# Patient Record
Sex: MALE | Race: WHITE | NOT HISPANIC OR LATINO | Employment: OTHER | URBAN - METROPOLITAN AREA
[De-identification: names, ages, dates, MRNs, and addresses within clinical notes are randomized per-mention and may not be internally consistent; named-entity substitution may affect disease eponyms.]

---

## 2017-02-02 ENCOUNTER — DOCUMENTATION ONLY (OUTPATIENT)
Dept: FAMILY MEDICINE | Facility: CLINIC | Age: 77
End: 2017-02-02

## 2017-02-02 NOTE — PROGRESS NOTES
Pre-Visit Chart Review  For Appointment Scheduled on 02/03/2017    Health Maintenance Due   Topic Date Due    Pneumococcal (65+) (2 of 2 - PCV13) 10/03/2009    Influenza Vaccine  08/01/2016

## 2017-02-03 ENCOUNTER — LAB VISIT (OUTPATIENT)
Dept: LAB | Facility: HOSPITAL | Age: 77
End: 2017-02-03
Attending: FAMILY MEDICINE
Payer: MEDICARE

## 2017-02-03 ENCOUNTER — OFFICE VISIT (OUTPATIENT)
Dept: FAMILY MEDICINE | Facility: CLINIC | Age: 77
End: 2017-02-03
Payer: MEDICARE

## 2017-02-03 VITALS
TEMPERATURE: 98 F | WEIGHT: 179 LBS | HEART RATE: 89 BPM | DIASTOLIC BLOOD PRESSURE: 74 MMHG | BODY MASS INDEX: 25.62 KG/M2 | SYSTOLIC BLOOD PRESSURE: 138 MMHG | HEIGHT: 70 IN

## 2017-02-03 DIAGNOSIS — Z12.5 SCREENING FOR PROSTATE CANCER: ICD-10-CM

## 2017-02-03 DIAGNOSIS — I10 ESSENTIAL HYPERTENSION: ICD-10-CM

## 2017-02-03 DIAGNOSIS — E78.5 HYPERLIPIDEMIA, UNSPECIFIED HYPERLIPIDEMIA TYPE: ICD-10-CM

## 2017-02-03 DIAGNOSIS — Z79.899 OTHER LONG TERM (CURRENT) DRUG THERAPY: ICD-10-CM

## 2017-02-03 DIAGNOSIS — B37.2 CANDIDAL INTERTRIGO: Primary | ICD-10-CM

## 2017-02-03 DIAGNOSIS — B37.2 CANDIDAL INTERTRIGO: ICD-10-CM

## 2017-02-03 LAB
ALBUMIN SERPL BCP-MCNC: 4.3 G/DL
ALP SERPL-CCNC: 68 U/L
ALT SERPL W/O P-5'-P-CCNC: 17 U/L
ANION GAP SERPL CALC-SCNC: 11 MMOL/L
AST SERPL-CCNC: 25 U/L
BILIRUB SERPL-MCNC: 1 MG/DL
BUN SERPL-MCNC: 16 MG/DL
CALCIUM SERPL-MCNC: 9.3 MG/DL
CHLORIDE SERPL-SCNC: 105 MMOL/L
CHOLEST/HDLC SERPL: 2.5 {RATIO}
CO2 SERPL-SCNC: 24 MMOL/L
COMPLEXED PSA SERPL-MCNC: 2.4 NG/ML
CREAT SERPL-MCNC: 0.8 MG/DL
EST. GFR  (AFRICAN AMERICAN): >60 ML/MIN/1.73 M^2
EST. GFR  (NON AFRICAN AMERICAN): >60 ML/MIN/1.73 M^2
GLUCOSE SERPL-MCNC: 150 MG/DL
HDL/CHOLESTEROL RATIO: 39.9 %
HDLC SERPL-MCNC: 153 MG/DL
HDLC SERPL-MCNC: 61 MG/DL
LDLC SERPL CALC-MCNC: 66 MG/DL
NONHDLC SERPL-MCNC: 92 MG/DL
POTASSIUM SERPL-SCNC: 3.7 MMOL/L
PROT SERPL-MCNC: 7 G/DL
SODIUM SERPL-SCNC: 140 MMOL/L
TRIGL SERPL-MCNC: 130 MG/DL

## 2017-02-03 PROCEDURE — 84153 ASSAY OF PSA TOTAL: CPT

## 2017-02-03 PROCEDURE — 99999 PR PBB SHADOW E&M-EST. PATIENT-LVL III: CPT | Mod: PBBFAC,,, | Performed by: PHYSICIAN ASSISTANT

## 2017-02-03 PROCEDURE — 83036 HEMOGLOBIN GLYCOSYLATED A1C: CPT

## 2017-02-03 PROCEDURE — 80053 COMPREHEN METABOLIC PANEL: CPT

## 2017-02-03 PROCEDURE — 99214 OFFICE O/P EST MOD 30 MIN: CPT | Mod: S$PBB,,, | Performed by: PHYSICIAN ASSISTANT

## 2017-02-03 PROCEDURE — 36415 COLL VENOUS BLD VENIPUNCTURE: CPT | Mod: PO

## 2017-02-03 PROCEDURE — 80061 LIPID PANEL: CPT

## 2017-02-03 RX ORDER — FLUCONAZOLE 150 MG/1
150 TABLET ORAL DAILY
Qty: 1 TABLET | Refills: 0 | Status: SHIPPED | OUTPATIENT
Start: 2017-02-03 | End: 2017-02-04

## 2017-02-03 RX ORDER — NYSTATIN AND TRIAMCINOLONE ACETONIDE 100000; 1 [USP'U]/G; MG/G
CREAM TOPICAL 2 TIMES DAILY
Qty: 60 G | Refills: 0 | Status: SHIPPED | OUTPATIENT
Start: 2017-02-03 | End: 2017-05-25 | Stop reason: SDUPTHER

## 2017-02-03 NOTE — PATIENT INSTRUCTIONS
Candida Skin Infection (Adult)  Candida is type of yeast. It grows naturally on the skin and in the mouth. If it grows out of control, it can cause an infection. Candida can cause infections in the genital area, skin folds, in the mouth, and under the breasts. Anyone can get this infection. It is more common in a person with a weak immune system, such as from diabetes, HIV, or cancer. Its also more common in someone who has been on antibiotic therapy. And its more common people who are overweight or who have incontinence. Wearing tight-fitting clothing and taking part in activities with lots of skin-to-skin contact can also put you at risk.  Candida causes the skin to become bright red and inflamed. The border of the infected part of the skin is often raised. The infection causes pain and itching. Sometimes the skin peels and bleeds. In the mouth, candida is called thrush, and may cause white thickened areas.  A Candida rash is most often treated with an antifungal cream or ointment. The rash will clear a few days after starting the medicine. Infections that dont go away may need a prescription medicine. In rare cases, a bacterial infection can also occur.  Home care  Your healthcare provider will recommend an antifungal cream or ointment for the rash. He or she may also prescribe a medicine for the itch. Follow all instructions for using these medicines. Dont use cornstarch powder. Cornstarch can cause the Candida infection to get worse.  General care:  · Keep your skin clean by washing the area twice a day.  · Use the cream as directed until your rash is gone. Once the skin has healed, keep it dry to prevent another infection.   · If you are overweight, talk with your healthcare provider about a plan to lose excess weight.  · Avoid clothes that fit tightly.  Follow-up care  Follow up with your healthcare provider, or as advised. Your rash will clear in 7 to 14 days. Call your healthcare provider if the rash  is not gone after 14 days.  When to seek medical advice  Call your healthcare provider right away if any of these occur:  · Pain or redness that gets worse or spreads  · Fluid coming from the skin  · Yellow crusts on the skin  · Fever of 100.4°F (38°C) or higher, or as directed by your healthcare provider  Date Last Reviewed: 9/1/2016  © 9469-5755 [employer]. [address] [place] [address], [place] [address]. All rights reserved. This information is not intended as a substitute for professional medical care. Always follow your healthcare professional's instructions.

## 2017-02-03 NOTE — PROGRESS NOTES
Subjective:       Patient ID: Iftikhar Lynn is a 76 y.o. male.    Chief Complaint: Abdominal Pain    HPI Comments: Patient with history of internal and external hemorrhoids presented for evaluation of hemorrhoids and tender rash in the groin.  He is using proctofoam without relief.  He has tried zinc oxide in the inguinal folds with some relief.      Groin Pain   The patient's primary symptoms include genital itching and testicular pain. The patient's pertinent negatives include no scrotal swelling. This is a new problem. The current episode started more than 1 month ago. The problem occurs constantly. The problem has been unchanged. Associated symptoms include anorexia and a rash. Pertinent negatives include no abdominal pain, chills, constipation, diarrhea, dysuria, fever, flank pain, frequency, hematuria, hesitancy, nausea, urgency or vomiting. The testicular pain affects both testicles. The color of the testicles is red.     Review of Systems   Constitutional: Negative for appetite change, chills, diaphoresis, fatigue and fever.   Gastrointestinal: Positive for anal bleeding and anorexia. Negative for abdominal pain, blood in stool, constipation, diarrhea, nausea and vomiting.   Genitourinary: Positive for testicular pain. Negative for decreased urine volume, difficulty urinating, dysuria, flank pain, frequency, genital sores, hematuria, hesitancy, scrotal swelling and urgency.   Skin: Positive for rash.       Objective:      Physical Exam   Constitutional: He appears well-developed and well-nourished. No distress.   Cardiovascular: Normal rate, regular rhythm and normal heart sounds.    Pulmonary/Chest: Effort normal and breath sounds normal.   Abdominal: Soft. Bowel sounds are normal. He exhibits no distension. There is no tenderness. A hernia is present. Hernia confirmed positive in the right inguinal area.   Genitourinary: Testes normal and penis normal. Rectal exam shows no external hemorrhoid.  Right testis shows no swelling and no tenderness. Left testis shows no swelling and no tenderness. No penile erythema.   Lymphadenopathy: No inguinal adenopathy noted on the right or left side.   Skin: Rash noted.   Beefy red rash with satellite lesions gluteal crease, beatriz rectal and bilateral inguinal folds     Vitals reviewed.      Assessment:       1. Candidal intertrigo    2. Essential hypertension    3. Hyperlipidemia, unspecified hyperlipidemia type    4. Screening for prostate cancer    5. Other long term (current) drug therapy         Plan:       Iftikhar was seen today for abdominal pain.    Diagnoses and all orders for this visit:    Candidal intertrigo    fluconazole (DIFLUCAN) 150 MG Tab; Take 1 tablet (150 mg total) by mouth once daily.  -     nystatin-triamcinolone (MYCOLOG II) cream; Apply topically 2 (two) times daily.        -     Essential hypertension  -     Comprehensive metabolic panel; Future  -     Lipid panel; Future  -     Urinalysis; Future  -     Hemoglobin A1c; Future    Hyperlipidemia, unspecified hyperlipidemia type  -     Comprehensive metabolic panel; Future  -     Lipid panel; Future  -     Hemoglobin A1c; Future    Screening for prostate cancer  -     PSA, Screening; Future    Other long term (current) drug therapy   -     Hemoglobin A1c; Future

## 2017-02-03 NOTE — MR AVS SNAPSHOT
[facility]  [address] [place] [address]  Phone: [phone]  Fax: [phone]                  Iftikhar Lynn   2/3/2017 8:40 AM   Office Visit    Description:  Male : 1940   Provider:  TREVON Nieves   Department:  [place] [facility]           Reason for Visit     Abdominal Pain           Diagnoses this Visit        Comments    Candidal intertrigo    -  Primary     Essential hypertension         Hyperlipidemia, unspecified hyperlipidemia type         Screening for prostate cancer         Other long term (current) drug therapy                To Do List           Future Appointments        Provider Department Dept Phone    2017 8:30 AM Chris Portillo Jr., MD [facility] [phone]      Goals (5 Years of Data)     None      Follow-Up and Disposition     Return for lab today ok, PCP follow up routine .       These Medications        Disp Refills Start End    fluconazole (DIFLUCAN) 150 MG Tab 1 tablet 0 2/3/2017 2017    Take 1 tablet (150 mg total) by mouth once daily. - Oral    Pharmacy: [facility]/pharmacy #[id number] - [place] - [address] Ph #: [phone]       nystatin-triamcinolone (MYCOLOG II) cream 60 g 0 2/3/2017     Apply topically 2 (two) times daily. - Topical (Top)    Pharmacy: [facility]/pharmacy #[id number] - [place] - [address] [employer] Ph #: [phone]         [facility] On Call     [facility] On Call Nurse Care Line -  Assistance  Registered nurses in the [facility] provide clinical advisement, health education, appointment booking, and other advisory services.  Call for this free service at [phone].             Medications           Message regarding Medications     Verify the changes and/or additions to your medication regime listed below are the same as discussed with your clinician today.  If any of these changes or additions are incorrect, please notify your healthcare provider.        START  "taking these NEW medications        Refills    fluconazole (DIFLUCAN) 150 MG Tab 0    Sig: Take 1 tablet (150 mg total) by mouth once daily.    Class: Normal    Route: Oral    nystatin-triamcinolone (MYCOLOG II) cream 0    Sig: Apply topically 2 (two) times daily.    Class: Normal    Route: Topical (Top)           Verify that the below list of medications is an accurate representation of the medications you are currently taking.  If none reported, the list may be blank. If incorrect, please contact your healthcare provider. Carry this list with you in case of emergency.           Current Medications     aspirin 81 MG chewable tablet Take 81 mg by mouth once daily.      hydrocortisone-pramoxine (PROCTOFOAM HC) rectal foam APPLY ONE APPLICATION RECTALLY TWICE DAILY AS DIRECTED    irbesartan (AVAPRO) 150 MG tablet TAKE 1 TABLET DAILY    meloxicam (MOBIC) 15 MG tablet Take 1 tablet (15 mg total) by mouth daily as needed for Pain.    omeprazole (PRILOSEC OTC) 20 MG tablet Take 20 mg by mouth once daily.     simvastatin (ZOCOR) 40 MG tablet TAKE 1 TABLET NIGHTLY    fluconazole (DIFLUCAN) 150 MG Tab Take 1 tablet (150 mg total) by mouth once daily.    FLUZONE HIGH-DOSE 2016-17, PF, 180 mcg/0.5 mL Syrg ADM 0.5ML IM UTD    nystatin-triamcinolone (MYCOLOG II) cream Apply topically 2 (two) times daily.    valacyclovir (VALTREX) 500 MG tablet Take 1 tablet (500 mg total) by mouth 2 (two) times daily.           Clinical Reference Information           Your Vitals Were     BP Pulse Temp Height Weight BMI    138/74 (BP Method: Manual) 89 98.3 °F (36.8 °C) (Oral) 5' 10" (1.778 m) 81.2 kg (179 lb 0.2 oz) 25.69 kg/m2      Blood Pressure          Most Recent Value    BP  138/74      Allergies as of 2/3/2017     Erythromycin    Iodine    Other    Shellfish Derived      Immunizations Administered on Date of Encounter - 2/3/2017     None      Orders Placed During Today's Visit     Future Labs/Procedures Expected by Expires    " Comprehensive metabolic panel  2/3/2017 4/4/2018    Hemoglobin A1c  2/3/2017 4/4/2018    Lipid panel  2/3/2017 4/4/2018    PSA, Screening  2/3/2017 4/4/2018    Urinalysis  2/3/2017 4/4/2018      Instructions      Candida Skin Infection (Adult)  Candida is type of yeast. It grows naturally on the skin and in the mouth. If it grows out of control, it can cause an infection. Candida can cause infections in the genital area, skin folds, in the mouth, and under the breasts. Anyone can get this infection. It is more common in a person with a weak immune system, such as from diabetes, HIV, or cancer. Its also more common in someone who has been on antibiotic therapy. And its more common people who are overweight or who have incontinence. Wearing tight-fitting clothing and taking part in activities with lots of skin-to-skin contact can also put you at risk.  Candida causes the skin to become bright red and inflamed. The border of the infected part of the skin is often raised. The infection causes pain and itching. Sometimes the skin peels and bleeds. In the mouth, candida is called thrush, and may cause white thickened areas.  A Candida rash is most often treated with an antifungal cream or ointment. The rash will clear a few days after starting the medicine. Infections that dont go away may need a prescription medicine. In rare cases, a bacterial infection can also occur.  Home care  Your healthcare provider will recommend an antifungal cream or ointment for the rash. He or she may also prescribe a medicine for the itch. Follow all instructions for using these medicines. Dont use cornstarch powder. Cornstarch can cause the Candida infection to get worse.  General care:  · Keep your skin clean by washing the area twice a day.  · Use the cream as directed until your rash is gone. Once the skin has healed, keep it dry to prevent another infection.   · If you are overweight, talk with your healthcare provider about a plan  to lose excess weight.  · Avoid clothes that fit tightly.  Follow-up care  Follow up with your healthcare provider, or as advised. Your rash will clear in 7 to 14 days. Call your healthcare provider if the rash is not gone after 14 days.  When to seek medical advice  Call your healthcare provider right away if any of these occur:  · Pain or redness that gets worse or spreads  · Fluid coming from the skin  · Yellow crusts on the skin  · Fever of 100.4°F (38°C) or higher, or as directed by your healthcare provider  Date Last Reviewed: 9/1/2016 © 2000-2016 [employer]. [address] [place] [address]. All rights reserved. This information is not intended as a substitute for professional medical care. Always follow your healthcare professional's instructions.             Language Assistance Services     ATTENTION: Language assistance services are available, free of charge. Please call [phone].      ATENCIÓN: Si habla enrique, tiene a shea disposición servicios gratuitos de asistencia lingüística. Llame al [phone].     BETSY Ý: N?u b?n nói Ti?ng Vi?t, có các d?ch v? h? tr? ngôn ng? mi?n phí dành cho b?n. G?i s? [phone].         [facility] complies with applicable Federal civil rights laws and does not discriminate on the basis of race, color, national origin, age, disability, or sex.

## 2017-02-05 LAB
ESTIMATED AVG GLUCOSE: 103 MG/DL
HBA1C MFR BLD HPLC: 5.2 %

## 2017-02-07 ENCOUNTER — TELEPHONE (OUTPATIENT)
Dept: FAMILY MEDICINE | Facility: CLINIC | Age: 77
End: 2017-02-07

## 2017-02-07 DIAGNOSIS — R80.9 PROTEINURIA, UNSPECIFIED TYPE: Primary | ICD-10-CM

## 2017-02-07 NOTE — TELEPHONE ENCOUNTER
Patient states he is feeling a little better. Pain on DOS 02/02/17- 3/10 he is currently at 1/10 on the pain scale. However, he would like to know how long should he expect to feel this pain/discomfort and when should he follow up if needed.

## 2017-02-07 NOTE — TELEPHONE ENCOUNTER
Patient would like parameters for pain/annoying constantly. Feels that improvement in 4 days, since 2/3/17 appointment,  has been 10%. Should he be doing anything more than using cream? Did schedule 2week followup in case needed.

## 2017-02-20 ENCOUNTER — DOCUMENTATION ONLY (OUTPATIENT)
Dept: FAMILY MEDICINE | Facility: CLINIC | Age: 77
End: 2017-02-20

## 2017-02-20 NOTE — PROGRESS NOTES
Pre-Visit Chart Review  For Appointment Scheduled on 02/21/2017    Health Maintenance Due   Topic Date Due    Pneumococcal (65+) (2 of 2 - PCV13) 10/03/2009    Influenza Vaccine  08/01/2016

## 2017-02-21 ENCOUNTER — TELEPHONE (OUTPATIENT)
Dept: FAMILY MEDICINE | Facility: CLINIC | Age: 77
End: 2017-02-21

## 2017-02-21 ENCOUNTER — HOSPITAL ENCOUNTER (OUTPATIENT)
Dept: RADIOLOGY | Facility: HOSPITAL | Age: 77
Discharge: HOME | End: 2017-02-21
Attending: FAMILY MEDICINE
Payer: MEDICARE

## 2017-02-21 ENCOUNTER — OFFICE VISIT (OUTPATIENT)
Dept: FAMILY MEDICINE | Facility: CLINIC | Age: 77
End: 2017-02-21
Payer: MEDICARE

## 2017-02-21 ENCOUNTER — LAB VISIT (OUTPATIENT)
Dept: LAB | Facility: HOSPITAL | Age: 77
End: 2017-02-21
Attending: FAMILY MEDICINE
Payer: MEDICARE

## 2017-02-21 VITALS
HEART RATE: 80 BPM | WEIGHT: 176.13 LBS | BODY MASS INDEX: 25.21 KG/M2 | TEMPERATURE: 98 F | DIASTOLIC BLOOD PRESSURE: 80 MMHG | SYSTOLIC BLOOD PRESSURE: 148 MMHG | HEIGHT: 70 IN

## 2017-02-21 DIAGNOSIS — R10.2 PELVIC PAIN: ICD-10-CM

## 2017-02-21 DIAGNOSIS — R80.9 PROTEINURIA, UNSPECIFIED TYPE: ICD-10-CM

## 2017-02-21 DIAGNOSIS — N50.819 TESTICULAR PAIN: ICD-10-CM

## 2017-02-21 DIAGNOSIS — B37.2 CANDIDAL INTERTRIGO: ICD-10-CM

## 2017-02-21 DIAGNOSIS — K64.4 EXTERNAL HEMORRHOID: ICD-10-CM

## 2017-02-21 DIAGNOSIS — R10.2 PELVIC PAIN: Primary | ICD-10-CM

## 2017-02-21 LAB
AMORPH CRY UR QL COMP ASSIST: ABNORMAL
BACTERIA #/AREA URNS AUTO: ABNORMAL /HPF
BILIRUB UR QL STRIP: NEGATIVE
CLARITY UR REFRACT.AUTO: ABNORMAL
COLOR UR AUTO: ABNORMAL
GLUCOSE UR QL STRIP: NEGATIVE
HGB UR QL STRIP: NEGATIVE
HYALINE CASTS UR QL AUTO: 0 /LPF
KETONES UR QL STRIP: NEGATIVE
LEUKOCYTE ESTERASE UR QL STRIP: NEGATIVE
MICROSCOPIC COMMENT: ABNORMAL
NITRITE UR QL STRIP: NEGATIVE
PH UR STRIP: 6 [PH] (ref 5–8)
PROT UR QL STRIP: ABNORMAL
RBC #/AREA URNS AUTO: 0 /HPF (ref 0–4)
SP GR UR STRIP: 1.02 (ref 1–1.03)
URN SPEC COLLECT METH UR: ABNORMAL
UROBILINOGEN UR STRIP-ACNC: NEGATIVE EU/DL
WBC #/AREA URNS AUTO: 0 /HPF (ref 0–5)

## 2017-02-21 PROCEDURE — 99999 PR PBB SHADOW E&M-EST. PATIENT-LVL IV: CPT | Mod: PBBFAC,,, | Performed by: PHYSICIAN ASSISTANT

## 2017-02-21 PROCEDURE — 76705 ECHO EXAM OF ABDOMEN: CPT | Mod: 26,,, | Performed by: RADIOLOGY

## 2017-02-21 PROCEDURE — 81001 URINALYSIS AUTO W/SCOPE: CPT

## 2017-02-21 PROCEDURE — 76870 US EXAM SCROTUM: CPT | Mod: TC

## 2017-02-21 PROCEDURE — 99214 OFFICE O/P EST MOD 30 MIN: CPT | Mod: S$PBB,,, | Performed by: PHYSICIAN ASSISTANT

## 2017-02-21 PROCEDURE — 76870 US EXAM SCROTUM: CPT | Mod: 26,,, | Performed by: RADIOLOGY

## 2017-02-21 PROCEDURE — 76999 ECHO EXAMINATION PROCEDURE: CPT | Mod: TC

## 2017-02-21 PROCEDURE — 87086 URINE CULTURE/COLONY COUNT: CPT

## 2017-02-21 RX ORDER — FLUCONAZOLE 150 MG/1
150 TABLET ORAL DAILY
Qty: 1 TABLET | Refills: 1 | Status: SHIPPED | OUTPATIENT
Start: 2017-02-21 | End: 2017-02-22

## 2017-02-21 RX ORDER — CIPROFLOXACIN 500 MG/1
500 TABLET ORAL 2 TIMES DAILY
Qty: 20 TABLET | Refills: 0 | Status: SHIPPED | OUTPATIENT
Start: 2017-02-21 | End: 2017-03-03

## 2017-02-21 NOTE — PROGRESS NOTES
Subjective:       Patient ID: Iftikhar Lynn is a 77 y.o. male.    Chief Complaint: Intertrigo    HPI Comments: Patient presents for follow up of external hemorrhoids, candidal intertrigo, pelvic pain and testicular pain.  He used diflucan and mycolog cream and the inguinal and gluteal crease rash improved.  He went out of town and was out of his cream for 2 days and the rash flared up.  He is having another hemorrhoid flare up and started using proctofoam cream.  He continues to have aching pain across suprapubic area, bilateral inguinal regions and testicles.  He is unable to localize the pain. He denies urinary symptoms.     Review of Systems   Constitutional: Negative for appetite change, chills, diaphoresis, fatigue and fever.   Gastrointestinal: Positive for rectal pain. Negative for abdominal distention, anal bleeding, blood in stool, constipation, diarrhea, nausea and vomiting.   Genitourinary: Positive for testicular pain. Negative for decreased urine volume, difficulty urinating, discharge, dysuria, enuresis, flank pain, frequency, genital sores, hematuria, penile pain, penile swelling, scrotal swelling and urgency.   Skin: Positive for rash.       Objective:      Physical Exam   Constitutional: He appears well-developed and well-nourished. No distress.   Abdominal: Soft. Normal appearance and bowel sounds are normal. He exhibits no distension. There is no hepatosplenomegaly. There is tenderness in the suprapubic area. There is no rigidity, no rebound, no guarding, no CVA tenderness, no tenderness at McBurney's point and negative Grissom's sign.   Genitourinary: Rectal exam shows external hemorrhoid. Right testis shows tenderness. Right testis shows no mass and no swelling. Left testis shows tenderness. Left testis shows no mass and no swelling.   Skin:   Erythema with superficial fissures bilateral inguinal folds  Gluteal crease with erythema no satellite lesions    Vitals reviewed.      Assessment:        1. Pelvic pain    2. Testicular pain    3. Candidal intertrigo    4. Proteinuria, unspecified type    5. External hemorrhoid        Plan:       Iftikhar was seen today for intertrigo.    Diagnoses and all orders for this visit:    Pelvic pain  -     Urinalysis; Future  -     Urine culture; Future  -     Ambulatory referral to Urology  -     US Scrotum And Testicles; Future    Testicular pain  -     Urinalysis; Future  -     Urine culture; Future  -     Ambulatory referral to Urology  -     US Scrotum And Testicles; Future     ciprofloxacin HCl (CIPRO) 500 MG tablet; Take 1 tablet (500 mg total) by mouth 2 (two) times daily.  Candidal intertrigo  fluconazole (DIFLUCAN) 150 MG Tab; Take 1 tablet (150 mg total) by mouth once daily.    Proteinuria, unspecified type  Repeat urine today   -    -     External hemorrhoid  Continue proctofoam

## 2017-02-21 NOTE — TELEPHONE ENCOUNTER
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

## 2017-02-21 NOTE — TELEPHONE ENCOUNTER
Reviewed results with patient; rescheduled Urology appointment with patient from 3/14/17 to 2/22/17  [id number]

## 2017-02-21 NOTE — MR AVS SNAPSHOT
[facility]  [address] [address]  Phone: [phone]  Fax: [phone]                  Iftikhar Lynn   2017 9:20 AM   Office Visit    Description:  Male : 1940   Provider:  TREVON Nieves   Department:  [place] - [facility]           Reason for Visit     Intertrigo           Diagnoses this Visit        Comments    Pelvic pain    -  Primary     Testicular pain         Candidal intertrigo         Proteinuria, unspecified type                To Do List           Future Appointments        Provider Department Dept Phone    2017 10:00 AM [facility], [place] [facility] - [facility] [phone]    2017 1:45 PM [facility] [phone]    3/14/2017 10:15 AM [facility] [phone]    2017 8:30 AM Chris Portillo Jr., MD [facility] [phone]      Goals (5 Years of Data)     None       These Medications        Disp Refills Start End    ciprofloxacin HCl (CIPRO) 500 MG tablet 20 tablet 0 2017 3/3/2017    Take 1 tablet (500 mg total) by mouth 2 (two) times daily. - Oral    Pharmacy: [facility]/pharmacy #[id number] - [place] - [address] Ph #: [phone]       fluconazole (DIFLUCAN) 150 MG Tab 1 tablet 1 2017    Take 1 tablet (150 mg total) by mouth once daily. - Oral    Pharmacy: [facility]/pharmacy #[id number] - [place] - [address] Ph #: [phone]         [facility]     [facility] Nurse Care Line -  Assistance  Registered nurses in the [facility] provide clinical advisement, health education, appointment booking, and other advisory services.  Call for this free service at [phone].             Medications           Message regarding Medications     Verify the changes and/or additions to your medication regime listed below are the same as discussed with your clinician today.  If any of these changes or  "additions are incorrect, please notify your healthcare provider.        START taking these NEW medications        Refills    ciprofloxacin HCl (CIPRO) 500 MG tablet 0    Sig: Take 1 tablet (500 mg total) by mouth 2 (two) times daily.    Class: Normal    Route: Oral    fluconazole (DIFLUCAN) 150 MG Tab 1    Sig: Take 1 tablet (150 mg total) by mouth once daily.    Class: Normal    Route: Oral           Verify that the below list of medications is an accurate representation of the medications you are currently taking.  If none reported, the list may be blank. If incorrect, please contact your healthcare provider. Carry this list with you in case of emergency.           Current Medications     aspirin 81 MG chewable tablet Take 81 mg by mouth once daily.      hydrocortisone-pramoxine (PROCTOFOAM HC) rectal foam APPLY ONE APPLICATION RECTALLY TWICE DAILY AS DIRECTED    irbesartan (AVAPRO) 150 MG tablet TAKE 1 TABLET DAILY    meloxicam (MOBIC) 15 MG tablet Take 1 tablet (15 mg total) by mouth daily as needed for Pain.    nystatin-triamcinolone (MYCOLOG II) cream Apply topically 2 (two) times daily.    omeprazole (PRILOSEC OTC) 20 MG tablet Take 20 mg by mouth once daily.     simvastatin (ZOCOR) 40 MG tablet TAKE 1 TABLET NIGHTLY    ciprofloxacin HCl (CIPRO) 500 MG tablet Take 1 tablet (500 mg total) by mouth 2 (two) times daily.    fluconazole (DIFLUCAN) 150 MG Tab Take 1 tablet (150 mg total) by mouth once daily.    FLUZONE HIGH-DOSE 2016-17, PF, 180 mcg/0.5 mL Syrg ADM 0.5ML IM UTD    valacyclovir (VALTREX) 500 MG tablet Take 1 tablet (500 mg total) by mouth 2 (two) times daily.           Clinical Reference Information           Your Vitals Were     BP Pulse Temp Height Weight BMI    148/80 (BP Method: Manual) 80 98 °F (36.7 °C) (Oral) 5' 10" (1.778 m) 79.9 kg (176 lb 2.4 oz) 25.27 kg/m2      Blood Pressure          Most Recent Value    BP  (!)  148/80      Allergies as of 2/21/2017     Erythromycin    Iodine    Other "    Shellfish Derived      Immunizations Administered on Date of Encounter - 2/21/2017     None      Orders Placed During Today's Visit      Normal Orders This Visit    Ambulatory referral to Urology     Future Labs/Procedures Expected by Expires    Urinalysis  2/21/2017 4/22/2018    Urine culture  2/21/2017 4/22/2018    US Scrotum And Testicles  2/21/2017 2/21/2018    US Soft Tissue Misc  2/21/2017 2/21/2018      Language Assistance Services     ATTENTION: Language assistance services are available, free of charge. Please call [phone].      ATENCIÓN: Si habla español, tiene a shea disposición servicios gratuitos de asistencia lingüística. Llame al [phone].     CHÚ Ý: N?u b?n nói Ti?ng Vi?t, có các d?ch v? h? tr? ngôn ng? mi?n phí dành cho b?n. G?i s? [phone].         [facility] complies with applicable Federal civil rights laws and does not discriminate on the basis of race, color, national origin, age, disability, or sex.

## 2017-02-22 ENCOUNTER — OFFICE VISIT (OUTPATIENT)
Dept: UROLOGY | Facility: CLINIC | Age: 77
End: 2017-02-22
Payer: MEDICARE

## 2017-02-22 VITALS
TEMPERATURE: 98 F | BODY MASS INDEX: 25.03 KG/M2 | HEART RATE: 84 BPM | SYSTOLIC BLOOD PRESSURE: 144 MMHG | HEIGHT: 70 IN | WEIGHT: 174.81 LBS | DIASTOLIC BLOOD PRESSURE: 83 MMHG

## 2017-02-22 DIAGNOSIS — R39.9 LOWER URINARY TRACT SYMPTOMS (LUTS): ICD-10-CM

## 2017-02-22 DIAGNOSIS — N40.0 ENLARGED PROSTATE: ICD-10-CM

## 2017-02-22 DIAGNOSIS — R10.9 ABDOMINAL PAIN, UNSPECIFIED LOCATION: ICD-10-CM

## 2017-02-22 DIAGNOSIS — I86.1 BILATERAL VARICOCELES: Primary | ICD-10-CM

## 2017-02-22 LAB
BILIRUB SERPL-MCNC: ABNORMAL MG/DL
BLOOD URINE, POC: ABNORMAL
COLOR, POC UA: ABNORMAL
GLUCOSE UR QL STRIP: ABNORMAL
KETONES UR QL STRIP: ABNORMAL
LEUKOCYTE ESTERASE URINE, POC: ABNORMAL
NITRITE, POC UA: ABNORMAL
PH, POC UA: 5
PROTEIN, POC: ABNORMAL
SPECIFIC GRAVITY, POC UA: 1.02
UROBILINOGEN, POC UA: ABNORMAL

## 2017-02-22 PROCEDURE — 81002 URINALYSIS NONAUTO W/O SCOPE: CPT | Mod: PBBFAC,PO | Performed by: UROLOGY

## 2017-02-22 PROCEDURE — 99213 OFFICE O/P EST LOW 20 MIN: CPT | Mod: PBBFAC,PO | Performed by: UROLOGY

## 2017-02-22 PROCEDURE — 99999 PR PBB SHADOW E&M-EST. PATIENT-LVL III: CPT | Mod: PBBFAC,,, | Performed by: UROLOGY

## 2017-02-22 PROCEDURE — 99203 OFFICE O/P NEW LOW 30 MIN: CPT | Mod: S$PBB,,, | Performed by: UROLOGY

## 2017-02-22 NOTE — LETTER
February 27, 2017      Dayne Galvan MD  [address]  [place] [address]           [facility]  [address]  [place] [address]  Phone: [phone]          Patient: Iftikhar Lynn   MR Number: [id number]   YOB: 1940   Date of Visit: 2/22/2017       Dear Dr. Dayne Galvan:    Thank you for referring Iftikhar Lynn to me for evaluation. Attached you will find relevant portions of my assessment and plan of care.    If you have questions, please do not hesitate to call me. I look forward to following Iftikhar Lynn along with you.    Sincerely,    Zacarias Anna MD    Enclosure  CC:  TREVON Nieves    If you would like to receive this communication electronically, please contact [email or web address] or [phone] to request more information on [employer] access.    For providers and/or their staff who would like to refer a patient to [facility], please contact us through our one-stop-shop provider referral line, [facility], at [phone].    If you feel you have received this communication in error or would no longer like to receive these types of communications, please e-mail [email or web address]

## 2017-02-22 NOTE — MR AVS SNAPSHOT
[facility]  [address]  [facility] [address]  Phone: [phone]                  Iftikhar Lynn   2017 10:45 AM   Office Visit    Description:  Male : 1940   Provider:  Zacarias Anna MD   Department:  [facility]           Reason for Visit     Follow-up           Diagnoses this Visit        Comments    Bilateral varicoceles    -  Primary     Enlarged prostate         Abdominal pain, unspecified location         Lower urinary tract symptoms (LUTS)                To Do List           Future Appointments        Provider Department Dept Phone    2017 10:15 AM [facility] CT1 LIMIT 400 LBS [facility] [phone]    2017 8:30 AM Chris Portillo Jr., MD [facility] [phone]    2017 10:00 AM Zacarias Anna MD [facility] [phone]      Goals (5 Years of Data)     None      Follow-Up and Disposition     Return in about 6 months (around 2017).      [facility]     [facility] Care [facility] - 24/7 Assistance  Registered nurses in the [facility] provide clinical advisement, health education, appointment booking, and other advisory services.  Call for this free service at [phone].             Medications           Message regarding Medications     Verify the changes and/or additions to your medication regime listed below are the same as discussed with your clinician today.  If any of these changes or additions are incorrect, please notify your healthcare provider.             Verify that the below list of medications is an accurate representation of the medications you are currently taking.  If none reported, the list may be blank. If incorrect, please contact your healthcare provider. Carry this list with you in case of emergency.           Current Medications     aspirin 81 MG chewable tablet Take 81 mg by mouth once daily.      ciprofloxacin HCl (CIPRO) 500 MG  "tablet Take 1 tablet (500 mg total) by mouth 2 (two) times daily.    fluconazole (DIFLUCAN) 150 MG Tab Take 1 tablet (150 mg total) by mouth once daily.    FLUZONE HIGH-DOSE 2016-17, PF, 180 mcg/0.5 mL Syrg ADM 0.5ML IM UTD    hydrocortisone-pramoxine (PROCTOFOAM HC) rectal foam APPLY ONE APPLICATION RECTALLY TWICE DAILY AS DIRECTED    irbesartan (AVAPRO) 150 MG tablet TAKE 1 TABLET DAILY    meloxicam (MOBIC) 15 MG tablet Take 1 tablet (15 mg total) by mouth daily as needed for Pain.    nystatin-triamcinolone (MYCOLOG II) cream Apply topically 2 (two) times daily.    omeprazole (PRILOSEC OTC) 20 MG tablet Take 20 mg by mouth once daily.     simvastatin (ZOCOR) 40 MG tablet TAKE 1 TABLET NIGHTLY    valacyclovir (VALTREX) 500 MG tablet Take 1 tablet (500 mg total) by mouth 2 (two) times daily.           Clinical Reference Information           Your Vitals Were     BP Pulse Temp Height Weight BMI    144/83 84 98 °F (36.7 °C) 5' 10" (1.778 m) 79.3 kg (174 lb 13.2 oz) 25.08 kg/m2      Blood Pressure          Most Recent Value    BP  (!)  144/83      Allergies as of 2/22/2017     Erythromycin    Iodine    Other    Shellfish Derived      Immunizations Administered on Date of Encounter - 2/22/2017     None      Orders Placed During Today's Visit      Normal Orders This Visit    POCT URINE DIPSTICK WITHOUT MICROSCOPE     Future Labs/Procedures Expected by Expires    CT Abdomen Pelvis  Without Contrast  2/22/2017 2/22/2018      Language Assistance Services     ATTENTION: Language assistance services are available, free of charge. Please call [phone].      ATENCIÓN: Si habla español, tiene a shea disposición servicios gratuitos de asistencia lingüística. Llame al [phone].     CHÚ Ý: N?u b?n nói Ti?ng Vi?t, có các d?ch v? h? tr? ngôn ng? mi?n phí dành cho b?n. G?i s? [phone].         [facility] complies with applicable Federal civil rights laws and does not discriminate on the basis of race, color, " national origin, age, disability, or sex.

## 2017-02-23 LAB — BACTERIA UR CULT: NO GROWTH

## 2017-02-24 ENCOUNTER — TELEPHONE (OUTPATIENT)
Dept: UROLOGY | Facility: CLINIC | Age: 77
End: 2017-02-24

## 2017-02-24 NOTE — TELEPHONE ENCOUNTER
----- Message from Lily Killian sent at 2/24/2017  9:51 AM CST -----  Contact: self   Patient wants to speak with a nurse regarding CT Scan please call back at [phone] (home)

## 2017-02-27 ENCOUNTER — HOSPITAL ENCOUNTER (OUTPATIENT)
Dept: RADIOLOGY | Facility: HOSPITAL | Age: 77
Discharge: HOME | End: 2017-02-27
Attending: UROLOGY
Payer: MEDICARE

## 2017-02-27 DIAGNOSIS — I86.1 BILATERAL VARICOCELES: ICD-10-CM

## 2017-02-27 DIAGNOSIS — R10.9 ABDOMINAL PAIN, UNSPECIFIED LOCATION: ICD-10-CM

## 2017-02-27 PROCEDURE — 74176 CT ABD & PELVIS W/O CONTRAST: CPT | Mod: 26,,, | Performed by: RADIOLOGY

## 2017-02-27 PROCEDURE — 74176 CT ABD & PELVIS W/O CONTRAST: CPT | Mod: TC

## 2017-02-27 NOTE — PROGRESS NOTES
[facility] Urology:    Iftikhar Lynn is a 77 y.o. male referred by Dr Galvan/Tiarra LESTER for evaluation of pelvic pain    He saw TREVON Sheth yesterday in follow up, noting inguinal rash improvement with antifungal, hemorrhoif flare up, and continued aching pain across suprapubic area, bilateral inguinal regions and testicles. He was given a course of cipro 500mg bid and a scrotal us was ordered.  Scrotal US:  The right testicle is normal in size measuring 4.1 x 1.5 x 2.2 cm on the right.  The left testicle is small in size measuring 2.0 x 1.5 x 2.2 cm.  No solid testicular mass is seen.  There is a small cyst in the superior pole of the right testicle measuring 2 mm.  Normal flow is present within both testicles.  The epididymides unremarkable.  No hydrocele is present.  Bilateral varicoceles are identified, greater on the left.    He notes about 1 month ago he started having a flare up of hemorrhoids with pain across his lower abdomen, and since has had sharp pain that comes and goes through his right groin.  He has suprapubic pain and right groin pain, unrelated to urinating, and notes he has been told he has a right groin hernia.  He also just completed 8 weeks of physical therapy for sciatica.    Denies urinary hesitancy, has some intermittency, mild pv dribble, rare nocturia (only 1x if drinking at night), denies urgency. No hematuria or dysuria. Does have genital herpes for 50 years with occasional flareup that involves right groin.    Udip with trace leuks, trace blood.  UA micro negative 2/21/17 and Ucx pending, but UA micro 2/3/17 had CaOx crystals.  No stone history.    No perineal pain, pain with ejaculation. No hematuria or dysuria    Past Medical History:   Diagnosis Date    Cancer     basal cell ca rt arm    GERD (gastroesophageal reflux disease)     Hyperlipidemia     Hypertension        Past Surgical History:   Procedure Laterality Date    BACK SURGERY      L 4 L5    SKIN CANCER  EXCISION      multiple sites, derm dr martinez, basal cell ca    VASECTOMY         Family History   Problem Relation Age of Onset    Cancer Mother      non hogkins lumphoma    Heart disease Father     Cancer Brother      melanoma, arm and leg    Clotting disorder Neg Hx     Anesthesia problems Neg Hx        Social History     Social History    Marital status:      Spouse name: N/A    Number of children: N/A    Years of education: N/A     Occupational History    Not on file.     Social History Main Topics    Smoking status: Former Smoker     Quit date: 2/15/1976    Smokeless tobacco: Not on file    Alcohol use 0.0 oz/week      Comment: daily wine    Drug use: No    Sexual activity: Not on file     Other Topics Concern    Not on file     Social History Narrative       Review of patient's allergies indicates:   Allergen Reactions    Erythromycin Other (See Comments)     NOT SURE    Iodine Hives    Other Hives     Contrast dye    Shellfish derived Hives       Medications Reviewed: see MAR    ROS:  As noted above in HPI otherwise negative x 10 systems reviewed.    PHYSICAL EXAM:    Vitals:    02/22/17 1052   BP: (!) 144/83   Pulse: 84   Temp: 98 °F (36.7 °C)     Body mass index is 25.08 kg/(m^2).    General: Alert, cooperative, no distress, appears stated age  Head: Normocephalic, without obvious abnormality, atraumatic  Neck: no masses, no thyromegaly, no lymphadenopathy  Eyes: PERRL, conjunctiva/corneas clear  Lungs: Respirations unlabored, normal effort, no accessory muscle use  CV: Warm and well perfused extremities  Abdomen: Soft, non-tender, no CVA tenderness, no hepatosplenomegaly, no appreciable hernia  Penis: phallus normal, circumcised, well cared for, no plaques or lesions.   Scrotum: no cysts, no lesions, no rash, no hydrocele, g1 R varicocele, g2 L varicocele  Epididymes: normal, nontender, symmetrical, no masses or cysts.   Testes: both descended, no masses, mild atrophy of left  testis.   Urethra: palpably normal with orthotopic meatus of normal size    COLLETTE: normal sphincter tone, no masses, no hemmorrhoids   PROSTATE: 35-40g, no nodules, non-tender, asymmetrically enlarged L>R   Extremities: Extremities normal, atraumatic, no cyanosis or edema  Skin: Normal color, texture, and turgor, no rashes or lesions  Psych: Appropriate, well oriented, normal affect, normal mood  Neuro: Non-focal    Lab Results   Component Value Date    PSA 2.4 02/03/2017    PSA 2.8 06/23/2015    PSA 2.6 04/14/2014       LABS:    POCT URINE DIPSTICK WITHOUT MICROSCOPE    Collection Time: 02/22/17  3:33 PM   Result Value Ref Range    Color y/c     Spec Grav 1.025     pH, UA 5     WBC, UA tr     Nitrite n     Protein tr     Glucose, UA n     Ketones, UA n     Urobilinogen n     Bilirubin n     Blood, UA tr        Assessment/Diagnosis:    1. Bilateral varicoceles  CT Abdomen Pelvis  Without Contrast   2. Enlarged prostate     3. Abdominal pain, unspecified location  CT Abdomen Pelvis  Without Contrast   4. Lower urinary tract symptoms (LUTS)  POCT URINE DIPSTICK WITHOUT MICROSCOPE       Plans:  He does have an enlarged prostate though minimal LUTS. I did advise that while varicoceles are generally benign, they are common on the left side and not common on the right, and in the presence of right varicocele should be evaluated for intraabdominal pathology.  As his pain complaint is more lower mid abdominal, I will image him with PO contrast. This would also evaluate any potential inguinal hernia.  He has a severe IV contrast allergy, and though fully contrasted exam may help identify lymphadenopathy or potential abdominal pathology related to R varicocele, will start with this.  As well, could have kidney or ureteral stone on right referring pain given his CaOx crystals in urine and trc leuks/trc rbs in urine, which noncon ct will eval for as well. Will chart check CT results and call with plan.  If all negative, RTC 6  months.

## 2017-03-07 ENCOUNTER — PATIENT MESSAGE (OUTPATIENT)
Dept: FAMILY MEDICINE | Facility: CLINIC | Age: 77
End: 2017-03-07

## 2017-03-07 DIAGNOSIS — K64.9 HEMORRHOIDS, UNSPECIFIED HEMORRHOID TYPE: Primary | ICD-10-CM

## 2017-03-09 ENCOUNTER — PATIENT MESSAGE (OUTPATIENT)
Dept: FAMILY MEDICINE | Facility: CLINIC | Age: 77
End: 2017-03-09

## 2017-03-22 ENCOUNTER — TELEPHONE (OUTPATIENT)
Dept: UROLOGY | Facility: CLINIC | Age: 77
End: 2017-03-22

## 2017-03-22 NOTE — TELEPHONE ENCOUNTER
Spoke to patient who is still symptomatic in right groin. Discussed concern for possible chronic nonobstructing distal ureteral stone on his CT without significant hernia presence there.  We discussed cystoscopy/RPG/possible ureteroscopy ad stone extraction if present. Would start with contrasted CT with delayed imaging for ureterogram though he has contrast allergy.  He is interested to discuss this more and will come in when he returns from work travels the first week of April.  I advised the office would call him to set up appointment for 4/6 or 4/7 and I could schedule procedure for 4/13 if that's how he would like to proceed.

## 2017-03-23 ENCOUNTER — TELEPHONE (OUTPATIENT)
Dept: UROLOGY | Facility: CLINIC | Age: 77
End: 2017-03-23

## 2017-03-23 NOTE — TELEPHONE ENCOUNTER
Tried to contact Pt via telephone. No answer but left message on answer machine for pt to call back to get scheduled on 4/6 or 4/7.

## 2017-03-24 ENCOUNTER — TELEPHONE (OUTPATIENT)
Dept: UROLOGY | Facility: CLINIC | Age: 77
End: 2017-03-24

## 2017-03-24 NOTE — TELEPHONE ENCOUNTER
Spoke with Pt. Pt scheduled on 4/10/17 @ 1051 to discuss kidney stone removal. Pt out of town the week before.

## 2017-03-31 ENCOUNTER — NURSE TRIAGE (OUTPATIENT)
Dept: ADMINISTRATIVE | Facility: CLINIC | Age: 77
End: 2017-03-31

## 2017-03-31 ENCOUNTER — TELEPHONE (OUTPATIENT)
Dept: FAMILY MEDICINE | Facility: CLINIC | Age: 77
End: 2017-03-31

## 2017-03-31 DIAGNOSIS — R05.9 COUGH: Primary | ICD-10-CM

## 2017-03-31 RX ORDER — PROMETHAZINE HYDROCHLORIDE AND PHENYLEPHRINE HYDROCHLORIDE 6.25; 5 MG/5ML; MG/5ML
5 SYRUP ORAL EVERY 6 HOURS PRN
Qty: 240 ML | Refills: 2 | Status: SHIPPED | OUTPATIENT
Start: 2017-03-31 | End: 2017-04-17 | Stop reason: ALTCHOICE

## 2017-03-31 NOTE — TELEPHONE ENCOUNTER
Mostly non productive cough with some brown sputum, some SOB.     Taking sudafed.      Would like something for the cough stating he is now having rib pain from coughing.

## 2017-03-31 NOTE — TELEPHONE ENCOUNTER
----- Message from Rena Lee sent at 3/31/2017  8:32 AM CDT -----  Contact: pt  Pt requesting a script for; Cough  Call back on # [phone]  thanks      [facility]/pharmacy #[id number] - [place] - [address]  [address] [address]  Phone: [phone] Fax: [phone]

## 2017-03-31 NOTE — TELEPHONE ENCOUNTER
Reason for Disposition   Caller has NON-URGENT medication question about med that PCP prescribed and triager unable to answer question    Protocols used: ST MEDICATION QUESTION CALL-A-  pt states he called pcp this AM re:cough med due to severe cough he has had x 3 days/ pt states ribs hurt due to coughing & he cannot sleep/ states he picke dup med and pharmacist told him it was an antihistamine/pharmacist said a med w/ codeine would be best based on what pt is complaining of    Advised pt that in order to get another med tonight UC is best option or he can be seen in AM when clinic is open    Myrna Leonard RN

## 2017-04-17 ENCOUNTER — HOSPITAL ENCOUNTER (OUTPATIENT)
Dept: RADIOLOGY | Facility: CLINIC | Age: 77
Discharge: HOME | End: 2017-04-17
Attending: FAMILY MEDICINE
Payer: MEDICARE

## 2017-04-17 ENCOUNTER — TELEPHONE (OUTPATIENT)
Dept: FAMILY MEDICINE | Facility: CLINIC | Age: 77
End: 2017-04-17

## 2017-04-17 ENCOUNTER — OFFICE VISIT (OUTPATIENT)
Dept: FAMILY MEDICINE | Facility: CLINIC | Age: 77
End: 2017-04-17
Payer: MEDICARE

## 2017-04-17 VITALS
BODY MASS INDEX: 26.14 KG/M2 | RESPIRATION RATE: 14 BRPM | WEIGHT: 182.56 LBS | DIASTOLIC BLOOD PRESSURE: 71 MMHG | OXYGEN SATURATION: 95 % | HEART RATE: 100 BPM | HEIGHT: 70 IN | SYSTOLIC BLOOD PRESSURE: 134 MMHG | TEMPERATURE: 98 F

## 2017-04-17 DIAGNOSIS — J01.00 ACUTE MAXILLARY SINUSITIS, RECURRENCE NOT SPECIFIED: ICD-10-CM

## 2017-04-17 DIAGNOSIS — R05.9 COUGH: Primary | ICD-10-CM

## 2017-04-17 DIAGNOSIS — I10 ESSENTIAL HYPERTENSION: ICD-10-CM

## 2017-04-17 DIAGNOSIS — R05.9 COUGH: ICD-10-CM

## 2017-04-17 DIAGNOSIS — R09.81 SINUS CONGESTION: ICD-10-CM

## 2017-04-17 PROCEDURE — 71020 XR CHEST PA AND LATERAL: CPT | Mod: TC,PO

## 2017-04-17 PROCEDURE — 99999 PR PBB SHADOW E&M-EST. PATIENT-LVL IV: CPT | Mod: PBBFAC,,, | Performed by: PHYSICIAN ASSISTANT

## 2017-04-17 PROCEDURE — 71020 XR CHEST PA AND LATERAL: CPT | Mod: 26,,, | Performed by: RADIOLOGY

## 2017-04-17 PROCEDURE — 99213 OFFICE O/P EST LOW 20 MIN: CPT | Mod: S$PBB,,, | Performed by: PHYSICIAN ASSISTANT

## 2017-04-17 RX ORDER — BETAMETHASONE SODIUM PHOSPHATE AND BETAMETHASONE ACETATE 3; 3 MG/ML; MG/ML
6 INJECTION, SUSPENSION INTRA-ARTICULAR; INTRALESIONAL; INTRAMUSCULAR; SOFT TISSUE
Status: COMPLETED | OUTPATIENT
Start: 2017-04-17 | End: 2017-04-17

## 2017-04-17 RX ORDER — AMOXICILLIN AND CLAVULANATE POTASSIUM 875; 125 MG/1; MG/1
1 TABLET, FILM COATED ORAL 2 TIMES DAILY
Qty: 20 TABLET | Refills: 0 | Status: SHIPPED | OUTPATIENT
Start: 2017-04-17 | End: 2017-04-27

## 2017-04-17 RX ORDER — BENZONATATE 200 MG/1
200 CAPSULE ORAL 3 TIMES DAILY PRN
Qty: 30 CAPSULE | Refills: 0 | Status: SHIPPED | OUTPATIENT
Start: 2017-04-17 | End: 2017-04-27

## 2017-04-17 RX ADMIN — BETAMETHASONE ACETATE AND BETAMETHASONE SODIUM PHOSPHATE 6 MG: 3; 3 INJECTION, SUSPENSION INTRA-ARTICULAR; INTRALESIONAL; INTRAMUSCULAR; SOFT TISSUE at 03:04

## 2017-04-17 NOTE — PROGRESS NOTES
Subjective:       Patient ID: Iftikhar Lynn is a 77 y.o. male.    Chief Complaint: Cough and Chest Congestion    Cough   This is a new problem. The current episode started 1 to 4 weeks ago. The problem has been unchanged. The problem occurs every few minutes. The cough is productive of purulent sputum. Associated symptoms include a fever, nasal congestion, postnasal drip, rhinorrhea and wheezing. Pertinent negatives include no chest pain, chills, headaches, heartburn, hemoptysis, myalgias, sore throat or shortness of breath. Nothing aggravates the symptoms. Treatments tried: prescription cough syrup, OTC cough syrup, aleve D. There is no history of asthma or COPD.     Review of Systems   Constitutional: Positive for activity change and fever. Negative for appetite change and chills.   HENT: Positive for postnasal drip, rhinorrhea and sinus pressure. Negative for sore throat.    Eyes: Negative for visual disturbance.   Respiratory: Positive for cough and wheezing. Negative for hemoptysis and shortness of breath.    Cardiovascular: Negative for chest pain.   Gastrointestinal: Negative for abdominal distention, abdominal pain and heartburn.   Musculoskeletal: Negative for myalgias.   Neurological: Negative for headaches.   Hematological: Negative for adenopathy.   Psychiatric/Behavioral: The patient is not nervous/anxious.        Objective:      Physical Exam   Constitutional: He is oriented to person, place, and time.   HENT:   Mouth/Throat: No oropharyngeal exudate.   Posterior oropharynx mildly erythematous     Eyes: Conjunctivae are normal. Pupils are equal, round, and reactive to light.   Cardiovascular: Normal rate and regular rhythm.    Pulmonary/Chest: Effort normal and breath sounds normal. He has no wheezes.   Patient coughing forcefully in exam room. Coarse breath sounds in lower lung fields.    Musculoskeletal: He exhibits no edema.   Lymphadenopathy:     He has no cervical adenopathy.    Neurological: He is alert and oriented to person, place, and time.   Skin: No erythema.   Psychiatric: His behavior is normal.       Assessment:       1. Cough    2. Essential hypertension    3. Sinus congestion    4. Acute maxillary sinusitis, recurrence not specified        Plan:     Iftikhar was seen today for cough and chest congestion.    Diagnoses and all orders for this visit:    Cough  -     benzonatate (TESSALON) 200 MG capsule; Take 1 capsule (200 mg total) by mouth 3 (three) times daily as needed for Cough.  -     betamethasone acetate-betamethasone sodium phosphate injection 6 mg; Inject 1 mL (6 mg total) into the muscle one time.  -     X-Ray Chest PA And Lateral; Future    Essential hypertension  Controlled  Low salt diet  Continue current medication  Patient advised not to take OTC decongestant as this will cause increase in blood pressure and heart rate.   Sinus congestion  -     betamethasone acetate-betamethasone sodium phosphate injection 6 mg; Inject 1 mL (6 mg total) into the muscle one time.    Acute maxillary sinusitis, recurrence not specified  -     amoxicillin-clavulanate 875-125mg (AUGMENTIN) 875-125 mg per tablet; Take 1 tablet by mouth 2 (two) times daily.    Take antibiotics with food.  Increase fluid intake.  Call the clinic if symptoms worsen, new symptoms develop or if you are not any better after completion of your antibiotics.

## 2017-04-17 NOTE — PROGRESS NOTES
Patient identified using name and . VIS given to patient and procedure was explained. Patient verbalized understanding. Celestone 1mg administered IM in the right upper outer quad gluteus using sterile technique. No residual bleeding noted. Patient tolerated procedure well.

## 2017-04-17 NOTE — TELEPHONE ENCOUNTER
----- Message from Alessio De Leon sent at 4/17/2017 12:04 PM CDT -----  Contact: same  Patient called in and stated he is still sick after 3 weeks and can't stop coughing.  Patient wanted to see if a Rx for some type of cough medicine could be called in for him.      [facility]acy [id number] - [place] - [address]  [address] [address]  Phone: [phone] Fax: [phone]    Patient call back number is [phone]

## 2017-04-17 NOTE — MR AVS SNAPSHOT
[facility]  [address]  [facility] [address]  Phone: [phone]  Fax: [phone]                  Iftikhar Lynn   2017 2:40 PM   Office Visit    Description:  Male : 1940   Provider:  Gracie Ashley PA-C   Department:  [facility] Medicine           Reason for Visit     Cough     Chest Congestion           Diagnoses this Visit        Comments    Essential hypertension    -  Primary     Cough         Sinus congestion         Acute maxillary sinusitis, recurrence not specified                To Do List           Future Appointments        Provider Department Dept Phone    2017 4:15 PM [facility] XR1 [facility]- X-Ray [phone]    2017 11:15 AM Jamil Campuzano MD [facility] General Surgery [phone]    2017 8:30 AM Chris Portillo Jr., MD [facility] [phone]    2017 10:00 AM Zacarias Anna MD [facility] - Urology [phone]      Goals (5 Years of Data)     None       These Medications        Disp Refills Start End    benzonatate (TESSALON) 200 MG capsule 30 capsule 0 2017    Take 1 capsule (200 mg total) by mouth 3 (three) times daily as needed for Cough. - Oral    Pharmacy: [facility]/pharmacy #[id number] - [place] - [address] Ph #: [phone]       amoxicillin-clavulanate 875-125mg (AUGMENTIN) 875-125 mg per tablet 20 tablet 0 2017    Take 1 tablet by mouth 2 (two) times daily. - Oral    Pharmacy: [facility]/pharmacy #[id number] - [place] - [address] Ph #: [phone]         [facility]     [employer] [facility] On Call Nurse Care Line - 24/ Assistance  Unless otherwise directed by your provider, please contact [facility], our nurse care line that is available for 24/7 assistance.     Registered nurses in the [facility] provide: appointment scheduling, clinical advisement, health education, and other advisory services.  Call: [phone] (toll free)                Medications           Message regarding Medications     Verify the changes and/or additions to your medication regime listed below are the same as discussed with your clinician today.  If any of these changes or additions are incorrect, please notify your healthcare provider.        START taking these NEW medications        Refills    benzonatate (TESSALON) 200 MG capsule 0    Sig: Take 1 capsule (200 mg total) by mouth 3 (three) times daily as needed for Cough.    Class: Normal    Route: Oral    amoxicillin-clavulanate 875-125mg (AUGMENTIN) 875-125 mg per tablet 0    Sig: Take 1 tablet by mouth 2 (two) times daily.    Class: Normal    Route: Oral      These medications were administered today        Dose Freq    betamethasone acetate-betamethasone sodium phosphate injection 6 mg 6 mg Clinic/HOD 1 time    Sig: Inject 1 mL (6 mg total) into the muscle one time.    Class: Normal    Route: Intramuscular    Cosign for Ordering: Required by Fausto Bajwa MD      STOP taking these medications     promethazine-phenylephrine (PROMETHAZINE VC) 6.25-5 mg/5 mL Syrp Take 5 mLs by mouth every 6 (six) hours as needed.           Verify that the below list of medications is an accurate representation of the medications you are currently taking.  If none reported, the list may be blank. If incorrect, please contact your healthcare provider. Carry this list with you in case of emergency.           Current Medications     aspirin 81 MG chewable tablet Take 81 mg by mouth once daily.      FLUZONE HIGH-DOSE 2016-17, PF, 180 mcg/0.5 mL Syrg ADM 0.5ML IM UTD    irbesartan (AVAPRO) 150 MG tablet TAKE 1 TABLET DAILY    meloxicam (MOBIC) 15 MG tablet Take 1 tablet (15 mg total) by mouth daily as needed for Pain.    nystatin-triamcinolone (MYCOLOG II) cream Apply topically 2 (two) times daily.    omeprazole (PRILOSEC OTC) 20 MG tablet Take 20 mg by mouth once daily.     simvastatin (ZOCOR) 40 MG tablet TAKE 1 TABLET NIGHTLY     "amoxicillin-clavulanate 875-125mg (AUGMENTIN) 875-125 mg per tablet Take 1 tablet by mouth 2 (two) times daily.    benzonatate (TESSALON) 200 MG capsule Take 1 capsule (200 mg total) by mouth 3 (three) times daily as needed for Cough.    hydrocortisone-pramoxine (PROCTOFOAM HC) rectal foam APPLY ONE APPLICATION RECTALLY TWICE DAILY AS DIRECTED    valacyclovir (VALTREX) 500 MG tablet Take 1 tablet (500 mg total) by mouth 2 (two) times daily.           Clinical Reference Information           Your Vitals Were     BP Pulse Temp Resp Height Weight    134/71 (BP Location: Right arm, Patient Position: Sitting, BP Method: Automatic) 100 98 °F (36.7 °C) (Oral) 14 5' 10" (1.778 m) 82.8 kg (182 lb 8.7 oz)    SpO2 BMI             95% 26.19 kg/m2         Blood Pressure          Most Recent Value    BP  134/71      Allergies as of 4/17/2017     Erythromycin    Iodine    Other    Shellfish Derived      Immunizations Administered on Date of Encounter - 4/17/2017     None      Orders Placed During Today's Visit     Future Labs/Procedures Expected by Expires    X-Ray Chest PA And Lateral  4/17/2017 4/17/2018      Language Assistance Services     ATTENTION: Language assistance services are available, free of charge. Please call [phone].      ATENCIÓN: Si kenjila enrique, tiene a shea disposición servicios gratuitos de asistencia lingüística. Llame al [phone].     BETSY Ý: N?u b?n nói Ti?ng Vi?t, có các d?ch v? h? tr? ngôn ng? mi?n phí dành cho b?n. G?i s? [phone].         [facility] complies with applicable Federal civil rights laws and does not discriminate on the basis of race, color, national origin, age, disability, or sex.        "

## 2017-04-18 ENCOUNTER — OFFICE VISIT (OUTPATIENT)
Dept: SURGERY | Facility: CLINIC | Age: 77
End: 2017-04-18
Payer: MEDICARE

## 2017-04-18 VITALS
SYSTOLIC BLOOD PRESSURE: 148 MMHG | BODY MASS INDEX: 25.91 KG/M2 | HEART RATE: 84 BPM | WEIGHT: 180.56 LBS | TEMPERATURE: 98 F | DIASTOLIC BLOOD PRESSURE: 79 MMHG

## 2017-04-18 DIAGNOSIS — K40.90 NON-RECURRENT UNILATERAL INGUINAL HERNIA WITHOUT OBSTRUCTION OR GANGRENE: ICD-10-CM

## 2017-04-18 DIAGNOSIS — K64.9 BLEEDING HEMORRHOIDS: Primary | ICD-10-CM

## 2017-04-18 PROCEDURE — 99212 OFFICE O/P EST SF 10 MIN: CPT | Mod: S$PBB,,, | Performed by: SURGERY

## 2017-04-18 PROCEDURE — 99213 OFFICE O/P EST LOW 20 MIN: CPT | Mod: PBBFAC,PO | Performed by: SURGERY

## 2017-04-18 PROCEDURE — 99999 PR PBB SHADOW E&M-EST. PATIENT-LVL III: CPT | Mod: PBBFAC,,, | Performed by: SURGERY

## 2017-04-18 NOTE — LETTER
April 18, 2017      Dayne Galvan MD  [address]  [place] [address]           [facility] [facility] General [facility]  [address]  [place] [address]  Phone: [phone]          Patient: Iftikhar Lynn   MR Number: [id number]   YOB: 1940   Date of Visit: 4/18/2017       Dear Dr. Dayne Galvan:    Thank you for referring Iftikhar Lynn to me for evaluation. Attached you will find relevant portions of my assessment and plan of care.    If you have questions, please do not hesitate to call me. I look forward to following Iftikhar Lynn along with you.    Sincerely,    Jamil Campuzano MD    Enclosure  CC:  No Recipients    If you would like to receive this communication electronically, please contact [email or web address] or [phone] to request more information on [employer] access.    For providers and/or their staff who would like to refer a patient to [facility], please contact us through our one-stop-shop provider referral line, [facility], at [phone].    If you feel you have received this communication in error or would no longer like to receive these types of communications, please e-mail [email or web address]

## 2017-04-18 NOTE — PROGRESS NOTES
Subjective:       Patient ID: Iftikhar Lynn is a 77 y.o. male.    Chief Complaint: Consult (hemorrhiods)    HPI  Pt presents to discuss mgmt of known asymptomatic R inguinal hernia and to discuss hemorrhoids.  Regarding his hemorrhoids he states that Dr Manning discovered it nearly a year ago.  He denies pain or discomfort from the hernia.  NO lifestyle changes because of the hernia.  Pt also with hemorrhoids for which he states that he has occasional bleeding. He notes that it occurs once every 2- 3 months and will typically only last for about 3 days.  He had a normal cscope last in 2013      Review of Systems   Constitutional: Negative for activity change, appetite change, diaphoresis, fever and unexpected weight change.   Respiratory: Negative for cough, chest tightness and wheezing.    Cardiovascular: Negative for chest pain and palpitations.   Gastrointestinal: Positive for anal bleeding. Negative for abdominal distention, abdominal pain, blood in stool, constipation, diarrhea, nausea, rectal pain and vomiting.   Genitourinary: Negative for difficulty urinating, dysuria and hematuria.   Musculoskeletal: Negative for back pain and gait problem.   Neurological: Negative for tremors and seizures.       Objective:      Physical Exam   Constitutional: He is oriented to person, place, and time. He appears well-developed and well-nourished.   HENT:   Head: Normocephalic and atraumatic.   Eyes: Pupils are equal, round, and reactive to light.   Neck: Normal range of motion. No tracheal deviation present. No thyromegaly present.   Cardiovascular: Normal rate, regular rhythm and normal heart sounds.  Exam reveals no gallop and no friction rub.    No murmur heard.  Pulmonary/Chest: Effort normal and breath sounds normal. He has no wheezes. He exhibits no tenderness.   Abdominal: He exhibits no distension and no mass. There is no tenderness. There is no rebound and no guarding. A hernia is present. Hernia confirmed  positive in the right inguinal area.   Genitourinary:   Genitourinary Comments: Ext exam demosntrates no fissure or fistula.  COLLETTE with normal sphincter tone.  Anoscopy demonstrates large int hemorrhoids in the R ant, R post and L lateral columns     Musculoskeletal: Normal range of motion.   Neurological: He is alert and oriented to person, place, and time. Coordination normal.   Skin: Skin is warm.   Psychiatric: He has a normal mood and affect.   Vitals reviewed.      Assessment:     Bleeding hemorrhoids.    No diagnosis found.    Plan:       D/w pt.  I have recommended increasing fiber in diet and to take a fiber supplement. IF bleeding continues or worsens would RTC for banding.  D/w pt that he should also consider cscope if bleeding continues.  REgarding the hernia d/w him that if it truly is asymptomatic close observation would be appropriate.  He will f/u with me prn

## 2017-04-18 NOTE — MR AVS SNAPSHOT
[facility]   [address]  [facility] [address]  Phone: [phone]                  Iftikhar Lynn   2017 11:15 AM   Office Visit    Description:  Male : 1940   Provider:  Jamil Campuzano MD   Department:  [facility]           Reason for Visit     Consult           Diagnoses this Visit        Comments    Bleeding hemorrhoids    -  Primary     Non-recurrent unilateral inguinal hernia without obstruction or gangrene                To Do List           Future Appointments        Provider Department Dept Phone    2017 8:30 AM Chris Portillo Jr., MD [facility] [phone]    2017 10:00 AM Zacarias Anna MD [facility] [phone]      Goals (5 Years of Data)     None      [facility]     [facility] Nurse Care Line -  Assistance  Unless otherwise directed by your provider, please contact [facility], our nurse care line that is available for  assistance.     Registered nurses in the [facility] On Call Center provide: appointment scheduling, clinical advisement, health education, and other advisory services.  Call: [phone] (toll free)               Medications           Message regarding Medications     Verify the changes and/or additions to your medication regime listed below are the same as discussed with your clinician today.  If any of these changes or additions are incorrect, please notify your healthcare provider.             Verify that the below list of medications is an accurate representation of the medications you are currently taking.  If none reported, the list may be blank. If incorrect, please contact your healthcare provider. Carry this list with you in case of emergency.           Current Medications     amoxicillin-clavulanate 875-125mg (AUGMENTIN) 875-125 mg per tablet Take 1 tablet by mouth 2 (two) times daily.    aspirin 81 MG chewable tablet Take 81 mg by  mouth once daily.      benzonatate (TESSALON) 200 MG capsule Take 1 capsule (200 mg total) by mouth 3 (three) times daily as needed for Cough.    FLUZONE HIGH-DOSE 2016-17, PF, 180 mcg/0.5 mL Syrg ADM 0.5ML IM UTD    hydrocortisone-pramoxine (PROCTOFOAM HC) rectal foam APPLY ONE APPLICATION RECTALLY TWICE DAILY AS DIRECTED    irbesartan (AVAPRO) 150 MG tablet TAKE 1 TABLET DAILY    meloxicam (MOBIC) 15 MG tablet Take 1 tablet (15 mg total) by mouth daily as needed for Pain.    nystatin-triamcinolone (MYCOLOG II) cream Apply topically 2 (two) times daily.    omeprazole (PRILOSEC OTC) 20 MG tablet Take 20 mg by mouth once daily.     simvastatin (ZOCOR) 40 MG tablet TAKE 1 TABLET NIGHTLY    valacyclovir (VALTREX) 500 MG tablet Take 1 tablet (500 mg total) by mouth 2 (two) times daily.           Clinical Reference Information           Your Vitals Were     BP Pulse Temp Weight BMI    148/79 84 98.1 °F (36.7 °C) 81.9 kg (180 lb 8.9 oz) 25.91 kg/m2      Blood Pressure          Most Recent Value    BP  (!)  148/79      Allergies as of 4/18/2017     Erythromycin    Iodine    Other    Shellfish Derived      Immunizations Administered on Date of Encounter - 4/18/2017     None      Language Assistance Services     ATTENTION: Language assistance services are available, free of charge. Please call [phone].      ATENCIÓN: Si habla español, tiene a shea disposición servicios gratuitos de asistencia lingüística. Llame al [phone].     CHÚ Ý: N?u b?n nói Ti?ng Vi?t, có các d?ch v? h? tr? ngôn ng? mi?n phí dành cho b?n. G?i s? [phone].         [facility] complies with applicable Federal civil rights laws and does not discriminate on the basis of race, color, national origin, age, disability, or sex.

## 2017-05-05 ENCOUNTER — PATIENT MESSAGE (OUTPATIENT)
Dept: FAMILY MEDICINE | Facility: CLINIC | Age: 77
End: 2017-05-05

## 2017-05-09 ENCOUNTER — PATIENT MESSAGE (OUTPATIENT)
Dept: FAMILY MEDICINE | Facility: CLINIC | Age: 77
End: 2017-05-09

## 2017-05-09 DIAGNOSIS — Z23 NEED FOR HEPATITIS A AND B VACCINATION: Primary | ICD-10-CM

## 2017-05-12 ENCOUNTER — TELEPHONE (OUTPATIENT)
Dept: FAMILY MEDICINE | Facility: CLINIC | Age: 77
End: 2017-05-12

## 2017-05-12 NOTE — TELEPHONE ENCOUNTER
----- Message from Sarah Salgado sent at 5/12/2017  9:21 AM CDT -----  Please call patient to schedule a injection appt for Hep A & B, [phone] (home)

## 2017-05-15 ENCOUNTER — CLINICAL SUPPORT (OUTPATIENT)
Dept: FAMILY MEDICINE | Facility: CLINIC | Age: 77
End: 2017-05-15
Payer: MEDICARE

## 2017-05-15 PROCEDURE — 90636 HEP A/HEP B VACC ADULT IM: CPT | Mod: PBBFAC,PO | Performed by: FAMILY MEDICINE

## 2017-05-15 NOTE — PROGRESS NOTES
2 patient identifiers used ( name &  ).  Administered Hep A&B vaccine left deltoid IM.  Patient tolerated well, no bleeding at insertion site noted.  Pain scale 0/10.  Aseptic technique maintained.  Vaccine information given to patient.

## 2017-05-25 RX ORDER — NYSTATIN AND TRIAMCINOLONE ACETONIDE 100000; 1 [USP'U]/G; MG/G
CREAM TOPICAL 2 TIMES DAILY
Qty: 60 G | Refills: 0 | Status: SHIPPED | OUTPATIENT
Start: 2017-05-25 | End: 2017-06-24 | Stop reason: SDUPTHER

## 2017-05-25 NOTE — TELEPHONE ENCOUNTER
----- Message from Divina Farley sent at 5/25/2017 12:39 PM CDT -----  Contact: self  Patient request refill on Nystatin Triamcinolone cream called to [facility] [place] and [address]    If any questions please call    Thanks

## 2017-06-14 ENCOUNTER — TELEPHONE (OUTPATIENT)
Dept: FAMILY MEDICINE | Facility: CLINIC | Age: 77
End: 2017-06-14

## 2017-06-14 DIAGNOSIS — Z23 NEED FOR HEPATITIS A AND B VACCINATION: Primary | ICD-10-CM

## 2017-06-14 NOTE — TELEPHONE ENCOUNTER
----- Message from Sarah Salgado sent at 6/13/2017 12:59 PM CDT -----  Please call patient to schedule 2nd Hep A-B injection, [phone] (home)

## 2017-06-15 ENCOUNTER — CLINICAL SUPPORT (OUTPATIENT)
Dept: FAMILY MEDICINE | Facility: CLINIC | Age: 77
End: 2017-06-15
Payer: MEDICARE

## 2017-06-15 PROCEDURE — 90636 HEP A/HEP B VACC ADULT IM: CPT | Mod: PBBFAC,PO

## 2017-06-15 NOTE — PROGRESS NOTES
2 patient identifiers used ( name &  ).  Administered 2nd Hep A/B vaccine IM.  Patient tolerated well, no bleeding at insertion site noted.  Pain scale 0/10.  Aseptic technique maintained.  Immunization information given to patient.

## 2017-06-16 DIAGNOSIS — E78.2 MIXED HYPERLIPIDEMIA: Primary | ICD-10-CM

## 2017-06-16 DIAGNOSIS — I10 ESSENTIAL HYPERTENSION: ICD-10-CM

## 2017-06-16 NOTE — TELEPHONE ENCOUNTER
----- Message from Kartik Calle sent at 2017 11:26 AM CDT -----  Contact: Patient  Patient states that he needs a refill for his medication for the 3 month supply, 90 day sent to the [facility], not [facility].  Its for the  IRBESARTAN 150 mg Tabs and the SIMBASTATIN 40 mg Tabs.  The prescription has  and he needs the medications now.  Can you please look in to this matter and call the patient back at [phone].  Thank you      [facility] [place] [place] - [address] AT Portal to Registered [facility]  [facility] [address]  [facility] [address]  Phone: [phone] Fax: [phone]

## 2017-06-17 RX ORDER — IRBESARTAN 150 MG/1
150 TABLET ORAL DAILY
Qty: 90 TABLET | Refills: 3 | Status: SHIPPED | OUTPATIENT
Start: 2017-06-17 | End: 2018-10-23 | Stop reason: SDUPTHER

## 2017-06-17 RX ORDER — SIMVASTATIN 40 MG/1
40 TABLET, FILM COATED ORAL NIGHTLY
Qty: 90 TABLET | Refills: 3 | Status: SHIPPED | OUTPATIENT
Start: 2017-06-17 | End: 2018-09-20 | Stop reason: SDUPTHER

## 2017-06-20 ENCOUNTER — DOCUMENTATION ONLY (OUTPATIENT)
Dept: FAMILY MEDICINE | Facility: CLINIC | Age: 77
End: 2017-06-20

## 2017-06-20 ENCOUNTER — TELEPHONE (OUTPATIENT)
Dept: FAMILY MEDICINE | Facility: CLINIC | Age: 77
End: 2017-06-20

## 2017-06-20 NOTE — PROGRESS NOTES
Pre-Visit Chart Review  For Appointment Scheduled on (date) 6/24/17    Health Maintenance Due   Topic Date Due    Pneumococcal (65+) (2 of 2 - PCV13) 10/03/2009    Colonoscopy  02/07/2014

## 2017-06-20 NOTE — TELEPHONE ENCOUNTER
----- Message from Verito Benavides sent at 6/20/2017  1:36 PM CDT -----  Contact: self [phone]  He would like to have his lab tests performed this week so Dr Portillo will have the results on Saturday.  Please call him.  Thank you!

## 2017-06-24 ENCOUNTER — OFFICE VISIT (OUTPATIENT)
Dept: FAMILY MEDICINE | Facility: CLINIC | Age: 77
End: 2017-06-24
Payer: MEDICARE

## 2017-06-24 VITALS
SYSTOLIC BLOOD PRESSURE: 150 MMHG | DIASTOLIC BLOOD PRESSURE: 80 MMHG | WEIGHT: 182.13 LBS | TEMPERATURE: 99 F | BODY MASS INDEX: 26.07 KG/M2 | HEIGHT: 70 IN | OXYGEN SATURATION: 94 % | HEART RATE: 90 BPM

## 2017-06-24 DIAGNOSIS — K21.9 GASTROESOPHAGEAL REFLUX DISEASE WITHOUT ESOPHAGITIS: ICD-10-CM

## 2017-06-24 DIAGNOSIS — I10 ESSENTIAL HYPERTENSION: Primary | ICD-10-CM

## 2017-06-24 DIAGNOSIS — C43.59 MELANOMA OF BACK: ICD-10-CM

## 2017-06-24 DIAGNOSIS — E78.2 MIXED HYPERLIPIDEMIA: ICD-10-CM

## 2017-06-24 DIAGNOSIS — R91.1 PULMONARY NODULE: ICD-10-CM

## 2017-06-24 PROCEDURE — 1126F AMNT PAIN NOTED NONE PRSNT: CPT | Mod: ,,, | Performed by: FAMILY MEDICINE

## 2017-06-24 PROCEDURE — 99999 PR PBB SHADOW E&M-EST. PATIENT-LVL III: CPT | Mod: PBBFAC,,, | Performed by: FAMILY MEDICINE

## 2017-06-24 PROCEDURE — 1159F MED LIST DOCD IN RCRD: CPT | Mod: ,,, | Performed by: FAMILY MEDICINE

## 2017-06-24 PROCEDURE — 99213 OFFICE O/P EST LOW 20 MIN: CPT | Mod: S$PBB,,, | Performed by: FAMILY MEDICINE

## 2017-06-24 PROCEDURE — 99213 OFFICE O/P EST LOW 20 MIN: CPT | Mod: PBBFAC,PO | Performed by: FAMILY MEDICINE

## 2017-06-24 RX ORDER — NYSTATIN AND TRIAMCINOLONE ACETONIDE 100000; 1 [USP'U]/G; MG/G
CREAM TOPICAL 2 TIMES DAILY
Qty: 60 G | Refills: 11 | Status: SHIPPED | OUTPATIENT
Start: 2017-06-24 | End: 2021-07-01 | Stop reason: SDUPTHER

## 2017-06-24 RX ORDER — VALACYCLOVIR HYDROCHLORIDE 500 MG/1
500 TABLET, FILM COATED ORAL 2 TIMES DAILY
COMMUNITY
End: 2021-07-01 | Stop reason: SDUPTHER

## 2017-06-25 NOTE — PROGRESS NOTES
Subjective:       Patient ID: Iftikhar Lynn is a 77 y.o. male.    Chief Complaint: Hypertension; Hyperlipidemia; and Gastroesophageal Reflux    Patient presents here for annual follow-up of hypertension, hyperlipidemia, and GERD.  His blood pressure is elevated here today but he states he has been out of his medications for the last week and just got it filled yesterday.  He states that normally it is well-controlled.  I have asked him to monitor this now that he is back on his medications, keep a log of the readings, and return to me in 2-3 weeks.  He is following his low-sodium diet.  As far as his hyperlipidemia, this is well controlled with his present dose of simvastatin 40 mg every evening as well as a low-fat low-cholesterol diet.  His weight is stable since his last visit here.  His GERD is well controlled with his present dose of omeprazole.  Since I saw him last, he did have a melanoma removed from his back and he is seen by the dermatologist every 6 months.  It was a very low-grade tumor.  He also has been having some chronic intermittent right lower quadrant pain for which she has had a workup and no etiology can be found.  He was found to have a small stone in the ureter at the ureterovesical junction but there did not seem to be any obstruction.  The urologist he saw was not sure that this was causing a problem and the patient elected not to have a procedure to remove it.  He denies any fever or chills.  He states it is usually brought on by certain activities or even certain foods.  It has been no change in his bowel or bladder habits.  As far as screening, he is up-to-date with all his routine screening except that he does need to update his pneumococcal vaccine and colonoscopy.  He states he thinks he had a second Pneumovax and he will check his files at home.  It should be noted that he had a CT of the abdomen for workup of his chronic abdominal pain.  On the CT which showed the lower  portions of the lung feels, there was a small nodule which was suggested that it needs follow-up in 3 months, which is about now.  He will be scheduled for a repeat CT of the chest to evaluate the nodule.      Hypertension   This is a chronic problem. The problem is unchanged. The problem is controlled. Pertinent negatives include no chest pain, headaches, palpitations or shortness of breath. Past treatments include angiotensin blockers. The current treatment provides moderate improvement. There are no compliance problems.  There is no history of kidney disease, CAD/MI, CVA or heart failure.   Hyperlipidemia   This is a chronic problem. The problem is controlled. Recent lipid tests were reviewed and are normal. Pertinent negatives include no chest pain or shortness of breath. Current antihyperlipidemic treatment includes statins. The current treatment provides moderate improvement of lipids. There are no compliance problems.  Risk factors for coronary artery disease include dyslipidemia, hypertension and male sex.     Review of Systems   Constitutional: Negative for chills, fatigue, fever and unexpected weight change.   HENT: Negative for congestion, ear pain, postnasal drip and sore throat.    Respiratory: Negative for cough and shortness of breath.    Cardiovascular: Negative for chest pain and palpitations.   Gastrointestinal: Negative for abdominal pain, blood in stool, constipation, diarrhea, nausea and vomiting.        Intermittent chronic right lower quadrant pain   Genitourinary: Negative for difficulty urinating, dysuria, flank pain and frequency.   Musculoskeletal: Positive for back pain. Negative for arthralgias.   Neurological: Negative for dizziness, syncope, light-headedness and headaches.   Psychiatric/Behavioral: Negative for sleep disturbance. The patient is not nervous/anxious.        Objective:      Physical Exam   Constitutional: He is oriented to person, place, and time. He appears  well-developed and well-nourished. No distress.   HENT:   Head: Normocephalic and atraumatic.   Right Ear: External ear normal.   Left Ear: External ear normal.   Nose: Nose normal.   Mouth/Throat: Oropharynx is clear and moist.   Neck: Normal range of motion. Neck supple. No thyromegaly present.   Cardiovascular: Normal rate, regular rhythm, normal heart sounds and intact distal pulses.    No murmur heard.  Pulmonary/Chest: Effort normal and breath sounds normal. He has no wheezes. He has no rales.   Abdominal: Soft. Bowel sounds are normal. There is no tenderness. There is no rebound and no guarding.   Musculoskeletal: Normal range of motion. He exhibits no edema.   Lymphadenopathy:     He has no cervical adenopathy.   Neurological: He is alert and oriented to person, place, and time. No cranial nerve deficit.   Vitals reviewed.      Assessment:       1. Essential hypertension    2. Mixed hyperlipidemia    3. Gastroesophageal reflux disease without esophagitis    4. Melanoma of back        Plan:       1.  Continue present medications as his hypertension, hyperlipidemia, and GERD are well controlled  2.  Monitor blood pressure at home, keep a log of the readings, and return to me in 3 weeks  3.  Continue low-sodium, low-fat low-cholesterol diet  4.  Get colonoscopy done this year  5.  He is to return a copy of his pneumococcal vaccination to me so that we can update his records   6.  Follow-up with me in one year or when necessary        Patient readiness: acceptance and barriers:none    During the course of the visit the patient was educated and counseled about the following:     Hypertension:   Dietary sodium restriction.  Regular aerobic exercise.  Follow up: 1 year and as needed.    Goals: Hypertension: Reduce Blood Pressure    Did patient meet goals/outcomes: Yes    The following self management tools provided: blood pressure log    Patient Instructions (the written plan) was given to the patient/family.      Time spent with patient: 30 minutes

## 2017-07-11 ENCOUNTER — TELEPHONE (OUTPATIENT)
Dept: FAMILY MEDICINE | Facility: CLINIC | Age: 77
End: 2017-07-11

## 2017-07-11 ENCOUNTER — HOSPITAL ENCOUNTER (OUTPATIENT)
Dept: RADIOLOGY | Facility: HOSPITAL | Age: 77
Discharge: HOME | End: 2017-07-11
Attending: FAMILY MEDICINE
Payer: MEDICARE

## 2017-07-11 DIAGNOSIS — R91.1 PULMONARY NODULE: ICD-10-CM

## 2017-07-11 PROBLEM — I70.0 ABDOMINAL AORTIC ATHEROSCLEROSIS: Status: ACTIVE | Noted: 2017-07-11

## 2017-07-11 PROCEDURE — 71250 CT THORAX DX C-: CPT | Mod: 26,,, | Performed by: RADIOLOGY

## 2017-07-11 PROCEDURE — 71250 CT THORAX DX C-: CPT | Mod: TC

## 2017-07-11 NOTE — TELEPHONE ENCOUNTER
----- Message from TREVON Elena sent at 7/11/2017 11:06 AM CDT -----  CT of the chest shows a stable lung nodule and patient should have a repeat CT in 9 months.  There are some benign and stable, liver cysts also seen but nothing to worry about

## 2017-07-12 ENCOUNTER — TELEPHONE (OUTPATIENT)
Dept: FAMILY MEDICINE | Facility: CLINIC | Age: 77
End: 2017-07-12

## 2017-08-28 ENCOUNTER — TELEPHONE (OUTPATIENT)
Dept: FAMILY MEDICINE | Facility: CLINIC | Age: 77
End: 2017-08-28

## 2017-08-28 NOTE — TELEPHONE ENCOUNTER
Attempted to schedule an appt for patient on Wed but patient refused.  Patient states he just don't think he needs to come in and would like to know what to watch for.  Advised patient to make sure he keeps it clean and if it is getting worse to call the office

## 2017-08-28 NOTE — TELEPHONE ENCOUNTER
----- Message from Jessica Goodrich sent at 8/28/2017  1:17 PM CDT -----  Contact: Patient  Patient called advising that he ran into a wire that was holding a tree up last Thursday, 8/24/17.  Patient advised that its about a 4X4 area affected on his left leg above the ankle.  He said he thought it was healing, but now it looks red and is concerned about infection.  Please call patient back at [phone] to discuss what can be done for his leg.  Thank you!

## 2017-11-13 DIAGNOSIS — Z23 NEED FOR HEPATITIS A AND B VACCINATION: Primary | ICD-10-CM

## 2017-11-14 ENCOUNTER — TELEPHONE (OUTPATIENT)
Dept: FAMILY MEDICINE | Facility: CLINIC | Age: 77
End: 2017-11-14

## 2017-11-14 NOTE — TELEPHONE ENCOUNTER
----- Message from Salma Stanley sent at 11/13/2017  1:04 PM CST -----  Contact: Patient's wife, Sameera  Patient's wife is calling to schedule patients last Hepatitis A&B injection. (I also sent a message for his wife to schedule hers as well).  Call Back# [phone]  Thanks

## 2017-11-20 ENCOUNTER — CLINICAL SUPPORT (OUTPATIENT)
Dept: FAMILY MEDICINE | Facility: CLINIC | Age: 77
End: 2017-11-20
Payer: MEDICARE

## 2017-11-20 DIAGNOSIS — Z23 NEED FOR HEPATITIS A AND B VACCINATION: Primary | ICD-10-CM

## 2017-11-20 PROCEDURE — 90636 HEP A/HEP B VACC ADULT IM: CPT | Mod: PBBFAC,PO

## 2017-11-20 NOTE — PROGRESS NOTES
2 patient identifiers used ( name &  ).  Administered 3rd Hep A/B ( Twinrix ) IM left deltoid.  Patient tolerated well.  No bleeding at insertion site noted.  Pain scale 0/10.  Allergies reviewed.    Aseptic technique maintained

## 2017-12-13 ENCOUNTER — TELEPHONE (OUTPATIENT)
Dept: GASTROENTEROLOGY | Facility: CLINIC | Age: 77
End: 2017-12-13

## 2017-12-13 DIAGNOSIS — Z86.010 HISTORY OF COLON POLYPS: Primary | ICD-10-CM

## 2017-12-15 ENCOUNTER — TELEPHONE (OUTPATIENT)
Dept: FAMILY MEDICINE | Facility: CLINIC | Age: 77
End: 2017-12-15

## 2017-12-15 DIAGNOSIS — R91.1 PULMONARY NODULE: Primary | ICD-10-CM

## 2017-12-15 NOTE — TELEPHONE ENCOUNTER
Patient had a CT scan done in 7/2017 to evaluate a lung nodule. 9 month f/u CT scan was recommended and will be due in April 2018.  Patient requesting to schedule in advance so he will not forget to have it done. Please place order so appointment can be scheduled.

## 2017-12-15 NOTE — TELEPHONE ENCOUNTER
----- Message from Sherry Chung sent at 12/15/2017  8:53 AM CST -----  Contact: self   Patient states he needs a follow up Xray   Please call back [phone] (home)

## 2017-12-18 ENCOUNTER — TELEPHONE (OUTPATIENT)
Dept: GASTROENTEROLOGY | Facility: CLINIC | Age: 77
End: 2017-12-18

## 2017-12-18 NOTE — TELEPHONE ENCOUNTER
----- Message from Sadia Winston sent at 12/18/2017 11:49 AM CST -----  Contact: Wife  Sameera, wife [phone] home, [phone] cell, calling to speak with office regarding colonoscopy 12/20/17, she has a few questions. Please advise. Thanks.

## 2017-12-20 ENCOUNTER — HOSPITAL ENCOUNTER (OUTPATIENT)
Facility: HOSPITAL | Age: 77
Discharge: HOME | End: 2017-12-20
Attending: INTERNAL MEDICINE | Admitting: INTERNAL MEDICINE
Payer: MEDICARE

## 2017-12-20 ENCOUNTER — ANESTHESIA (OUTPATIENT)
Dept: ENDOSCOPY | Facility: HOSPITAL | Age: 77
End: 2017-12-20
Payer: MEDICARE

## 2017-12-20 ENCOUNTER — SURGERY (OUTPATIENT)
Age: 77
End: 2017-12-20

## 2017-12-20 ENCOUNTER — ANESTHESIA EVENT (OUTPATIENT)
Dept: ENDOSCOPY | Facility: HOSPITAL | Age: 77
End: 2017-12-20
Payer: MEDICARE

## 2017-12-20 VITALS
TEMPERATURE: 98 F | HEIGHT: 72 IN | DIASTOLIC BLOOD PRESSURE: 75 MMHG | OXYGEN SATURATION: 99 % | RESPIRATION RATE: 18 BRPM | BODY MASS INDEX: 24.11 KG/M2 | WEIGHT: 178 LBS | HEART RATE: 61 BPM | SYSTOLIC BLOOD PRESSURE: 140 MMHG

## 2017-12-20 DIAGNOSIS — K64.8 INTERNAL HEMORRHOIDS: ICD-10-CM

## 2017-12-20 DIAGNOSIS — K63.5 POLYP OF COLON, UNSPECIFIED PART OF COLON, UNSPECIFIED TYPE: Primary | ICD-10-CM

## 2017-12-20 DIAGNOSIS — Z86.010 HX OF COLONIC POLYPS: ICD-10-CM

## 2017-12-20 PROBLEM — Z86.0100 HX OF COLONIC POLYPS: Status: ACTIVE | Noted: 2017-12-20

## 2017-12-20 PROCEDURE — 45380 COLONOSCOPY AND BIOPSY: CPT | Mod: 59,,, | Performed by: INTERNAL MEDICINE

## 2017-12-20 PROCEDURE — D9220A PRA ANESTHESIA: Mod: PT,CRNA,, | Performed by: NURSE ANESTHETIST, CERTIFIED REGISTERED

## 2017-12-20 PROCEDURE — 27201089 HC SNARE, DISP (ANY): Performed by: INTERNAL MEDICINE

## 2017-12-20 PROCEDURE — 27201012 HC FORCEPS, HOT/COLD, DISP: Performed by: INTERNAL MEDICINE

## 2017-12-20 PROCEDURE — 45385 COLONOSCOPY W/LESION REMOVAL: CPT | Performed by: INTERNAL MEDICINE

## 2017-12-20 PROCEDURE — D9220A PRA ANESTHESIA: Mod: PT,ANES,, | Performed by: ANESTHESIOLOGY

## 2017-12-20 PROCEDURE — 25000003 PHARM REV CODE 250: Performed by: INTERNAL MEDICINE

## 2017-12-20 PROCEDURE — 37000009 HC ANESTHESIA EA ADD 15 MINS: Performed by: INTERNAL MEDICINE

## 2017-12-20 PROCEDURE — 63600175 PHARM REV CODE 636 W HCPCS: Performed by: NURSE ANESTHETIST, CERTIFIED REGISTERED

## 2017-12-20 PROCEDURE — 45385 COLONOSCOPY W/LESION REMOVAL: CPT | Mod: PT,,, | Performed by: INTERNAL MEDICINE

## 2017-12-20 PROCEDURE — 45380 COLONOSCOPY AND BIOPSY: CPT | Performed by: INTERNAL MEDICINE

## 2017-12-20 PROCEDURE — 37000008 HC ANESTHESIA 1ST 15 MINUTES: Performed by: INTERNAL MEDICINE

## 2017-12-20 PROCEDURE — 88305 TISSUE EXAM BY PATHOLOGIST: CPT | Performed by: PATHOLOGY

## 2017-12-20 RX ORDER — PROPOFOL 10 MG/ML
VIAL (ML) INTRAVENOUS
Status: DISCONTINUED | OUTPATIENT
Start: 2017-12-20 | End: 2017-12-20

## 2017-12-20 RX ORDER — SODIUM CHLORIDE 9 MG/ML
INJECTION, SOLUTION INTRAVENOUS CONTINUOUS
Status: DISCONTINUED | OUTPATIENT
Start: 2017-12-20 | End: 2017-12-20 | Stop reason: HOSPADM

## 2017-12-20 RX ORDER — LIDOCAINE HYDROCHLORIDE 20 MG/ML
INJECTION, SOLUTION EPIDURAL; INFILTRATION; INTRACAUDAL; PERINEURAL
Status: DISCONTINUED
Start: 2017-12-20 | End: 2017-12-20 | Stop reason: HOSPADM

## 2017-12-20 RX ORDER — LIDOCAINE HCL/PF 100 MG/5ML
SYRINGE (ML) INTRAVENOUS
Status: DISCONTINUED | OUTPATIENT
Start: 2017-12-20 | End: 2017-12-20

## 2017-12-20 RX ORDER — PROPOFOL 10 MG/ML
INJECTION, EMULSION INTRAVENOUS
Status: COMPLETED
Start: 2017-12-20 | End: 2017-12-20

## 2017-12-20 RX ADMIN — LIDOCAINE HYDROCHLORIDE 100 MG: 20 INJECTION, SOLUTION INTRAVENOUS at 10:12

## 2017-12-20 RX ADMIN — PROPOFOL 50 MG: 10 INJECTION, EMULSION INTRAVENOUS at 11:12

## 2017-12-20 RX ADMIN — PROPOFOL 50 MG: 10 INJECTION, EMULSION INTRAVENOUS at 10:12

## 2017-12-20 RX ADMIN — PROPOFOL 100 MG: 10 INJECTION, EMULSION INTRAVENOUS at 10:12

## 2017-12-20 RX ADMIN — SODIUM CHLORIDE: 0.9 INJECTION, SOLUTION INTRAVENOUS at 10:12

## 2017-12-20 NOTE — ANESTHESIA PREPROCEDURE EVALUATION
12/20/2017  Iftikhar Lynn is a 77 y.o., male.    Anesthesia Evaluation    I have reviewed the Patient Summary Reports.    I have reviewed the Nursing Notes.   I have reviewed the Medications.     Review of Systems  Anesthesia Hx:  No problems with previous Anesthesia    Social:  Non-Smoker    Cardiovascular:   Hypertension    Pulmonary:  Pulmonary Normal    Renal/:  Renal/ Normal     Hepatic/GI:   GERD    Neurological:  Neurology Normal    Endocrine:  Endocrine Normal        Physical Exam  General:  Well nourished    Airway/Jaw/Neck:  Airway Findings: Mouth Opening: Normal Tongue: Normal  General Airway Assessment: Adult  Oropharynx Findings:  Mallampati: II  Jaw/Neck Findings:  Neck ROM: Normal ROM     Eyes/Ears/Nose:  Eyes/Ears/Nose Findings:    Dental:  Dental Findings:   Chest/Lungs:  Chest/Lungs Findings: Normal Respiratory Rate     Heart/Vascular:  Heart Findings: Rate: Normal  Rhythm: Regular Rhythm        Mental Status:  Mental Status Findings:  Cooperative, Alert and Oriented         Anesthesia Plan  Type of Anesthesia, risks & benefits discussed:  Anesthesia Type:  general  Patient's Preference:   Intra-op Monitoring Plan:   Intra-op Monitoring Plan Comments:   Post Op Pain Control Plan: multimodal analgesia  Post Op Pain Control Plan Comments:   Induction:   IV  Beta Blocker:  Patient is not currently on a Beta-Blocker (No further documentation required).       Informed Consent: Patient understands risks and agrees with Anesthesia plan.  Questions answered. Anesthesia consent signed with patient.  ASA Score: 2     Day of Surgery Review of History & Physical:  There are no significant changes.   H&P completed by Anesthesiologist.       Ready For Surgery From Anesthesia Perspective.

## 2017-12-20 NOTE — H&P
CC: History of colon polyps - last scope 2013    77 year old male with above. States that symptoms are absent, no alleviating/exacerbating factors. No family history of CA. Positive personal history of polyps. No bleeding or weight loss.     ROS:  No headache, no fever/chills, no chest pain/SOB, no nausea/vomiting/diarrhea/constipation/GI bleeding/abdominal pain, no dysuria/hematuria.    VSSAF   Exam:   Alert and oriented x 3; no apparent distress   PERRLA, sclera anicteric  CV: Regular rate/rhythm, normal PMI   Lungs: Clear bilaterally with no wheeze/rales   Abdomen: Soft, NT/ND, normal bowel sounds   Ext: No cyanosis, clubbing     Impression:   As above    Plan:   Proceed with endoscopy. Further recs to follow.

## 2017-12-20 NOTE — ANESTHESIA POSTPROCEDURE EVALUATION
Anesthesia Post Evaluation    Patient: Iftikhar Lynn    Procedure(s) Performed: Procedure(s) (LRB):  COLONOSCOPY (N/A)    Final Anesthesia Type: general  Patient location during evaluation: PACU  Patient participation: Yes- Able to Participate  Level of consciousness: awake and alert and oriented  Post-procedure vital signs: reviewed and stable  Pain management: adequate  Airway patency: patent  PONV status at discharge: No PONV  Anesthetic complications: no      Cardiovascular status: blood pressure returned to baseline and stable  Respiratory status: unassisted and spontaneous ventilation  Hydration status: euvolemic  Follow-up not needed.        Visit Vitals  BP (!) 140/75   Pulse 61   Temp 36.9 °C (98.4 °F) (Temporal)   Resp 18   Ht 6' (1.829 m)   Wt 80.7 kg (178 lb)   SpO2 99%   BMI 24.14 kg/m²       Pain/Alanna Score: Pain Assessment Performed: Yes (12/20/2017 11:40 AM)  Presence of Pain: denies (12/20/2017 11:40 AM)  Alanna Score: 10 (12/20/2017 11:40 AM)

## 2017-12-20 NOTE — OR NURSING
Have you had a colonoscopy LESS THAN 3 years ago?   * If YES, answer these questions*:    NO    1. Did patient have a prior colonic polyp in a previous surveillance/diagnostic colonoscopy and is 18 years or older on date of encounter?      YES  2. Documentation of < 3 year interval since the patients last colonoscopy due to medical reasons (eg., last colonoscopy incomplete, last colonoscopy had inadequate prep, piecemeal removal of adenomas, or last colonoscopy found > 10 adenomas) ?     LAST COLONOSCOPY 2013

## 2017-12-27 ENCOUNTER — PATIENT MESSAGE (OUTPATIENT)
Dept: GASTROENTEROLOGY | Facility: CLINIC | Age: 77
End: 2017-12-27

## 2018-02-06 ENCOUNTER — OFFICE VISIT (OUTPATIENT)
Dept: ORTHOPEDICS | Facility: CLINIC | Age: 78
End: 2018-02-06
Payer: MEDICARE

## 2018-02-06 ENCOUNTER — HOSPITAL ENCOUNTER (OUTPATIENT)
Dept: RADIOLOGY | Facility: HOSPITAL | Age: 78
Discharge: HOME | End: 2018-02-06
Attending: ORTHOPAEDIC SURGERY
Payer: MEDICARE

## 2018-02-06 VITALS
WEIGHT: 178 LBS | BODY MASS INDEX: 24.11 KG/M2 | SYSTOLIC BLOOD PRESSURE: 168 MMHG | DIASTOLIC BLOOD PRESSURE: 80 MMHG | HEART RATE: 70 BPM | HEIGHT: 72 IN

## 2018-02-06 DIAGNOSIS — M76.892 TENDONITIS OF KNEE, LEFT: ICD-10-CM

## 2018-02-06 DIAGNOSIS — M25.569 KNEE PAIN, UNSPECIFIED CHRONICITY, UNSPECIFIED LATERALITY: ICD-10-CM

## 2018-02-06 DIAGNOSIS — M25.569 KNEE PAIN, UNSPECIFIED CHRONICITY, UNSPECIFIED LATERALITY: Primary | ICD-10-CM

## 2018-02-06 DIAGNOSIS — M17.12 ARTHRITIS OF KNEE, LEFT: Primary | ICD-10-CM

## 2018-02-06 PROCEDURE — 73562 X-RAY EXAM OF KNEE 3: CPT | Mod: TC,PN,RT

## 2018-02-06 PROCEDURE — 99203 OFFICE O/P NEW LOW 30 MIN: CPT | Mod: S$PBB,,, | Performed by: ORTHOPAEDIC SURGERY

## 2018-02-06 PROCEDURE — 99999 PR PBB SHADOW E&M-EST. PATIENT-LVL III: CPT | Mod: PBBFAC,,, | Performed by: ORTHOPAEDIC SURGERY

## 2018-02-06 PROCEDURE — 1159F MED LIST DOCD IN RCRD: CPT | Mod: ,,, | Performed by: ORTHOPAEDIC SURGERY

## 2018-02-06 PROCEDURE — 99213 OFFICE O/P EST LOW 20 MIN: CPT | Mod: PBBFAC,25,PN | Performed by: ORTHOPAEDIC SURGERY

## 2018-02-06 PROCEDURE — 1125F AMNT PAIN NOTED PAIN PRSNT: CPT | Mod: ,,, | Performed by: ORTHOPAEDIC SURGERY

## 2018-02-06 PROCEDURE — 73564 X-RAY EXAM KNEE 4 OR MORE: CPT | Mod: 26,LT,, | Performed by: RADIOLOGY

## 2018-02-06 PROCEDURE — 73562 X-RAY EXAM OF KNEE 3: CPT | Mod: 26,59,RT, | Performed by: RADIOLOGY

## 2018-02-07 NOTE — PROGRESS NOTES
Past Medical History:   Diagnosis Date    Cancer     basal cell ca rt arm / melanoma on back    GERD (gastroesophageal reflux disease)     Hyperlipidemia     Hypertension        Past Surgical History:   Procedure Laterality Date    BACK SURGERY      L 4 L5    COLONOSCOPY N/A 12/20/2017    Procedure: COLONOSCOPY;  Surgeon: Filipe Ramirez MD;  Location: [facility];  Service: Endoscopy;  Laterality: N/A;    SKIN CANCER EXCISION      multiple sites, derm dr martinez, basal cell ca    VASECTOMY         Current Outpatient Prescriptions   Medication Sig    aspirin 81 MG chewable tablet Take 81 mg by mouth once daily.      hydrocortisone-pramoxine (PROCTOFOAM HC) rectal foam APPLY ONE APPLICATION RECTALLY TWICE DAILY AS DIRECTED    irbesartan (AVAPRO) 150 MG tablet Take 1 tablet (150 mg total) by mouth once daily.    nystatin-triamcinolone (MYCOLOG II) cream Apply topically 2 (two) times daily.    omeprazole (PRILOSEC OTC) 20 MG tablet Take 20 mg by mouth once daily.     simvastatin (ZOCOR) 40 MG tablet Take 1 tablet (40 mg total) by mouth nightly.    valacyclovir (VALTREX) 500 MG tablet Take 500 mg by mouth 2 (two) times daily.     No current facility-administered medications for this visit.        Review of patient's allergies indicates:   Allergen Reactions    Erythromycin Other (See Comments)     NOT SURE    Iodine Hives    Other Hives     Contrast dye    Shellfish derived Hives       Family History   Problem Relation Age of Onset    Cancer Mother      non hogkins lumphoma    Heart disease Father     Cancer Brother      melanoma, arm and leg    Clotting disorder Neg Hx     Anesthesia problems Neg Hx        Social History     Social History    Marital status:      Spouse name: N/A    Number of children: N/A    Years of education: N/A     Occupational History    Not on file.     Social History Main Topics    Smoking status: Former Smoker     Quit date: 2/15/1976    Smokeless tobacco:  Never Used    Alcohol use 0.0 oz/week      Comment: daily wine    Drug use: No    Sexual activity: Not on file     Other Topics Concern    Not on file     Social History Narrative    No narrative on file       Chief Complaint:   Chief Complaint   Patient presents with    Left Knee - Pain       Consulting Physician: Self, Aaareferral    History of present illness:    This is a 77 y.o. male who complains of left knee pain since clearing brush 1-15-18. No injury. Pain 5/10 and worse at end of day.    Review of Systems:    Constitution: Denies chills, fever, and sweats.  HENT: Denies headaches or blurry vision.  Cardiovascular: Denies chest pain or irregular heart beat.  Respiratory: Denies cough or shortness of breath.  Gastrointestinal: Denies abdominal pain, nausea, or vomiting.  Musculoskeletal:  Denies muscle cramps.  Neurological: Denies dizziness or focal weakness.  Psychiatric/Behavioral: Normal mental status.  Hematologic/Lymphatic: Denies bleeding problem or easy bruising/bleeding.  Skin: Denies rash or suspicious lesions.    Examination:    Vital Signs:    Vitals:    02/06/18 1434   BP: (!) 168/80   Pulse: 70   Weight: 80.7 kg (178 lb)   Height: 6' (1.829 m)   PainSc:   5   PainLoc: Knee       Body mass index is 24.14 kg/m².    This a well-developed, well nourished patient in no acute distress.    Alert and oriented x 3 and cooperative to examination.       Physical Exam: Left Knee Exam    Gait   Antalgic    Skin  Rash:   None  Scars:   None    Inspection  Erythema:  None  Bruising:  None  Effusion:  None  Masses:  None  Lymphadenopathy: None    Range of Motion: 0 to 130°    Medial Joint : n  Lateral Joint : n    Patellofemoral Tenderness: Yes  Patellofemoral Crepitus: Yes    Tender hamstring insertion    Lachman:  Normal  Anterior Drawer: Normal  Posterior Drawer: Normal    Eulogio's:  Negative  Apley's:  Negative    Varus Stress:  Stable  Valgus  Stress:  Stable    Strength:  5/5    Pulses:  Palpable  Sensation:  Intact          Imaging: X-rays ordered and reviewed today personally of the left knee show mild medial DJD.        Assessment: Arthritis of knee, left    Tendonitis of knee, left        Plan:  Likely hamstring insertional tendonitis. Will give voltaren rx. Declined injection and PT. PRN.      DISCLAIMER: This note may have been dictated using voice recognition software and may contain grammatical errors.     NOTE: Consult report sent to referring provider via [facility] EMR.

## 2018-03-14 ENCOUNTER — OFFICE VISIT (OUTPATIENT)
Dept: SURGERY | Facility: CLINIC | Age: 78
End: 2018-03-14
Payer: MEDICARE

## 2018-03-14 ENCOUNTER — HOSPITAL ENCOUNTER (OUTPATIENT)
Dept: PREADMISSION TESTING | Facility: HOSPITAL | Age: 78
Discharge: HOME | End: 2018-03-14
Attending: SURGERY
Payer: MEDICARE

## 2018-03-14 VITALS — WEIGHT: 180 LBS | BODY MASS INDEX: 24.38 KG/M2 | HEIGHT: 72 IN

## 2018-03-14 VITALS
WEIGHT: 184.5 LBS | BODY MASS INDEX: 25.03 KG/M2 | HEART RATE: 78 BPM | DIASTOLIC BLOOD PRESSURE: 70 MMHG | SYSTOLIC BLOOD PRESSURE: 141 MMHG | TEMPERATURE: 98 F

## 2018-03-14 DIAGNOSIS — C43.61 MALIGNANT MELANOMA OF RIGHT UPPER EXTREMITY INCLUDING SHOULDER: Primary | ICD-10-CM

## 2018-03-14 DIAGNOSIS — C43.61 MALIGNANT MELANOMA OF RIGHT UPPER EXTREMITY: Primary | ICD-10-CM

## 2018-03-14 LAB
ALBUMIN SERPL BCP-MCNC: 4 G/DL
ALP SERPL-CCNC: 67 U/L
ALT SERPL W/O P-5'-P-CCNC: 20 U/L
ANION GAP SERPL CALC-SCNC: 6 MMOL/L
AST SERPL-CCNC: 18 U/L
BASOPHILS # BLD AUTO: 0 K/UL
BASOPHILS NFR BLD: 0.5 %
BILIRUB SERPL-MCNC: 0.5 MG/DL
BUN SERPL-MCNC: 15 MG/DL
CALCIUM SERPL-MCNC: 9.3 MG/DL
CHLORIDE SERPL-SCNC: 107 MMOL/L
CO2 SERPL-SCNC: 30 MMOL/L
CREAT SERPL-MCNC: 0.8 MG/DL
DIFFERENTIAL METHOD: ABNORMAL
EOSINOPHIL # BLD AUTO: 0.3 K/UL
EOSINOPHIL NFR BLD: 4 %
ERYTHROCYTE [DISTWIDTH] IN BLOOD BY AUTOMATED COUNT: 14.1 %
EST. GFR  (AFRICAN AMERICAN): >60 ML/MIN/1.73 M^2
EST. GFR  (NON AFRICAN AMERICAN): >60 ML/MIN/1.73 M^2
GLUCOSE SERPL-MCNC: 140 MG/DL
HCT VFR BLD AUTO: 41.4 %
HGB BLD-MCNC: 13.6 G/DL
LYMPHOCYTES # BLD AUTO: 1.9 K/UL
LYMPHOCYTES NFR BLD: 28.6 %
MCH RBC QN AUTO: 31.8 PG
MCHC RBC AUTO-ENTMCNC: 33 G/DL
MCV RBC AUTO: 96 FL
MONOCYTES # BLD AUTO: 0.4 K/UL
MONOCYTES NFR BLD: 6.7 %
NEUTROPHILS # BLD AUTO: 4 K/UL
NEUTROPHILS NFR BLD: 60.2 %
PLATELET # BLD AUTO: 227 K/UL
PMV BLD AUTO: 9 FL
POTASSIUM SERPL-SCNC: 4 MMOL/L
PROT SERPL-MCNC: 6.6 G/DL
RBC # BLD AUTO: 4.29 M/UL
SODIUM SERPL-SCNC: 143 MMOL/L
WBC # BLD AUTO: 6.6 K/UL

## 2018-03-14 PROCEDURE — 93010 ELECTROCARDIOGRAM REPORT: CPT | Mod: ,,, | Performed by: INTERNAL MEDICINE

## 2018-03-14 PROCEDURE — 99999 PR PBB SHADOW E&M-EST. PATIENT-LVL IV: CPT | Mod: PBBFAC,,, | Performed by: SURGERY

## 2018-03-14 PROCEDURE — 99900103 DSU ONLY-NO CHARGE-INITIAL HR (STAT)

## 2018-03-14 PROCEDURE — 99214 OFFICE O/P EST MOD 30 MIN: CPT | Mod: PBBFAC,PO | Performed by: SURGERY

## 2018-03-14 PROCEDURE — 85025 COMPLETE CBC W/AUTO DIFF WBC: CPT

## 2018-03-14 PROCEDURE — 80053 COMPREHEN METABOLIC PANEL: CPT

## 2018-03-14 PROCEDURE — 99214 OFFICE O/P EST MOD 30 MIN: CPT | Mod: S$PBB,,, | Performed by: SURGERY

## 2018-03-14 PROCEDURE — 93005 ELECTROCARDIOGRAM TRACING: CPT

## 2018-03-14 PROCEDURE — 36415 COLL VENOUS BLD VENIPUNCTURE: CPT

## 2018-03-14 RX ORDER — DICLOFENAC SODIUM 10 MG/G
GEL TOPICAL
COMMUNITY
Start: 2018-03-07 | End: 2018-06-16 | Stop reason: SDUPTHER

## 2018-03-14 RX ORDER — SODIUM CHLORIDE 9 MG/ML
INJECTION, SOLUTION INTRAVENOUS CONTINUOUS
Status: CANCELLED | OUTPATIENT
Start: 2018-03-22

## 2018-03-14 NOTE — PROGRESS NOTES
Subjective:       Patient ID: Iftikhar Lynn is a 78 y.o. male.    Chief Complaint: Consult (melano right forearm)      HPI 78-year-old male presents after biopsy revealing melanoma of the right upper extremity.  This was measured at 0.26 mm.  Peripheral margin was positive.  He has a history of a melanoma from the back 2 years ago.  He has done well since that time.  He was referred for excision.    Past Medical History:   Diagnosis Date    Cancer     basal cell ca rt arm / melanoma on back    GERD (gastroesophageal reflux disease)     Hyperlipidemia     Hypertension      Past Surgical History:   Procedure Laterality Date    BACK SURGERY      L 4 L5    COLONOSCOPY N/A 12/20/2017    Procedure: COLONOSCOPY;  Surgeon: Filipe Ramirez MD;  Location: [facility];  Service: Endoscopy;  Laterality: N/A;    SKIN CANCER EXCISION      multiple sites, derm dr martinez, basal cell ca    VASECTOMY           Current Outpatient Prescriptions:     aspirin 81 MG chewable tablet, Take 81 mg by mouth once daily.  , Disp: , Rfl:     diclofenac sodium 1 % Gel, , Disp: , Rfl:     simvastatin (ZOCOR) 40 MG tablet, Take 1 tablet (40 mg total) by mouth nightly., Disp: 90 tablet, Rfl: 3    hydrocortisone-pramoxine (PROCTOFOAM HC) rectal foam, APPLY ONE APPLICATION RECTALLY TWICE DAILY AS DIRECTED, Disp: 10 g, Rfl: 5    irbesartan (AVAPRO) 150 MG tablet, Take 1 tablet (150 mg total) by mouth once daily., Disp: 90 tablet, Rfl: 3    nystatin-triamcinolone (MYCOLOG II) cream, Apply topically 2 (two) times daily., Disp: 60 g, Rfl: 11    omeprazole (PRILOSEC OTC) 20 MG tablet, Take 20 mg by mouth once daily. , Disp: , Rfl:     valacyclovir (VALTREX) 500 MG tablet, Take 500 mg by mouth 2 (two) times daily., Disp: , Rfl:     Review of patient's allergies indicates:   Allergen Reactions    Erythromycin Other (See Comments)     NOT SURE    Iodine Hives    Other Hives     Contrast dye    Shellfish derived Hives       Family  History   Problem Relation Age of Onset    Cancer Mother      non hogkins lumphoma    Heart disease Father     Cancer Brother      melanoma, arm and leg    Clotting disorder Neg Hx     Anesthesia problems Neg Hx      Social History     Social History    Marital status:      Spouse name: N/A    Number of children: N/A    Years of education: N/A     Occupational History    Not on file.     Social History Main Topics    Smoking status: Former Smoker     Quit date: 2/15/1976    Smokeless tobacco: Never Used    Alcohol use 0.0 oz/week      Comment: daily wine    Drug use: No    Sexual activity: Not on file     Other Topics Concern    Not on file     Social History Narrative    No narrative on file       Review of Systems   Constitutional: Negative for chills, fatigue, fever and unexpected weight change.   HENT: Negative for congestion, sore throat, trouble swallowing and voice change.    Eyes: Negative for redness and visual disturbance.   Respiratory: Negative for cough, shortness of breath and wheezing.    Cardiovascular: Negative for chest pain and palpitations.   Gastrointestinal: Negative for abdominal pain, blood in stool, nausea and vomiting.   Genitourinary: Negative for dysuria, frequency, hematuria and urgency.   Musculoskeletal: Negative for arthralgias, myalgias and neck pain.   Skin: Negative for rash and wound.        See history of present illness   Allergic/Immunologic: Negative.    Neurological: Negative for tremors, seizures, weakness and headaches.   Hematological: Does not bruise/bleed easily.   Psychiatric/Behavioral: Negative for confusion and dysphoric mood. The patient is not nervous/anxious.      Objective:     Physical Exam   Constitutional: He is oriented to person, place, and time. He appears well-developed and well-nourished. No distress.   HENT:   Head: Normocephalic and atraumatic.   Eyes: Conjunctivae and EOM are normal. Pupils are equal, round, and reactive to  light. No scleral icterus.   Neck: Normal range of motion. Neck supple. No thyromegaly present.   Cardiovascular: Normal rate, regular rhythm, normal heart sounds and intact distal pulses.    No murmur heard.  Pulmonary/Chest: Effort normal and breath sounds normal. No respiratory distress. He has no wheezes. He has no rales.   No palpable axillary adenopathy.   Abdominal: Soft. Bowel sounds are normal. He exhibits no distension. There is no tenderness. No hernia.   Musculoskeletal: Normal range of motion. He exhibits no edema or tenderness.   Lymphadenopathy:     He has no cervical adenopathy.   Neurological: He is alert and oriented to person, place, and time. No cranial nerve deficit.   Skin: Skin is warm and dry. No rash noted. No erythema.   Healing wound measured at 1 cm of the right upper extremity on the posterior forearm.  There is no evidence of infection.  There is no residual visible lesion.  It is nontender.   Psychiatric: He has a normal mood and affect. His behavior is normal. Judgment normal.     Pathology was reviewed and reveals a 0.26 mm malignant melanoma.  Assessment:     Encounter Diagnosis   Name Primary?   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 Plan wide excision. Do not plan sentinel node biopsy given known thickness.  2. Risks and benefits of the planned procedure were discussed at length with the patient.  Risks and benefits of not proceeding with the procedure were discussed as well. All questions were answered. The patient expressed clear understanding and would like to proceed with the procedure as discussed.

## 2018-03-21 ENCOUNTER — ANESTHESIA EVENT (OUTPATIENT)
Dept: SURGERY | Facility: HOSPITAL | Age: 78
End: 2018-03-21
Payer: MEDICARE

## 2018-03-22 ENCOUNTER — PATIENT MESSAGE (OUTPATIENT)
Dept: SURGERY | Facility: CLINIC | Age: 78
End: 2018-03-22

## 2018-03-22 ENCOUNTER — HOSPITAL ENCOUNTER (OUTPATIENT)
Facility: HOSPITAL | Age: 78
Discharge: HOME | End: 2018-03-22
Attending: SURGERY | Admitting: SURGERY
Payer: MEDICARE

## 2018-03-22 ENCOUNTER — ANESTHESIA (OUTPATIENT)
Dept: SURGERY | Facility: HOSPITAL | Age: 78
End: 2018-03-22
Payer: MEDICARE

## 2018-03-22 DIAGNOSIS — C43.61 MALIGNANT MELANOMA OF RIGHT UPPER EXTREMITY INCLUDING SHOULDER: Primary | ICD-10-CM

## 2018-03-22 PROCEDURE — 25000003 PHARM REV CODE 250: Performed by: ANESTHESIOLOGY

## 2018-03-22 PROCEDURE — 25000003 PHARM REV CODE 250: Performed by: SURGERY

## 2018-03-22 PROCEDURE — 37000009 HC ANESTHESIA EA ADD 15 MINS: Performed by: SURGERY

## 2018-03-22 PROCEDURE — 63600175 PHARM REV CODE 636 W HCPCS: Performed by: NURSE ANESTHETIST, CERTIFIED REGISTERED

## 2018-03-22 PROCEDURE — 99900104 DSU ONLY-NO CHARGE-EA ADD'L HR (STAT): Performed by: SURGERY

## 2018-03-22 PROCEDURE — D9220A PRA ANESTHESIA: Mod: ANES,,, | Performed by: ANESTHESIOLOGY

## 2018-03-22 PROCEDURE — 63600175 PHARM REV CODE 636 W HCPCS: Performed by: ANESTHESIOLOGY

## 2018-03-22 PROCEDURE — 36000706: Performed by: SURGERY

## 2018-03-22 PROCEDURE — 71000015 HC POSTOP RECOV 1ST HR: Performed by: SURGERY

## 2018-03-22 PROCEDURE — 11601 EXC TR-EXT MAL+MARG 0.6-1 CM: CPT | Mod: RT,,, | Performed by: SURGERY

## 2018-03-22 PROCEDURE — 27201423 OPTIME MED/SURG SUP & DEVICES STERILE SUPPLY: Performed by: SURGERY

## 2018-03-22 PROCEDURE — D9220A PRA ANESTHESIA: Mod: CRNA,,, | Performed by: NURSE ANESTHETIST, CERTIFIED REGISTERED

## 2018-03-22 PROCEDURE — 12031 INTMD RPR S/A/T/EXT 2.5 CM/<: CPT | Mod: 51,,, | Performed by: SURGERY

## 2018-03-22 PROCEDURE — 36000707: Performed by: SURGERY

## 2018-03-22 PROCEDURE — 27200651 HC AIRWAY, LMA: Performed by: NURSE ANESTHETIST, CERTIFIED REGISTERED

## 2018-03-22 PROCEDURE — 63600175 PHARM REV CODE 636 W HCPCS: Performed by: SURGERY

## 2018-03-22 PROCEDURE — 88305 TISSUE EXAM BY PATHOLOGIST: CPT | Mod: 26,,, | Performed by: PATHOLOGY

## 2018-03-22 PROCEDURE — 25000003 PHARM REV CODE 250

## 2018-03-22 PROCEDURE — 88305 TISSUE EXAM BY PATHOLOGIST: CPT | Performed by: PATHOLOGY

## 2018-03-22 PROCEDURE — 37000008 HC ANESTHESIA 1ST 15 MINUTES: Performed by: SURGERY

## 2018-03-22 PROCEDURE — 71000033 HC RECOVERY, INTIAL HOUR: Performed by: SURGERY

## 2018-03-22 PROCEDURE — 99900103 DSU ONLY-NO CHARGE-INITIAL HR (STAT): Performed by: SURGERY

## 2018-03-22 RX ORDER — SODIUM CHLORIDE 9 MG/ML
INJECTION, SOLUTION INTRAVENOUS CONTINUOUS
Status: DISCONTINUED | OUTPATIENT
Start: 2018-03-22 | End: 2018-03-22 | Stop reason: HOSPADM

## 2018-03-22 RX ORDER — HYDROMORPHONE HYDROCHLORIDE 2 MG/ML
0.2 INJECTION, SOLUTION INTRAMUSCULAR; INTRAVENOUS; SUBCUTANEOUS EVERY 5 MIN PRN
Status: DISCONTINUED | OUTPATIENT
Start: 2018-03-22 | End: 2018-03-22 | Stop reason: HOSPADM

## 2018-03-22 RX ORDER — OXYCODONE HYDROCHLORIDE 5 MG/1
5 TABLET ORAL ONCE AS NEEDED
Status: DISCONTINUED | OUTPATIENT
Start: 2018-03-23 | End: 2018-03-22 | Stop reason: HOSPADM

## 2018-03-22 RX ORDER — HYDROCODONE BITARTRATE AND ACETAMINOPHEN 7.5; 325 MG/1; MG/1
1 TABLET ORAL EVERY 4 HOURS PRN
Qty: 15 TABLET | Refills: 0 | Status: SHIPPED | OUTPATIENT
Start: 2018-03-22 | End: 2018-04-01

## 2018-03-22 RX ORDER — SODIUM CHLORIDE 0.9 % (FLUSH) 0.9 %
3 SYRINGE (ML) INJECTION
Status: DISCONTINUED | OUTPATIENT
Start: 2018-03-22 | End: 2018-03-22 | Stop reason: HOSPADM

## 2018-03-22 RX ORDER — MEPERIDINE HYDROCHLORIDE 25 MG/ML
12.5 INJECTION INTRAMUSCULAR; INTRAVENOUS; SUBCUTANEOUS ONCE AS NEEDED
Status: DISCONTINUED | OUTPATIENT
Start: 2018-03-22 | End: 2018-03-22 | Stop reason: HOSPADM

## 2018-03-22 RX ORDER — PROPOFOL 10 MG/ML
VIAL (ML) INTRAVENOUS
Status: DISCONTINUED | OUTPATIENT
Start: 2018-03-22 | End: 2018-03-22

## 2018-03-22 RX ORDER — FENTANYL CITRATE 50 UG/ML
25 INJECTION, SOLUTION INTRAMUSCULAR; INTRAVENOUS EVERY 5 MIN PRN
Status: DISCONTINUED | OUTPATIENT
Start: 2018-03-22 | End: 2018-03-22 | Stop reason: HOSPADM

## 2018-03-22 RX ORDER — LIDOCAINE HCL/PF 100 MG/5ML
SYRINGE (ML) INTRAVENOUS
Status: DISCONTINUED | OUTPATIENT
Start: 2018-03-22 | End: 2018-03-22

## 2018-03-22 RX ORDER — LIDOCAINE HYDROCHLORIDE 10 MG/ML
INJECTION, SOLUTION EPIDURAL; INFILTRATION; INTRACAUDAL; PERINEURAL
Status: COMPLETED
Start: 2018-03-22 | End: 2018-03-22

## 2018-03-22 RX ORDER — FENTANYL CITRATE 50 UG/ML
INJECTION, SOLUTION INTRAMUSCULAR; INTRAVENOUS
Status: DISCONTINUED | OUTPATIENT
Start: 2018-03-22 | End: 2018-03-22

## 2018-03-22 RX ORDER — HYDROCODONE BITARTRATE AND ACETAMINOPHEN 5; 325 MG/1; MG/1
1 TABLET ORAL EVERY 4 HOURS PRN
Status: DISCONTINUED | OUTPATIENT
Start: 2018-03-22 | End: 2018-03-22 | Stop reason: HOSPADM

## 2018-03-22 RX ORDER — CEFAZOLIN SODIUM 2 G/50ML
2 SOLUTION INTRAVENOUS
Status: COMPLETED | OUTPATIENT
Start: 2018-03-22 | End: 2018-03-22

## 2018-03-22 RX ORDER — LIDOCAINE HYDROCHLORIDE 10 MG/ML
5 INJECTION, SOLUTION EPIDURAL; INFILTRATION; INTRACAUDAL; PERINEURAL ONCE
Status: COMPLETED | OUTPATIENT
Start: 2018-03-22 | End: 2018-03-22

## 2018-03-22 RX ORDER — MIDAZOLAM HYDROCHLORIDE 1 MG/ML
INJECTION, SOLUTION INTRAMUSCULAR; INTRAVENOUS
Status: DISCONTINUED | OUTPATIENT
Start: 2018-03-22 | End: 2018-03-22

## 2018-03-22 RX ORDER — ONDANSETRON 2 MG/ML
4 INJECTION INTRAMUSCULAR; INTRAVENOUS ONCE
Status: COMPLETED | OUTPATIENT
Start: 2018-03-22 | End: 2018-03-22

## 2018-03-22 RX ORDER — BUPIVACAINE HYDROCHLORIDE AND EPINEPHRINE 2.5; 5 MG/ML; UG/ML
INJECTION, SOLUTION EPIDURAL; INFILTRATION; INTRACAUDAL; PERINEURAL
Status: DISCONTINUED | OUTPATIENT
Start: 2018-03-22 | End: 2018-03-22 | Stop reason: HOSPADM

## 2018-03-22 RX ORDER — DIPHENHYDRAMINE HYDROCHLORIDE 50 MG/ML
25 INJECTION INTRAMUSCULAR; INTRAVENOUS EVERY 6 HOURS PRN
Status: DISCONTINUED | OUTPATIENT
Start: 2018-03-22 | End: 2018-03-22 | Stop reason: HOSPADM

## 2018-03-22 RX ADMIN — LIDOCAINE HYDROCHLORIDE 75 MG: 20 INJECTION, SOLUTION INTRAVENOUS at 12:03

## 2018-03-22 RX ADMIN — MIDAZOLAM 2 MG: 1 INJECTION INTRAMUSCULAR; INTRAVENOUS at 12:03

## 2018-03-22 RX ADMIN — ONDANSETRON 4 MG: 2 INJECTION, SOLUTION INTRAMUSCULAR; INTRAVENOUS at 12:03

## 2018-03-22 RX ADMIN — SODIUM CHLORIDE, SODIUM GLUCONATE, SODIUM ACETATE, POTASSIUM CHLORIDE, MAGNESIUM CHLORIDE, SODIUM PHOSPHATE, DIBASIC, AND POTASSIUM PHOSPHATE: .53; .5; .37; .037; .03; .012; .00082 INJECTION, SOLUTION INTRAVENOUS at 11:03

## 2018-03-22 RX ADMIN — PROPOFOL 150 MG: 10 INJECTION, EMULSION INTRAVENOUS at 12:03

## 2018-03-22 RX ADMIN — LIDOCAINE HYDROCHLORIDE 5 MG: 10 INJECTION, SOLUTION EPIDURAL; INFILTRATION; INTRACAUDAL; PERINEURAL at 11:03

## 2018-03-22 RX ADMIN — CEFAZOLIN SODIUM 2 G: 2 SOLUTION INTRAVENOUS at 12:03

## 2018-03-22 RX ADMIN — FENTANYL CITRATE 100 MCG: 50 INJECTION, SOLUTION INTRAMUSCULAR; INTRAVENOUS at 12:03

## 2018-03-22 NOTE — PLAN OF CARE
Vs stable.  Denies pain.  Spouse notified to come to the hospital for transportation home per Yue Peraza RN PACU.  Tolerating po fluids.  Will discharge once spouse arrives and discharge teaching completed.

## 2018-03-22 NOTE — PLAN OF CARE
Pre op assessment performed and questions answered.  No family present.  Wife will come back around 2 or whenever called for discharge

## 2018-03-22 NOTE — TRANSFER OF CARE
Anesthesia Transfer of Care Note    Patient: Iftikhar Lynn    Procedure(s) Performed: Procedure(s) (LRB):  EXCISION-MELANOMA (Right)    Patient location: PACU    Anesthesia Type: general    Transport from OR: Transported from OR on 2-3 L/min O2 by NC with adequate spontaneous ventilation    Post pain: adequate analgesia    Post assessment: no apparent anesthetic complications    Post vital signs: stable    Level of consciousness: awake    Nausea/Vomiting: no nausea/vomiting    Complications: none    Transfer of care protocol was followed      Last vitals:   Visit Vitals  BP (!) 142/77 (BP Location: Left arm, Patient Position: Lying)   Pulse 72   Temp 36.6 °C (97.9 °F) (Temporal)   Resp 14   Ht 6' (1.829 m)   Wt 81.6 kg (180 lb)   SpO2 99%   BMI 24.41 kg/m²

## 2018-03-22 NOTE — OR NURSING
Spouse called and is within 15 minutes of the hospital.  Patient dressing at bedside independently.

## 2018-03-22 NOTE — DISCHARGE INSTRUCTIONS
Dr. Manning's nurse will call you to schedule your post op appointment.  If you do not receive this call by lunch time tomorrow, please call  to follow up.   General Information:    1.  Do not drink alcoholic beverages including beer for 24 hours or as long as you are on pain medication..  2.  Do not drive a motor vehicle, operate machinery or power tools, or signs legal papers for 24 hours or as long as you are on pain medication.   3.  You may experience light-headedness, dizziness, and sleepiness following surgery. Please do not stay alone. A responsible adult should be with you for this 24 hour period.  4.  Go home and rest.    5. Progress slowly to a normal diet unless instructed.  Otherwise, begin with liquids such as soft drinks, then soup and crackers working up to solid foods. Drink plenty of nonalcoholic fluids.  6.  Certain anesthetics and pain medications produce nausea and vomiting in certain       individuals. If nausea becomes a problem at home, call you doctor.    7. A nurse will be calling you sometime after surgery. Do not be alarmed. This is our way of finding out how you are doing.    8. Several times every hour while you are awake, take 2-3 deep breaths and cough. If you had stomach surgery hold a pillow or rolled towel firmly against your stomach before you cough. This will help with any pain the cough might cause.  9. Several times every hour while you are awake, pump and flex your feet 5-6 times and do foot circles. This will help prevent blood clots.    10.Call your doctor for severe pain, bleeding, fever, or signs or symptoms of infection (pain, swelling, redness, foul odor, drainage).    Discharge Instructions: After Your Surgery/Procedure  Youve just had surgery. During surgery you were given medicine called anesthesia to keep you relaxed and free of pain. After surgery you may have some pain or nausea. This is common. Here are some tips for feeling better and getting well  "after surgery.     Stay on schedule with your medication.   Going home  Your doctor or nurse will show you how to take care of yourself when you go home. He or she will also answer your questions. Have an adult family member or friend drive you home.      For your safety we recommend these precaution for the first 24 hours after your procedure:  · Do not drive or use heavy equipment.  · Do not make important decisions or sign legal papers.  · Do not drink alcohol.  · Have someone stay with you, if needed. He or she can watch for problems and help keep you safe.  · Your concentration, balance, coordination, and judgement may be impaired for many hours after anesthesia.  Use caution when ambulating or standing up.     · You may feel weak and "washed out" after anesthesia and surgery.      Subtle residual effects of general anesthesia or sedation with regional / local anesthesia can last more than 24 hours.  Rest for the remainder of the day or longer if your Doctor/Surgeon has advised you to do so.  Although you may feel normal within the first 24 hours, your reflexes and mental ability may be impaired without you realizing it.  You may feel dizzy, lightheaded or sleepy for 24 hours or longer.      Be sure to go to all follow-up visits with your doctor. And rest after your surgery for as long as your doctor tells you to.  Coping with pain  If you have pain after surgery, pain medicine will help you feel better. Take it as told, before pain becomes severe. Also, ask your doctor or pharmacist about other ways to control pain. This might be with heat, ice, or relaxation. And follow any other instructions your surgeon or nurse gives you.  Tips for taking pain medicine  To get the best relief possible, remember these points:  · Pain medicines can upset your stomach. Taking them with a little food may help.  · Most pain relievers taken by mouth need at least 20 to 30 minutes to start to work.  · Taking medicine on a " schedule can help you remember to take it. Try to time your medicine so that you can take it before starting an activity. This might be before you get dressed, go for a walk, or sit down for dinner.  · Constipation is a common side effect of pain medicines. Call your doctor before taking any medicines such as laxatives or stool softeners to help ease constipation. Also ask if you should skip any foods. Drinking lots of fluids and eating foods such as fruits and vegetables that are high in fiber can also help. Remember, do not take laxatives unless your surgeon has prescribed them.  · Drinking alcohol and taking pain medicine can cause dizziness and slow your breathing. It can even be deadly. Do not drink alcohol while taking pain medicine.  · Pain medicine can make you react more slowly to things. Do not drive or run machinery while taking pain medicine.  Your health care provider may tell you to take acetaminophen to help ease your pain. Ask him or her how much you are supposed to take each day. Acetaminophen or other pain relievers may interact with your prescription medicines or other over-the-counter (OTC) drugs. Some prescription medicines have acetaminophen and other ingredients. Using both prescription and OTC acetaminophen for pain can cause you to overdose. Read the labels on your OTC medicines with care. This will help you to clearly know the list of ingredients, how much to take, and any warnings. It may also help you not take too much acetaminophen. If you have questions or do not understand the information, ask your pharmacist or health care provider to explain it to you before you take the OTC medicine.  Managing nausea  Some people have an upset stomach after surgery. This is often because of anesthesia, pain, or pain medicine, or the stress of surgery. These tips will help you handle nausea and eat healthy foods as you get better. If you were on a special food plan before surgery, ask your doctor if  you should follow it while you get better. These tips may help:  · Do not push yourself to eat. Your body will tell you when to eat and how much.  · Start off with clear liquids and soup. They are easier to digest.  · Next try semi-solid foods, such as mashed potatoes, applesauce, and gelatin, as you feel ready.  · Slowly move to solid foods. Dont eat fatty, rich, or spicy foods at first.  · Do not force yourself to have 3 large meals a day. Instead eat smaller amounts more often.  · Take pain medicines with a small amount of solid food, such as crackers or toast, to avoid nausea.     Call your surgeon if  · You still have pain an hour after taking medicine. The medicine may not be strong enough.  · You feel too sleepy, dizzy, or groggy. The medicine may be too strong.  · You have side effects like nausea, vomiting, or skin changes, such as rash, itching, or hives.       If you have obstructive sleep apnea  You were given anesthesia medicine during surgery to keep you comfortable and free of pain. After surgery, you may have more apnea spells because of this medicine and other medicines you were given. The spells may last longer than usual.   At home:  · Keep using the continuous positive airway pressure (CPAP) device when you sleep. Unless your health care provider tells you not to, use it when you sleep, day or night. CPAP is a common device used to treat obstructive sleep apnea.  · Talk with your provider before taking any pain medicine, muscle relaxants, or sedatives. Your provider will tell you about the possible dangers of taking these medicines.  © 4197-2325 [employer]. [address] [place] [address]. All rights reserved. This information is not intended as a substitute for professional medical care. Always follow your healthcare professional's instructions.  Post op instructions for prevention of DVT  What is deep vein thrombosis?  Deep vein thrombosis (DVT) is the medical term  for blood clots in the deep veins of the leg.  These blood clots can be dangerous.  A DVT can block a blood vessel and keep blood from getting where it needs to go.  Another problem is that the clot can travel to other parts of the body such as the lungs.  A clot that travels to the lungs is called a pulmonary embolus (PE) and can cause serious problems with breathing which can lead to death.  Am I at risk for DVT/PE?  If you are not very active, you are at risk of DVT.  Anyone confined to bed, sitting for long periods of time, recovering from surgery, etc. increases the risk of DVT.  Other risk factors are cancer diagnosis, certain medications, estrogen replacement in any form,older age, obesity, pregnancy, smoking, history of clotting disorders, and dehydration.  How will I know if I have a DVT?   Swelling in the lower leg   Pain   Warmth, redness, hardness or bulging of the vein  If you have any of these symptoms, call your doctors office right away.  Some people will not have any symptoms until the clot moves to the lungs.  What are the symptoms of a PE?   Panting, shortness of breath, or trouble breathing   Sharp, knife-like chest pain when you breathe   Coughing or coughing up blood   Rapid heartbeat  If you have any of these symptoms or get worse quickly, call 911 for emergency treatment.  How can I prevent a DVT?   Avoid long periods of inactivity and dont cross your legs--get up and walk around every hour or so.   Stay active--walking after surgery is highly encouraged.  This means you should get out of the house and walk in the neighborhood.  Going up and down stairs will not impair healing (unless advised against such activity by your doctor).     Drink plenty of noncaffeinated, nonalcoholic fluids each day to prevent dehydration.   Wear special support stockings, if they have been advised by your doctor.   If you travel, stop at least once an hour and walk around.   Avoid smoking  (assistance with stopping is available through your healthcare provider)  Always notify your doctor if you are not able to follow the post operative instructions that are given to you at the time of discharge.  It may be necessary to prescribe one of the medications available to prevent DVT.Using Opioids for Pain Management     Your doctor has given instructions for you to take an opioid.  This is a drug for bad pain.  It helps control pain without causing bleeding and kidney problems.  Common opioid names are morphine, hydromorphone, oxycodone, and methadone. These drugs are called narcotics.    There are several safety concerns you need to know.     · It is against the law to give or sell this drug to another person.  You must keep this medicine safely locked.    · You may have side effects from taking this medication.  These include nausea, itching, sweating, sleepiness, a change in your ability to breathe, and depression.  · Do not take alcohol or sleeping pills opioids.    · Long-term opoid use may no longer giver you relief from pain.  It can cause you stomach pain, mental anxiety, and headaches.  Long-term opoid use can potentially lead to unlawful street drug abuse and reduce your ability to stay employed.    · Your body may become opioid tolerant if you need to take more to get relief.    · You must stop taking opioids if you begin having more pain as a result of the medicine.    · Opioid withdrawal occurs when you have to stop taking the drug.  It can cause you to have nausea, vomiting, diarrhea, stomach pain, anxiety, and dilated pupils in your eyes. This condition means you are opioid dependent.    · Addiction is a drug induced brain disease. It means there are changes in how your brain is working.  Children, teens, and young adults under 25 years old are more likely to get addicted to opioids.      · Addiction can happen with repeated opioid use.  It does not happen with short-term use of two weeks or  less.       For more information, please speak with your doctor or pharmacist.        We hope your stay was comfortable as you heal now, mend and rest.    For we have enjoyed taking care of you by giving your our best.    And as you get better, by regaining your health and strength;   We count it as a privilege to have served you and hope your time at [facility] was well spent.      Thank  You!!!      Melanoma Removal Surgery    Melanoma is a type of skin cancer. It often starts as a mole or dark spot on the skin. It spreads faster than many other types of cancer. And it can be fatal if not treated. Melanoma is often diagnosed after removal of the entire mole. Once a diagnosis is made, the area around the melanoma is also removed. If the cancer is caught early, it has a high chance of being cured.  Removing lymph nodes  Lymph nodes are small masses of tissue that are part of the bodys immune system. The sentinel node is the first lymph node a tumor drains into. So, its the first place that cancer is likely to spread. Depending on the thickness of your tumor, either the sentinel node or all the nearby lymph nodes may need to be removed. A sentinel node biopsy is a test often done during melanoma removal surgery. This helps the doctor know which lymph nodes to remove. If you will have a sentinel node biopsy, your doctor can tell you more about what to expect.  Preparing for surgery  Prepare for the surgery as youve been told. In addition:  · Tell your doctor about all medicines you take. This includes over-the-counter medicines, herbs and other supplements. It also includes any blood thinners, such as warfarin, clopidogrel, or daily aspirin. You may need to stop taking some or all of them before surgery.  · Follow any directions you are given for taking medicines and for not eating or drinking before surgery.  · If youre having a sentinel node biopsy, you may have an injection of harmless dye the day before  surgery.  The day of surgery  The surgery takes about 2 to 3 hours. You go home the same day.  Before the surgery begins:  · An IV (intravenous) line is put into a vein in your arm or hand. This line supplies fluids and medicines.  · You will be given medicine to keep you pain free during surgery. This may be general anesthesia, which puts you into a deep sleep. A tube may be inserted into your throat to help you breathe. Or you may have sedation, which makes you relaxed and sleepy. If you have sedation, local anesthesia will be injected to numb the area being worked on. The anesthesiologist will discuss your options with you.  During the surgery:  · If youre having a sentinel node biopsy, harmless blue dye is injected to help the doctor locate lymph nodes where cancer may have spread.  · An incision is made at the tumor site. The tumor is removed along with some of the normal tissue around it. This helps ensure that any cancer cells that may have spread to nearby skin are removed.  · One or more lymph nodes near the tumor may be removed. These are checked for cancer cells (a sign that cancer has spread).  · The incision may be closed with stitches or staples. In some cases, a skin graft or flap may be needed to help close the site. This can come from your own body, a donor, or manmade sources. The doctor will discuss the different types of grafts with you before the surgery.  After the surgery  Youll be taken to a room to wake up from the anesthesia. You may feel sleepy and nauseated. If a breathing tube was used, your throat may be sore at first. Youll be given medicine to manage pain. When its time for you to be released from the hospital, have an adult family member or friend ready to drive you.  Recovering at home  Once at home, follow the instructions youve been given. Your doctor will tell you when you can return to your normal routine. Be sure to:  · Take all medicine as directed.  · Care for your  incision as instructed.  · Avoid heavy lifting and strenuous activities as directed.  · Avoid driving until your doctor says its OK. Dont drive if youre taking medicine that makes you sleepy or drowsy.  · Follow your doctors guidelines for showering. Avoid swimming, bathing, using a hot tub, and other activities that cause the incision to be covered with water until your doctor says its OK.        When to call your doctor  Call the doctor if you have any of the following:  · Chest pain or trouble breathing (call 911 or other emergency service)   · Fever of 100.4°F (38.0°C) or higher (or as directed by your doctor)    · Chills  · Symptoms of infection at an incision site, such as increased redness or swelling, warmth, worsening pain, or foul-smelling drainage  · Pain that cannot be controlled with medicine      Follow-up  You will have follow-up visits so your doctor can see how well youre healing. If needed, stitches or staples will be removed at one of these visits. You and your doctor can also discuss any further treatments you may need.        Risks and complications  Risks and possible complications include:  · Bleeding  · Infection  · Scarring at the surgery site  · Problems with the skin graft (if one was used)  · Failure to remove all of the cancer, requiring further treatments  · Risks of anesthesia   Date Last Reviewed: 7/16/2015  © 7751-1963 [employer]. [address] [place] [address], [place] [address]. All rights reserved. This information is not intended as a substitute for professional medical care. Always follow your healthcare professional's instructions.

## 2018-03-22 NOTE — DISCHARGE SUMMARY
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    Patient Instructions:   Current Discharge Medication List      START taking these medications    Details   hydrocodone-acetaminophen 7.5-325mg (NORCO) 7.5-325 mg per tablet Take 1 tablet by mouth every 4 (four) hours as needed for Pain.  Qty: 15 tablet, Refills: 0         CONTINUE these medications which have NOT CHANGED    Details   aspirin 81 MG chewable tablet Take 81 mg by mouth once daily.        diclofenac sodium 1 % Gel       irbesartan (AVAPRO) 150 MG tablet Take 1 tablet (150 mg total) by mouth once daily.  Qty: 90 tablet, Refills: 3    Associated Diagnoses: Essential hypertension      omeprazole (PRILOSEC OTC) 20 MG tablet Take 20 mg by mouth once daily.       simvastatin (ZOCOR) 40 MG tablet Take 1 tablet (40 mg total) by mouth nightly.  Qty: 90 tablet, Refills: 3    Associated Diagnoses: Mixed hyperlipidemia      hydrocortisone-pramoxine (PROCTOFOAM HC) rectal foam APPLY ONE APPLICATION RECTALLY TWICE DAILY AS DIRECTED  Qty: 10 g, Refills: 5    Associated Diagnoses: Hemorrhoids, unspecified hemorrhoid type      nystatin-triamcinolone (MYCOLOG II) cream Apply topically 2 (two) times daily.  Qty: 60 g, Refills: 11      valacyclovir (VALTREX) 500 MG tablet Take 500 mg by mouth 2 (two) times daily.               Discharge Procedure Orders  Diet general     Activity as tolerated     Remove dressing in 48 hours

## 2018-03-22 NOTE — H&P (VIEW-ONLY)
Subjective:       Patient ID: Iftikhar Lynn is a 78 y.o. male.    Chief Complaint: Consult (melano right forearm)      HPI 78-year-old male presents after biopsy revealing melanoma of the right upper extremity.  This was measured at 0.26 mm.  Peripheral margin was positive.  He has a history of a melanoma from the back 2 years ago.  He has done well since that time.  He was referred for excision.    Past Medical History:   Diagnosis Date    Cancer     basal cell ca rt arm / melanoma on back    GERD (gastroesophageal reflux disease)     Hyperlipidemia     Hypertension      Past Surgical History:   Procedure Laterality Date    BACK SURGERY      L 4 L5    COLONOSCOPY N/A 12/20/2017    Procedure: COLONOSCOPY;  Surgeon: Filipe Ramirez MD;  Location: [facility];  Service: Endoscopy;  Laterality: N/A;    SKIN CANCER EXCISION      multiple sites, derm dr martinez, basal cell ca    VASECTOMY           Current Outpatient Prescriptions:     aspirin 81 MG chewable tablet, Take 81 mg by mouth once daily.  , Disp: , Rfl:     diclofenac sodium 1 % Gel, , Disp: , Rfl:     simvastatin (ZOCOR) 40 MG tablet, Take 1 tablet (40 mg total) by mouth nightly., Disp: 90 tablet, Rfl: 3    hydrocortisone-pramoxine (PROCTOFOAM HC) rectal foam, APPLY ONE APPLICATION RECTALLY TWICE DAILY AS DIRECTED, Disp: 10 g, Rfl: 5    irbesartan (AVAPRO) 150 MG tablet, Take 1 tablet (150 mg total) by mouth once daily., Disp: 90 tablet, Rfl: 3    nystatin-triamcinolone (MYCOLOG II) cream, Apply topically 2 (two) times daily., Disp: 60 g, Rfl: 11    omeprazole (PRILOSEC OTC) 20 MG tablet, Take 20 mg by mouth once daily. , Disp: , Rfl:     valacyclovir (VALTREX) 500 MG tablet, Take 500 mg by mouth 2 (two) times daily., Disp: , Rfl:     Review of patient's allergies indicates:   Allergen Reactions    Erythromycin Other (See Comments)     NOT SURE    Iodine Hives    Other Hives     Contrast dye    Shellfish derived Hives       Family  History   Problem Relation Age of Onset    Cancer Mother      non hogkins lumphoma    Heart disease Father     Cancer Brother      melanoma, arm and leg    Clotting disorder Neg Hx     Anesthesia problems Neg Hx      Social History     Social History    Marital status:      Spouse name: N/A    Number of children: N/A    Years of education: N/A     Occupational History    Not on file.     Social History Main Topics    Smoking status: Former Smoker     Quit date: 2/15/1976    Smokeless tobacco: Never Used    Alcohol use 0.0 oz/week      Comment: daily wine    Drug use: No    Sexual activity: Not on file     Other Topics Concern    Not on file     Social History Narrative    No narrative on file       Review of Systems   Constitutional: Negative for chills, fatigue, fever and unexpected weight change.   HENT: Negative for congestion, sore throat, trouble swallowing and voice change.    Eyes: Negative for redness and visual disturbance.   Respiratory: Negative for cough, shortness of breath and wheezing.    Cardiovascular: Negative for chest pain and palpitations.   Gastrointestinal: Negative for abdominal pain, blood in stool, nausea and vomiting.   Genitourinary: Negative for dysuria, frequency, hematuria and urgency.   Musculoskeletal: Negative for arthralgias, myalgias and neck pain.   Skin: Negative for rash and wound.        See history of present illness   Allergic/Immunologic: Negative.    Neurological: Negative for tremors, seizures, weakness and headaches.   Hematological: Does not bruise/bleed easily.   Psychiatric/Behavioral: Negative for confusion and dysphoric mood. The patient is not nervous/anxious.      Objective:     Physical Exam   Constitutional: He is oriented to person, place, and time. He appears well-developed and well-nourished. No distress.   HENT:   Head: Normocephalic and atraumatic.   Eyes: Conjunctivae and EOM are normal. Pupils are equal, round, and reactive to  light. No scleral icterus.   Neck: Normal range of motion. Neck supple. No thyromegaly present.   Cardiovascular: Normal rate, regular rhythm, normal heart sounds and intact distal pulses.    No murmur heard.  Pulmonary/Chest: Effort normal and breath sounds normal. No respiratory distress. He has no wheezes. He has no rales.   No palpable axillary adenopathy.   Abdominal: Soft. Bowel sounds are normal. He exhibits no distension. There is no tenderness. No hernia.   Musculoskeletal: Normal range of motion. He exhibits no edema or tenderness.   Lymphadenopathy:     He has no cervical adenopathy.   Neurological: He is alert and oriented to person, place, and time. No cranial nerve deficit.   Skin: Skin is warm and dry. No rash noted. No erythema.   Healing wound measured at 1 cm of the right upper extremity on the posterior forearm.  There is no evidence of infection.  There is no residual visible lesion.  It is nontender.   Psychiatric: He has a normal mood and affect. His behavior is normal. Judgment normal.     Pathology was reviewed and reveals a 0.26 mm malignant melanoma.  Assessment:     Encounter Diagnosis   Name Primary?   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 Plan wide excision. Do not plan sentinel node biopsy given known thickness.  2. Risks and benefits of the planned procedure were discussed at length with the patient.  Risks and benefits of not proceeding with the procedure were discussed as well. All questions were answered. The patient expressed clear understanding and would like to proceed with the procedure as discussed.

## 2018-03-22 NOTE — INTERVAL H&P NOTE
The patient has been examined and the H&P has been reviewed:    I concur with the findings and no changes have occurred since H&P was written.    Anesthesia/Surgery risks, benefits and alternative options discussed and understood by patient/family.          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

## 2018-03-22 NOTE — ANESTHESIA PREPROCEDURE EVALUATION
03/22/2018  Iftikhar Lynn is a 78 y.o., male.    Pre-op Assessment         Review of Systems

## 2018-03-22 NOTE — ANESTHESIA POSTPROCEDURE EVALUATION
Anesthesia Post Evaluation    Patient: Iftikhar Lynn    Procedure(s) Performed: Procedure(s) (LRB):  EXCISION-MELANOMA (Right)    Final Anesthesia Type: general  Patient location during evaluation: PACU  Patient participation: Yes- Able to Participate  Level of consciousness: awake and alert  Post-procedure vital signs: reviewed and stable  Pain management: adequate  Airway patency: patent  PONV status at discharge: No PONV  Anesthetic complications: no      Cardiovascular status: blood pressure returned to baseline  Respiratory status: unassisted  Hydration status: euvolemic  Follow-up not needed.        Visit Vitals  /74 (BP Location: Left arm, Patient Position: Sitting)   Pulse 70   Temp 36.9 °C (98.4 °F) (Temporal)   Resp 10   Ht 6' (1.829 m)   Wt 81.6 kg (180 lb)   SpO2 98%   BMI 24.41 kg/m²       Pain/Alanna Score: Pain Assessment Performed: Yes (3/22/2018  1:00 PM)  Presence of Pain: denies (3/22/2018  1:30 PM)  Alanna Score: 10 (3/22/2018  1:30 PM)

## 2018-03-22 NOTE — OP NOTE
DATE OF PROCEDURE:  03/22/2018    PROCEDURE:  Wide excision of melanoma of the right arm.    PREOPERATIVE DIAGNOSIS:  Melanoma of the right arm.    POSTOPERATIVE DIAGNOSIS:  Melanoma of the right arm.    SURGEON:  Edu Manning Jr., M.D.    ASSISTANT:  None.    SPECIMEN:  Skin and subcutaneous tissues of the right arm.    PROCEDURE IN DETAIL:  After appropriate consent was obtained, consent forms   signed and questions answered, the patient was taken to the Operating Room,   placed on the operating room table where general anesthesia was induced via   laryngeal mask.  Preoperative antibiotics were administered.  SCDs were in   place.  A timeout procedure was performed.  The arm was positioned and then   prepped and draped in the usual sterile fashion.  This had been a previously   biopsied/excised melanoma, which was less than 1 mm in thickness.  An elliptical   incision was drawn around the lesion and then a skin incision was made,   full-thickness skin and subcutaneous tissue down to the fascia of the arm   muscle.  This was then excised completely and then oriented to the pathologist   with a double stitch on the distal margin and a single stitch on the lateral   margin.  Flaps were raised laterally and medially and proximally and distally to   allow for closure.  Deep tissues and dermis were reapproximated with   interrupted 3-0 Vicryl suture and the skin was closed with a 4-0 Monocryl   subcuticular stitch.  Steri-Strips and dressings were applied.  The patient was   awakened, extubated and transferred to the Recovery Room in stable condition.    He tolerated the procedure without complication.  Blood loss was approximately   15 mL.  Counts correct at the end of the procedure.       RTL/HN  dd: 03/22/2018 13:09:09 (CDT)  td: 03/22/2018 18:49:49 (CDT)  Doc ID   #[id number]  Job ID #[id number]    CC:

## 2018-03-22 NOTE — ANESTHESIA PREPROCEDURE EVALUATION
03/22/2018  Iftikhar Lynn is a 78 y.o., male.    Pre-op Assessment    I have reviewed the Patient Summary Reports.     I have reviewed the Nursing Notes.   I have reviewed the Medications.     Review of Systems  Anesthesia Hx:  No problems with previous Anesthesia  Denies Family Hx of Anesthesia complications.   Denies Personal Hx of Anesthesia complications.   Social:  Non-Smoker    Cardiovascular:   Hypertension    Pulmonary:  Pulmonary Normal    Renal/:  Renal/ Normal     Hepatic/GI:   GERD, well controlled    Neurological:  Neurology Normal    Endocrine:  Endocrine Normal        Physical Exam  General:  Well nourished    Airway/Jaw/Neck:  Airway Findings: Mouth Opening: Normal Tongue: Normal  General Airway Assessment: Adult  Oropharynx Findings:  Mallampati: II  Jaw/Neck Findings:  Neck ROM: Normal ROM     Eyes/Ears/Nose:  Eyes/Ears/Nose Findings:    Dental:  Dental Findings:   Chest/Lungs:  Chest/Lungs Findings: Normal Respiratory Rate     Heart/Vascular:  Heart Findings: Rate: Normal  Rhythm: Regular Rhythm        Mental Status:  Mental Status Findings:  Cooperative, Alert and Oriented         Anesthesia Plan  Type of Anesthesia, risks & benefits discussed:  Anesthesia Type:  general  Patient's Preference:   Intra-op Monitoring Plan: standard ASA monitors  Intra-op Monitoring Plan Comments:   Post Op Pain Control Plan: multimodal analgesia  Post Op Pain Control Plan Comments:   Induction:   IV  Beta Blocker:  Patient is not currently on a Beta-Blocker (No further documentation required).       Informed Consent: Patient understands risks and agrees with Anesthesia plan.  Questions answered. Anesthesia consent signed with patient.  ASA Score: 2     Day of Surgery Review of History & Physical:    H&P update referred to the surgeon.         Ready For Surgery From Anesthesia Perspective.

## 2018-03-22 NOTE — OR NURSING
Patient discharged home per wheelchair per personal vehicle with spouse.  aao x 4.  No complaints voiced at discharge.

## 2018-03-22 NOTE — BRIEF OP NOTE
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

## 2018-03-23 VITALS
RESPIRATION RATE: 20 BRPM | OXYGEN SATURATION: 98 % | DIASTOLIC BLOOD PRESSURE: 82 MMHG | SYSTOLIC BLOOD PRESSURE: 145 MMHG | TEMPERATURE: 98 F | BODY MASS INDEX: 24.38 KG/M2 | HEIGHT: 72 IN | WEIGHT: 180 LBS | HEART RATE: 66 BPM

## 2018-03-28 ENCOUNTER — OFFICE VISIT (OUTPATIENT)
Dept: SURGERY | Facility: CLINIC | Age: 78
End: 2018-03-28
Payer: MEDICARE

## 2018-03-28 VITALS — WEIGHT: 176.38 LBS | BODY MASS INDEX: 23.92 KG/M2

## 2018-03-28 DIAGNOSIS — Z98.890 POST-OPERATIVE STATE: Primary | ICD-10-CM

## 2018-03-28 PROCEDURE — 99213 OFFICE O/P EST LOW 20 MIN: CPT | Mod: PBBFAC,PO | Performed by: SURGERY

## 2018-03-28 PROCEDURE — 99999 PR PBB SHADOW E&M-EST. PATIENT-LVL III: CPT | Mod: PBBFAC,,, | Performed by: SURGERY

## 2018-03-28 PROCEDURE — 99024 POSTOP FOLLOW-UP VISIT: CPT | Mod: POP,,, | Performed by: SURGERY

## 2018-04-02 NOTE — PROGRESS NOTES
POST-OP NOTE    PROCEDURE: Re excision melanoma    COMPLAINTS: None.    EXAM: Incision well approximated. No drainage. No infection.      IMPRESSION:   FINAL PATHOLOGIC DIAGNOSIS  1. Skin, right arm, excision:  - RESIDUAL MALIGNANT MELANOMA IN SITU.  - MARGINS ARE NEGATIVE.  - PREVIOUS BIOPSY SITE CHANGES.    PLAN: FU as needed.

## 2018-04-03 ENCOUNTER — HOSPITAL ENCOUNTER (OUTPATIENT)
Dept: RADIOLOGY | Facility: HOSPITAL | Age: 78
Discharge: HOME | End: 2018-04-03
Attending: FAMILY MEDICINE
Payer: MEDICARE

## 2018-04-03 DIAGNOSIS — R91.1 PULMONARY NODULE: ICD-10-CM

## 2018-04-03 PROCEDURE — 71250 CT THORAX DX C-: CPT | Mod: 26,,, | Performed by: RADIOLOGY

## 2018-04-03 PROCEDURE — 71250 CT THORAX DX C-: CPT | Mod: TC

## 2018-04-05 DIAGNOSIS — R73.9 HYPERGLYCEMIA: Primary | ICD-10-CM

## 2018-04-06 ENCOUNTER — TELEPHONE (OUTPATIENT)
Dept: SURGERY | Facility: CLINIC | Age: 78
End: 2018-04-06

## 2018-04-06 NOTE — TELEPHONE ENCOUNTER
----- Message from Candace Beavers sent at 4/6/2018 12:53 PM CDT -----  Misty with Dr. Kitty Be office requesting copy of patient's clinic notes from March 14th appointment on patient/would like faxed to [phone] or can call back at [phone].

## 2018-04-18 ENCOUNTER — TELEPHONE (OUTPATIENT)
Dept: SURGERY | Facility: CLINIC | Age: 78
End: 2018-04-18

## 2018-04-18 NOTE — TELEPHONE ENCOUNTER
----- Message from Verenice Beard sent at 4/18/2018  4:35 PM CDT -----  Contact: Self  Patient is requesting an appt to come in and have his stitches removed that the doctor put in the office on 3/28 for his post op.  Call back at [phone] (home).  Thank you!

## 2018-04-19 ENCOUNTER — TELEPHONE (OUTPATIENT)
Dept: SURGERY | Facility: CLINIC | Age: 78
End: 2018-04-19

## 2018-04-19 NOTE — TELEPHONE ENCOUNTER
----- Message from Salma Stanley sent at 4/19/2018  2:02 PM CDT -----  Contact: Patient  Type:  Patient Returning Call    Who Called:  Patient  Who Left Message for Patient:  Hany  Does the patient know what this is regarding?:  Appointment time; patient stated that he can take the Wednesday, 4/25 in [facility] Call Back Number:    Additional Information:  Please call patient

## 2018-04-25 ENCOUNTER — OFFICE VISIT (OUTPATIENT)
Dept: SURGERY | Facility: CLINIC | Age: 78
End: 2018-04-25
Payer: MEDICARE

## 2018-04-25 VITALS — WEIGHT: 183.44 LBS | BODY MASS INDEX: 24.88 KG/M2

## 2018-04-25 DIAGNOSIS — Z98.890 POST-OPERATIVE STATE: Primary | ICD-10-CM

## 2018-04-25 PROCEDURE — 99212 OFFICE O/P EST SF 10 MIN: CPT | Mod: PBBFAC,PO | Performed by: SURGERY

## 2018-04-25 PROCEDURE — 99999 PR PBB SHADOW E&M-EST. PATIENT-LVL II: CPT | Mod: PBBFAC,,, | Performed by: SURGERY

## 2018-04-25 PROCEDURE — 99024 POSTOP FOLLOW-UP VISIT: CPT | Mod: POP,,, | Performed by: SURGERY

## 2018-04-25 NOTE — PROGRESS NOTES
Sutures removed.  Path reviewed at last visit.  Margins were clear.  Incision is healing well.  Follow-up as needed.

## 2018-06-18 RX ORDER — DICLOFENAC SODIUM 10 MG/G
GEL TOPICAL
Qty: 100 G | Refills: 1 | Status: SHIPPED | OUTPATIENT
Start: 2018-06-18 | End: 2020-11-11

## 2018-07-09 ENCOUNTER — OFFICE VISIT (OUTPATIENT)
Dept: ORTHOPEDICS | Facility: CLINIC | Age: 78
End: 2018-07-09
Payer: MEDICARE

## 2018-07-09 VITALS
DIASTOLIC BLOOD PRESSURE: 60 MMHG | HEIGHT: 72 IN | HEART RATE: 79 BPM | WEIGHT: 183.44 LBS | SYSTOLIC BLOOD PRESSURE: 129 MMHG | BODY MASS INDEX: 24.85 KG/M2

## 2018-07-09 DIAGNOSIS — M76.892 TENDONITIS OF KNEE, LEFT: Primary | ICD-10-CM

## 2018-07-09 PROCEDURE — 99213 OFFICE O/P EST LOW 20 MIN: CPT | Mod: PBBFAC,PN | Performed by: ORTHOPAEDIC SURGERY

## 2018-07-09 PROCEDURE — 99213 OFFICE O/P EST LOW 20 MIN: CPT | Mod: S$PBB,,, | Performed by: ORTHOPAEDIC SURGERY

## 2018-07-09 PROCEDURE — 99999 PR PBB SHADOW E&M-EST. PATIENT-LVL III: CPT | Mod: PBBFAC,,, | Performed by: ORTHOPAEDIC SURGERY

## 2018-07-13 NOTE — PROGRESS NOTES
Past Medical History:   Diagnosis Date    Cancer     basal cell ca rt arm / melanoma on back    GERD (gastroesophageal reflux disease)     Hyperlipidemia     Hypertension        Past Surgical History:   Procedure Laterality Date    BACK SURGERY      L 4 L5    COLONOSCOPY N/A 12/20/2017    Procedure: COLONOSCOPY;  Surgeon: Filipe Ramirez MD;  Location: [facility];  Service: Endoscopy;  Laterality: N/A;    SKIN CANCER EXCISION      multiple sites, derm dr martinez, basal cell ca    VASECTOMY         Current Outpatient Prescriptions   Medication Sig    aspirin 81 MG chewable tablet Take 81 mg by mouth once daily.      diclofenac sodium 1 % Gel APPLY AS DIRECTED    hydrocortisone-pramoxine (PROCTOFOAM HC) rectal foam APPLY ONE APPLICATION RECTALLY TWICE DAILY AS DIRECTED    irbesartan (AVAPRO) 150 MG tablet Take 1 tablet (150 mg total) by mouth once daily.    nystatin-triamcinolone (MYCOLOG II) cream Apply topically 2 (two) times daily.    omeprazole (PRILOSEC OTC) 20 MG tablet Take 20 mg by mouth once daily.     simvastatin (ZOCOR) 40 MG tablet Take 1 tablet (40 mg total) by mouth nightly.    valacyclovir (VALTREX) 500 MG tablet Take 500 mg by mouth 2 (two) times daily.     No current facility-administered medications for this visit.        Review of patient's allergies indicates:   Allergen Reactions    Erythromycin Other (See Comments)     NOT SURE    Iodine Hives    Other Hives     Contrast dye    Shellfish derived Hives       Family History   Problem Relation Age of Onset    Cancer Mother         non hogkins lumphoma    Heart disease Father     Cancer Brother         melanoma, arm and leg    Clotting disorder Neg Hx     Anesthesia problems Neg Hx        Social History     Social History    Marital status:      Spouse name: N/A    Number of children: N/A    Years of education: N/A     Occupational History    Not on file.     Social History Main Topics    Smoking status: Former  Smoker     Quit date: 2/15/1976    Smokeless tobacco: Never Used    Alcohol use 0.0 oz/week      Comment: daily wine    Drug use: No    Sexual activity: Not on file     Other Topics Concern    Not on file     Social History Narrative    No narrative on file       Chief Complaint:   Chief Complaint   Patient presents with    Left Knee - Pain       Consulting Physician: No ref. provider found    History of present illness:    This is a 78 y.o. male who complains of left knee pain since clearing brush 1-15-18. No injury. Pain 5/10 and worse at end of day.    Review of Systems:    Constitution: Denies chills, fever, and sweats.  HENT: Denies headaches or blurry vision.  Cardiovascular: Denies chest pain or irregular heart beat.  Respiratory: Denies cough or shortness of breath.  Gastrointestinal: Denies abdominal pain, nausea, or vomiting.  Musculoskeletal:  Denies muscle cramps.  Neurological: Denies dizziness or focal weakness.  Psychiatric/Behavioral: Normal mental status.  Hematologic/Lymphatic: Denies bleeding problem or easy bruising/bleeding.  Skin: Denies rash or suspicious lesions.    Examination:    Vital Signs:    Vitals:    07/09/18 0832   BP: 129/60   Pulse: 79   Weight: 83.2 kg (183 lb 6.8 oz)   Height: 6' (1.829 m)   PainSc:   5   PainLoc: Knee       Body mass index is 24.88 kg/m².    This a well-developed, well nourished patient in no acute distress.    Alert and oriented x 3 and cooperative to examination.       Physical Exam: Left Knee Exam    Gait   Antalgic    Skin  Rash:   None  Scars:   None    Inspection  Erythema:  None  Bruising:  None  Effusion:  None  Masses:  None  Lymphadenopathy: None    Range of Motion: 0 to 130°    Medial Joint : n  Lateral Joint : n    Patellofemoral Tenderness: Yes  Patellofemoral Crepitus: Yes    Tender hamstring insertion    Lachman:  Normal  Anterior Drawer: Normal  Posterior  Drawer: Normal    Eulogio's:  Negative  Apley's:  Negative    Varus Stress:  Stable  Valgus Stress:  Stable    Strength:  5/5    Pulses:  Palpable  Sensation:  Intact          Imaging: 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        Assessment: Tendonitis of knee, left        Plan:  Hamstring insertional tendonitis. Pain did not go away. Pt interested in PT.     DISCLAIMER: This note may have been dictated using voice recognition software and may contain grammatical errors.     NOTE: Consult report sent to referring provider via [employer] EMR.

## 2018-09-20 DIAGNOSIS — E78.2 MIXED HYPERLIPIDEMIA: ICD-10-CM

## 2018-09-20 RX ORDER — SIMVASTATIN 40 MG/1
TABLET, FILM COATED ORAL
Qty: 90 TABLET | Refills: 0 | Status: SHIPPED | OUTPATIENT
Start: 2018-09-20 | End: 2018-10-23 | Stop reason: SDUPTHER

## 2018-09-25 DIAGNOSIS — I10 ESSENTIAL HYPERTENSION: ICD-10-CM

## 2018-09-25 RX ORDER — IRBESARTAN 150 MG/1
150 TABLET ORAL DAILY
Qty: 90 TABLET | Refills: 6 | Status: CANCELLED | OUTPATIENT
Start: 2018-09-25

## 2018-09-25 NOTE — TELEPHONE ENCOUNTER
----- Message from Nani Wynn sent at 9/25/2018  4:14 PM CDT -----  Contact: pt  Pt calling states that someone from the office called him while he was at the gym and he missed so he is returning the call..[phone]

## 2018-09-25 NOTE — TELEPHONE ENCOUNTER
Pt is requesting medication refill on   irbesartan (AVAPRO) 150 MG tablet   Last visit: 6/24/17  Last refill:Avapro 6/17/17  Follow Up: 10/18/18      Wants lab work done before appt. Needs orders

## 2018-09-25 NOTE — TELEPHONE ENCOUNTER
Called pt regarding below message. Let him know Im not sure who called but did let him know Im send his refill request in as well as a message asking about lab orders.

## 2018-10-03 DIAGNOSIS — I10 ESSENTIAL HYPERTENSION: ICD-10-CM

## 2018-10-03 RX ORDER — IRBESARTAN 150 MG/1
150 TABLET ORAL NIGHTLY
Qty: 30 TABLET | Refills: 0 | Status: SHIPPED | OUTPATIENT
Start: 2018-10-03 | End: 2018-11-02

## 2018-10-03 RX ORDER — IRBESARTAN 150 MG/1
150 TABLET ORAL DAILY
Qty: 90 TABLET | Refills: 3 | Status: CANCELLED | OUTPATIENT
Start: 2018-10-03

## 2018-10-03 NOTE — TELEPHONE ENCOUNTER
----- Message from Candace Bello sent at 10/3/2018 11:30 AM CDT -----  Type:  RX Refill Request    Who Called:  Patient  Refill or New Rx:  refill  RX Name and Strength:  irbesartan (AVAPRO) 150 MG tablet  How is the patient currently taking it? (ex. 1XDay):   Is this a 30 day or 90 day RX: 90  Preferred Pharmacy with phone number:  Patient is out of medicaton and is requesting a 30 day supply to got to [facility]pharm[facility] #[id number] - [place] - [address]  [address]  [place] [address]  Phone: [phone] Fax: [phone]    And a 90 day supply to [facility] [place] [place] - [address] AT Portal to Registered [facility] Sites  [address] [employer] [facility] [address]  Phone: [phone] Fax: [phone]    Local or Mail Order:  Local and mail order  Ordering Provider:  Chris Portillo  Best Call Back Number:  [phone]  Additional Information: patient states this is his second request and is not out of medication

## 2018-10-03 NOTE — TELEPHONE ENCOUNTER
FOV: 10/18/18    LOV: 6/24/18    LR: 6/17/17    Note:   Patient is out of medication. One Rx for local and one for mail order.

## 2018-10-04 ENCOUNTER — TELEPHONE (OUTPATIENT)
Dept: FAMILY MEDICINE | Facility: CLINIC | Age: 78
End: 2018-10-04

## 2018-10-04 DIAGNOSIS — E78.5 HYPERLIPIDEMIA, UNSPECIFIED HYPERLIPIDEMIA TYPE: Primary | ICD-10-CM

## 2018-10-04 DIAGNOSIS — I10 ESSENTIAL HYPERTENSION: ICD-10-CM

## 2018-10-04 DIAGNOSIS — Z12.5 SCREENING FOR PROSTATE CANCER: ICD-10-CM

## 2018-10-04 NOTE — TELEPHONE ENCOUNTER
----- Message from Jacinda Sibley LPN sent at 10/3/2018 11:33 PM CDT -----      ----- Message -----  From: Candace Bello  Sent: 10/3/2018  11:35 AM  To: Meryl Mayen Staff    Type: Needs Medical Advice    Who Called: Patient   Symptoms (please be specific):  N/a  How long has patient had these symptoms:  n/a  Pharmacy name and phone #:  n/a  Best Call Back Number: [phone]  Additional Information: patient  is schedule for his annual physical on 10/18/18 he is requesting lab prior contact to advise  when order are laced and ready to schedule    Thank you

## 2018-10-04 NOTE — TELEPHONE ENCOUNTER
Labs have been ordered.  Okay to schedule  There is no known a blood test to check for plaque in the arteries.  We will discuss further at his visit

## 2018-10-05 ENCOUNTER — PATIENT MESSAGE (OUTPATIENT)
Dept: FAMILY MEDICINE | Facility: CLINIC | Age: 78
End: 2018-10-05

## 2018-10-10 ENCOUNTER — LAB VISIT (OUTPATIENT)
Dept: LAB | Facility: HOSPITAL | Age: 78
End: 2018-10-10
Attending: FAMILY MEDICINE
Payer: MEDICARE

## 2018-10-10 DIAGNOSIS — I10 ESSENTIAL HYPERTENSION: ICD-10-CM

## 2018-10-10 DIAGNOSIS — Z12.5 SCREENING FOR PROSTATE CANCER: ICD-10-CM

## 2018-10-10 DIAGNOSIS — R73.9 HYPERGLYCEMIA: ICD-10-CM

## 2018-10-10 LAB
ALBUMIN SERPL BCP-MCNC: 4.2 G/DL
ALP SERPL-CCNC: 70 U/L
ALT SERPL W/O P-5'-P-CCNC: 17 U/L
ANION GAP SERPL CALC-SCNC: 12 MMOL/L
AST SERPL-CCNC: 21 U/L
BASOPHILS # BLD AUTO: 0.08 K/UL
BASOPHILS NFR BLD: 1.2 %
BILIRUB SERPL-MCNC: 1 MG/DL
BUN SERPL-MCNC: 12 MG/DL
CALCIUM SERPL-MCNC: 9.8 MG/DL
CHLORIDE SERPL-SCNC: 106 MMOL/L
CHOLEST SERPL-MCNC: 137 MG/DL
CHOLEST/HDLC SERPL: 2.8 {RATIO}
CO2 SERPL-SCNC: 22 MMOL/L
COMPLEXED PSA SERPL-MCNC: 3.3 NG/ML
CREAT SERPL-MCNC: 0.8 MG/DL
DIFFERENTIAL METHOD: ABNORMAL
EOSINOPHIL # BLD AUTO: 0.3 K/UL
EOSINOPHIL NFR BLD: 4.7 %
ERYTHROCYTE [DISTWIDTH] IN BLOOD BY AUTOMATED COUNT: 13.6 %
EST. GFR  (AFRICAN AMERICAN): >60 ML/MIN/1.73 M^2
EST. GFR  (NON AFRICAN AMERICAN): >60 ML/MIN/1.73 M^2
GLUCOSE SERPL-MCNC: 95 MG/DL
HCT VFR BLD AUTO: 42.1 %
HDLC SERPL-MCNC: 49 MG/DL
HDLC SERPL: 35.8 %
HGB BLD-MCNC: 13.7 G/DL
IMM GRANULOCYTES # BLD AUTO: 0.03 K/UL
IMM GRANULOCYTES NFR BLD AUTO: 0.5 %
LDLC SERPL CALC-MCNC: 46 MG/DL
LYMPHOCYTES # BLD AUTO: 2.5 K/UL
LYMPHOCYTES NFR BLD: 38.3 %
MCH RBC QN AUTO: 32.9 PG
MCHC RBC AUTO-ENTMCNC: 32.5 G/DL
MCV RBC AUTO: 101 FL
MONOCYTES # BLD AUTO: 0.5 K/UL
MONOCYTES NFR BLD: 7.9 %
NEUTROPHILS # BLD AUTO: 3.1 K/UL
NEUTROPHILS NFR BLD: 47.4 %
NONHDLC SERPL-MCNC: 88 MG/DL
NRBC BLD-RTO: 0 /100 WBC
PLATELET # BLD AUTO: 249 K/UL
PMV BLD AUTO: 11.6 FL
POTASSIUM SERPL-SCNC: 4.3 MMOL/L
PROT SERPL-MCNC: 6.8 G/DL
RBC # BLD AUTO: 4.16 M/UL
SODIUM SERPL-SCNC: 140 MMOL/L
TRIGL SERPL-MCNC: 210 MG/DL
WBC # BLD AUTO: 6.6 K/UL

## 2018-10-10 PROCEDURE — 36415 COLL VENOUS BLD VENIPUNCTURE: CPT | Mod: PO

## 2018-10-10 PROCEDURE — 80061 LIPID PANEL: CPT

## 2018-10-10 PROCEDURE — 85025 COMPLETE CBC W/AUTO DIFF WBC: CPT

## 2018-10-10 PROCEDURE — 83036 HEMOGLOBIN GLYCOSYLATED A1C: CPT

## 2018-10-10 PROCEDURE — 84153 ASSAY OF PSA TOTAL: CPT

## 2018-10-10 PROCEDURE — 80053 COMPREHEN METABOLIC PANEL: CPT

## 2018-10-11 ENCOUNTER — PATIENT MESSAGE (OUTPATIENT)
Dept: ADMINISTRATIVE | Facility: OTHER | Age: 78
End: 2018-10-11

## 2018-10-11 LAB
ESTIMATED AVG GLUCOSE: 97 MG/DL
HBA1C MFR BLD HPLC: 5 %

## 2018-10-14 ENCOUNTER — PATIENT MESSAGE (OUTPATIENT)
Dept: ADMINISTRATIVE | Facility: OTHER | Age: 78
End: 2018-10-14

## 2018-10-17 ENCOUNTER — OFFICE VISIT (OUTPATIENT)
Dept: ORTHOPEDICS | Facility: CLINIC | Age: 78
End: 2018-10-17
Payer: MEDICARE

## 2018-10-17 VITALS
DIASTOLIC BLOOD PRESSURE: 80 MMHG | WEIGHT: 183 LBS | HEIGHT: 72 IN | BODY MASS INDEX: 24.79 KG/M2 | SYSTOLIC BLOOD PRESSURE: 156 MMHG | HEART RATE: 70 BPM

## 2018-10-17 DIAGNOSIS — M17.12 ARTHRITIS OF KNEE, LEFT: Primary | ICD-10-CM

## 2018-10-17 DIAGNOSIS — M25.562 LEFT KNEE PAIN, UNSPECIFIED CHRONICITY: Primary | ICD-10-CM

## 2018-10-17 DIAGNOSIS — M25.562 ACUTE PAIN OF LEFT KNEE: ICD-10-CM

## 2018-10-17 PROCEDURE — 99999 PR PBB SHADOW E&M-EST. PATIENT-LVL III: CPT | Mod: PBBFAC,,, | Performed by: ORTHOPAEDIC SURGERY

## 2018-10-17 PROCEDURE — 99214 OFFICE O/P EST MOD 30 MIN: CPT | Mod: S$PBB,,, | Performed by: ORTHOPAEDIC SURGERY

## 2018-10-17 PROCEDURE — 99213 OFFICE O/P EST LOW 20 MIN: CPT | Mod: PBBFAC,PN | Performed by: ORTHOPAEDIC SURGERY

## 2018-10-17 NOTE — PROGRESS NOTES
Past Medical History:   Diagnosis Date    Cancer     basal cell ca rt arm / melanoma on back    GERD (gastroesophageal reflux disease)     Hyperlipidemia     Hypertension        Past Surgical History:   Procedure Laterality Date    BACK SURGERY      L 4 L5    COLONOSCOPY N/A 12/20/2017    Procedure: COLONOSCOPY;  Surgeon: Filipe Ramirez MD;  Location: [facility];  Service: Endoscopy;  Laterality: N/A;    COLONOSCOPY N/A 12/20/2017    Performed by [facility] at [facility] ENDO    COLONOSCOPY N/A 2/7/2013    [facility] by [facility] at [facility] ENDO    EXCISION-LESION N/A 2/16/2016    Performed by [facility] at [facility] OR    EXCISION-MELANOMA Right 3/22/2018    [facility] by [facility] at [facility] OR    SKIN CANCER EXCISION      multiple sites, derm dr martinez, basal cell ca    VASECTOMY         Current Outpatient Medications   Medication Sig    aspirin 81 MG chewable tablet Take 81 mg by mouth once daily.      diclofenac sodium 1 % Gel APPLY AS DIRECTED    irbesartan (AVAPRO) 150 MG tablet Take 1 tablet (150 mg total) by mouth every evening.    simvastatin (ZOCOR) 40 MG tablet TAKE 1 TABLET NIGHTLY    hydrocortisone-pramoxine (PROCTOFOAM HC) rectal foam APPLY ONE APPLICATION RECTALLY TWICE DAILY AS DIRECTED    irbesartan (AVAPRO) 150 MG tablet Take 1 tablet (150 mg total) by mouth once daily.    nystatin-triamcinolone (MYCOLOG II) cream Apply topically 2 (two) times daily.    omeprazole (PRILOSEC OTC) 20 MG tablet Take 20 mg by mouth once daily.     valacyclovir (VALTREX) 500 MG tablet Take 500 mg by mouth 2 (two) times daily.     No current facility-administered medications for this visit.        Review of patient's allergies indicates:   Allergen Reactions    Erythromycin Other (See Comments)     NOT SURE    Iodine Hives    Other Hives     Contrast dye    Shellfish derived Hives       Family History   Problem Relation Age of Onset    Cancer Mother         non  mayte lumphoma    Heart disease Father     Cancer Brother         melanoma, arm and leg    Clotting disorder Neg Hx     Anesthesia problems Neg Hx        Social History     Socioeconomic History    Marital status:      Spouse name: Not on file    Number of children: Not on file    Years of education: Not on file    Highest education level: Not on file   Social Needs    Financial resource strain: Not on file    Food insecurity - worry: Not on file    Food insecurity - inability: Not on file    Transportation needs - medical: Not on file    Transportation needs - non-medical: Not on file   Occupational History    Not on file   Tobacco Use    Smoking status: Former Smoker     Last attempt to quit: 2/15/1976     Years since quittin.6    Smokeless tobacco: Never Used   Substance and Sexual Activity    Alcohol use: Yes     Alcohol/week: 0.0 oz     Comment: daily wine    Drug use: No    Sexual activity: Not on file   Other Topics Concern    Not on file   Social History Narrative    Not on file       Chief Complaint:   Chief Complaint   Patient presents with    Left Knee - Pain       Consulting Physician: No ref. provider found    History of present illness:    This is a 78 y.o. male who complains of left knee pain since clearing brush 1-15-18.  We sent him to therapy for hamstring tendinitis and he is completely resolved from that.  He reports he has new pain today which is located on the medial joint line.  He denies any injury.  This new pain has been there for several weeks.  Pain 3/10 and worse at end of day.    Review of Systems:    Constitution: Denies chills, fever, and sweats.  HENT: Denies headaches or blurry vision.  Cardiovascular: Denies chest pain or irregular heart beat.  Respiratory: Denies cough or shortness of breath.  Gastrointestinal: Denies abdominal pain, nausea, or vomiting.  Musculoskeletal:  Denies muscle cramps.  Neurological: Denies dizziness or focal  weakness.  Psychiatric/Behavioral: Normal mental status.  Hematologic/Lymphatic: Denies bleeding problem or easy bruising/bleeding.  Skin: Denies rash or suspicious lesions.    Examination:    Vital Signs:    Vitals:    10/17/18 1404   BP: (!) 156/80   Pulse: 70   Weight: 83 kg (183 lb)   Height: 6' (1.829 m)   PainSc:   3   PainLoc: Knee       Body mass index is 24.82 kg/m².    This a well-developed, well nourished patient in no acute distress.    Alert and oriented x 3 and cooperative to examination.       Physical Exam: Left Knee Exam    Gait   normal    Skin  Rash:   None  Scars:   None    Inspection  Erythema:  None  Bruising:  None  Effusion:  None  Masses:  None  Lymphadenopathy: None    Range of Motion: 0 to 130°    Medial Joint : yes  Lateral Joint : no    Patellofemoral Tenderness: no  Patellofemoral Crepitus: no    Lachman:  Normal  Anterior Drawer: Normal  Posterior Drawer: Normal    Eulogio's:  Negative  Apley's:  Negative    Varus Stress:  Stable  Valgus Stress:  Stable    Strength:  5/5    Pulses:  Palpable  Sensation:  Intact          Imaging: 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        Assessment: Arthritis of knee, left    Acute pain of left knee        Plan:  This tendinitis has resolved however he has exquisite medial joint line tenderness.  Will go ahead and obtain an MRI to evaluate the meniscus.    DISCLAIMER: This note may have been dictated using voice recognition software and may contain grammatical errors.     NOTE: Consult report sent to referring provider via [employer].

## 2018-10-18 ENCOUNTER — DOCUMENTATION ONLY (OUTPATIENT)
Dept: FAMILY MEDICINE | Facility: CLINIC | Age: 78
End: 2018-10-18

## 2018-10-18 ENCOUNTER — LAB VISIT (OUTPATIENT)
Dept: LAB | Facility: HOSPITAL | Age: 78
End: 2018-10-18
Attending: PHYSICIAN ASSISTANT
Payer: MEDICARE

## 2018-10-18 ENCOUNTER — OFFICE VISIT (OUTPATIENT)
Dept: FAMILY MEDICINE | Facility: CLINIC | Age: 78
End: 2018-10-18
Payer: MEDICARE

## 2018-10-18 VITALS
SYSTOLIC BLOOD PRESSURE: 154 MMHG | DIASTOLIC BLOOD PRESSURE: 73 MMHG | TEMPERATURE: 98 F | HEART RATE: 84 BPM | HEIGHT: 72 IN | WEIGHT: 176.38 LBS | BODY MASS INDEX: 23.89 KG/M2

## 2018-10-18 DIAGNOSIS — D75.89 MACROCYTOSIS: ICD-10-CM

## 2018-10-18 DIAGNOSIS — I70.0 ABDOMINAL AORTIC ATHEROSCLEROSIS: ICD-10-CM

## 2018-10-18 DIAGNOSIS — R91.1 LUNG NODULE: ICD-10-CM

## 2018-10-18 DIAGNOSIS — D53.9 NUTRITIONAL ANEMIA: ICD-10-CM

## 2018-10-18 DIAGNOSIS — I10 ESSENTIAL HYPERTENSION: Primary | ICD-10-CM

## 2018-10-18 DIAGNOSIS — N40.0 BENIGN PROSTATIC HYPERPLASIA, UNSPECIFIED WHETHER LOWER URINARY TRACT SYMPTOMS PRESENT: ICD-10-CM

## 2018-10-18 DIAGNOSIS — G89.29 CHRONIC RLQ PAIN: ICD-10-CM

## 2018-10-18 DIAGNOSIS — R10.31 CHRONIC RLQ PAIN: ICD-10-CM

## 2018-10-18 DIAGNOSIS — R71.8 RED BLOOD CELL ABNORMALITY: ICD-10-CM

## 2018-10-18 DIAGNOSIS — E78.2 MIXED HYPERLIPIDEMIA: ICD-10-CM

## 2018-10-18 LAB
FOLATE SERPL-MCNC: 7.4 NG/ML
VIT B12 SERPL-MCNC: 312 PG/ML

## 2018-10-18 PROCEDURE — 99999 PR PBB SHADOW E&M-EST. PATIENT-LVL V: CPT | Mod: PBBFAC,,, | Performed by: PHYSICIAN ASSISTANT

## 2018-10-18 PROCEDURE — 99214 OFFICE O/P EST MOD 30 MIN: CPT | Mod: S$PBB,,, | Performed by: PHYSICIAN ASSISTANT

## 2018-10-18 PROCEDURE — 82746 ASSAY OF FOLIC ACID SERUM: CPT

## 2018-10-18 PROCEDURE — 36415 COLL VENOUS BLD VENIPUNCTURE: CPT | Mod: PO

## 2018-10-18 PROCEDURE — 82607 VITAMIN B-12: CPT

## 2018-10-18 PROCEDURE — 99215 OFFICE O/P EST HI 40 MIN: CPT | Mod: PBBFAC,PO | Performed by: PHYSICIAN ASSISTANT

## 2018-10-18 RX ORDER — ZOSTER VACCINE RECOMBINANT, ADJUVANTED 50 MCG/0.5
KIT INTRAMUSCULAR
Refills: 0 | COMMUNITY
Start: 2018-08-28 | End: 2018-10-18

## 2018-10-18 NOTE — PROGRESS NOTES
Pre-Visit Chart Review  For Appointment Scheduled on 10/18/2018    Health Maintenance Due   Topic Date Due    Pneumococcal (65+) (2 of 2 - PCV13) 10/03/2009

## 2018-10-18 NOTE — PROGRESS NOTES
Subjective:       Patient ID: Iftikhar Lynn is a 78 y.o. male.    Chief Complaint: Hypertension    Patient with hypertension and hyperlipidemia presents for routine follow-up.  Patient's blood pressure is elevated above goal today.  Recent labs showed cholesterol is at goal.  Recent lab also showed stable red cell count and hemoglobin.  His MCV is elevated at 101.  He reports drinking approximately 1 bottle of wine daily.  He continues to complain of right lower quadrant/right pelvic pain. Pain has been chronic in nature.  Pain increases with walking and eating a heavy meal.  He denies nausea vomiting diarrhea constipation.  There was evidence of right kidney mass, right kidney stone and bladder wall thickening on a CT scan that was done 1 year ago.  Patient did not follow up with Urology as recommended.  He is up-to-date with all routine vaccinations and health maintenance screenings.  He has a 6 mm pulmonary nodule that will be followed with CT scan in April of 2019.  He is due for abdominal aortic ultrasound.  Patients patient medical/surgical, social and family histories have been reviewed         Review of Systems   Constitutional: Positive for activity change. Negative for unexpected weight change.   HENT: Negative for hearing loss, rhinorrhea and trouble swallowing.    Eyes: Negative for discharge and visual disturbance.   Respiratory: Negative for chest tightness and wheezing.    Cardiovascular: Negative for chest pain and palpitations.   Gastrointestinal: Negative for blood in stool, constipation, diarrhea and vomiting.   Endocrine: Negative for polydipsia and polyuria.   Genitourinary: Negative for difficulty urinating, hematuria and urgency.   Musculoskeletal: Positive for arthralgias. Negative for joint swelling and neck pain.   Neurological: Negative for weakness and headaches.   Psychiatric/Behavioral: Negative for confusion and dysphoric mood.       Objective:      Physical Exam    Constitutional: He appears well-developed and well-nourished. He is cooperative. No distress.   HENT:   Head: Normocephalic and atraumatic.   Eyes: Conjunctivae and EOM are normal. No scleral icterus.   Neck: Carotid bruit is not present.   Cardiovascular: Normal rate, regular rhythm and normal heart sounds.   Pulmonary/Chest: Effort normal and breath sounds normal.   Abdominal: Soft. Normal appearance and bowel sounds are normal. There is no hepatosplenomegaly. There is no tenderness. There is no rigidity, no rebound, no guarding and no CVA tenderness.       Musculoskeletal:        Right lower leg: He exhibits no edema.        Left lower leg: He exhibits no edema.   Neurological: He is alert.   Vitals reviewed.      Assessment:       1. Essential hypertension    2. Mixed hyperlipidemia    3. Abdominal aortic atherosclerosis    4. Lung nodule    5. Chronic RLQ pain    6. Benign prostatic hyperplasia, unspecified whether lower urinary tract symptoms present    7. Macrocytosis    8. Red blood cell abnormality    9. Nutritional anemia         Plan:       Iftikhar was seen today for hypertension.    Diagnoses and all orders for this visit:    Essential hypertension  Home BP log and 4 weeks nurse visit   Mixed hyperlipidemia  Continue statin   Abdominal aortic atherosclerosis  -     US Abdominal Aorta; Future    Lung nodule  -    Repeat in 4/2019  CT Chest With Contrast; Future    Chronic RLQ pain  Benign prostatic hyperplasia, unspecified whether lower urinary tract symptoms present  -     Ambulatory referral to Urology    Macrocytosis  Red blood cell abnormality  Nutritional anemia   -     Folate; Future  -     Vitamin B12; Future    ?alcohol   Further recommendations will be made based on results

## 2018-10-19 ENCOUNTER — HOSPITAL ENCOUNTER (OUTPATIENT)
Dept: RADIOLOGY | Facility: HOSPITAL | Age: 78
Discharge: HOME | End: 2018-10-19
Attending: ORTHOPAEDIC SURGERY
Payer: MEDICARE

## 2018-10-19 DIAGNOSIS — M25.562 LEFT KNEE PAIN, UNSPECIFIED CHRONICITY: ICD-10-CM

## 2018-10-19 PROCEDURE — 73721 MRI JNT OF LWR EXTRE W/O DYE: CPT | Mod: TC,LT

## 2018-10-19 PROCEDURE — 73721 MRI JNT OF LWR EXTRE W/O DYE: CPT | Mod: 26,LT,, | Performed by: RADIOLOGY

## 2018-10-22 ENCOUNTER — TELEPHONE (OUTPATIENT)
Dept: FAMILY MEDICINE | Facility: CLINIC | Age: 78
End: 2018-10-22

## 2018-10-23 DIAGNOSIS — I10 ESSENTIAL HYPERTENSION: ICD-10-CM

## 2018-10-23 DIAGNOSIS — E78.2 MIXED HYPERLIPIDEMIA: ICD-10-CM

## 2018-10-23 RX ORDER — IRBESARTAN 150 MG/1
TABLET ORAL
Qty: 90 TABLET | Refills: 3 | Status: SHIPPED | OUTPATIENT
Start: 2018-10-23 | End: 2018-11-13

## 2018-10-23 RX ORDER — SIMVASTATIN 40 MG/1
TABLET, FILM COATED ORAL
Qty: 90 TABLET | Refills: 3 | Status: SHIPPED | OUTPATIENT
Start: 2018-10-23 | End: 2019-11-06 | Stop reason: SDUPTHER

## 2018-10-27 ENCOUNTER — TELEPHONE (OUTPATIENT)
Dept: UROLOGY | Facility: CLINIC | Age: 78
End: 2018-10-27

## 2018-10-27 NOTE — TELEPHONE ENCOUNTER
TREVON Sheth placed consult for BPH but patient is EP of mine who did not follow up as instructed. Can schedule routine EP appointment as available.

## 2018-10-29 ENCOUNTER — HOSPITAL ENCOUNTER (OUTPATIENT)
Dept: RADIOLOGY | Facility: HOSPITAL | Age: 78
Discharge: HOME | End: 2018-10-29
Attending: PHYSICIAN ASSISTANT
Payer: MEDICARE

## 2018-10-29 ENCOUNTER — OFFICE VISIT (OUTPATIENT)
Dept: ORTHOPEDICS | Facility: CLINIC | Age: 78
End: 2018-10-29
Payer: MEDICARE

## 2018-10-29 VITALS
HEIGHT: 72 IN | HEART RATE: 75 BPM | WEIGHT: 176 LBS | DIASTOLIC BLOOD PRESSURE: 80 MMHG | BODY MASS INDEX: 23.84 KG/M2 | SYSTOLIC BLOOD PRESSURE: 147 MMHG

## 2018-10-29 DIAGNOSIS — Z01.818 PRE-OP EXAM: ICD-10-CM

## 2018-10-29 DIAGNOSIS — M25.562 ACUTE PAIN OF LEFT KNEE: Primary | ICD-10-CM

## 2018-10-29 DIAGNOSIS — R91.1 LUNG NODULE: ICD-10-CM

## 2018-10-29 DIAGNOSIS — I70.0 ABDOMINAL AORTIC ATHEROSCLEROSIS: ICD-10-CM

## 2018-10-29 DIAGNOSIS — M17.12 ARTHRITIS OF KNEE, LEFT: ICD-10-CM

## 2018-10-29 PROCEDURE — 99213 OFFICE O/P EST LOW 20 MIN: CPT | Mod: PBBFAC,25,PN | Performed by: ORTHOPAEDIC SURGERY

## 2018-10-29 PROCEDURE — 71260 CT THORAX DX C+: CPT | Mod: TC

## 2018-10-29 PROCEDURE — 71260 CT THORAX DX C+: CPT | Mod: 26,,, | Performed by: RADIOLOGY

## 2018-10-29 PROCEDURE — 25500020 PHARM REV CODE 255

## 2018-10-29 PROCEDURE — 76775 US EXAM ABDO BACK WALL LIM: CPT | Mod: TC

## 2018-10-29 PROCEDURE — 99999 PR PBB SHADOW E&M-EST. PATIENT-LVL III: CPT | Mod: PBBFAC,,, | Performed by: ORTHOPAEDIC SURGERY

## 2018-10-29 PROCEDURE — 99214 OFFICE O/P EST MOD 30 MIN: CPT | Mod: S$PBB,,, | Performed by: ORTHOPAEDIC SURGERY

## 2018-10-29 PROCEDURE — 76775 US EXAM ABDO BACK WALL LIM: CPT | Mod: 26,,, | Performed by: RADIOLOGY

## 2018-10-29 RX ORDER — SODIUM CHLORIDE 9 MG/ML
INJECTION, SOLUTION INTRAVENOUS
Status: DISPENSED
Start: 2018-10-29 | End: 2018-10-29

## 2018-10-29 RX ADMIN — IOHEXOL 75 ML: 350 INJECTION, SOLUTION INTRAVENOUS at 09:10

## 2018-10-30 ENCOUNTER — TELEPHONE (OUTPATIENT)
Dept: ORTHOPEDICS | Facility: CLINIC | Age: 78
End: 2018-10-30

## 2018-10-30 NOTE — TELEPHONE ENCOUNTER
----- Message from Margie Irwin LPN sent at 10/30/2018 10:43 AM CDT -----  I was able to get him scheduled with Dr. Hung for 1/7/18 at 840 am. Please advise him to come fasting as he will need labs drawn. Thank you  ----- Message -----  From: Tiarra Alas LPN  Sent: 10/29/2018   4:11 PM  To: Margie Irwin LPN    When can he be seen?    Tiarra  ----- Message -----  From: Margie Irwin LPN  Sent: 10/29/2018   4:04 PM  To: Tiarra Alas LPN    Hi, yes Dr. Portillo or a physician will need to see him as he has not seen Dr. Portillo since June 2017. PA's and NP's can not clear patients for surgery. Sorry  ----- Message -----  From: Tiarra Alas LPN  Sent: 10/29/2018   3:21 PM  To: Bandar RENTERIA Jr. Staff    We are scheduling Mr. Lynn for left knee scope on 1/24/19.  Dr. Díaz requires surgical clearance.  Will Dr. Portillo need to see him again for clearance or can clearance be given without appt?    Please advise,  Tiarra

## 2018-10-30 NOTE — TELEPHONE ENCOUNTER
Called and LVM for pt advising that PCP has scheduled him for Surgery Clearance Appt for 1/7/19@8:40.

## 2018-10-31 NOTE — PROGRESS NOTES
Past Medical History:   Diagnosis Date    Cancer     basal cell ca rt arm / melanoma on back    GERD (gastroesophageal reflux disease)     Hyperlipidemia     Hypertension        Past Surgical History:   Procedure Laterality Date    BACK SURGERY      L 4 L5    COLONOSCOPY N/A 12/20/2017    Procedure: COLONOSCOPY;  Surgeon: Filipe Ramirez MD;  Location: [facility];  Service: Endoscopy;  Laterality: N/A;    COLONOSCOPY N/A 12/20/2017    [facility]    COLONOSCOPY N/A 2/7/2013    [facility] at [facility] ENDO    EXCISION-LESION N/A 2/16/2016    [facility] Edu [facility] at [facility] OR    EXCISION-MELANOMA Right 3/22/2018    [facility] at [facility] OR    SKIN CANCER EXCISION      multiple sites, derm dr martinez, basal cell ca    VASECTOMY         Current Outpatient Medications   Medication Sig    aspirin 81 MG chewable tablet Take 81 mg by mouth once daily.      diclofenac sodium 1 % Gel APPLY AS DIRECTED    hydrocortisone-pramoxine (PROCTOFOAM HC) rectal foam APPLY ONE APPLICATION RECTALLY TWICE DAILY AS DIRECTED    irbesartan (AVAPRO) 150 MG tablet Take 1 tablet (150 mg total) by mouth every evening.    irbesartan (AVAPRO) 150 MG tablet TAKE 1 TABLET DAILY    nystatin-triamcinolone (MYCOLOG II) cream Apply topically 2 (two) times daily.    omeprazole (PRILOSEC OTC) 20 MG tablet Take 20 mg by mouth once daily.     simvastatin (ZOCOR) 40 MG tablet TAKE 1 TABLET NIGHTLY    valacyclovir (VALTREX) 500 MG tablet Take 500 mg by mouth 2 (two) times daily.     No current facility-administered medications for this visit.        Review of patient's allergies indicates:   Allergen Reactions    Erythromycin Other (See Comments)     NOT SURE    Iodine Hives    Other Hives     Contrast dye    Shellfish derived Hives       Family History   Problem Relation Age of Onset    Cancer Mother         non hogkins lumphoma    Heart disease  Father     Cancer Brother         melanoma, arm and leg    Clotting disorder Neg Hx     Anesthesia problems Neg Hx        Social History     Socioeconomic History    Marital status:      Spouse name: Not on file    Number of children: Not on file    Years of education: Not on file    Highest education level: Not on file   Social Needs    Financial resource strain: Not on file    Food insecurity - worry: Not on file    Food insecurity - inability: Not on file    Transportation needs - medical: Not on file    Transportation needs - non-medical: Not on file   Occupational History    Not on file   Tobacco Use    Smoking status: Former Smoker     Last attempt to quit: 2/15/1976     Years since quittin.7    Smokeless tobacco: Never Used   Substance and Sexual Activity    Alcohol use: Yes     Alcohol/week: 0.0 oz     Comment: daily wine    Drug use: No    Sexual activity: Not on file   Other Topics Concern    Not on file   Social History Narrative    Not on file       Chief Complaint:   Chief Complaint   Patient presents with    Left Knee - Pain       Consulting Physician: No ref. provider found    History of present illness:    This is a 78 y.o. male who complains of left knee pain since clearing brush 1-15-18.  We sent him to therapy for hamstring tendinitis and he is completely resolved from that.  He reports he has pain located on the medial joint line.  He denies any injury.  This pain has been there for several weeks.  Pain 3/10 and worse at end of day.    Review of Systems:    Constitution: Denies chills, fever, and sweats.  HENT: Denies headaches or blurry vision.  Cardiovascular: Denies chest pain or irregular heart beat.  Respiratory: Denies cough or shortness of breath.  Gastrointestinal: Denies abdominal pain, nausea, or vomiting.  Musculoskeletal:  Denies muscle cramps.  Neurological: Denies dizziness or focal weakness.  Psychiatric/Behavioral: Normal mental  status.  Hematologic/Lymphatic: Denies bleeding problem or easy bruising/bleeding.  Skin: Denies rash or suspicious lesions.    Examination:    Vital Signs:    Vitals:    10/29/18 1431   BP: (!) 147/80   Pulse: 75   Weight: 79.8 kg (176 lb)   Height: 6' (1.829 m)   PainSc:   1   PainLoc: Knee       Body mass index is 23.87 kg/m².    This a well-developed, well nourished patient in no acute distress.    Alert and oriented x 3 and cooperative to examination.       Physical Exam: Left Knee Exam    Gait   normal    Skin  Rash:   None  Scars:   None    Inspection  Erythema:  None  Bruising:  None  Effusion:  None  Masses:  None  Lymphadenopathy: None    Range of Motion: 0 to 130°    Medial Joint : yes  Lateral Joint : no    Patellofemoral Tenderness: no  Patellofemoral Crepitus: no    Lachman:  Normal  Anterior Drawer: Normal  Posterior Drawer: Normal    Eulogio's:  Negative  Apley's:  Negative    Varus Stress:  Stable  Valgus Stress:  Stable    Strength:  5/5    Pulses:  Palpable  Sensation:  Intact          Imaging: 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 The MRI study images that were interpreted personally by me today and reviewed with the patient demonstrate posterior horn medial meniscal tear.         Assessment: Acute pain of left knee    Arthritis of knee, left        Plan:  We discussed options and he elected for scope with menisectomy. After discussing the diagnosis and reviewing treatment options, the patient elected to proceed with surgical intervention.    In preparation for surgery:    A pre-operative clearance request was placed with patient's primary physician.    Appropriate pre-operative labs were ordered.    An EKG examination was ordered.    A chest X-ray was ordered.    Pertinent risk factors and comorbidities for surgery were identified and reviewed, including hypertension.    Pre-operative antibiotics were ordered.    The risks, benefits, and alternatives to the  procedure were explained to the patient including, but not limited to: incomplete pain relief, surgical failure, hardware failure, need for further procedures, damage to nerves, arteries, blood vessels and other structures, infection, Deep Vein Thrombosis (DVT), Pulmonary Embolus (PE), Complex Regional Pain Syndrome as well as general anesthetic complications including seizure, stroke, heart attack and even death. The patient understood these risks and wished to proceed and signed the informed consent. All questions were answered. No guarantees were implied or stated.    We will obtain the necessary clearances and schedule the patient for surgery.        DISCLAIMER: This note may have been dictated using voice recognition software and may contain grammatical errors.     NOTE: Consult report sent to referring provider via [employer].

## 2018-11-01 ENCOUNTER — TELEPHONE (OUTPATIENT)
Dept: FAMILY MEDICINE | Facility: CLINIC | Age: 78
End: 2018-11-01

## 2018-11-01 RX ORDER — MUPIROCIN 20 MG/G
OINTMENT TOPICAL
Status: CANCELLED | OUTPATIENT
Start: 2018-11-01

## 2018-11-01 RX ORDER — SODIUM CHLORIDE 9 MG/ML
INJECTION, SOLUTION INTRAVENOUS CONTINUOUS
Status: CANCELLED | OUTPATIENT
Start: 2018-11-01

## 2018-11-01 NOTE — TELEPHONE ENCOUNTER
----- Message from Kacey Almanza sent at 11/1/2018  1:51 PM CDT -----  Contact: patient  Type:  Patient Returning Call    Who Called:  patient  Who Left Message for Patient:  Not sure  Does the patient know what this is regarding?:  Not sure  Best Call Back Number:  [phone]  Additional Information:  Requesting a call back

## 2018-11-01 NOTE — TELEPHONE ENCOUNTER
Spoke with patient. He is not sure who called him. I did not see anything incomplete in his chart. I advised if he needed anything to call us and if someone needs him they will attempt to reach him again.

## 2018-11-01 NOTE — TELEPHONE ENCOUNTER
Called pt regarding below message. Informed of appt for surgery clearance.  Pt verbalized appt date, time, and location with no further questions

## 2018-11-01 NOTE — TELEPHONE ENCOUNTER
----- Message from Alexander Aldana sent at 11/1/2018 10:41 AM CDT -----  Contact: pt  He's calling in regard to a missed call, [phone] (home)

## 2018-11-06 ENCOUNTER — PATIENT MESSAGE (OUTPATIENT)
Dept: OTHER | Facility: OTHER | Age: 78
End: 2018-11-06

## 2018-11-09 ENCOUNTER — DOCUMENTATION ONLY (OUTPATIENT)
Dept: FAMILY MEDICINE | Facility: CLINIC | Age: 78
End: 2018-11-09

## 2018-11-09 NOTE — PROGRESS NOTES
Pre-Visit Chart Review  For Appointment Scheduled on 11/13/2018    There are no preventive care reminders to display for this patient.

## 2018-11-13 ENCOUNTER — OFFICE VISIT (OUTPATIENT)
Dept: FAMILY MEDICINE | Facility: CLINIC | Age: 78
End: 2018-11-13
Payer: MEDICARE

## 2018-11-13 VITALS
HEIGHT: 72 IN | HEART RATE: 75 BPM | SYSTOLIC BLOOD PRESSURE: 143 MMHG | DIASTOLIC BLOOD PRESSURE: 80 MMHG | WEIGHT: 181.88 LBS | BODY MASS INDEX: 24.63 KG/M2 | TEMPERATURE: 98 F

## 2018-11-13 DIAGNOSIS — I10 ESSENTIAL HYPERTENSION: Primary | ICD-10-CM

## 2018-11-13 DIAGNOSIS — I77.89 OTHER SPECIFIED DISORDERS OF ARTERIES AND ARTERIOLES: ICD-10-CM

## 2018-11-13 DIAGNOSIS — E78.2 MIXED HYPERLIPIDEMIA: ICD-10-CM

## 2018-11-13 DIAGNOSIS — I70.90 ATHEROSCLEROSIS: ICD-10-CM

## 2018-11-13 PROCEDURE — 99999 PR PBB SHADOW E&M-EST. PATIENT-LVL IV: CPT | Mod: PBBFAC,,, | Performed by: PHYSICIAN ASSISTANT

## 2018-11-13 PROCEDURE — 99214 OFFICE O/P EST MOD 30 MIN: CPT | Mod: S$PBB,,, | Performed by: PHYSICIAN ASSISTANT

## 2018-11-13 PROCEDURE — 99214 OFFICE O/P EST MOD 30 MIN: CPT | Mod: PBBFAC,PO | Performed by: PHYSICIAN ASSISTANT

## 2018-11-13 RX ORDER — IRBESARTAN 300 MG/1
300 TABLET ORAL NIGHTLY
Qty: 90 TABLET | Refills: 3 | Status: SHIPPED | OUTPATIENT
Start: 2018-11-13 | End: 2019-07-10 | Stop reason: RX

## 2018-11-13 NOTE — PROGRESS NOTES
Subjective:       Patient ID: fItikhar Lynn is a 78 y.o. male.    Chief Complaint: Follow-up (HTN)    Patient with hypertension and hyperlipidemia presents for follow-up of recent abdominal aortic aneurysm ultrasound which showed mild-to-moderate left iliac stenosis.  Patient denies any symptoms of claudication.  His blood pressure is slightly above goal.  His cholesterol is at goal.  He takes aspirin on daily basis.  Patients patient medical/surgical, social and family histories have been reviewed         Hypertension   This is a chronic problem. The current episode started more than 1 year ago. The problem is unchanged. The problem is resistant. Pertinent negatives include no anxiety, blurred vision, chest pain, headaches, malaise/fatigue, neck pain, orthopnea, palpitations, peripheral edema, PND, shortness of breath or sweats. Agents associated with hypertension include decongestants and NSAIDs. Risk factors for coronary artery disease include family history. Past treatments include direct vasodilators. The current treatment provides moderate improvement. There are no compliance problems.      Review of Systems   Constitutional: Negative for malaise/fatigue.   Eyes: Negative for blurred vision.   Respiratory: Negative for shortness of breath.    Cardiovascular: Negative for chest pain, palpitations, orthopnea and PND.   Musculoskeletal: Negative for neck pain.   Neurological: Negative for headaches.       Objective:      Physical Exam   Constitutional: He appears well-developed and well-nourished. He is cooperative. No distress.   Eyes: Conjunctivae and EOM are normal. No scleral icterus.   Neck: Carotid bruit is not present.   Cardiovascular: Normal rate, regular rhythm and normal heart sounds.   Pulses:       Dorsalis pedis pulses are 2+ on the right side, and 2+ on the left side.        Posterior tibial pulses are 2+ on the right side, and 2+ on the left side.   Pulmonary/Chest: Effort normal and  breath sounds normal.   Abdominal: Soft. Bowel sounds are normal. There is no tenderness.   Musculoskeletal:        Right lower leg: He exhibits no edema.        Left lower leg: He exhibits no edema.   Neurological: He is alert.   Skin: Skin is warm and dry. Capillary refill takes less than 2 seconds.   Vitals reviewed.      Assessment:       1. Essential hypertension    2. Atherosclerosis    3. Mixed hyperlipidemia    4. Other specified disorders of arteries and arterioles         Plan:       Iftikhar was seen today for follow-up.    Diagnoses and all orders for this visit:    Essential hypertension uncontrolled   increased    irbesartan (AVAPRO) 300 MG tablet; Take 1 tablet (300 mg total) by mouth every evening.    Atherosclerosis  -     US Carotid Bilateral; Future  Discussed Maximize blood pressure and lipid control.  Continue aspirin once daily    Mixed hyperlipidemia  Continue statin  Other specified disorders of arteries and arterioles   -     US Carotid Bilateral; Future    Other orders  -

## 2018-11-14 ENCOUNTER — HOSPITAL ENCOUNTER (OUTPATIENT)
Dept: RADIOLOGY | Facility: CLINIC | Age: 78
Discharge: HOME | End: 2018-11-14
Attending: PHYSICIAN ASSISTANT
Payer: MEDICARE

## 2018-11-14 DIAGNOSIS — I70.90 ATHEROSCLEROSIS: ICD-10-CM

## 2018-11-14 DIAGNOSIS — I77.89 OTHER SPECIFIED DISORDERS OF ARTERIES AND ARTERIOLES: ICD-10-CM

## 2018-11-14 PROCEDURE — 93880 EXTRACRANIAL BILAT STUDY: CPT | Mod: 26,,, | Performed by: RADIOLOGY

## 2018-11-14 PROCEDURE — 93880 EXTRACRANIAL BILAT STUDY: CPT | Mod: TC,PO

## 2018-12-11 ENCOUNTER — PATIENT OUTREACH (OUTPATIENT)
Dept: OTHER | Facility: OTHER | Age: 78
End: 2018-12-11

## 2018-12-11 NOTE — LETTER
Lu Posadas, PharmD  [address] [place] [address]     Dear Iftikhar Lynn,    Welcome to the [facility]!           My name is Lu Posadas PharmD and I am your dedicated Digital Medicine clinician.  As an expert in medication management, I will help ensure that the medications you are taking continue to provide you with the intended benefits.        I am Jamil Sanderson and I will be your health  for the duration of the program.  My  job is to help you identify lifestyle changes to improve your blood pressure control.  We will talk about nutrition, exercise, and other ways that you may be able to adjust your current habits to better your health. Together, we will work to improve your overall health and encourage you to meet your goals for a healthier lifestyle.    What we expect from YOU:    You will need to take blood pressure readings multiple times a week and no less than one reading per week.   It is important that you take your measurements at different times during the day, when possible.     What you should expect from your Digital Medicine Care Team:   We will provide you with education about high blood pressure, including lifestyle changes that could help you to control your blood pressure.   We will review your weekly readings and provide you with monthly blood pressure progress reports after you have been in the program for more than 30 days.   We will send monthly progress reports on your blood pressure control to your physician so they can follow along with your progress as well.    You will be able to reach me by phone at [phone] or through your [facility] account by clicking my name under Care Team on the right side of the home screen.    I look forward to working with you to achieve your blood pressure goals!    Sincerely,  Lu Posadas PharmD  Your personal clinician    Please visit  [email or web address] to learn more about high blood pressure and what you can do lower your blood pressure.                                                                                           Iftikhar Lynn  [address] [place] [id number]

## 2018-12-11 NOTE — PROGRESS NOTES
Digital Medicine Enrollment Call    Introduced Mr. Iftikhar Lynn to Digital Medicine.     Discussed program expectations and requirements.  Introduced digital medicine care team.   Reviewed the importance of self-monitoring and proper blood pressure technique.      Reviewed that the Digital Medicine team is not available for emergencies and instructed the patient to call 911 or [facility] ([phone] or [phone]) if one arises.    Pt stated he takes medication around 10pm and takes 150mg instead of the 300mg which was prescribed because he feels sick.        1st attempt enrollment call. Left voicemail.      Last 5 Patient Entered Readings                                      Current 30 Day Average: 139/79     Recent Readings 12/8/2018 12/7/2018 12/4/2018 12/3/2018 12/2/2018    SBP (mmHg) 142 134 150 148 138    DBP (mmHg) 70 69 78 79 81    Pulse 83 70 77 90 69

## 2018-12-26 ENCOUNTER — PATIENT OUTREACH (OUTPATIENT)
Dept: OTHER | Facility: OTHER | Age: 78
End: 2018-12-26

## 2018-12-26 NOTE — PROGRESS NOTES
"Last 5 Patient Entered Readings                                      Current 30 Day Average: 141/80     Recent Readings 12/20/2018 12/19/2018 12/18/2018 12/16/2018 12/16/2018    SBP (mmHg) 142 142 138 129 131    DBP (mmHg) 87 70 78 79 90    Pulse 70 81 73 74 77        Digital Medicine: Health  Introduction    Introduced Mr. Iftikhar Lynn to Digital Medicine. Discussed health  role and recommended lifestyle modifications.    Patient makes notes on his BP readings.     Lifestyle Assessment:    Current Dietary Habits(i.e. low sodium, food labels, dining out):  Patient reports that he does NOT watch his salt intake.  I encouraged patient to start monitoring his salt intake by watching the salt shaker and reading food labels for hidden sodium.  I advised patient to limit his sodium to stay under the recommended <2,000 mg/day.  Pt verbally understood.  Will continue to follow up on sodium restriction.     Exercise:  Patient states "he walks a couple miles/day with his dog".  Pt also states he does some stretching exercises.  Pt states he wears a "FitBit" and he "normally walks 6-7k steps/day". I encouraged patient to continue to stay active.     Alcohol/Tobacco:  Patient states "he drinks a bottle of wine/day".  Pt states his doctor told him to "limit it to 2 glasses/day".  Pt states he is working on it.     Medication Adherence:   has been compliant with the medicaiton regimen   Patient states "he has nausea/vertigo when taking 300mg of CBS". Will notify PharmD.    Other goals:  Will discuss next call.     Reviewed AHA/AACE recommendations:  Limit sodium intake to <2000mg/day. Pt acknowledged.       Reviewed the importance of self-monitoring, medication adherence, and that the health  can be used as a resource for lifestyle modifications to help reduce or maintain a healthy lifestyle.  Reviewed that the Digital Medicine team is not available for emergencies and instructed the patient to call 911 or " [facility] ([phone] or [phone]) if one arises.

## 2018-12-28 ENCOUNTER — PATIENT OUTREACH (OUTPATIENT)
Dept: OTHER | Facility: OTHER | Age: 78
End: 2018-12-28

## 2018-12-28 NOTE — PROGRESS NOTES
Last 5 Patient Entered Readings                                      Current 30 Day Average: 141/80     Recent Readings 12/20/2018 12/19/2018 12/18/2018 12/16/2018 12/16/2018    SBP (mmHg) 142 142 138 129 131    DBP (mmHg) 87 70 78 79 90    Pulse 70 81 73 74 77        Hypertension Medications             irbesartan (AVAPRO) 300 MG tablet Take 1 tablet (300 mg total) by mouth every evening.        Plan:   [facility] Reviewed BP readings. Per 2017 ACC/ AHA HTN guidelines  (goal of BP < 130/80), current 30-day average is uncontrolled.  LVM, requested patient call back at his convenience.  Will continue to monitor. WCB in 2 weeks.

## 2019-01-02 ENCOUNTER — DOCUMENTATION ONLY (OUTPATIENT)
Dept: FAMILY MEDICINE | Facility: CLINIC | Age: 79
End: 2019-01-02

## 2019-01-02 NOTE — PROGRESS NOTES
Pre-Visit Chart Review  For Appointment Scheduled on 1/7/2019    There are no preventive care reminders to display for this patient.

## 2019-01-07 ENCOUNTER — OFFICE VISIT (OUTPATIENT)
Dept: FAMILY MEDICINE | Facility: CLINIC | Age: 79
End: 2019-01-07
Payer: MEDICARE

## 2019-01-07 ENCOUNTER — TELEPHONE (OUTPATIENT)
Dept: FAMILY MEDICINE | Facility: CLINIC | Age: 79
End: 2019-01-07

## 2019-01-07 VITALS
WEIGHT: 185.88 LBS | BODY MASS INDEX: 25.18 KG/M2 | HEIGHT: 72 IN | HEART RATE: 73 BPM | SYSTOLIC BLOOD PRESSURE: 149 MMHG | DIASTOLIC BLOOD PRESSURE: 82 MMHG | TEMPERATURE: 98 F

## 2019-01-07 DIAGNOSIS — K21.9 GASTROESOPHAGEAL REFLUX DISEASE WITHOUT ESOPHAGITIS: ICD-10-CM

## 2019-01-07 DIAGNOSIS — S83.242D ACUTE MEDIAL MENISCUS TEAR OF LEFT KNEE, SUBSEQUENT ENCOUNTER: Primary | ICD-10-CM

## 2019-01-07 DIAGNOSIS — I10 ESSENTIAL HYPERTENSION: ICD-10-CM

## 2019-01-07 DIAGNOSIS — E78.2 MIXED HYPERLIPIDEMIA: ICD-10-CM

## 2019-01-07 PROCEDURE — 99213 OFFICE O/P EST LOW 20 MIN: CPT | Mod: S$PBB,,, | Performed by: FAMILY MEDICINE

## 2019-01-07 PROCEDURE — 99213 OFFICE O/P EST LOW 20 MIN: CPT | Mod: PBBFAC,PO | Performed by: FAMILY MEDICINE

## 2019-01-07 PROCEDURE — 99999 PR PBB SHADOW E&M-EST. PATIENT-LVL III: CPT | Mod: PBBFAC,,, | Performed by: FAMILY MEDICINE

## 2019-01-07 PROCEDURE — 99999 PR PBB SHADOW E&M-EST. PATIENT-LVL III: ICD-10-PCS | Mod: PBBFAC,,, | Performed by: FAMILY MEDICINE

## 2019-01-07 PROCEDURE — 99213 PR OFFICE/OUTPT VISIT, EST, LEVL III, 20-29 MIN: ICD-10-PCS | Mod: S$PBB,,, | Performed by: FAMILY MEDICINE

## 2019-01-07 NOTE — TELEPHONE ENCOUNTER
----- Message from Rafaela Mayer sent at 1/7/2019  9:04 AM CST -----  Contact: patient  Type:  Sooner Apoointment Request    Caller is requesting a sooner appointment.  Caller declined first available appointment listed below.  Caller will not accept being placed on the waitlist and is requesting a message be sent to doctor.    Name of Caller:  Patient   When is the first available appointment?      Best Call Back Number:  [phone]  Additional Information:  Patient have Pre-Op for Knee Scope scheduled with BABATUNDE Hung but she had to cancel because she is ill. Patient states that he has to find someone else to do his pre op before his surgery on 01/24/2019. Please advise patient

## 2019-01-07 NOTE — TELEPHONE ENCOUNTER
Spoke to pt. Rescheduled pre op clearance for today with Dr Portillo at 10:30am. Pt verbalized understanding

## 2019-01-07 NOTE — TELEPHONE ENCOUNTER
Called pt-he was scheduled to see Dr. Hung this morning for a pre-op clearance. Dr. Hung called out sick today. Advised pt that Dr. Hung is scheduled out of the office for 4 weeks starting Monday, 11/14/19, and there are no available appointments with her before then. Advised pt to call his PCP's office and let them know he needs a pre-op clearance prior to his surgery on 1/24/19. Thanks, Maricel

## 2019-01-10 ENCOUNTER — TELEPHONE (OUTPATIENT)
Dept: ORTHOPEDICS | Facility: CLINIC | Age: 79
End: 2019-01-10

## 2019-01-10 NOTE — PROGRESS NOTES
HPI:  Called patient to welcome him to the [facility]. Reviewed my role and responsibilities.   Patient states he initially had a difficult time tolerating irbesartan 300mg (felt hypotensive, ~11/2018); however, patient reports he has been taking irbesartan 300mg daily since about 12/15/2018.  Patient endorses adherence to medication regimen daily and denies missed doses.   Patient denies recent hypotensive s/sx (lightheadedness, dizziness, nausea, fatigue); patient denies hypertensive s/sx (SOB, CP, severe headaches, changes in vision, dizziness, fatigue, confusion, anxiety, nosebleeds).     Last 5 Patient Entered Readings                                      Current 30 Day Average: 141/82     Recent Readings 1/6/2019 1/4/2019 12/31/2018 12/20/2018 12/19/2018    SBP (mmHg) 141 133 138 142 142    DBP (mmHg) 82 87 69 87 70    Pulse 80 80 83 70 81        Assessment:  Reviewed recent readings. Per 2017 ACC/ AHA HTN guidelines (goal of BP < 130/80), current 30-day average is uncontrolled. Patient was seen by PCP on 1/7, BP was 149/82.    Plan:  Continue current medication regimen.   Instructed patient to call dispensing pharmacy to confirm whether or not he has received recalled irbesartan.   Patients health , Reed Sanderson, will be following up as scheduled.   I will continue to monitor regularly and will follow-up in 4 weeks, sooner if blood pressure begins to trend upward or downward. If BP remains elevated, will discuss either adding amlodipine or hctz.     Current medication regimen:  Hypertension Medications             irbesartan (AVAPRO) 300 MG tablet Take 1 tablet (300 mg total) by mouth every evening.         Patient denies having questions or concerns. Patient has my contact information and knows to call with any concerns or clinical changes.

## 2019-01-10 NOTE — TELEPHONE ENCOUNTER
----- Message from Verito Benavides sent at 1/10/2019  2:31 PM CST -----  Contact: self [phone]  He is calling to let you know that his left knee is suddenly swollen and painful.  Do you want to see him?  Thank you!

## 2019-01-11 ENCOUNTER — OFFICE VISIT (OUTPATIENT)
Dept: ORTHOPEDICS | Facility: CLINIC | Age: 79
End: 2019-01-11
Payer: MEDICARE

## 2019-01-11 ENCOUNTER — TELEPHONE (OUTPATIENT)
Dept: ORTHOPEDICS | Facility: CLINIC | Age: 79
End: 2019-01-11

## 2019-01-11 VITALS
HEART RATE: 81 BPM | HEIGHT: 72 IN | DIASTOLIC BLOOD PRESSURE: 69 MMHG | BODY MASS INDEX: 25.18 KG/M2 | WEIGHT: 185.88 LBS | SYSTOLIC BLOOD PRESSURE: 133 MMHG

## 2019-01-11 DIAGNOSIS — M25.562 ACUTE PAIN OF LEFT KNEE: Primary | ICD-10-CM

## 2019-01-11 PROCEDURE — 99999 PR PBB SHADOW E&M-EST. PATIENT-LVL III: ICD-10-PCS | Mod: PBBFAC,,, | Performed by: ORTHOPAEDIC SURGERY

## 2019-01-11 PROCEDURE — 99213 OFFICE O/P EST LOW 20 MIN: CPT | Mod: S$PBB,,, | Performed by: ORTHOPAEDIC SURGERY

## 2019-01-11 PROCEDURE — 99999 PR PBB SHADOW E&M-EST. PATIENT-LVL III: CPT | Mod: PBBFAC,,, | Performed by: ORTHOPAEDIC SURGERY

## 2019-01-11 PROCEDURE — 99213 OFFICE O/P EST LOW 20 MIN: CPT | Mod: PBBFAC,PN | Performed by: ORTHOPAEDIC SURGERY

## 2019-01-11 PROCEDURE — 99213 PR OFFICE/OUTPT VISIT, EST, LEVL III, 20-29 MIN: ICD-10-PCS | Mod: S$PBB,,, | Performed by: ORTHOPAEDIC SURGERY

## 2019-01-11 NOTE — PROGRESS NOTES
Past Medical History:   Diagnosis Date    Cancer     basal cell ca rt arm / melanoma on back    GERD (gastroesophageal reflux disease)     Hyperlipidemia     Hypertension        Past Surgical History:   Procedure Laterality Date    BACK SURGERY      L 4 L5    COLONOSCOPY N/A 12/20/2017    [facility]    COLONOSCOPY N/A 2/7/2013    [facility]    EXCISION-LESION N/A 2/16/2016    [facility]    EXCISION-MELANOMA Right 3/22/2018    [facility]    SKIN CANCER EXCISION      multiple sites, derm dr martienz, basal cell ca    VASECTOMY         Current Outpatient Medications   Medication Sig    aspirin 81 MG chewable tablet Take 81 mg by mouth once daily.      diclofenac sodium 1 % Gel APPLY AS DIRECTED    hydrocortisone-pramoxine (PROCTOFOAM HC) rectal foam APPLY ONE APPLICATION RECTALLY TWICE DAILY AS DIRECTED    irbesartan (AVAPRO) 300 MG tablet Take 1 tablet (300 mg total) by mouth every evening.    nystatin-triamcinolone (MYCOLOG II) cream Apply topically 2 (two) times daily.    omeprazole (PRILOSEC OTC) 20 MG tablet Take 20 mg by mouth once daily.     simvastatin (ZOCOR) 40 MG tablet TAKE 1 TABLET NIGHTLY    valacyclovir (VALTREX) 500 MG tablet Take 500 mg by mouth 2 (two) times daily.     No current facility-administered medications for this visit.        Review of patient's allergies indicates:   Allergen Reactions    Erythromycin Other (See Comments)     NOT SURE    Iodine Hives    Other Hives     Contrast dye    Shellfish derived Hives       Family History   Problem Relation Age of Onset    Cancer Mother         non hogkins lumphoma    Heart disease Father     Cancer Brother         melanoma, arm and leg    Clotting disorder Neg Hx     Anesthesia problems Neg Hx        Social History     Socioeconomic History    Marital status:      Spouse  name: Not on file    Number of children: Not on file    Years of education: Not on file    Highest education level: Not on file   Social Needs    Financial resource strain: Not on file    Food insecurity - worry: Not on file    Food insecurity - inability: Not on file    Transportation needs - medical: Not on file    Transportation needs - non-medical: Not on file   Occupational History    Not on file   Tobacco Use    Smoking status: Former Smoker     Last attempt to quit: 2/15/1976     Years since quittin.9    Smokeless tobacco: Never Used   Substance and Sexual Activity    Alcohol use: Yes     Alcohol/week: 0.0 oz     Comment: daily wine    Drug use: No    Sexual activity: Not on file   Other Topics Concern    Not on file   Social History Narrative    Not on file       Chief Complaint:   Chief Complaint   Patient presents with    Left Knee - Pain       Consulting Physician: No ref. provider found    History of present illness:    He reports left knee pain that is increased in the last day or so due to over doing work on his boat and jogging.  Pain for a 10 worse with activities.  It is getting better.    Review of Systems:    Constitution: Denies chills, fever, and sweats.  HENT: Denies headaches or blurry vision.  Cardiovascular: Denies chest pain or irregular heart beat.  Respiratory: Denies cough or shortness of breath.  Gastrointestinal: Denies abdominal pain, nausea, or vomiting.  Musculoskeletal:  Denies muscle cramps.  Neurological: Denies dizziness or focal weakness.  Psychiatric/Behavioral: Normal mental status.  Hematologic/Lymphatic: Denies bleeding problem or easy bruising/bleeding.  Skin: Denies rash or suspicious lesions.    Examination:    Vital Signs:    Vitals:    19 1109   BP: 133/69   Pulse: 81   Weight: 84.3 kg (185 lb 13.6 oz)   Height: 6' (1.829 m)   PainSc:   4   PainLoc: Knee       Body mass index is 25.21 kg/m².    This a well-developed, well nourished patient  in no acute distress.    Alert and oriented x 3 and cooperative to examination.       Physical Exam: Left Knee Exam    Gait   normal    Skin  Rash:   None  Scars:   None    Inspection  Erythema:  None  Bruising:  None  Effusion:  None  Masses:  None  Lymphadenopathy: None    Range of Motion: 0 to 130°    Medial Joint : yes  Lateral Joint : no    Patellofemoral Tenderness: no  Patellofemoral Crepitus: no    Lachman:  Normal  Anterior Drawer: Normal  Posterior Drawer: Normal    Eulogio's:  Negative  Apley's:  Negative    Varus Stress:  Stable  Valgus Stress:  Stable    Strength:  5/5    Pulses:  Palpable  Sensation:  Intact          Imaging: 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 The MRI study images demonstrate posterior horn medial meniscal tear.         Assessment: Acute pain of left knee        Plan:  He is scheduled for a knee scope in 2 weeks.  We offered to move that up for him if he would like  To get it done sooner.    DISCLAIMER: This note may have been dictated using voice recognition software and may contain grammatical errors.     NOTE: Consult report sent to referring provider via [facility].

## 2019-01-11 NOTE — TELEPHONE ENCOUNTER
----- Message from Lula Killian MA sent at 1/11/2019 12:12 PM CST -----  Contact: Self  Patient wants to see if his surgery could be moved to 10/17/19    Call back# [phone]

## 2019-01-11 NOTE — TELEPHONE ENCOUNTER
Returned pt's call, LVM advising that we will move surgery to 1/17/19 as requested.  Notified surgery of date change.

## 2019-01-11 NOTE — H&P (VIEW-ONLY)
Past Medical History:   Diagnosis Date    Cancer     basal cell ca rt arm / melanoma on back    GERD (gastroesophageal reflux disease)     Hyperlipidemia     Hypertension        Past Surgical History:   Procedure Laterality Date    BACK SURGERY      L 4 L5    COLONOSCOPY N/A 12/20/2017    [facility]    COLONOSCOPY N/A 2/7/2013    [facility]    EXCISION-LESION N/A 2/16/2016    [facility]    EXCISION-MELANOMA Right 3/22/2018    [facility]    SKIN CANCER EXCISION      multiple sites, derm dr martinez, basal cell ca    VASECTOMY         Current Outpatient Medications   Medication Sig    aspirin 81 MG chewable tablet Take 81 mg by mouth once daily.      diclofenac sodium 1 % Gel APPLY AS DIRECTED    hydrocortisone-pramoxine (PROCTOFOAM HC) rectal foam APPLY ONE APPLICATION RECTALLY TWICE DAILY AS DIRECTED    irbesartan (AVAPRO) 300 MG tablet Take 1 tablet (300 mg total) by mouth every evening.    nystatin-triamcinolone (MYCOLOG II) cream Apply topically 2 (two) times daily.    omeprazole (PRILOSEC OTC) 20 MG tablet Take 20 mg by mouth once daily.     simvastatin (ZOCOR) 40 MG tablet TAKE 1 TABLET NIGHTLY    valacyclovir (VALTREX) 500 MG tablet Take 500 mg by mouth 2 (two) times daily.     No current facility-administered medications for this visit.        Review of patient's allergies indicates:   Allergen Reactions    Erythromycin Other (See Comments)     NOT SURE    Iodine Hives    Other Hives     Contrast dye    Shellfish derived Hives       Family History   Problem Relation Age of Onset    Cancer Mother         non hogkins lumphoma    Heart disease Father     Cancer Brother         melanoma, arm and leg    Clotting disorder Neg Hx     Anesthesia problems Neg Hx        Social History     Socioeconomic History    Marital status:      Spouse  name: Not on file    Number of children: Not on file    Years of education: Not on file    Highest education level: Not on file   Social Needs    Financial resource strain: Not on file    Food insecurity - worry: Not on file    Food insecurity - inability: Not on file    Transportation needs - medical: Not on file    Transportation needs - non-medical: Not on file   Occupational History    Not on file   Tobacco Use    Smoking status: Former Smoker     Last attempt to quit: 2/15/1976     Years since quittin.9    Smokeless tobacco: Never Used   Substance and Sexual Activity    Alcohol use: Yes     Alcohol/week: 0.0 oz     Comment: daily wine    Drug use: No    Sexual activity: Not on file   Other Topics Concern    Not on file   Social History Narrative    Not on file       Chief Complaint:   Chief Complaint   Patient presents with    Left Knee - Pain       Consulting Physician: No ref. provider found    History of present illness:    He reports left knee pain that is increased in the last day or so due to over doing work on his boat and jogging.  Pain for a 10 worse with activities.  It is getting better.    Review of Systems:    Constitution: Denies chills, fever, and sweats.  HENT: Denies headaches or blurry vision.  Cardiovascular: Denies chest pain or irregular heart beat.  Respiratory: Denies cough or shortness of breath.  Gastrointestinal: Denies abdominal pain, nausea, or vomiting.  Musculoskeletal:  Denies muscle cramps.  Neurological: Denies dizziness or focal weakness.  Psychiatric/Behavioral: Normal mental status.  Hematologic/Lymphatic: Denies bleeding problem or easy bruising/bleeding.  Skin: Denies rash or suspicious lesions.    Examination:    Vital Signs:    Vitals:    19 1109   BP: 133/69   Pulse: 81   Weight: 84.3 kg (185 lb 13.6 oz)   Height: 6' (1.829 m)   PainSc:   4   PainLoc: Knee       Body mass index is 25.21 kg/m².    This a well-developed, well nourished patient  in no acute distress.    Alert and oriented x 3 and cooperative to examination.       Physical Exam: Left Knee Exam    Gait   normal    Skin  Rash:   None  Scars:   None    Inspection  Erythema:  None  Bruising:  None  Effusion:  None  Masses:  None  Lymphadenopathy: None    Range of Motion: 0 to 130°    Medial Joint : yes  Lateral Joint : no    Patellofemoral Tenderness: no  Patellofemoral Crepitus: no    Lachman:  Normal  Anterior Drawer: Normal  Posterior Drawer: Normal    Eulogio's:  Negative  Apley's:  Negative    Varus Stress:  Stable  Valgus Stress:  Stable    Strength:  5/5    Pulses:  Palpable  Sensation:  Intact          Imaging: 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 The MRI study images demonstrate posterior horn medial meniscal tear.         Assessment: Acute pain of left knee        Plan:  He is scheduled for a knee scope in 2 weeks.  We offered to move that up for him if he would like  To get it done sooner.    DISCLAIMER: This note may have been dictated using voice recognition software and may contain grammatical errors.     NOTE: Consult report sent to referring provider via [facility].

## 2019-01-14 NOTE — PROGRESS NOTES
Subjective:       Patient ID: Iftikhar Lynn is a 78 y.o. male.    Chief Complaint: Pre-op Exam (left knee surgery 1/24)    Patient presents here for preoperative evaluation and clearance for a left knee arthroscopy scheduled for 01/24/2019.  This will be performed by Dr. Díaz.  He has been having knee pain for the last year.  He underwent physical therapy with some relief from his hamstring tightness but it did not affect the knee pain that he was having.  MRI of the knee in October 2018 showed a complex tear of the medial meniscus as well as some osteoarthritis.  The patient is willing to undergo arthroscopy to correct these.  He does have chronic medical problems due to hypertension, hyperlipidemia, and GERD.  His hypertension is well controlled on his present medications as well as his low-sodium diet.  He is tolerating his medications well.  His hyperlipidemia is also well controlled with his present dose of simvastatin.  As far as his GERD, this is also controlled with his present dose of omeprazole.  The patient has not had any views preoperative lab work at this time.  He did have a CT of the chest in November 2018 for follow-up of a nodule; therefore I do not think he needs a chest x-ray.      Review of Systems   Constitutional: Negative for chills, fatigue, fever and unexpected weight change.   HENT: Negative for congestion, ear pain, postnasal drip and sore throat.    Respiratory: Negative for cough and shortness of breath.    Cardiovascular: Negative for chest pain and palpitations.   Gastrointestinal: Negative for abdominal pain, diarrhea, nausea and vomiting.   Genitourinary: Negative for difficulty urinating, dysuria, flank pain and frequency.        No nocturia   Musculoskeletal: Positive for arthralgias (Left knee pain). Negative for back pain.   Neurological: Negative for dizziness, light-headedness and headaches.   Hematological: Negative for adenopathy. Does not bruise/bleed easily.    Psychiatric/Behavioral: Negative for sleep disturbance. The patient is not nervous/anxious.        Objective:      Physical Exam   Constitutional: He is oriented to person, place, and time. He appears well-developed and well-nourished. No distress.   HENT:   Head: Normocephalic and atraumatic.   Right Ear: External ear normal.   Left Ear: External ear normal.   Nose: Nose normal.   Mouth/Throat: Oropharynx is clear and moist.   Eyes: EOM are normal.   Neck: Normal range of motion. Neck supple. No thyromegaly present.   Cardiovascular: Normal rate, regular rhythm, normal heart sounds and intact distal pulses.   No murmur heard.  Pulmonary/Chest: Effort normal and breath sounds normal. He has no wheezes. He has no rales.   Abdominal: Soft. Bowel sounds are normal. There is no tenderness. There is no rebound and no guarding.   Musculoskeletal: Normal range of motion. He exhibits no edema.   Tender to palpation over the medial joint line of the left knee.  No effusion   Lymphadenopathy:     He has no cervical adenopathy.   Neurological: He is alert and oriented to person, place, and time. He has normal reflexes. No cranial nerve deficit.   Psychiatric: He has a normal mood and affect. His behavior is normal.   Vitals reviewed.      Assessment:       1. Acute medial meniscus tear of left knee, subsequent encounter    2. Essential hypertension    3. Mixed hyperlipidemia    4. Gastroesophageal reflux disease without esophagitis        Plan:       1.  Patient is cleared for his left knee arthroscopy under general anesthesia.  2.  Patient is continue his present medications and take his blood pressure the morning of surgery  3.  Follow recommended guidelines for DVT prophylaxis

## 2019-01-16 ENCOUNTER — ANESTHESIA EVENT (OUTPATIENT)
Dept: SURGERY | Facility: HOSPITAL | Age: 79
End: 2019-01-16
Payer: MEDICARE

## 2019-01-16 ENCOUNTER — HOSPITAL ENCOUNTER (OUTPATIENT)
Dept: PREADMISSION TESTING | Facility: HOSPITAL | Age: 79
Discharge: HOME | End: 2019-01-16
Attending: ORTHOPAEDIC SURGERY
Payer: MEDICARE

## 2019-01-16 VITALS — BODY MASS INDEX: 24.38 KG/M2 | WEIGHT: 180 LBS | HEIGHT: 72 IN

## 2019-01-16 DIAGNOSIS — M25.562 ACUTE PAIN OF LEFT KNEE: ICD-10-CM

## 2019-01-16 DIAGNOSIS — M17.12 ARTHRITIS OF KNEE, LEFT: ICD-10-CM

## 2019-01-16 DIAGNOSIS — Z01.818 PRE-OP EXAM: ICD-10-CM

## 2019-01-16 LAB
ANION GAP SERPL CALC-SCNC: 11 MMOL/L
BASOPHILS # BLD AUTO: 0 K/UL
BASOPHILS NFR BLD: 0.6 %
BUN SERPL-MCNC: 12 MG/DL
CALCIUM SERPL-MCNC: 9.5 MG/DL
CHLORIDE SERPL-SCNC: 105 MMOL/L
CO2 SERPL-SCNC: 25 MMOL/L
CREAT SERPL-MCNC: 0.8 MG/DL
DIFFERENTIAL METHOD: ABNORMAL
EOSINOPHIL # BLD AUTO: 0.3 K/UL
EOSINOPHIL NFR BLD: 3.6 %
ERYTHROCYTE [DISTWIDTH] IN BLOOD BY AUTOMATED COUNT: 14.2 %
EST. GFR  (AFRICAN AMERICAN): >60 ML/MIN/1.73 M^2
EST. GFR  (NON AFRICAN AMERICAN): >60 ML/MIN/1.73 M^2
GLUCOSE SERPL-MCNC: 94 MG/DL
HCT VFR BLD AUTO: 42.6 %
HGB BLD-MCNC: 13.9 G/DL
LYMPHOCYTES # BLD AUTO: 2.2 K/UL
LYMPHOCYTES NFR BLD: 29.5 %
MCH RBC QN AUTO: 31.4 PG
MCHC RBC AUTO-ENTMCNC: 32.7 G/DL
MCV RBC AUTO: 96 FL
MONOCYTES # BLD AUTO: 0.3 K/UL
MONOCYTES NFR BLD: 4.5 %
NEUTROPHILS # BLD AUTO: 4.6 K/UL
NEUTROPHILS NFR BLD: 61.8 %
PLATELET # BLD AUTO: 292 K/UL
PMV BLD AUTO: 8.2 FL
POTASSIUM SERPL-SCNC: 3.8 MMOL/L
RBC # BLD AUTO: 4.43 M/UL
SODIUM SERPL-SCNC: 141 MMOL/L
WBC # BLD AUTO: 7.4 K/UL

## 2019-01-16 PROCEDURE — 93005 ELECTROCARDIOGRAM TRACING: CPT

## 2019-01-16 PROCEDURE — 93010 ELECTROCARDIOGRAM REPORT: CPT | Mod: ,,, | Performed by: INTERNAL MEDICINE

## 2019-01-16 PROCEDURE — 99900103 DSU ONLY-NO CHARGE-INITIAL HR (STAT)

## 2019-01-16 PROCEDURE — 93010 EKG 12-LEAD: ICD-10-PCS | Mod: ,,, | Performed by: INTERNAL MEDICINE

## 2019-01-16 PROCEDURE — 80048 BASIC METABOLIC PNL TOTAL CA: CPT

## 2019-01-16 PROCEDURE — 99900104 DSU ONLY-NO CHARGE-EA ADD'L HR (STAT)

## 2019-01-16 PROCEDURE — 85025 COMPLETE CBC W/AUTO DIFF WBC: CPT

## 2019-01-16 PROCEDURE — 36415 COLL VENOUS BLD VENIPUNCTURE: CPT

## 2019-01-16 NOTE — DISCHARGE INSTRUCTIONS

## 2019-01-17 ENCOUNTER — ANESTHESIA (OUTPATIENT)
Dept: SURGERY | Facility: HOSPITAL | Age: 79
End: 2019-01-17
Payer: MEDICARE

## 2019-01-17 ENCOUNTER — HOSPITAL ENCOUNTER (OUTPATIENT)
Facility: HOSPITAL | Age: 79
Discharge: HOME | End: 2019-01-17
Attending: ORTHOPAEDIC SURGERY | Admitting: ORTHOPAEDIC SURGERY
Payer: MEDICARE

## 2019-01-17 DIAGNOSIS — M25.562 ACUTE PAIN OF LEFT KNEE: ICD-10-CM

## 2019-01-17 DIAGNOSIS — M17.12 ARTHRITIS OF KNEE, LEFT: ICD-10-CM

## 2019-01-17 PROCEDURE — 71000016 HC POSTOP RECOV ADDL HR: Performed by: ORTHOPAEDIC SURGERY

## 2019-01-17 PROCEDURE — 37000008 HC ANESTHESIA 1ST 15 MINUTES: Performed by: ORTHOPAEDIC SURGERY

## 2019-01-17 PROCEDURE — D9220A PRA ANESTHESIA: ICD-10-PCS | Mod: ANES,,, | Performed by: ANESTHESIOLOGY

## 2019-01-17 PROCEDURE — D9220A PRA ANESTHESIA: Mod: ANES,,, | Performed by: ANESTHESIOLOGY

## 2019-01-17 PROCEDURE — 25000003 PHARM REV CODE 250: Performed by: ANESTHESIOLOGY

## 2019-01-17 PROCEDURE — 37000009 HC ANESTHESIA EA ADD 15 MINS: Performed by: ORTHOPAEDIC SURGERY

## 2019-01-17 PROCEDURE — 99900103 DSU ONLY-NO CHARGE-INITIAL HR (STAT): Performed by: ORTHOPAEDIC SURGERY

## 2019-01-17 PROCEDURE — 63600175 PHARM REV CODE 636 W HCPCS: Performed by: ORTHOPAEDIC SURGERY

## 2019-01-17 PROCEDURE — 29881 ARTHRS KNE SRG MNISECTMY M/L: CPT | Mod: LT,,, | Performed by: ORTHOPAEDIC SURGERY

## 2019-01-17 PROCEDURE — 27201423 OPTIME MED/SURG SUP & DEVICES STERILE SUPPLY: Performed by: ORTHOPAEDIC SURGERY

## 2019-01-17 PROCEDURE — 99900104 DSU ONLY-NO CHARGE-EA ADD'L HR (STAT): Performed by: ORTHOPAEDIC SURGERY

## 2019-01-17 PROCEDURE — 25000003 PHARM REV CODE 250: Performed by: ORTHOPAEDIC SURGERY

## 2019-01-17 PROCEDURE — 71000015 HC POSTOP RECOV 1ST HR: Performed by: ORTHOPAEDIC SURGERY

## 2019-01-17 PROCEDURE — D9220A PRA ANESTHESIA: ICD-10-PCS | Mod: CRNA,,, | Performed by: NURSE ANESTHETIST, CERTIFIED REGISTERED

## 2019-01-17 PROCEDURE — 63600175 PHARM REV CODE 636 W HCPCS: Performed by: NURSE ANESTHETIST, CERTIFIED REGISTERED

## 2019-01-17 PROCEDURE — D9220A PRA ANESTHESIA: Mod: CRNA,,, | Performed by: NURSE ANESTHETIST, CERTIFIED REGISTERED

## 2019-01-17 PROCEDURE — 36000711: Performed by: ORTHOPAEDIC SURGERY

## 2019-01-17 PROCEDURE — 71000033 HC RECOVERY, INTIAL HOUR: Performed by: ORTHOPAEDIC SURGERY

## 2019-01-17 PROCEDURE — 29881 PR KNEE SCOPE SINGLE MENISECECTOMY: ICD-10-PCS | Mod: LT,,, | Performed by: ORTHOPAEDIC SURGERY

## 2019-01-17 PROCEDURE — 36000710: Performed by: ORTHOPAEDIC SURGERY

## 2019-01-17 RX ORDER — HYDROMORPHONE HYDROCHLORIDE 2 MG/ML
0.2 INJECTION, SOLUTION INTRAMUSCULAR; INTRAVENOUS; SUBCUTANEOUS EVERY 5 MIN PRN
Status: DISCONTINUED | OUTPATIENT
Start: 2019-01-17 | End: 2019-01-17 | Stop reason: HOSPADM

## 2019-01-17 RX ORDER — DIPHENHYDRAMINE HCL 25 MG
25 CAPSULE ORAL EVERY 6 HOURS PRN
Status: DISCONTINUED | OUTPATIENT
Start: 2019-01-17 | End: 2019-01-17 | Stop reason: HOSPADM

## 2019-01-17 RX ORDER — MEPERIDINE HYDROCHLORIDE 50 MG/ML
12.5 INJECTION INTRAMUSCULAR; INTRAVENOUS; SUBCUTANEOUS ONCE AS NEEDED
Status: DISCONTINUED | OUTPATIENT
Start: 2019-01-17 | End: 2019-01-17 | Stop reason: HOSPADM

## 2019-01-17 RX ORDER — MUPIROCIN 20 MG/G
OINTMENT TOPICAL
Status: DISCONTINUED | OUTPATIENT
Start: 2019-01-17 | End: 2019-01-17 | Stop reason: HOSPADM

## 2019-01-17 RX ORDER — DEXAMETHASONE SODIUM PHOSPHATE 4 MG/ML
INJECTION, SOLUTION INTRA-ARTICULAR; INTRALESIONAL; INTRAMUSCULAR; INTRAVENOUS; SOFT TISSUE
Status: DISCONTINUED | OUTPATIENT
Start: 2019-01-17 | End: 2019-01-17

## 2019-01-17 RX ORDER — SODIUM CHLORIDE, SODIUM LACTATE, POTASSIUM CHLORIDE, CALCIUM CHLORIDE 600; 310; 30; 20 MG/100ML; MG/100ML; MG/100ML; MG/100ML
INJECTION, SOLUTION INTRAVENOUS CONTINUOUS
Status: DISCONTINUED | OUTPATIENT
Start: 2019-01-17 | End: 2019-01-17 | Stop reason: HOSPADM

## 2019-01-17 RX ORDER — OXYCODONE AND ACETAMINOPHEN 5; 325 MG/1; MG/1
1 TABLET ORAL EVERY 6 HOURS PRN
Qty: 30 TABLET | Refills: 0 | Status: SHIPPED | OUTPATIENT
Start: 2019-01-17 | End: 2019-07-01

## 2019-01-17 RX ORDER — CEFAZOLIN SODIUM 2 G/50ML
2 SOLUTION INTRAVENOUS
Status: COMPLETED | OUTPATIENT
Start: 2019-01-17 | End: 2019-01-17

## 2019-01-17 RX ORDER — IBUPROFEN 800 MG/1
800 TABLET ORAL 3 TIMES DAILY
Qty: 60 TABLET | Refills: 0 | Status: SHIPPED | OUTPATIENT
Start: 2019-01-17 | End: 2019-02-25 | Stop reason: SDUPTHER

## 2019-01-17 RX ORDER — FENTANYL CITRATE 50 UG/ML
INJECTION, SOLUTION INTRAMUSCULAR; INTRAVENOUS
Status: DISCONTINUED | OUTPATIENT
Start: 2019-01-17 | End: 2019-01-17

## 2019-01-17 RX ORDER — OXYCODONE HYDROCHLORIDE 5 MG/1
5 TABLET ORAL ONCE
Status: CANCELLED | OUTPATIENT
Start: 2019-01-17

## 2019-01-17 RX ORDER — OXYCODONE HYDROCHLORIDE 5 MG/1
5 TABLET ORAL
Status: DISCONTINUED | OUTPATIENT
Start: 2019-01-17 | End: 2019-01-17 | Stop reason: HOSPADM

## 2019-01-17 RX ORDER — ONDANSETRON 2 MG/ML
4 INJECTION INTRAMUSCULAR; INTRAVENOUS DAILY PRN
Status: DISCONTINUED | OUTPATIENT
Start: 2019-01-17 | End: 2019-01-17 | Stop reason: HOSPADM

## 2019-01-17 RX ORDER — HYDROCODONE BITARTRATE AND ACETAMINOPHEN 5; 325 MG/1; MG/1
1 TABLET ORAL EVERY 4 HOURS PRN
Status: CANCELLED | OUTPATIENT
Start: 2019-01-17

## 2019-01-17 RX ORDER — PROPOFOL 10 MG/ML
VIAL (ML) INTRAVENOUS
Status: DISCONTINUED | OUTPATIENT
Start: 2019-01-17 | End: 2019-01-17

## 2019-01-17 RX ORDER — SODIUM CHLORIDE 0.9 % (FLUSH) 0.9 %
3 SYRINGE (ML) INJECTION EVERY 8 HOURS
Status: DISCONTINUED | OUTPATIENT
Start: 2019-01-17 | End: 2019-01-17 | Stop reason: HOSPADM

## 2019-01-17 RX ORDER — ONDANSETRON HYDROCHLORIDE 2 MG/ML
INJECTION, SOLUTION INTRAMUSCULAR; INTRAVENOUS
Status: DISCONTINUED | OUTPATIENT
Start: 2019-01-17 | End: 2019-01-17

## 2019-01-17 RX ORDER — LIDOCAINE HYDROCHLORIDE 10 MG/ML
1 INJECTION, SOLUTION EPIDURAL; INFILTRATION; INTRACAUDAL; PERINEURAL ONCE
Status: DISCONTINUED | OUTPATIENT
Start: 2019-01-17 | End: 2019-01-17 | Stop reason: HOSPADM

## 2019-01-17 RX ORDER — HYDROCODONE BITARTRATE AND ACETAMINOPHEN 10; 325 MG/1; MG/1
1 TABLET ORAL EVERY 4 HOURS PRN
Status: CANCELLED | OUTPATIENT
Start: 2019-01-17

## 2019-01-17 RX ORDER — FENTANYL CITRATE 50 UG/ML
25 INJECTION, SOLUTION INTRAMUSCULAR; INTRAVENOUS EVERY 5 MIN PRN
Status: DISCONTINUED | OUTPATIENT
Start: 2019-01-17 | End: 2019-01-17 | Stop reason: HOSPADM

## 2019-01-17 RX ORDER — SODIUM CHLORIDE 0.9 % (FLUSH) 0.9 %
3 SYRINGE (ML) INJECTION
Status: DISCONTINUED | OUTPATIENT
Start: 2019-01-17 | End: 2019-01-17 | Stop reason: HOSPADM

## 2019-01-17 RX ORDER — LIDOCAINE HCL/PF 100 MG/5ML
SYRINGE (ML) INTRAVENOUS
Status: DISCONTINUED | OUTPATIENT
Start: 2019-01-17 | End: 2019-01-17

## 2019-01-17 RX ORDER — IBUPROFEN 400 MG/1
800 TABLET ORAL 3 TIMES DAILY
Status: CANCELLED | OUTPATIENT
Start: 2019-01-17

## 2019-01-17 RX ADMIN — LIDOCAINE HYDROCHLORIDE 100 MG: 20 INJECTION, SOLUTION INTRAVENOUS at 12:01

## 2019-01-17 RX ADMIN — DEXAMETHASONE SODIUM PHOSPHATE 4 MG: 4 INJECTION, SOLUTION INTRAMUSCULAR; INTRAVENOUS at 12:01

## 2019-01-17 RX ADMIN — CEFAZOLIN SODIUM 2 G: 2 SOLUTION INTRAVENOUS at 12:01

## 2019-01-17 RX ADMIN — PROPOFOL 200 MG: 10 INJECTION, EMULSION INTRAVENOUS at 12:01

## 2019-01-17 RX ADMIN — FENTANYL CITRATE 50 MCG: 50 INJECTION, SOLUTION INTRAMUSCULAR; INTRAVENOUS at 01:01

## 2019-01-17 RX ADMIN — OXYCODONE HYDROCHLORIDE 5 MG: 5 TABLET ORAL at 03:01

## 2019-01-17 RX ADMIN — MUPIROCIN: 20 OINTMENT TOPICAL at 11:01

## 2019-01-17 RX ADMIN — SODIUM CHLORIDE, SODIUM LACTATE, POTASSIUM CHLORIDE, AND CALCIUM CHLORIDE: .6; .31; .03; .02 INJECTION, SOLUTION INTRAVENOUS at 11:01

## 2019-01-17 RX ADMIN — FENTANYL CITRATE 50 MCG: 50 INJECTION, SOLUTION INTRAMUSCULAR; INTRAVENOUS at 12:01

## 2019-01-17 RX ADMIN — ONDANSETRON 4 MG: 2 INJECTION, SOLUTION INTRAMUSCULAR; INTRAVENOUS at 12:01

## 2019-01-17 RX ADMIN — OXYCODONE HYDROCHLORIDE 5 MG: 5 TABLET ORAL at 01:01

## 2019-01-17 NOTE — ANESTHESIA PREPROCEDURE EVALUATION
01/17/2019  Iftikhar Lynn is a 78 y.o., male.    Anesthesia Evaluation    I have reviewed the Patient Summary Reports.    I have reviewed the Nursing Notes.   I have reviewed the Medications.     Review of Systems  Anesthesia Hx:  No problems with previous Anesthesia    Social:  Former Smoker    Hematology/Oncology:         -- Cancer in past history (melanoma):   Cardiovascular:   Hypertension, well controlled hyperlipidemia    Pulmonary:  Pulmonary Normal    Renal/:  Renal/ Normal     Hepatic/GI:   GERD, well controlled    Neurological:  Neurology Normal    Endocrine:  Endocrine Normal        Physical Exam  General:  Well nourished    Airway/Jaw/Neck:  Airway Findings: Mouth Opening: Normal Tongue: Normal  General Airway Assessment: Adult  Oropharynx Findings:  Mallampati: II  Jaw/Neck Findings:  Neck ROM: Normal ROM     Eyes/Ears/Nose:  Eyes/Ears/Nose Findings:    Dental:  Dental Findings:   Chest/Lungs:  Chest/Lungs Findings: Normal Respiratory Rate     Heart/Vascular:  Heart Findings: Rate: Normal  Rhythm: Regular Rhythm        Mental Status:  Mental Status Findings:  Cooperative, Alert and Oriented         Anesthesia Plan  Type of Anesthesia, risks & benefits discussed:  Anesthesia Type:  general  Patient's Preference:   Intra-op Monitoring Plan: standard ASA monitors  Intra-op Monitoring Plan Comments:   Post Op Pain Control Plan: multimodal analgesia  Post Op Pain Control Plan Comments:   Induction:   IV  Beta Blocker:  Patient is not currently on a Beta-Blocker (No further documentation required).       Informed Consent: Patient understands risks and agrees with Anesthesia plan.  Questions answered. Anesthesia consent signed with patient.  ASA Score: 2     Day of Surgery Review of History & Physical:  There are no significant changes.   H&P completed by Anesthesiologist.       Ready For  Surgery From Anesthesia Perspective.

## 2019-01-17 NOTE — TRANSFER OF CARE
Anesthesia Transfer of Care Note    Patient: Iftikhar Lynn    Procedure(s) Performed: Procedure(s) (LRB):  ARTHROSCOPY, KNEE, WITH MENISCECTOMY (Left)    Patient location: PACU    Anesthesia Type: general    Transport from OR: Transported from OR on 2-3 L/min O2 by NC with adequate spontaneous ventilation    Post pain: adequate analgesia    Post assessment: no apparent anesthetic complications and tolerated procedure well    Post vital signs: stable    Level of consciousness: sedated    Nausea/Vomiting: no nausea/vomiting    Complications: none    Transfer of care protocol was followed      Last vitals:   Visit Vitals  BP (!) 164/74 (BP Location: Left arm, Patient Position: Lying)   Pulse 76   Temp 36.8 °C (98.2 °F) (Oral)   Resp 16   Ht 6' (1.829 m)   Wt 81.6 kg (180 lb)   SpO2 96%   BMI 24.41 kg/m²

## 2019-01-17 NOTE — DISCHARGE INSTRUCTIONS
General Information:    1.  Do not drink alcoholic beverages including beer for 24 hours or as long as you are on pain medication..  2.  Do not drive a motor vehicle, operate machinery or power tools, or signs legal papers for 24 hours or as long as you are on pain medication.   3.  You may experience light-headedness, dizziness, and sleepiness following surgery. Please do not stay alone. A responsible adult should be with you for this 24 hour period.  4.  Go home and rest.    5. Progress slowly to a normal diet unless instructed.  Otherwise, begin with liquids such as soft drinks, then soup and crackers working up to solid foods. Drink plenty of nonalcoholic fluids.  6.  Certain anesthetics and pain medications produce nausea and vomiting in certain       individuals. If nausea becomes a problem at home, call you doctor.    7. A nurse will be calling you sometime after surgery. Do not be alarmed. This is our way of finding out how you are doing.    8. Several times every hour while you are awake, take 2-3 deep breaths and cough. If you had stomach surgery hold a pillow or rolled towel firmly against your stomach before you cough. This will help with any pain the cough might cause.  9. Several times every hour while you are awake, pump and flex your feet 5-6 times and do foot circles. This will help prevent blood clots.    10.Call your doctor for severe pain, bleeding, fever, or signs or symptoms of infection (pain, swelling, redness, foul odor, drainage).    Post op instructions for prevention of DVT  What is deep vein thrombosis?  Deep vein thrombosis (DVT) is the medical term for blood clots in the deep veins of the leg.  These blood clots can be dangerous.  A DVT can block a blood vessel and keep blood from getting where it needs to go.  Another problem is that the clot can travel to other parts of the body such as the lungs.  A clot that travels to the lungs is called a pulmonary embolus (PE) and can cause  serious problems with breathing which can lead to death.  Am I at risk for DVT/PE?  If you are not very active, you are at risk of DVT.  Anyone confined to bed, sitting for long periods of time, recovering from surgery, etc. increases the risk of DVT.  Other risk factors are cancer diagnosis, certain medications, estrogen replacement in any form,older age, obesity, pregnancy, smoking, history of clotting disorders, and dehydration.  How will I know if I have a DVT?   Swelling in the lower leg   Pain   Warmth, redness, hardness or bulging of the vein  If you have any of these symptoms, call your doctors office right away.  Some people will not have any symptoms until the clot moves to the lungs.  What are the symptoms of a PE?   Panting, shortness of breath, or trouble breathing   Sharp, knife-like chest pain when you breathe   Coughing or coughing up blood   Rapid heartbeat  If you have any of these symptoms or get worse quickly, call 911 for emergency treatment.  How can I prevent a DVT?   Avoid long periods of inactivity and dont cross your legs--get up and walk around every hour or so.   Stay active--walking after surgery is highly encouraged.  This means you should get out of the house and walk in the neighborhood.  Going up and down stairs will not impair healing (unless advised against such activity by your doctor).     Drink plenty of noncaffeinated, nonalcoholic fluids each day to prevent dehydration.   Wear special support stockings, if they have been advised by your doctor.   If you travel, stop at least once an hour and walk around.   Avoid smoking (assistance with stopping is available through your healthcare provider)  Always notify your doctor if you are not able to follow the post operative instructions that are given to you at the time of discharge.  It may be necessary to prescribe one of the medications available to prevent DVT.Discharge Instructions:     After Your  "Surgery/Procedure  Youve just had surgery. During surgery you were given medicine called anesthesia to keep you relaxed and free of pain. After surgery you may have some pain or nausea. This is common. Here are some tips for feeling better and getting well after surgery.     Stay on schedule with your medication.   Going home  Your doctor or nurse will show you how to take care of yourself when you go home. He or she will also answer your questions. Have an adult family member or friend drive you home.      For your safety we recommend these precaution for the first 24 hours after your procedure:  · Do not drive or use heavy equipment.  · Do not make important decisions or sign legal papers.  · Do not drink alcohol.  · Have someone stay with you, if needed. He or she can watch for problems and help keep you safe.  · Your concentration, balance, coordination, and judgement may be impaired for many hours after anesthesia.  Use caution when ambulating or standing up.     · You may feel weak and "washed out" after anesthesia and surgery.      Subtle residual effects of general anesthesia or sedation with regional / local anesthesia can last more than 24 hours.  Rest for the remainder of the day or longer if your Doctor/Surgeon has advised you to do so.  Although you may feel normal within the first 24 hours, your reflexes and mental ability may be impaired without you realizing it.  You may feel dizzy, lightheaded or sleepy for 24 hours or longer.      Be sure to go to all follow-up visits with your doctor. And rest after your surgery for as long as your doctor tells you to.  Coping with pain  If you have pain after surgery, pain medicine will help you feel better. Take it as told, before pain becomes severe. Also, ask your doctor or pharmacist about other ways to control pain. This might be with heat, ice, or relaxation. And follow any other instructions your surgeon or nurse gives you.  Tips for taking pain " medicine  To get the best relief possible, remember these points:  · Pain medicines can upset your stomach. Taking them with a little food may help.  · Most pain relievers taken by mouth need at least 20 to 30 minutes to start to work.  · Taking medicine on a schedule can help you remember to take it. Try to time your medicine so that you can take it before starting an activity. This might be before you get dressed, go for a walk, or sit down for dinner.  · Constipation is a common side effect of pain medicines. Call your doctor before taking any medicines such as laxatives or stool softeners to help ease constipation. Also ask if you should skip any foods. Drinking lots of fluids and eating foods such as fruits and vegetables that are high in fiber can also help. Remember, do not take laxatives unless your surgeon has prescribed them.  · Drinking alcohol and taking pain medicine can cause dizziness and slow your breathing. It can even be deadly. Do not drink alcohol while taking pain medicine.  · Pain medicine can make you react more slowly to things. Do not drive or run machinery while taking pain medicine.  Your health care provider may tell you to take acetaminophen to help ease your pain. Ask him or her how much you are supposed to take each day. Acetaminophen or other pain relievers may interact with your prescription medicines or other over-the-counter (OTC) drugs. Some prescription medicines have acetaminophen and other ingredients. Using both prescription and OTC acetaminophen for pain can cause you to overdose. Read the labels on your OTC medicines with care. This will help you to clearly know the list of ingredients, how much to take, and any warnings. It may also help you not take too much acetaminophen. If you have questions or do not understand the information, ask your pharmacist or health care provider to explain it to you before you take the OTC medicine.  Managing nausea  Some people have an upset  stomach after surgery. This is often because of anesthesia, pain, or pain medicine, or the stress of surgery. These tips will help you handle nausea and eat healthy foods as you get better. If you were on a special food plan before surgery, ask your doctor if you should follow it while you get better. These tips may help:  · Do not push yourself to eat. Your body will tell you when to eat and how much.  · Start off with clear liquids and soup. They are easier to digest.  · Next try semi-solid foods, such as mashed potatoes, applesauce, and gelatin, as you feel ready.  · Slowly move to solid foods. Dont eat fatty, rich, or spicy foods at first.  · Do not force yourself to have 3 large meals a day. Instead eat smaller amounts more often.  · Take pain medicines with a small amount of solid food, such as crackers or toast, to avoid nausea.     Call your surgeon if  · You still have pain an hour after taking medicine. The medicine may not be strong enough.  · You feel too sleepy, dizzy, or groggy. The medicine may be too strong.  · You have side effects like nausea, vomiting, or skin changes, such as rash, itching, or hives.       If you have obstructive sleep apnea  You were given anesthesia medicine during surgery to keep you comfortable and free of pain. After surgery, you may have more apnea spells because of this medicine and other medicines you were given. The spells may last longer than usual.   At home:  · Keep using the continuous positive airway pressure (CPAP) device when you sleep. Unless your health care provider tells you not to, use it when you sleep, day or night. CPAP is a common device used to treat obstructive sleep apnea.  · Talk with your provider before taking any pain medicine, muscle relaxants, or sedatives. Your provider will tell you about the possible dangers of taking these medicines.  © 6469-2522 [employer]. [address] [place] [address], [place] [address]. All rights reserved. This  information is not intended as a substitute for professional medical care. Always follow your healthcare professional's instructions.

## 2019-01-17 NOTE — BRIEF OP NOTE
[facility]  Brief Operative Note     SUMMARY     Surgery Date: 1/17/2019     Surgeon(s) and Role:     * Ethan Díaz MD - Primary    Assisting Surgeon: None    Pre-op Diagnosis:  Acute pain of left knee [M25.562]  Arthritis of knee, left [M17.12]    Post-op Diagnosis:  Post-Op Diagnosis Codes:     * Acute pain of left knee [M25.562]     * Arthritis of knee, left [M17.12]    Procedure(s) (LRB):  ARTHROSCOPY, KNEE, WITH MENISCECTOMY (Left)    Anesthesia: General    Description of the findings of the procedure: right knee arthroscopy with partial medial menisectomy    Findings/Key Components: See Dictation     Estimated Blood Loss: * No values recorded between 1/17/2019 12:43 PM and 1/17/2019  1:12 PM *         Specimens:   Specimen (12h ago, onward)    None          Discharge Note    SUMMARY     Admit Date: 1/17/2019    Discharge Date and Time: 1/17/2019     Hospital Course : Patient Tolerated Procedure Well      Final Diagnosis: Post-Op Diagnosis Codes:     * Acute pain of left knee [M25.562]     * Arthritis of knee, left [M17.12]    Disposition: Home or Self Care    Follow Up/Patient Instructions:     Medications:  Reconciled Home Medications:   single his the rest patient is active to his and his  Discharge Procedure Orders   CRUTCHES FOR HOME USE     Order Specific Question Answer Comments   Type: Axillary    Height: 6' (1.829 m)    Weight: 81.6 kg (180 lb)    Length of need (1-99 months): 1      Diet general     Ice to affected area     No driving, operating heavy equipment or signing legal documents while taking pain medication     Call MD for:  temperature >100.4     Call MD for:  persistent nausea and vomiting     Call MD for:  severe uncontrolled pain     Call MD for:  difficulty breathing, headache or visual disturbances     Call MD for:  redness, tenderness, or signs of infection (pain, swelling, redness, odor or green/yellow discharge around incision site)     Call MD for:  hives      Call MD for:  persistent dizziness or light-headedness     Call MD for:  extreme fatigue     Remove dressing in 48 hours     Shower on day dressing removed (No bath)     Weight bearing as tolerated     Follow-up Information     Ethan Díaz MD In 2 weeks.    Specialties:  Sports Medicine, Orthopedic Surgery  Contact information:  [address] [place] [address]  [address]  [facility] [address]  [phone]                   Dictation #1  MRN:[id number]  CSN:[id number]   [id number]

## 2019-01-17 NOTE — OP NOTE
DATE OF PROCEDURE:  01/17/2019.    PREOPERATIVE DIAGNOSES:  1.  Left knee medial meniscus tear.  2.  Left knee osteoarthritis.    POSTOPERATIVE DIAGNOSES:  1.  Left knee medial meniscus tear.  2.  Left knee osteoarthritis.    PROCEDURES PERFORMED:  Left knee arthroscopic partial medial meniscectomy.    SURGEON:  Ethan Díaz M.D.    ASSISTANT:  Joseph Salgado.    ANESTHESIA:  General with local infiltrate.    HISTORY:  Mr. Lynn is a 78-year-old gentleman who presented to our office with   a complaint of left knee pain.  His imaging studies and examination were   consistent with a medial meniscus tear.  We discussed treatment options and he   elected to proceed with arthroscopic inspection of the knee with possible   meniscectomy and arthritis cleaned up.  The risks and benefits of surgical   intervention including the potential for incomplete pain relief and the need for   further procedures were explained to the patient who expressed he understood   and wished to proceed.  Informed consent was obtained.    PROCEDURE IN DETAIL:  After verifying informed consent and correct site, the   patient was brought back to the Operating Room, placed on the OR table in the   supine position.  Preoperative IV antibiotics were administered and a timeout   was performed.  The patient's left lower extremity was then prepped and draped   in sterile fashion.  We began by creating a lateral portal.  Upon entering the   knee, we visualized the patella, which had some grade III and IV cartilage loss   on the medial facet.  The remainder of the patella appeared in relatively good   condition.  The trochlea appeared to have grade II cartilage changes, but   otherwise was normal.  The lateral gutter was clear and then we proceeded into   the medial gutter, which was also clear.  We entered the medial compartment and   found a complex tear of the posterior horn of the medial meniscus as expected.    We created a medial portal using a  combination of chapito and biters.  The   medial meniscus was debrided down to a stable rim.  Once the meniscectomy had   been performed, we did a slight chondroplasty on the medial femoral condyle.  We   then proceeded on into the notch with the ACL was palpated and visualized and   found to be normal.  We then proceeded into the lateral compartment where the   lateral compartment was found to be normal.  At this point, the equipment was   removed from the knee and the portals were closed.  A sterile dressing was   applied along with a PolarCare.  The patient then awoke from anesthesia,   extubated, and brought to the PACU in good condition.    TOTAL TOURNIQUET TIME:  None.    DRAINS:  None.    SPECIMENS:  None.    COMPLICATIONS:  None.    ESTIMATED BLOOD LOSS:  Minimal.    POSTOPERATIVE PLAN:  The patient will be discharged home later this afternoon to   follow up with us in clinic in 2 weeks' time.  He has been given complete   postop instructions and pain medication.      JOSEPHINE  dd: 01/17/2019 13:18:08 (CST)  td: 01/17/2019 13:36:24 (CST)  Doc ID   #[id number]  Job ID #[id number]    CC:

## 2019-01-17 NOTE — ANESTHESIA POSTPROCEDURE EVALUATION
Anesthesia Post Evaluation    Patient: Iftikhar Lynn    Procedure(s) Performed: Procedure(s) (LRB):  ARTHROSCOPY, KNEE, WITH MENISCECTOMY (Left)    Final Anesthesia Type: general  Patient location during evaluation: PACU  Patient participation: Yes- Able to Participate  Level of consciousness: awake and alert and oriented  Post-procedure vital signs: reviewed and stable  Pain management: adequate  Airway patency: patent  PONV status at discharge: No PONV  Anesthetic complications: no      Cardiovascular status: blood pressure returned to baseline and stable  Respiratory status: unassisted and spontaneous ventilation  Hydration status: euvolemic  Follow-up not needed.        Visit Vitals  BP (!) 174/91   Pulse 75   Temp 36.7 °C (98 °F) (Skin)   Resp 16   Ht 6' (1.829 m)   Wt 81.6 kg (180 lb)   SpO2 97%   BMI 24.41 kg/m²       Pain/Alanna Score: Pain Rating Prior to Med Admin: 5 (1/17/2019  3:07 PM)  Alanna Score: 10 (1/17/2019  2:00 PM)

## 2019-01-17 NOTE — PLAN OF CARE
1402:  Patient transferred to Phase II.  Report to FLACO Ochoa.  Released from Anesthesia.  Wife at bedside.  Pain rated 2/10, tolerable. Denies nausea. Drinking a cup of coffee.  Dressing CDI.  NAD noted.

## 2019-01-18 ENCOUNTER — TELEPHONE (OUTPATIENT)
Dept: ORTHOPEDICS | Facility: CLINIC | Age: 79
End: 2019-01-18

## 2019-01-18 VITALS
HEART RATE: 75 BPM | DIASTOLIC BLOOD PRESSURE: 91 MMHG | BODY MASS INDEX: 24.38 KG/M2 | RESPIRATION RATE: 16 BRPM | HEIGHT: 72 IN | SYSTOLIC BLOOD PRESSURE: 174 MMHG | OXYGEN SATURATION: 97 % | WEIGHT: 180 LBS | TEMPERATURE: 98 F

## 2019-01-18 NOTE — TELEPHONE ENCOUNTER
Called to check on patient, he is post op day one from knee scope.  He states that he is doing great.  Pain is controlled, no needs at this time.  Dressing removal and showering instructions given. He verbalized understanding and was able to repeat instructions back to me in his own words.  Encouraged him to call for any need or question that arose.

## 2019-01-18 NOTE — OR NURSING
Pt tolerated procedure well. Pt states no complaints. Neuro checks intact. Discharge instructions given to both patient and SO with verbalized understanding. Pt escorted via w/c to car.

## 2019-01-23 ENCOUNTER — PATIENT OUTREACH (OUTPATIENT)
Dept: OTHER | Facility: OTHER | Age: 79
End: 2019-01-23

## 2019-01-23 NOTE — PROGRESS NOTES
"Last 5 Patient Entered Readings                                      Current 30 Day Average: 137/81     Recent Readings 1/23/2019 1/22/2019 1/14/2019 1/14/2019 1/6/2019    SBP (mmHg) 155 124 151 156 141    DBP (mmHg) 79 74 89 87 82    Pulse 84 77 67 63 80          Digital Medicine: Health  Follow Up    Called patient to follow after knee surgery.    Lifestyle Modifications:    1.Dietary Modifications (Sodium intake <2,000mg/day, food labels, dining out):   Deferred    2.Physical Activity:   Patient reports he is doing well since the surgery.  Patient states he just got back from "walking" and already has his normal gait back. Patient states he did fall down some stairs and is taking Ibuprofen.  Encouraged patient to take Tylenol instead.     3.Medication Therapy:   Patient has been compliant with the medication regimen.    4.Patient has the following medication side effects/concerns: None  (Frequency/Alleviating factors/Precipitating factors, etc.)     Follow up with Babita Iftikhar Rivasjusten Lynn completed. No further questions or concerns. Will continue to follow up to achieve health goals.  Patient is currently not at goal, 137/81 mmHg does exceed <130/80 mmHg.    "

## 2019-02-07 ENCOUNTER — PATIENT OUTREACH (OUTPATIENT)
Dept: OTHER | Facility: OTHER | Age: 79
End: 2019-02-07

## 2019-02-07 DIAGNOSIS — M25.562 LEFT KNEE PAIN, UNSPECIFIED CHRONICITY: Primary | ICD-10-CM

## 2019-02-07 NOTE — PROGRESS NOTES
Last 5 Patient Entered Readings                                      Current 30 Day Average: 138/79     Recent Readings 2/2/2019 2/2/2019 1/29/2019 1/23/2019 1/22/2019    SBP (mmHg) 128 128 132 155 124    DBP (mmHg) 69 69 89 79 74    Pulse 78 78 77 84 77        Hypertension Medications             irbesartan (AVAPRO) 300 MG tablet Take 1 tablet (300 mg total) by mouth every evening.        Plan:   Called patient to follow up, reviewed BP readings. Per 2017 ACC/ AHA HTN guidelines  (goal of BP < 130/80), current 30-day average is slightly uncontrolled, appears to be trending down.  LVM, requested patient call back at his convenience.  Will continue to monitor. WCB in 1 month.

## 2019-02-11 ENCOUNTER — OFFICE VISIT (OUTPATIENT)
Dept: ORTHOPEDICS | Facility: CLINIC | Age: 79
End: 2019-02-11
Payer: MEDICARE

## 2019-02-11 ENCOUNTER — HOSPITAL ENCOUNTER (OUTPATIENT)
Dept: RADIOLOGY | Facility: HOSPITAL | Age: 79
Discharge: HOME | End: 2019-02-11
Attending: ORTHOPAEDIC SURGERY
Payer: MEDICARE

## 2019-02-11 VITALS
BODY MASS INDEX: 24.36 KG/M2 | HEIGHT: 72 IN | SYSTOLIC BLOOD PRESSURE: 138 MMHG | DIASTOLIC BLOOD PRESSURE: 69 MMHG | HEART RATE: 80 BPM | WEIGHT: 179.88 LBS

## 2019-02-11 DIAGNOSIS — M25.562 LEFT KNEE PAIN, UNSPECIFIED CHRONICITY: ICD-10-CM

## 2019-02-11 DIAGNOSIS — S83.282D ACUTE LATERAL MENISCUS TEAR OF LEFT KNEE, SUBSEQUENT ENCOUNTER: Primary | ICD-10-CM

## 2019-02-11 PROCEDURE — 73560 X-RAY EXAM OF KNEE 1 OR 2: CPT | Mod: TC,PN,LT

## 2019-02-11 PROCEDURE — 99213 OFFICE O/P EST LOW 20 MIN: CPT | Mod: PBBFAC,25,PN | Performed by: ORTHOPAEDIC SURGERY

## 2019-02-11 PROCEDURE — 99024 PR POST-OP FOLLOW-UP VISIT: ICD-10-PCS | Mod: POP,,, | Performed by: ORTHOPAEDIC SURGERY

## 2019-02-11 PROCEDURE — 73560 X-RAY EXAM OF KNEE 1 OR 2: CPT | Mod: 26,LT,, | Performed by: RADIOLOGY

## 2019-02-11 PROCEDURE — 99999 PR PBB SHADOW E&M-EST. PATIENT-LVL III: CPT | Mod: PBBFAC,,, | Performed by: ORTHOPAEDIC SURGERY

## 2019-02-11 PROCEDURE — 99024 POSTOP FOLLOW-UP VISIT: CPT | Mod: POP,,, | Performed by: ORTHOPAEDIC SURGERY

## 2019-02-11 PROCEDURE — 73560 XR KNEE 1 OR 2 VIEW LEFT: ICD-10-PCS | Mod: 26,LT,, | Performed by: RADIOLOGY

## 2019-02-11 PROCEDURE — 99999 PR PBB SHADOW E&M-EST. PATIENT-LVL III: ICD-10-PCS | Mod: PBBFAC,,, | Performed by: ORTHOPAEDIC SURGERY

## 2019-02-16 NOTE — PROGRESS NOTES
Past Medical History:   Diagnosis Date    Cancer     basal cell ca rt arm / melanoma on back    GERD (gastroesophageal reflux disease)     Hyperlipidemia     Hypertension     Melanoma        Past Surgical History:   Procedure Laterality Date    ARTHROSCOPY, KNEE, WITH MENISCECTOMY Left 1/17/2019    [facility]    BACK SURGERY      L 4 L5    COLONOSCOPY N/A 12/20/2017    [facility] at [facility]    COLONOSCOPY N/A 2/7/2013    [facility]    EXCISION-LESION N/A 2/16/2016    [facility] at [facility]    EXCISION-MELANOMA Right 3/22/2018    [facility]    SKIN CANCER EXCISION      multiple sites, derm dr martinez, basal cell ca    VASECTOMY         Current Outpatient Medications   Medication Sig    aspirin 81 MG chewable tablet Take 81 mg by mouth once daily.      diclofenac sodium 1 % Gel APPLY AS DIRECTED    hydrocortisone-pramoxine (PROCTOFOAM HC) rectal foam APPLY ONE APPLICATION RECTALLY TWICE DAILY AS DIRECTED    ibuprofen (ADVIL,MOTRIN) 800 MG tablet Take 1 tablet (800 mg total) by mouth 3 (three) times daily.    irbesartan (AVAPRO) 300 MG tablet Take 1 tablet (300 mg total) by mouth every evening.    multivitamin capsule Take 1 capsule by mouth once daily.    nystatin-triamcinolone (MYCOLOG II) cream Apply topically 2 (two) times daily.    omeprazole (PRILOSEC OTC) 20 MG tablet Take 20 mg by mouth once daily.     oxyCODONE-acetaminophen (PERCOCET) 5-325 mg per tablet Take 1 tablet by mouth every 6 (six) hours as needed for Pain.    simvastatin (ZOCOR) 40 MG tablet TAKE 1 TABLET NIGHTLY    valacyclovir (VALTREX) 500 MG tablet Take 500 mg by mouth 2 (two) times daily.     No current facility-administered medications for this visit.        Review of patient's allergies indicates:  No Known Allergies    Family History   Problem Relation Age of Onset     Cancer Mother         non hogkins lumphoma    Heart disease Father     Cancer Brother         melanoma, arm and leg    Clotting disorder Neg Hx     Anesthesia problems Neg Hx        Social History     Socioeconomic History    Marital status:      Spouse name: Not on file    Number of children: Not on file    Years of education: Not on file    Highest education level: Not on file   Social Needs    Financial resource strain: Not on file    Food insecurity - worry: Not on file    Food insecurity - inability: Not on file    Transportation needs - medical: Not on file    Transportation needs - non-medical: Not on file   Occupational History    Not on file   Tobacco Use    Smoking status: Former Smoker     Last attempt to quit: 2/15/1976     Years since quittin.0    Smokeless tobacco: Never Used   Substance and Sexual Activity    Alcohol use: Yes     Alcohol/week: 0.0 oz     Comment: daily wine    Drug use: No    Sexual activity: Not on file   Other Topics Concern    Not on file   Social History Narrative    Not on file       Chief Complaint:   Chief Complaint   Patient presents with    Post-op Evaluation     S/P LEFT KNEE PARTIAL MEDIAL MENISCECTOMY 19       Consulting Physician: No ref. provider found    History of present illness: This is a 79 y.o. male following up for left knee scope with partial medial menisectomy 19.      Review of Systems:    Constitution: Denies chills, fever, and sweats.    HENT: Denies headaches or blurry vision.    Cardiovascular: Denies chest pain or irregular heart beat.    Respiratory: Denies cough or shortness of breath.    Gastrointestinal: Denies abdominal pain, nausea, or vomiting.    Musculoskeletal:  Denies muscle cramps.    Neurological: Denies dizziness or focal weakness.    Psychiatric/Behavioral: Normal mental status.    Hematologic/Lymphatic: Denies bleeding problem or easy bruising/bleeding.    Skin: Denies rash or suspicious  lesions.      Examination:    Vital Signs:    Vitals:    02/11/19 1329   BP: 138/69   Pulse: 80       Body mass index is 24.4 kg/m².    This a well-developed, well nourished patient in no acute distress.    Alert and oriented and cooperative to examination.       Physical Exam: left knee    Inspection/Observation   Swelling:   mild  Erythema:   none  Bruising:   none  Wounds:   Clean and dry  Drainage:  None    Range of Motion   Limited    Muscle Strength   Limited    Other   Sensation:  normal  Pulse:    palpable    Imaging: Normal post-operative XR     Assessment: Acute lateral meniscus tear of left knee, subsequent encounter        Plan: Doing well. HEP. Back in 4 weeks.      DISCLAIMER: This note may have been dictated using voice recognition software and may contain grammatical errors. Report sent to referring provider as required.

## 2019-02-20 ENCOUNTER — PATIENT OUTREACH (OUTPATIENT)
Dept: OTHER | Facility: OTHER | Age: 79
End: 2019-02-20

## 2019-02-20 NOTE — PROGRESS NOTES
"Last 5 Patient Entered Readings                                      Current 30 Day Average: 144/84     Recent Readings 2/20/2019 2/20/2019 2/20/2019 2/20/2019 2/19/2019    SBP (mmHg) 171 165 147 186 149    DBP (mmHg) 93 96 101 96 84    Pulse 84 75 78 85 88          Digital Medicine: Health  Follow Up    Patient reports he has been taking ibuprofen.  Encouraged patient to take Tylenol instead as it was better for HTN.  Patient reports he has been taking Nyquil and Sudafed because of cold and sinuses.  Encouraged patient to not take decongestants and opt for cough suppressants and Coricidin HBP line.  Patient verbalized understanding.     Lifestyle Modifications:    1.Dietary Modifications (Sodium intake <2,000mg/day, food labels, dining out):   Deferred    2.Physical Activity:   Patient reports he is getting out and walking around.  Patient states he may not be doing "his full 2 miles".  Patient states he will try to start doing a row machine or bicycle.  Encouraged patient to keep up the great work.     3.Medication Therapy:   Patient has been compliant with the medication regimen.    4.Patient has the following medication side effects/concerns: None  (Frequency/Alleviating factors/Precipitating factors, etc.)     Follow up with Mr. Iftikhar Lynn completed. No further questions or concerns. Will continue to follow up to achieve health goals.  Patient is currently not at goal, 144/84 mmHg does exceed <130/80 mmHg.    "

## 2019-02-25 RX ORDER — IBUPROFEN 800 MG/1
TABLET ORAL
Qty: 60 TABLET | Refills: 0 | Status: SHIPPED | OUTPATIENT
Start: 2019-02-25 | End: 2020-11-11 | Stop reason: SDUPTHER

## 2019-03-07 NOTE — PROGRESS NOTES
HPI:  Called patient to follow up. Patient reports adherence to medication regimen daily and denies missed doses. Patient denies hypotensive s/sx (lightheadedness, dizziness, nausea, fatigue); patient denies hypertensive s/sx (SOB, CP, severe headaches, changes in vision, dizziness, fatigue, confusion, anxiety, nosebleeds).     Patient reports he was able to return to exercising about 1 week ago; patient had knee surgery on 1/17.     Last 5 Patient Entered Readings                                      Current 30 Day Average: 140/83     Recent Readings 3/5/2019 3/3/2019 3/2/2019 3/2/2019 3/1/2019    SBP (mmHg) 135 133 131 141 117    DBP (mmHg) 73 83 77 85 70    Pulse 69 79 76 76 76        Assessment:  Reviewed recent readings. Per 2017 ACC/ AHA HTN guidelines (goal of BP < 130/80), current 30-day average is uncontrolled.     Plan:  Patient states he would like to continue focusing on lifestyle modifications; therefore, continue current medication regimen.   Patients health , Reed Sanderson, will be following up as scheduled.   I will continue to monitor regularly and will follow-up in 6 weeks. If BP remains elevated, will discuss adding amlodipine 5mg.     Current medication regimen:  Hypertension Medications             irbesartan (AVAPRO) 300 MG tablet Take 1 tablet (300 mg total) by mouth every evening.         Patient denies having questions or concerns. Patient has my contact information and knows to call with any concerns or clinical changes.

## 2019-03-20 ENCOUNTER — PATIENT OUTREACH (OUTPATIENT)
Dept: OTHER | Facility: OTHER | Age: 79
End: 2019-03-20

## 2019-03-20 NOTE — PROGRESS NOTES
Last 5 Patient Entered Readings                                      Current 30 Day Average: 137/81     Recent Readings 3/19/2019 3/19/2019 3/12/2019 3/11/2019 3/10/2019    SBP (mmHg) 146 144 133 130 158    DBP (mmHg) 77 81 73 68 87    Pulse 69 69 81 82 80        Digital Medicine: Health  Follow Up    BP is trending downward.  Patient is working on lifestyle change and has increased activity.     Lifestyle Modifications:    1.Dietary Modifications (Sodium intake <2,000mg/day, food labels, dining out):   Encouraged patient to continue to work on salt restriction. Pt denies needing any other help with nutrition at this time.    2.Physical Activity:   Patient reports he is continuing to walk m3ojmvr/day and is also going x3/wk to the gym.  Patient reports he rides the stationary bike and then does an hour of weights at the gym.  Gave praises and encouragement to patient to keep up the great work.      3.Medication Therapy:   Patient has been compliant with the medication regimen.    4.Patient has the following medication side effects/concerns: None  (Frequency/Alleviating factors/Precipitating factors, etc.)     Follow up with Mr. Iftikhar Lynn completed. No further questions or concerns. Will continue to follow up to achieve health goals.  Patient is currently not at goal, 137/81 mmHg does exceed <130/80 mmHg.

## 2019-04-18 ENCOUNTER — PATIENT OUTREACH (OUTPATIENT)
Dept: OTHER | Facility: OTHER | Age: 79
End: 2019-04-18

## 2019-04-18 NOTE — PROGRESS NOTES
HPI:  Called patient to follow up. Patient reports adherence to medication regimen daily and denies missed doses. Patient denies hypotensive s/sx (lightheadedness, dizziness, nausea, fatigue); patient denies hypertensive s/sx (SOB, CP, severe headaches, changes in vision, dizziness, fatigue, confusion, anxiety, nosebleeds).     Patient states he returned to the gym about 4-6 weeks ago (EOD).    Last 5 Patient Entered Readings                                      Current 30 Day Average: 138/74     Recent Readings 4/17/2019 4/9/2019 4/8/2019 4/7/2019 4/7/2019    SBP (mmHg) 138 134 144 129 138    DBP (mmHg) 73 68 79 68 66    Pulse 68 82 76 84 81        Assessment:  Reviewed recent readings. Per 2017 ACC/ AHA HTN guidelines (goal of BP < 130/80), current 30-day average is slightly uncontrolled, but does appear to be trending down due to patient's recent return to the gym.     Plan:  Offered to start amlodipine 5mg qhs, patient declines change today, states he would prefer to focus on lifestyle modifications; therefore, continue current medication regimen.   Patients health , Jamil Sanderson, will be following up as scheduled.   I will continue to monitor regularly and will follow-up in 6 weeks. If BP remains elevated, will re-discuss adding amlodipine.    Current medication regimen:  Hypertension Medications             irbesartan (AVAPRO) 300 MG tablet Take 1 tablet (300 mg total) by mouth every evening.         Patient denies having questions or concerns. Patient has my contact information and knows to call with any concerns or clinical changes.

## 2019-04-24 ENCOUNTER — PATIENT OUTREACH (OUTPATIENT)
Dept: OTHER | Facility: OTHER | Age: 79
End: 2019-04-24

## 2019-04-24 NOTE — PROGRESS NOTES
"Last 5 Patient Entered Readings                                      Current 30 Day Average: 134/72     Recent Readings 4/23/2019 4/23/2019 4/23/2019 4/23/2019 4/17/2019    SBP (mmHg) 128 133 130 140 138    DBP (mmHg) 70 70 70 75 73    Pulse 72 72 71 72 68        Digital Medicine: Health  Follow Up    BP continues to trend downward.  BP is nearly at goal.    Lifestyle Modifications:    1.Dietary Modifications (Sodium intake <2,000mg/day, food labels, dining out):   Deferred    2.Physical Activity:   Patient reports he has been "spinning" x3/wk.  Patient reports he rides the bike for 30min and then weight trains x30min.  Patient reports he may increase to riding his bike everyday.  Encouraged patient to keep up the great and do not increase activity too much at one time.  Patient verbalized understanding.      3.Medication Therapy:   Patient has been compliant with the medication regimen.    4.Patient has the following medication side effects/concerns: None  (Frequency/Alleviating factors/Precipitating factors, etc.)     Follow up with Mr. Iftikhar Lynn completed. No further questions or concerns. Will continue to follow up to achieve health goals.  Patient is currently not at goal, 134/72 mmHg does exceed <130/80 mmHg.      "

## 2019-05-20 ENCOUNTER — PATIENT OUTREACH (OUTPATIENT)
Dept: OTHER | Facility: OTHER | Age: 79
End: 2019-05-20

## 2019-05-20 NOTE — PROGRESS NOTES
Last 5 Patient Entered Readings                                      Current 30 Day Average: 135/72     Recent Readings 5/10/2019 4/30/2019 4/23/2019 4/23/2019 4/23/2019    SBP (mmHg) 128 150 128 133 130    DBP (mmHg) 70 77 70 70 70    Pulse 86 76 72 72 71        Hypertension Medications             irbesartan (AVAPRO) 300 MG tablet Take 1 tablet (300 mg total) by mouth every evening.        Called patient to follow up, patient states he is driving in [place], requests call back at a later time.   Reviewed BP readings. Per 2017 ACC/ AHA HTN guidelines  (goal of BP < 130/80), current 30-day average is slightly uncontrolled; however, patient has only taken BP on 3 days within the past 30 days.   Will continue to monitor. WCB in 2 weeks. Would like to discuss adding amlodipine 5.

## 2019-06-03 NOTE — PROGRESS NOTES
HPI:  Called patient to follow up. Patient reports adherence to medication regimen daily and denies missed doses. Patient denies hypotensive s/sx (lightheadedness, dizziness, nausea, fatigue); patient denies hypertensive s/sx (SOB, CP, severe headaches, changes in vision, dizziness, fatigue, confusion, anxiety, nosebleeds).     Patient continues to take BP with [employer] BP cuff as well; readings taken with [facility] cuff are typically 5-10 points lower.    Last 5 Patient Entered Readings                                      Current 30 Day Average: 131/73     Recent Readings 6/1/2019 5/30/2019 5/27/2019 5/27/2019 5/10/2019    SBP (mmHg) 130 126 139 144 128    DBP (mmHg) 70 72 77 77 70    Pulse 80 82 74 74 86        Assessment:  Reviewed recent readings. Per 2017 ACC/ AHA HTN guidelines (goal of BP < 130/80), current 30-day average is slightly uncontrolled.     Plan:  Continue current medication regimen.   Encouraged patient to charge digital cuff at least monthly.  Patients health , Jamil Sanderson, will be following up as scheduled.   I will continue to monitor regularly and will follow-up in 12 weeks, sooner if blood pressure begins to trend upward or downward.     Current medication regimen:  Hypertension Medications             irbesartan (AVAPRO) 300 MG tablet Take 1 tablet (300 mg total) by mouth every evening.         Patient denies having questions or concerns. Patient has my contact information and knows to call with any concerns or clinical changes.

## 2019-06-12 ENCOUNTER — PATIENT OUTREACH (OUTPATIENT)
Dept: OTHER | Facility: OTHER | Age: 79
End: 2019-06-12

## 2019-06-12 NOTE — PROGRESS NOTES
Last 5 Patient Entered Readings                                      Current 30 Day Average: 133/75     Recent Readings 6/9/2019 6/1/2019 5/30/2019 5/27/2019 5/27/2019    SBP (mmHg) 135 130 126 139 144    DBP (mmHg) 79 70 72 77 77    Pulse 72 80 82 74 74          Digital Medicine: Health  Follow Up    6/12: Left voicemail to follow up with  Iftikhar Robjusten Shane.  Current BP average 133/75 mmHg is not at goal, <130/80 mmHg.

## 2019-06-24 ENCOUNTER — TELEPHONE (OUTPATIENT)
Dept: FAMILY MEDICINE | Facility: CLINIC | Age: 79
End: 2019-06-24

## 2019-06-24 DIAGNOSIS — I10 ESSENTIAL HYPERTENSION: Primary | ICD-10-CM

## 2019-06-24 DIAGNOSIS — E78.2 MIXED HYPERLIPIDEMIA: ICD-10-CM

## 2019-06-24 NOTE — TELEPHONE ENCOUNTER
----- Message from Elvis Basilio sent at 6/24/2019  3:14 PM CDT -----  Contact: Patient  Type: Needs Medical Advice    Who Called:  Patient  Best Call Back Number: [phone]  Additional Information: Patient is scheduled for annual on 7/1/19. Please advise when lab orders are entered so patient can have labs drawn

## 2019-06-25 ENCOUNTER — TELEPHONE (OUTPATIENT)
Dept: FAMILY MEDICINE | Facility: CLINIC | Age: 79
End: 2019-06-25

## 2019-06-25 NOTE — TELEPHONE ENCOUNTER
----- Message from Kitty Dickey sent at 6/25/2019 11:57 AM CDT -----  Contact: Iftikhar   Type: Needs Medical Advice    Who Called:  Patient   Best Call Back Number: [phone]  Additional Information:  Calling as the PSA was not ordered with other labs and asking to attach to appointment for tomorrow 6/26/19. Please call to advise if order was able to be added. Thanks!

## 2019-06-26 ENCOUNTER — LAB VISIT (OUTPATIENT)
Dept: LAB | Facility: HOSPITAL | Age: 79
End: 2019-06-26
Attending: FAMILY MEDICINE
Payer: MEDICARE

## 2019-06-26 DIAGNOSIS — E78.2 MIXED HYPERLIPIDEMIA: ICD-10-CM

## 2019-06-26 DIAGNOSIS — I10 ESSENTIAL HYPERTENSION: ICD-10-CM

## 2019-06-26 LAB
ALBUMIN SERPL BCP-MCNC: 4.1 G/DL (ref 3.5–5.2)
ALP SERPL-CCNC: 66 U/L (ref 55–135)
ALT SERPL W/O P-5'-P-CCNC: 14 U/L (ref 10–44)
ANION GAP SERPL CALC-SCNC: 8 MMOL/L (ref 8–16)
AST SERPL-CCNC: 19 U/L (ref 10–40)
BILIRUB SERPL-MCNC: 0.6 MG/DL (ref 0.1–1)
BUN SERPL-MCNC: 12 MG/DL (ref 8–23)
CALCIUM SERPL-MCNC: 9.3 MG/DL (ref 8.7–10.5)
CHLORIDE SERPL-SCNC: 107 MMOL/L (ref 95–110)
CHOLEST SERPL-MCNC: 132 MG/DL (ref 120–199)
CHOLEST/HDLC SERPL: 2.2 {RATIO} (ref 2–5)
CO2 SERPL-SCNC: 27 MMOL/L (ref 23–29)
CREAT SERPL-MCNC: 0.8 MG/DL (ref 0.5–1.4)
EST. GFR  (AFRICAN AMERICAN): >60 ML/MIN/1.73 M^2
EST. GFR  (NON AFRICAN AMERICAN): >60 ML/MIN/1.73 M^2
GLUCOSE SERPL-MCNC: 89 MG/DL (ref 70–110)
HDLC SERPL-MCNC: 60 MG/DL (ref 40–75)
HDLC SERPL: 45.5 % (ref 20–50)
LDLC SERPL CALC-MCNC: 53.6 MG/DL (ref 63–159)
NONHDLC SERPL-MCNC: 72 MG/DL
POTASSIUM SERPL-SCNC: 4.1 MMOL/L (ref 3.5–5.1)
PROT SERPL-MCNC: 6.4 G/DL (ref 6–8.4)
SODIUM SERPL-SCNC: 142 MMOL/L (ref 136–145)
TRIGL SERPL-MCNC: 92 MG/DL (ref 30–150)

## 2019-06-26 PROCEDURE — 80053 COMPREHEN METABOLIC PANEL: CPT

## 2019-06-26 PROCEDURE — 80061 LIPID PANEL: CPT

## 2019-06-26 PROCEDURE — 36415 COLL VENOUS BLD VENIPUNCTURE: CPT | Mod: PO

## 2019-06-26 NOTE — PROGRESS NOTES
Last 5 Patient Entered Readings                                      Current 30 Day Average: 130/72     Recent Readings 6/23/2019 6/17/2019 6/17/2019 6/17/2019 6/9/2019    SBP (mmHg) 131 116 113 115 135    DBP (mmHg) 67 67 69 69 79    Pulse 84 69 69 70 72        Digital Medicine: Health  Follow Up    Encounter was brief.  BP is controlled,  Patient reports he has his annual physical on Monday.     Lifestyle Modifications:    1.Dietary Modifications (Sodium intake <2,000mg/day, food labels, dining out):   Patient reports no changes and denies needing any other help with nutrition at this time.    2.Physical Activity:   Patient reports he continues to be active and go to the gym.  Gave encouragement to patient.     3.Medication Therapy:   Patient has been compliant with the medication regimen.    4.Patient has the following medication side effects/concerns: None  (Frequency/Alleviating factors/Precipitating factors, etc.)     Follow up with Babita Iftikhar Nunez Shane completed. No further questions or concerns. Will continue to follow up to achieve health goals.  Patient is currently at goal, 130/72 mmHg does not exceed <130/80 mmHg.

## 2019-06-28 ENCOUNTER — DOCUMENTATION ONLY (OUTPATIENT)
Dept: FAMILY MEDICINE | Facility: CLINIC | Age: 79
End: 2019-06-28

## 2019-06-28 NOTE — PROGRESS NOTES
Pre-Visit Chart Review  For Appointment Scheduled on 7/1/2019.       There are no preventive care reminders to display for this patient.

## 2019-07-01 ENCOUNTER — OFFICE VISIT (OUTPATIENT)
Dept: FAMILY MEDICINE | Facility: CLINIC | Age: 79
End: 2019-07-01
Payer: MEDICARE

## 2019-07-01 VITALS
WEIGHT: 179.88 LBS | HEIGHT: 72 IN | HEART RATE: 88 BPM | DIASTOLIC BLOOD PRESSURE: 74 MMHG | SYSTOLIC BLOOD PRESSURE: 136 MMHG | OXYGEN SATURATION: 96 % | TEMPERATURE: 98 F | BODY MASS INDEX: 24.36 KG/M2

## 2019-07-01 DIAGNOSIS — C43.61 MALIGNANT MELANOMA OF RIGHT UPPER EXTREMITY INCLUDING SHOULDER: ICD-10-CM

## 2019-07-01 DIAGNOSIS — I10 ESSENTIAL HYPERTENSION: Primary | ICD-10-CM

## 2019-07-01 DIAGNOSIS — K21.9 GASTROESOPHAGEAL REFLUX DISEASE WITHOUT ESOPHAGITIS: ICD-10-CM

## 2019-07-01 DIAGNOSIS — I77.89 OTHER SPECIFIED DISORDERS OF ARTERIES AND ARTERIOLES: ICD-10-CM

## 2019-07-01 DIAGNOSIS — E78.2 MIXED HYPERLIPIDEMIA: ICD-10-CM

## 2019-07-01 PROCEDURE — 99214 OFFICE O/P EST MOD 30 MIN: CPT | Mod: S$PBB,,, | Performed by: FAMILY MEDICINE

## 2019-07-01 PROCEDURE — 99999 PR PBB SHADOW E&M-EST. PATIENT-LVL III: ICD-10-PCS | Mod: PBBFAC,,, | Performed by: FAMILY MEDICINE

## 2019-07-01 PROCEDURE — 99214 PR OFFICE/OUTPT VISIT, EST, LEVL IV, 30-39 MIN: ICD-10-PCS | Mod: S$PBB,,, | Performed by: FAMILY MEDICINE

## 2019-07-01 PROCEDURE — 99999 PR PBB SHADOW E&M-EST. PATIENT-LVL III: CPT | Mod: PBBFAC,,, | Performed by: FAMILY MEDICINE

## 2019-07-01 PROCEDURE — 99213 OFFICE O/P EST LOW 20 MIN: CPT | Mod: PBBFAC,PO | Performed by: FAMILY MEDICINE

## 2019-07-01 NOTE — PROGRESS NOTES
Subjective:       Patient ID: Iftikhar Lynn is a 79 y.o. male.    Chief Complaint: Hypertension; Hyperlipidemia; and Gastroesophageal Reflux    Patient presents here today for follow-up of hypertension, hyperlipidemia, and GERD.  His weight has decreased 6 lb since his last visit.  He states he is following his low-sodium, low-fat low-cholesterol diet well and he is exercising on a regular basis.  His hypertension is well controlled on his present dose of Avapro.  He is on the digital hypertension program and his blood pressures were reviewed and these show excellent control at this time.  His hyperlipidemia is also well controlled with his diet as well as his present dose of simvastatin 40 mg at bedtime.  His most recent lipid profile shows a total cholesterol 132, HDL 60, LDL 53, triglycerides 92.  His CMP was completely within normal limits.  As far as his GERD, this is well controlled with his present use of over-the-counter Prilosec.  He did have arthroscopic surgery on his left knee 6 months ago for torn meniscus, and his recovery has been slower than he would have liked.  He is still undergoing physical therapy at this time.  He does have a history of malignant melanoma and he is compliant with his annual checks with his dermatologist.  There has been no recurrence.  He is also very compliant with use of sunscreen and sun protection.  I did discuss with the patient about advanced directives and he does have a living will and power of  already at home and I have asked him to bring a copy here so they can be scanned into our system.  As far as health maintenance, he is up-to-date on all of his recommended screening exams.    Hypertension   This is a chronic problem. The problem is unchanged. The problem is controlled. Pertinent negatives include no chest pain, headaches, neck pain, palpitations or shortness of breath. Risk factors for coronary artery disease include dyslipidemia and male gender.  Past treatments include angiotensin blockers. The current treatment provides moderate improvement. There are no compliance problems.  There is no history of kidney disease, CAD/MI, CVA or heart failure.   Hyperlipidemia   This is a chronic problem. The problem is controlled. Recent lipid tests were reviewed and are normal. Pertinent negatives include no chest pain, myalgias or shortness of breath. Current antihyperlipidemic treatment includes statins. The current treatment provides significant improvement of lipids. There are no compliance problems.  Risk factors for coronary artery disease include dyslipidemia, hypertension and male sex.     Review of Systems   Constitutional: Negative for activity change, chills, fatigue, fever and unexpected weight change.   HENT: Negative for congestion, ear pain, hearing loss, postnasal drip, rhinorrhea and sore throat.    Respiratory: Negative for cough, chest tightness and shortness of breath.    Cardiovascular: Negative for chest pain and palpitations.   Gastrointestinal: Negative for abdominal pain, diarrhea, nausea and vomiting.   Endocrine: Negative for polydipsia and polyuria.   Genitourinary: Negative for difficulty urinating, dysuria, flank pain, frequency, hematuria and urgency.        No nocturia   Musculoskeletal: Positive for arthralgias (Left knee pain). Negative for back pain, myalgias and neck pain.   Skin: Negative for pallor and rash.   Neurological: Negative for dizziness, syncope, weakness, light-headedness and headaches.   Hematological: Negative for adenopathy. Does not bruise/bleed easily.   Psychiatric/Behavioral: Negative for sleep disturbance. The patient is not nervous/anxious.        Objective:      Physical Exam   Constitutional: He is oriented to person, place, and time. He appears well-developed and well-nourished. No distress.   HENT:   Head: Normocephalic and atraumatic.   Right Ear: External ear normal.   Left Ear: External ear normal.   Nose:  Nose normal.   Mouth/Throat: Oropharynx is clear and moist.   Eyes: Pupils are equal, round, and reactive to light. Conjunctivae and EOM are normal.   Neck: Normal range of motion. Neck supple. No thyromegaly present.   Cardiovascular: Normal rate, regular rhythm, normal heart sounds and intact distal pulses.   No murmur heard.  Pulmonary/Chest: Effort normal and breath sounds normal. He has no wheezes. He has no rales.   Abdominal: Soft. Bowel sounds are normal. There is no tenderness. There is no rebound and no guarding.   Genitourinary: Rectum normal and prostate normal.   Musculoskeletal: Normal range of motion. He exhibits no edema.   He still has some swelling over the left knee but his range of motion is good.  There is minimal discomfort to palpation over the medial and lateral joint lines.  There is no instability.   Lymphadenopathy:     He has no cervical adenopathy.   Neurological: He is alert and oriented to person, place, and time. He has normal reflexes. No cranial nerve deficit.   Skin: Skin is warm and dry. No rash noted. No erythema.   Psychiatric: He has a normal mood and affect. His behavior is normal.   Vitals reviewed.      Assessment:       1. Essential hypertension    2. Mixed hyperlipidemia    3. Gastroesophageal reflux disease without esophagitis    4. 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  Continue present medication as his hypertension, hyperlipidemia, and GERD are all well controlled  2.  Continue low-sodium, low-fat low-cholesterol diet and exercise to maintain optimal weight  3.  Continue physical therapy and follow-up with Orthopedics  4.  Patient has a living will and power-of- at home and he will bring a copy here so that it can be scanned into the system  5.  Follow up with me in 1 year or p.r.n.          Patient readiness: acceptance and barriers:none    During the  course of the visit the patient was educated and counseled about the following:     Hypertension:   Dietary sodium restriction.  Regular aerobic exercise.  Follow up: 1 year and as needed.    Goals: Hypertension: Reduce Blood Pressure    Did patient meet goals/outcomes: Yes    The following self management tools provided: blood pressure log    Patient Instructions (the written plan) was given to the patient/family.     Time spent with patient: 30 minutes    Barriers to medications present (no )    Adverse reactions to current medications (no)    Over the counter medications reviewed (Yes)

## 2019-07-10 ENCOUNTER — TELEPHONE (OUTPATIENT)
Dept: FAMILY MEDICINE | Facility: CLINIC | Age: 79
End: 2019-07-10

## 2019-07-10 DIAGNOSIS — I10 ESSENTIAL HYPERTENSION: Primary | ICD-10-CM

## 2019-07-10 RX ORDER — OLMESARTAN MEDOXOMIL 40 MG/1
40 TABLET ORAL DAILY
Qty: 90 TABLET | Refills: 3 | Status: SHIPPED | OUTPATIENT
Start: 2019-07-10 | End: 2020-06-29 | Stop reason: SDUPTHER

## 2019-07-10 NOTE — TELEPHONE ENCOUNTER
Anshul called stating rx irbesartan 300mg is currently unavailable is is requesting an alternative.

## 2019-07-10 NOTE — TELEPHONE ENCOUNTER
----- Message from Rosa Mendoza sent at 7/10/2019  4:16 PM CDT -----  Contact: dc with [facility]  Type:  Pharmacy Calling to Clarify an RX    Name of Caller:  dc  Pharmacy Name:    [facility] - [place] - [address] AT Portal to Registered [facility] Sites  [facility]  [facility] [address]  Phone: [phone] Fax: [phone]  Prescription Name:  irbesartan (AVAPRO) 300 MG tablet  What do they need to clarify?:  Medication is currently unavailable and needs alternative  Best Call Back Number:    Additional Information:  na

## 2019-07-10 NOTE — TELEPHONE ENCOUNTER
Sent in a prescription for Benicar 40 mg daily in place of irbesartan.  Please notify patient of the change.

## 2019-07-11 ENCOUNTER — TELEPHONE (OUTPATIENT)
Dept: FAMILY MEDICINE | Facility: CLINIC | Age: 79
End: 2019-07-11

## 2019-07-11 NOTE — TELEPHONE ENCOUNTER
----- Message from Foreign Gottlieb sent at 7/11/2019  8:24 AM CDT -----  Contact: Beatrice with [facility]  Type:  Pharmacy Calling to Clarify an RX    Name of Caller:  Beatrice  Pharmacy Name:  [facility]  Prescription Name:  Irbesartan (avapro) 300 mg  What do they need to clarify?:  Medication is on back order. Wanting to know if there is another medication that an be prescribed.    Best Call Back Number:  [phone]  Additional Information:  Ref #: [id number]

## 2019-08-21 ENCOUNTER — PATIENT OUTREACH (OUTPATIENT)
Dept: OTHER | Facility: OTHER | Age: 79
End: 2019-08-21

## 2019-08-21 NOTE — PROGRESS NOTES
Last 5 Patient Entered Readings                                      Current 30 Day Average: 127/71     Recent Readings 8/18/2019 8/3/2019 8/3/2019 7/23/2019 7/15/2019    SBP (mmHg) 139 135 105 107 124    DBP (mmHg) 76 72 65 71 73    Pulse 74 77 86 97 81          Digital Medicine: Health  Follow Up    8/21: Left voicemail to follow up with Mr. Iftikhar Nunez Shane.  Current BP average 127/71 mmHg is at goal, <130/80 mmHg.  Will address recent elevation in SBP.

## 2019-08-26 ENCOUNTER — PATIENT OUTREACH (OUTPATIENT)
Dept: OTHER | Facility: OTHER | Age: 79
End: 2019-08-26

## 2019-08-26 NOTE — PROGRESS NOTES
HPI:  Called patient to follow up. Patient reports adherence to medication regimen daily and denies missed doses. Patient denies hypotensive s/sx (lightheadedness, dizziness, nausea, fatigue); patient denies hypertensive s/sx (SOB, CP, severe headaches, changes in vision, dizziness, fatigue, confusion, anxiety, nosebleeds).     Patient reports he does not want to add any more HTN medication to his regimen.    Last 5 Patient Entered Readings                                      Current 30 Day Average: 137/71     Recent Readings 8/18/2019 8/3/2019 8/3/2019 7/23/2019 7/15/2019    SBP (mmHg) 139 135 105 107 124    DBP (mmHg) 76 72 65 71 73    Pulse 74 77 86 97 81        Assessment:  Reviewed recent readings. Per 2017 ACC/ AHA HTN guidelines (goal of BP < 130/80), current 30-day average is slightly uncontrolled; however, patient has only taken his BP on 2 days within the past month.    Plan:  Continue current medication regimen.   Instructed patient to increase frequency of monitoring to 1-2x per week. Instructed patient to enter both sets of readings, currently using 2 cuffs at home.  Patients health , Jamil Sanderson, will be following up as scheduled.   I will continue to monitor regularly and will follow-up in 12 weeks, sooner if blood pressure begins to trend upward or downward.     Current medication regimen:  Hypertension Medications             olmesartan (BENICAR) 40 MG tablet Take 1 tablet (40 mg total) by mouth once daily.         Patient denies having questions or concerns. Patient has my contact information and knows to call with any concerns or clinical changes.

## 2019-09-11 NOTE — PROGRESS NOTES
"Digital Medicine: Health  Follow-Up        Follow Up  Follow-up reason(s): reading review      Readings are missing.   patient reminder needed. Patient apologizes for lack of readings.  Encouraged patient to submit 2-3 readings/wk.  Patient verbalized understanding.           Physical Activity:   When asked if exercising, patient responded: yes    He exercises for 60 minutes per day 3 day(s) a week.      Patient participates in the following activities: weights, walking and biking    Patient reports he walked his dog "one mile/day".  Patient states he goes to the gym "every other day" and does 30min of weight training and 30min or cardio.      SDOH    INTERVENTION(S)  encouragement/support      There are no preventive care reminders to display for this patient.    Last 5 Patient Entered Readings                                      Current 30 Day Average: 129/76     Recent Readings 8/29/2019 8/29/2019 8/29/2019 8/18/2019 8/3/2019    SBP (mmHg) 118 117 123 139 135    DBP (mmHg) 76 70 81 76 72    Pulse 69 68 68 74 77                "

## 2019-11-06 ENCOUNTER — PATIENT OUTREACH (OUTPATIENT)
Dept: OTHER | Facility: OTHER | Age: 79
End: 2019-11-06

## 2019-11-06 DIAGNOSIS — E78.2 MIXED HYPERLIPIDEMIA: ICD-10-CM

## 2019-11-06 RX ORDER — SIMVASTATIN 40 MG/1
40 TABLET, FILM COATED ORAL NIGHTLY
Qty: 90 TABLET | Refills: 3 | Status: SHIPPED | OUTPATIENT
Start: 2019-11-06 | End: 2021-07-01 | Stop reason: SDUPTHER

## 2019-11-06 NOTE — PROGRESS NOTES
"Digital Medicine: Health  Follow-Up    Patient reports he had went on vacation and apologizes for not submitting readings.          Follow Up  Follow-up reason(s): reading review      Patient reports "his readings have sucked".   Patient attributes readings to taking only "half of his dose".  Patient did not realize he was doing that.  Patient states he has corrected it and has been taking the proper dosage for "about two days".       INTERVENTION(S)  encouragement/support    PLAN  additional monitoring needed and continue monitoring      There are no preventive care reminders to display for this patient.    Last 5 Patient Entered Readings                                      Current 30 Day Average: 132/75     Recent Readings 11/5/2019 10/23/2019 10/23/2019 10/21/2019 10/8/2019    SBP (mmHg) 154 133 140 112 129    DBP (mmHg) 80 68 79 68 78    Pulse 76 81 82 80 78            Diet Screening   No change to diet.      Physical Activity Screening   No change to exercise routine.        "

## 2019-11-18 ENCOUNTER — PATIENT OUTREACH (OUTPATIENT)
Dept: OTHER | Facility: OTHER | Age: 79
End: 2019-11-18

## 2019-11-18 NOTE — PROGRESS NOTES
"Last 5 Patient Entered Readings                                      Current 30 Day Average: 135/73     Recent Readings 11/13/2019 11/12/2019 11/12/2019 11/12/2019 11/12/2019    SBP (mmHg) 148 129 129 135 148    DBP (mmHg) 79 74 68 72 71    Pulse 64 82 79 78 79        Hypertension Medications             olmesartan (BENICAR) 40 MG tablet Take 1 tablet (40 mg total) by mouth once daily.        Called patient to follow up, reviewed BP readings. Per 2017 ACC/ AHA HTN guidelines  (goal of BP < 130/80), current 30-day average is slightly uncontrolled.  Per HC's note from 11/6, "Patient reports "his readings have sucked".   Patient attributes readings to taking only "half of his dose".  Patient did not realize he was doing that.  Patient states he has corrected it and has been taking the proper dosage for "about two days"."  Patient picked up the phone 2x, speaking to someone else.  Will continue to monitor. WCB in 1 month.    "

## 2019-11-19 ENCOUNTER — TELEPHONE (OUTPATIENT)
Dept: FAMILY MEDICINE | Facility: CLINIC | Age: 79
End: 2019-11-19

## 2019-11-19 NOTE — TELEPHONE ENCOUNTER
----- Message from Stacy German sent at 11/19/2019  8:26 AM CST -----  Contact: patient  Type:  Same Day Appointment Request  Caller is requesting a same day appointment.  Caller declined first available appointment listed below.    Name of Caller:  patient  When is the first available appointment?  11/20  Symptoms:  Vertigo acting up per patient  Best Call Back Number: [phone]  Additional Information:   Please call to advise and schedule.Thanks!

## 2019-11-20 ENCOUNTER — OFFICE VISIT (OUTPATIENT)
Dept: FAMILY MEDICINE | Facility: CLINIC | Age: 79
End: 2019-11-20
Payer: MEDICARE

## 2019-11-20 VITALS
WEIGHT: 183.19 LBS | BODY MASS INDEX: 24.81 KG/M2 | OXYGEN SATURATION: 96 % | TEMPERATURE: 98 F | RESPIRATION RATE: 18 BRPM | HEIGHT: 72 IN | HEART RATE: 83 BPM | SYSTOLIC BLOOD PRESSURE: 134 MMHG | DIASTOLIC BLOOD PRESSURE: 82 MMHG

## 2019-11-20 DIAGNOSIS — H81.10 BENIGN PAROXYSMAL POSITIONAL VERTIGO, UNSPECIFIED LATERALITY: Primary | ICD-10-CM

## 2019-11-20 DIAGNOSIS — I10 ESSENTIAL HYPERTENSION: ICD-10-CM

## 2019-11-20 PROCEDURE — 99213 OFFICE O/P EST LOW 20 MIN: CPT | Mod: S$PBB,,, | Performed by: NURSE PRACTITIONER

## 2019-11-20 PROCEDURE — 99999 PR PBB SHADOW E&M-EST. PATIENT-LVL IV: ICD-10-PCS | Mod: PBBFAC,,, | Performed by: NURSE PRACTITIONER

## 2019-11-20 PROCEDURE — 99213 PR OFFICE/OUTPT VISIT, EST, LEVL III, 20-29 MIN: ICD-10-PCS | Mod: S$PBB,,, | Performed by: NURSE PRACTITIONER

## 2019-11-20 PROCEDURE — 1159F MED LIST DOCD IN RCRD: CPT | Mod: ,,, | Performed by: NURSE PRACTITIONER

## 2019-11-20 PROCEDURE — 99214 OFFICE O/P EST MOD 30 MIN: CPT | Mod: PBBFAC,PO | Performed by: NURSE PRACTITIONER

## 2019-11-20 PROCEDURE — 1126F AMNT PAIN NOTED NONE PRSNT: CPT | Mod: ,,, | Performed by: NURSE PRACTITIONER

## 2019-11-20 PROCEDURE — 1159F PR MEDICATION LIST DOCUMENTED IN MEDICAL RECORD: ICD-10-PCS | Mod: ,,, | Performed by: NURSE PRACTITIONER

## 2019-11-20 PROCEDURE — 99999 PR PBB SHADOW E&M-EST. PATIENT-LVL IV: CPT | Mod: PBBFAC,,, | Performed by: NURSE PRACTITIONER

## 2019-11-20 PROCEDURE — 1126F PR PAIN SEVERITY QUANTIFIED, NO PAIN PRESENT: ICD-10-PCS | Mod: ,,, | Performed by: NURSE PRACTITIONER

## 2019-11-20 RX ORDER — MECLIZINE HCL 12.5 MG 12.5 MG/1
12.5 TABLET ORAL 3 TIMES DAILY PRN
Qty: 45 TABLET | Refills: 0 | Status: SHIPPED | OUTPATIENT
Start: 2019-11-20 | End: 2020-11-11

## 2019-11-20 NOTE — PROGRESS NOTES
"Subjective:       Patient ID: Iftikhar Lynn is a 79 y.o. male.    Chief Complaint: vertigo    HPI    Patient presents today with c/o vertigo.  He states that this has been a problem for "Years off and on". This current episode started about two weeks ago.  He has been taking some claritin which seems to help.  He feels that the room is spinning around him when he has an episode and it is usually related to head movement.  He did do the epley  maneuvers at home yesterday and reports some improvement with this.    Vitals:    11/20/19 1046   BP: 134/82   Pulse: 83   Resp: 18   Temp: 98.3 °F (36.8 °C)     Review of Systems   Constitutional: Negative for chills, fatigue and fever.   HENT: Positive for congestion, postnasal drip and sinus pressure.    Respiratory: Negative for cough and shortness of breath.    Cardiovascular: Negative for chest pain and palpitations.   Gastrointestinal: Negative for diarrhea, nausea and vomiting.   Genitourinary: Negative for dysuria.   Musculoskeletal: Negative for myalgias.   Skin: Negative for color change, pallor and rash.   Neurological: Positive for dizziness. Negative for syncope, facial asymmetry, light-headedness and headaches.   Psychiatric/Behavioral: Negative for dysphoric mood.       Objective:      Physical Exam   Constitutional: He is oriented to person, place, and time. He appears well-developed and well-nourished.   HENT:   Head: Normocephalic and atraumatic.   Eyes: Pupils are equal, round, and reactive to light. Conjunctivae are normal. Right eye exhibits no discharge. Left eye exhibits no discharge.   Neck: Normal range of motion. Neck supple. No thyromegaly present.   Cardiovascular: Normal rate, regular rhythm, normal heart sounds and intact distal pulses.   Pulmonary/Chest: Breath sounds normal. No respiratory distress. He has no wheezes.   Abdominal: Soft. Bowel sounds are normal. He exhibits no distension. There is no tenderness. There is no rebound. "   Musculoskeletal: Normal range of motion. He exhibits no edema or deformity.   Lymphadenopathy:     He has no cervical adenopathy.   Neurological: He is alert and oriented to person, place, and time. He has normal strength. No cranial nerve deficit or sensory deficit. Coordination and gait normal.   +dixhallpike    Skin: Skin is warm and dry. No rash noted.   Psychiatric: He has a normal mood and affect.       Assessment & Plan:       Benign paroxysmal positional vertigo, unspecified laterality  -     meclizine (ANTIVERT) 12.5 mg tablet; Take 1 tablet (12.5 mg total) by mouth 3 (three) times daily as needed.  Dispense: 45 tablet; Refill: 0  -     Ambulatory referral to ENT        -    Discussed epley maneuvers        -    If no improvement with the above, will need to see ENT     Essential hypertension  -Stable, continue current medication regimen         Patient readiness: acceptance and barriers:none    During the course of the visit the patient was educated and counseled about the following:     Hypertension:   Medication: no change.    Goals: Hypertension: Reduce Blood Pressure    Did patient meet goals/outcomes: Yes    The following self management tools provided: declined    Patient Instructions (the written plan) was given to the patient/family.     Time spent with patient: 15 minutes    Barriers to medications present (no )    Adverse reactions to current medications (no)    Over the counter medications reviewed (Yes)      Follow up if symptoms worsen or fail to improve.

## 2019-12-09 ENCOUNTER — TELEPHONE (OUTPATIENT)
Dept: FAMILY MEDICINE | Facility: CLINIC | Age: 79
End: 2019-12-09

## 2019-12-09 NOTE — TELEPHONE ENCOUNTER
----- Message from Soto Carrasco sent at 12/9/2019  4:14 PM CST -----  Contact: Pt  Pt called and would like to know if you would call in a prescription for a cough medication    Pt ede reached at [phone]

## 2019-12-09 NOTE — TELEPHONE ENCOUNTER
----- Message from Soto Carrasco sent at 12/9/2019  4:51 PM CST -----  Contact: Pt  Pt called and said that he was on the phone with you and got disconnected.    Please call him back at [phone]

## 2019-12-09 NOTE — TELEPHONE ENCOUNTER
Pt called in and stated he has been having a cough, congestion, runny nose for th past 9 days. Pt stated cough is keeping him up at night and is requesting cough medicine to be called into his pharm. Appt scheduled for pt for Wednesday but pt still wanted to have rx called in.

## 2019-12-10 RX ORDER — BENZONATATE 100 MG/1
100 CAPSULE ORAL 3 TIMES DAILY PRN
Qty: 6 CAPSULE | Refills: 0 | Status: SHIPPED | OUTPATIENT
Start: 2019-12-10 | End: 2019-12-11 | Stop reason: SDUPTHER

## 2019-12-11 ENCOUNTER — HOSPITAL ENCOUNTER (OUTPATIENT)
Dept: RADIOLOGY | Facility: CLINIC | Age: 79
Discharge: HOME | End: 2019-12-11
Attending: PHYSICIAN ASSISTANT
Payer: MEDICARE

## 2019-12-11 ENCOUNTER — OFFICE VISIT (OUTPATIENT)
Dept: FAMILY MEDICINE | Facility: CLINIC | Age: 79
End: 2019-12-11
Payer: MEDICARE

## 2019-12-11 VITALS
TEMPERATURE: 98 F | HEART RATE: 71 BPM | WEIGHT: 179.25 LBS | BODY MASS INDEX: 24.28 KG/M2 | OXYGEN SATURATION: 96 % | DIASTOLIC BLOOD PRESSURE: 60 MMHG | RESPIRATION RATE: 17 BRPM | SYSTOLIC BLOOD PRESSURE: 124 MMHG | HEIGHT: 72 IN

## 2019-12-11 DIAGNOSIS — R05.9 COUGH: Primary | ICD-10-CM

## 2019-12-11 DIAGNOSIS — R50.9 FEVER, UNSPECIFIED FEVER CAUSE: ICD-10-CM

## 2019-12-11 DIAGNOSIS — B96.89 ACUTE BACTERIAL BRONCHITIS: ICD-10-CM

## 2019-12-11 DIAGNOSIS — J20.8 ACUTE BACTERIAL BRONCHITIS: ICD-10-CM

## 2019-12-11 DIAGNOSIS — R05.9 COUGH: ICD-10-CM

## 2019-12-11 PROCEDURE — 1125F AMNT PAIN NOTED PAIN PRSNT: CPT | Mod: ,,, | Performed by: PHYSICIAN ASSISTANT

## 2019-12-11 PROCEDURE — 1159F PR MEDICATION LIST DOCUMENTED IN MEDICAL RECORD: ICD-10-PCS | Mod: ,,, | Performed by: PHYSICIAN ASSISTANT

## 2019-12-11 PROCEDURE — 71046 X-RAY EXAM CHEST 2 VIEWS: CPT | Mod: TC,FY,PO

## 2019-12-11 PROCEDURE — 99213 OFFICE O/P EST LOW 20 MIN: CPT | Mod: S$PBB,,, | Performed by: PHYSICIAN ASSISTANT

## 2019-12-11 PROCEDURE — 99214 OFFICE O/P EST MOD 30 MIN: CPT | Mod: PBBFAC,25,PO | Performed by: PHYSICIAN ASSISTANT

## 2019-12-11 PROCEDURE — 71046 X-RAY EXAM CHEST 2 VIEWS: CPT | Mod: 26,,, | Performed by: RADIOLOGY

## 2019-12-11 PROCEDURE — 1159F MED LIST DOCD IN RCRD: CPT | Mod: ,,, | Performed by: PHYSICIAN ASSISTANT

## 2019-12-11 PROCEDURE — 99999 PR PBB SHADOW E&M-EST. PATIENT-LVL IV: ICD-10-PCS | Mod: PBBFAC,,, | Performed by: PHYSICIAN ASSISTANT

## 2019-12-11 PROCEDURE — 99999 PR PBB SHADOW E&M-EST. PATIENT-LVL IV: CPT | Mod: PBBFAC,,, | Performed by: PHYSICIAN ASSISTANT

## 2019-12-11 PROCEDURE — 99213 PR OFFICE/OUTPT VISIT, EST, LEVL III, 20-29 MIN: ICD-10-PCS | Mod: S$PBB,,, | Performed by: PHYSICIAN ASSISTANT

## 2019-12-11 PROCEDURE — 1125F PR PAIN SEVERITY QUANTIFIED, PAIN PRESENT: ICD-10-PCS | Mod: ,,, | Performed by: PHYSICIAN ASSISTANT

## 2019-12-11 PROCEDURE — 71046 XR CHEST PA AND LATERAL: ICD-10-PCS | Mod: 26,,, | Performed by: RADIOLOGY

## 2019-12-11 RX ORDER — PREDNISONE 20 MG/1
20 TABLET ORAL DAILY
Qty: 5 TABLET | Refills: 0 | Status: SHIPPED | OUTPATIENT
Start: 2019-12-11 | End: 2020-06-29

## 2019-12-11 RX ORDER — BENZONATATE 100 MG/1
100 CAPSULE ORAL 3 TIMES DAILY PRN
Qty: 30 CAPSULE | Refills: 0 | Status: SHIPPED | OUTPATIENT
Start: 2019-12-11 | End: 2019-12-13

## 2019-12-11 RX ORDER — AMOXICILLIN AND CLAVULANATE POTASSIUM 875; 125 MG/1; MG/1
1 TABLET, FILM COATED ORAL 2 TIMES DAILY
Qty: 20 TABLET | Refills: 0 | Status: SHIPPED | OUTPATIENT
Start: 2019-12-11 | End: 2019-12-21

## 2019-12-11 NOTE — PROGRESS NOTES
Subjective:       Patient ID: Iftikhar Lynn is a 79 y.o. male.    Chief Complaint: Cough; Chest Congestion; Headache; Sore Throat; and Otalgia    Cough   This is a new problem. The current episode started 1 to 4 weeks ago. The problem has been gradually worsening. The problem occurs every few minutes. The cough is productive of purulent sputum. Associated symptoms include headaches, nasal congestion, postnasal drip, rhinorrhea and wheezing. Pertinent negatives include no chest pain, chills, ear congestion, fever, myalgias or shortness of breath. Nothing aggravates the symptoms. He has tried OTC cough suppressant and prescription cough suppressant for the symptoms. The treatment provided mild relief.     Review of patient's allergies indicates:  No Known Allergies      Current Outpatient Medications:     aspirin 81 MG chewable tablet, Take 81 mg by mouth once daily.  , Disp: , Rfl:     benzonatate (TESSALON) 100 MG capsule, Take 1 capsule (100 mg total) by mouth 3 (three) times daily as needed for Cough., Disp: 30 capsule, Rfl: 0    diclofenac sodium 1 % Gel, APPLY AS DIRECTED, Disp: 100 g, Rfl: 1    hydrocortisone-pramoxine (PROCTOFOAM HC) rectal foam, APPLY ONE APPLICATION RECTALLY TWICE DAILY AS DIRECTED, Disp: 10 g, Rfl: 5     mg tablet, TAKE 1 TABLET BY MOUTH THREE TIMES A DAY, Disp: 60 tablet, Rfl: 0    meclizine (ANTIVERT) 12.5 mg tablet, Take 1 tablet (12.5 mg total) by mouth 3 (three) times daily as needed., Disp: 45 tablet, Rfl: 0    multivitamin capsule, Take 1 capsule by mouth once daily., Disp: , Rfl:     nystatin-triamcinolone (MYCOLOG II) cream, Apply topically 2 (two) times daily., Disp: 60 g, Rfl: 11    olmesartan (BENICAR) 40 MG tablet, Take 1 tablet (40 mg total) by mouth once daily., Disp: 90 tablet, Rfl: 3    omeprazole (PRILOSEC OTC) 20 MG tablet, Take 20 mg by mouth once daily. , Disp: , Rfl:     simvastatin (ZOCOR) 40 MG tablet, Take 1 tablet (40 mg total) by mouth  nightly., Disp: 90 tablet, Rfl: 3    valacyclovir (VALTREX) 500 MG tablet, Take 500 mg by mouth 2 (two) times daily., Disp: , Rfl:     Lab Results   Component Value Date    WBC 7.40 01/16/2019    HGB 13.9 (L) 01/16/2019    HCT 42.6 01/16/2019     01/16/2019    CHOL 132 06/26/2019    TRIG 92 06/26/2019    HDL 60 06/26/2019    ALT 14 06/26/2019    AST 19 06/26/2019     06/26/2019    K 4.1 06/26/2019     06/26/2019    CREATININE 0.8 06/26/2019    BUN 12 06/26/2019    CO2 27 06/26/2019    TSH 2.306 04/12/2016    PSA 3.3 10/10/2018    HGBA1C 5.0 10/10/2018       Review of Systems   Constitutional: Positive for activity change. Negative for appetite change, chills and fever.   HENT: Positive for postnasal drip and rhinorrhea. Negative for sinus pressure.    Eyes: Negative for visual disturbance.   Respiratory: Positive for cough and wheezing. Negative for shortness of breath.    Cardiovascular: Negative for chest pain.   Gastrointestinal: Negative for abdominal distention and abdominal pain.   Genitourinary: Negative for difficulty urinating and dysuria.   Musculoskeletal: Negative for arthralgias and myalgias.   Neurological: Positive for headaches.   Hematological: Negative for adenopathy.   Psychiatric/Behavioral: The patient is not nervous/anxious.        Objective:      Physical Exam   Constitutional: He is oriented to person, place, and time.   HENT:   Mouth/Throat: No oropharyngeal exudate.   Posterior oropharynx mildly erythematous   Eyes: Pupils are equal, round, and reactive to light. Conjunctivae are normal.   Cardiovascular: Normal rate and regular rhythm.   Pulmonary/Chest: Effort normal. He has wheezes.   Wheezes and rhonchi in upper lung fields   Abdominal: Soft. Bowel sounds are normal. He exhibits no distension. There is no tenderness.   Musculoskeletal: He exhibits no edema.   Lymphadenopathy:     He has no cervical adenopathy.   Neurological: He is alert and oriented to person,  place, and time.   Skin: No erythema.   Psychiatric: His behavior is normal.       Assessment:       1. Cough    2. Fever, unspecified fever cause        Plan:           Iftikhar was seen today for cough, chest congestion, headache, sore throat and otalgia.    Diagnoses and all orders for this visit:    Cough  -     X-Ray Chest PA And Lateral; Future  -     benzonatate (TESSALON) 100 MG capsule; Take 1 capsule (100 mg total) by mouth 3 (three) times daily as needed for Cough.  -     predniSONE (DELTASONE) 20 MG tablet; Take 1 tablet (20 mg total) by mouth once daily.    Fever, unspecified fever cause  -     X-Ray Chest PA And Lateral; Future    Acute bacterial bronchitis  -     amoxicillin-clavulanate 875-125mg (AUGMENTIN) 875-125 mg per tablet; Take 1 tablet by mouth 2 (two) times daily. for 10 days  -     predniSONE (DELTASONE) 20 MG tablet; Take 1 tablet (20 mg total) by mouth once daily.    Take antibiotics with food.  Increase fluid intake.  Call the clinic if symptoms worsen, new symptoms develop or if you are not any better after completion of your antibiotics.

## 2019-12-16 NOTE — PROGRESS NOTES
Last 5 Patient Entered Readings                                      Current 30 Day Average: 135/70     Recent Readings 12/11/2019 12/3/2019 11/19/2019 11/13/2019 11/12/2019    SBP (mmHg) 124 139 141 148 129    DBP (mmHg) 60 76 73 79 74    Pulse 80 67 72 64 82        Hypertension Medications             olmesartan (BENICAR) 40 MG tablet Take 1 tablet (40 mg total) by mouth once daily.        Called patient to follow up, reviewed BP readings. Per 2017 ACC/ AHA HTN guidelines  (goal of BP < 130/80), current 30-day average is slightly uncontrolled. Patient was seen by FM on 11/20, BP was 134/82. Patient was seen by FM again on 12/11, BP was 124/60.  LVM, 2nd attempt, requested patient call back at his convenience if he has any questions or concerns, and to continue to take at least weekly BP readings.   Will continue to monitor. WCB in 3 months, sooner if BP begins to trend up or down.

## 2020-01-10 ENCOUNTER — PATIENT OUTREACH (OUTPATIENT)
Dept: OTHER | Facility: OTHER | Age: 80
End: 2020-01-10

## 2020-01-10 NOTE — PROGRESS NOTES
Digital Medicine: Health  Follow-Up    Patient denies any other concerns at this time.           Follow Up  Follow-up reason(s): reading review        INTERVENTION(S)  encouragement/support and denied further coaching    PLAN  continue monitoring      There are no preventive care reminders to display for this patient.    Last 5 Patient Entered Readings                                      Current 30 Day Average: 126/72     Recent Readings 1/7/2020 1/1/2020 1/1/2020 12/11/2019 12/3/2019    SBP (mmHg) 127 128 121 124 139    DBP (mmHg) 73 79 76 60 76    Pulse 82 90 80 80 67            Diet Screening   No change to diet.      Physical Activity Screening   No change to exercise routine.    When asked if exercising, patient responded: yes7 day(s) a week.      Patient participates in the following activities: walking    Patient reports he continues to walk 2miles/day.  Gave praises to patient.

## 2020-03-09 ENCOUNTER — PATIENT OUTREACH (OUTPATIENT)
Dept: OTHER | Facility: OTHER | Age: 80
End: 2020-03-09

## 2020-03-09 NOTE — PROGRESS NOTES
Last 5 Patient Entered Readings                                      Current 30 Day Average: 123/71     Recent Readings 3/4/2020 2/24/2020 2/13/2020 2/13/2020 2/13/2020    SBP (mmHg) 127 114 128 127 138    DBP (mmHg) 72 65 70 73 75    Pulse 68 76 74 75 75        Hypertension Medications             olmesartan (BENICAR) 40 MG tablet Take 1 tablet (40 mg total) by mouth once daily.        Called patient to follow up. Reviewed recent readings and labs (last CMP drawn on 6/26/19). Per 2017 ACC/ AHA HTN guidelines  (goal of BP < 130/80), current 30-day average is well controlled.  LVM, requested patient call back at his convenience if he has any questions or concerns.  Will continue to monitor. WCB in 6 months, sooner if BP begins to trend up or down.

## 2020-03-12 DIAGNOSIS — K64.9 HEMORRHOIDS, UNSPECIFIED HEMORRHOID TYPE: ICD-10-CM

## 2020-03-12 NOTE — TELEPHONE ENCOUNTER
----- Message from Mendy Schwartz sent at 3/12/2020 12:40 PM CDT -----  Contact: pt  Type:  Patient Returning Call    Who Called:  pt  Does the patient know what this is regarding?:  Pt needs prescription sent for  hydrocortisone-pramoxine (PROCTOFOAM HC) rectal foam     mg tablet     [facility]pharmacy #[id number] - [place] - [address]  [address] [place] [address]  Phone: [phone] Fax: [phone]  [facility] - [place], [place] - [address] AT Portal to Registered [facility] Sites  [address]  [facility] [address]  Phone: [phone] Fax: [phone]  Best Call Back Number:   Additional Information:  Please give pt a call back as soon as possible or send script over to pharm. Thank uyou

## 2020-03-12 NOTE — TELEPHONE ENCOUNTER
Pt lat seen by department 12/11/2019, last refilled 7/13/2016. Please advise if pt needs follow up to discuss current sxs.

## 2020-03-18 ENCOUNTER — TELEPHONE (OUTPATIENT)
Dept: FAMILY MEDICINE | Facility: CLINIC | Age: 80
End: 2020-03-18

## 2020-03-18 RX ORDER — HYDROCORTISONE ACETATE PRAMOXINE HCL 1; 1 G/100G; G/100G
CREAM TOPICAL 2 TIMES DAILY
Qty: 28.4 G | Refills: 11 | Status: SHIPPED | OUTPATIENT
Start: 2020-03-18 | End: 2021-07-01 | Stop reason: SDUPTHER

## 2020-03-18 NOTE — TELEPHONE ENCOUNTER
Outpatient Medication Detail      Disp Refills Start End BRET   hydrocortisone-pramoxine (PROCTOFOAM HC) rectal foam 10 g 5 3/12/2020  No   Sig: APPLY ONE APPLICATION RECTALLY TWICE DAILY AS DIRECTED     Medication is on back order. I was informed by pharmacy that proctocream is available. Please advise if alternative can be called in.

## 2020-03-18 NOTE — TELEPHONE ENCOUNTER
----- Message from Abhi Alfredo sent at 3/18/2020 11:33 AM CDT -----  Contact: Patient  Type: Needs Medical Advice    Who Called:  Patient  Pharmacy name and phone #:    [id number] - [place] - [address]  [address] [place] [address]  Phone: [phone] Fax: [phone]  Best Call Back Number: [phone]  Additional Information: Patient states pharmacy has been attempting to reach the office in regards to changing a medication due to back order. Please call to advise. Thanks!

## 2020-03-20 ENCOUNTER — PATIENT OUTREACH (OUTPATIENT)
Dept: OTHER | Facility: OTHER | Age: 80
End: 2020-03-20

## 2020-03-20 NOTE — PROGRESS NOTES
Digital Medicine: Health  Follow-Up    Patient denies any other concerns at this time.           Follow Up  Follow-up reason(s): reading review      Patient reports he is doing well.       INTERVENTION(S)  encouragement/support and denied further coaching    PLAN  continue monitoring      There are no preventive care reminders to display for this patient.    Last 5 Patient Entered Readings                                      Current 30 Day Average: 127/71     Recent Readings 3/17/2020 3/10/2020 3/4/2020 2/24/2020 2/13/2020    SBP (mmHg) 136 132 127 114 128    DBP (mmHg) 71 75 72 65 70    Pulse 66 77 68 76 74            Diet Screening   No change to diet.      Physical Activity Screening   No change to exercise routine.    When asked if exercising, patient responded: yes

## 2020-05-05 ENCOUNTER — PATIENT MESSAGE (OUTPATIENT)
Dept: ADMINISTRATIVE | Facility: HOSPITAL | Age: 80
End: 2020-05-05

## 2020-05-26 ENCOUNTER — LAB VISIT (OUTPATIENT)
Dept: PRIMARY CARE CLINIC | Facility: CLINIC | Age: 80
End: 2020-05-26
Payer: MEDICARE

## 2020-05-26 DIAGNOSIS — R05.9 COUGH: Primary | ICD-10-CM

## 2020-05-26 PROCEDURE — U0003 INFECTIOUS AGENT DETECTION BY NUCLEIC ACID (DNA OR RNA); SEVERE ACUTE RESPIRATORY SYNDROME CORONAVIRUS 2 (SARS-COV-2) (CORONAVIRUS DISEASE [COVID-19]), AMPLIFIED PROBE TECHNIQUE, MAKING USE OF HIGH THROUGHPUT TECHNOLOGIES AS DESCRIBED BY CMS-2020-01-R: HCPCS

## 2020-05-27 ENCOUNTER — PATIENT MESSAGE (OUTPATIENT)
Dept: FAMILY MEDICINE | Facility: CLINIC | Age: 80
End: 2020-05-27

## 2020-05-27 DIAGNOSIS — Z20.822 EXPOSURE TO COVID-19 VIRUS: Primary | ICD-10-CM

## 2020-05-27 LAB — SARS-COV-2 RNA RESP QL NAA+PROBE: NOT DETECTED

## 2020-05-28 ENCOUNTER — PATIENT MESSAGE (OUTPATIENT)
Dept: FAMILY MEDICINE | Facility: CLINIC | Age: 80
End: 2020-05-28

## 2020-06-01 ENCOUNTER — PATIENT MESSAGE (OUTPATIENT)
Dept: FAMILY MEDICINE | Facility: CLINIC | Age: 80
End: 2020-06-01

## 2020-06-02 ENCOUNTER — LAB VISIT (OUTPATIENT)
Dept: LAB | Facility: HOSPITAL | Age: 80
End: 2020-06-02
Attending: FAMILY MEDICINE
Payer: MEDICARE

## 2020-06-02 ENCOUNTER — PATIENT MESSAGE (OUTPATIENT)
Dept: FAMILY MEDICINE | Facility: CLINIC | Age: 80
End: 2020-06-02

## 2020-06-02 DIAGNOSIS — Z20.822 EXPOSURE TO COVID-19 VIRUS: ICD-10-CM

## 2020-06-02 LAB — SARS-COV-2 IGG SERPLBLD QL IA.RAPID: NEGATIVE

## 2020-06-02 PROCEDURE — 86769 SARS-COV-2 COVID-19 ANTIBODY: CPT

## 2020-06-02 PROCEDURE — 36415 COLL VENOUS BLD VENIPUNCTURE: CPT | Mod: PO

## 2020-06-29 ENCOUNTER — OFFICE VISIT (OUTPATIENT)
Dept: FAMILY MEDICINE | Facility: CLINIC | Age: 80
End: 2020-06-29
Payer: MEDICARE

## 2020-06-29 VITALS
HEIGHT: 72 IN | SYSTOLIC BLOOD PRESSURE: 132 MMHG | TEMPERATURE: 98 F | BODY MASS INDEX: 24.1 KG/M2 | WEIGHT: 177.94 LBS | OXYGEN SATURATION: 95 % | DIASTOLIC BLOOD PRESSURE: 72 MMHG | HEART RATE: 86 BPM

## 2020-06-29 DIAGNOSIS — K21.9 GASTROESOPHAGEAL REFLUX DISEASE WITHOUT ESOPHAGITIS: ICD-10-CM

## 2020-06-29 DIAGNOSIS — Z12.5 SPECIAL SCREENING FOR MALIGNANT NEOPLASM OF PROSTATE: ICD-10-CM

## 2020-06-29 DIAGNOSIS — E78.2 MIXED HYPERLIPIDEMIA: ICD-10-CM

## 2020-06-29 DIAGNOSIS — I10 ESSENTIAL HYPERTENSION: Primary | ICD-10-CM

## 2020-06-29 DIAGNOSIS — C43.61 MALIGNANT MELANOMA OF RIGHT UPPER EXTREMITY INCLUDING SHOULDER: ICD-10-CM

## 2020-06-29 PROCEDURE — 99213 PR OFFICE/OUTPT VISIT, EST, LEVL III, 20-29 MIN: ICD-10-PCS | Mod: S$PBB,,, | Performed by: FAMILY MEDICINE

## 2020-06-29 PROCEDURE — 99214 OFFICE O/P EST MOD 30 MIN: CPT | Mod: PBBFAC,PO | Performed by: FAMILY MEDICINE

## 2020-06-29 PROCEDURE — 99213 OFFICE O/P EST LOW 20 MIN: CPT | Mod: S$PBB,,, | Performed by: FAMILY MEDICINE

## 2020-06-29 PROCEDURE — 99999 PR PBB SHADOW E&M-EST. PATIENT-LVL IV: CPT | Mod: PBBFAC,,, | Performed by: FAMILY MEDICINE

## 2020-06-29 PROCEDURE — 99999 PR PBB SHADOW E&M-EST. PATIENT-LVL IV: ICD-10-PCS | Mod: PBBFAC,,, | Performed by: FAMILY MEDICINE

## 2020-06-29 RX ORDER — OLMESARTAN MEDOXOMIL 40 MG/1
40 TABLET ORAL DAILY
Qty: 90 TABLET | Refills: 3 | Status: SHIPPED | OUTPATIENT
Start: 2020-06-29 | End: 2021-05-14

## 2020-07-05 NOTE — PROGRESS NOTES
Subjective:       Patient ID: Iftikhar Lynn is a 80 y.o. male.    Chief Complaint: Hypertension, Hyperlipidemia, and Gastroesophageal Reflux    Patient presents here for annual follow-up of hypertension, hyperlipidemia, and GERD.  He has no new complaints at this time and no changes in his medical or surgical history since his last visit last year.  His hypertension is well controlled with his present medications and he is following his low-sodium diet.  He is also participating in the digital hypertension program and this is working well for him.  His hyperlipidemia is very well controlled with his present dose of simvastatin 40 mg daily as well as his low-cholesterol low-fat diet.  He does exercise regularly and his weight is stable and appropriate.  He does have a history of GERD and this is well controlled with his present dose of omeprazole 20 mg daily.  He is also followed by dermatology for history of malignant melanoma and has had no recurrence recently.  It should be noted that he has had malignant melanoma on 2 separate occasions.  As far as health maintenance, he is up-to-date with all of his recommended screening exams and immunizations.    Hypertension  This is a chronic problem. The problem is unchanged. The problem is controlled. Pertinent negatives include no chest pain, headaches, neck pain, palpitations or shortness of breath. Risk factors for coronary artery disease include dyslipidemia and male gender. Past treatments include angiotensin blockers. The current treatment provides significant improvement. There are no compliance problems.  There is no history of kidney disease, CAD/MI, CVA or heart failure.   Hyperlipidemia  This is a chronic problem. The problem is controlled. Recent lipid tests were reviewed and are normal. Pertinent negatives include no chest pain, myalgias or shortness of breath. Current antihyperlipidemic treatment includes statins. The current treatment provides  significant improvement of lipids. There are no compliance problems.  Risk factors for coronary artery disease include dyslipidemia, hypertension and male sex.     Review of Systems   Constitutional: Negative for chills, fatigue, fever and unexpected weight change.   HENT: Negative for nasal congestion, ear pain, postnasal drip and sore throat.    Respiratory: Negative for cough, shortness of breath and wheezing.    Cardiovascular: Negative for chest pain and palpitations.   Gastrointestinal: Negative for abdominal pain, diarrhea, nausea and vomiting.   Genitourinary: Negative for difficulty urinating, dysuria, flank pain and frequency.        Nocturia times once per night   Musculoskeletal: Positive for arthralgias. Negative for back pain, myalgias and neck pain.   Integumentary:         History of malignant melanoma; compliant with follow-up and no recurrence as of this time   Neurological: Negative for dizziness, light-headedness and headaches.   Psychiatric/Behavioral: Negative for sleep disturbance. The patient is not nervous/anxious.          Objective:      Physical Exam  Vitals signs reviewed.   Constitutional:       General: He is not in acute distress.     Appearance: Normal appearance. He is well-developed and normal weight.   HENT:      Right Ear: Tympanic membrane and external ear normal.      Left Ear: Tympanic membrane and external ear normal.      Nose: Nose normal.      Mouth/Throat:      Pharynx: Oropharynx is clear. No oropharyngeal exudate.   Neck:      Musculoskeletal: Normal range of motion and neck supple.      Thyroid: No thyromegaly.   Cardiovascular:      Rate and Rhythm: Normal rate and regular rhythm.      Heart sounds: Normal heart sounds. No murmur.   Pulmonary:      Effort: Pulmonary effort is normal.      Breath sounds: Normal breath sounds. No wheezing or rales.   Musculoskeletal: Normal range of motion.      Right lower leg: No edema.      Left lower leg: No edema.    Lymphadenopathy:      Cervical: No cervical adenopathy.   Neurological:      General: No focal deficit present.      Mental Status: He is alert and oriented to person, place, and time.      Cranial Nerves: No cranial nerve deficit.      Deep Tendon Reflexes: Reflexes are normal and symmetric.   Psychiatric:         Mood and Affect: Mood normal.         Behavior: Behavior normal.         Assessment:       1. Essential hypertension    2. Mixed hyperlipidemia    3. Gastroesophageal reflux disease without esophagitis    4. 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 Special screening for malignant neoplasm of prostate        Plan:       1.  Continue present medication as his hypertension, hyperlipidemia, and GERD are stable and well controlled  2.  Continue low-sodium, low-fat low-cholesterol diet and exercise to maintain optimal weight  3.  CMP, lipid profile, PSA  4.  Continue follow-up with dermatology for history of malignant melanoma  5.  Follow up with me in 1 year or p.r.n.        Patient readiness: acceptance and barriers:none    During the course of the visit the patient was educated and counseled about the following:     Hypertension:   Dietary sodium restriction.  Regular aerobic exercise.  Follow up: 1 year and as needed.    Goals: Hypertension: Reduce Blood Pressure    Did patient meet goals/outcomes: Yes    The following self management tools provided: blood pressure log    Patient Instructions (the written plan) was given to the patient/family.     Time spent with patient: 30 minutes    Barriers to medications present (no )    Adverse reactions to current medications (no)    Over the counter medications reviewed (Yes)

## 2020-07-09 ENCOUNTER — LAB VISIT (OUTPATIENT)
Dept: LAB | Facility: HOSPITAL | Age: 80
End: 2020-07-09
Attending: FAMILY MEDICINE
Payer: MEDICARE

## 2020-07-09 DIAGNOSIS — Z12.5 SPECIAL SCREENING FOR MALIGNANT NEOPLASM OF PROSTATE: ICD-10-CM

## 2020-07-09 DIAGNOSIS — I10 ESSENTIAL HYPERTENSION: ICD-10-CM

## 2020-07-09 DIAGNOSIS — E78.2 MIXED HYPERLIPIDEMIA: ICD-10-CM

## 2020-07-09 LAB
ALBUMIN SERPL BCP-MCNC: 4.3 G/DL (ref 3.5–5.2)
ALP SERPL-CCNC: 84 U/L (ref 55–135)
ALT SERPL W/O P-5'-P-CCNC: 30 U/L (ref 10–44)
ANION GAP SERPL CALC-SCNC: 12 MMOL/L (ref 8–16)
AST SERPL-CCNC: 40 U/L (ref 10–40)
BILIRUB SERPL-MCNC: 0.5 MG/DL (ref 0.1–1)
BUN SERPL-MCNC: 15 MG/DL (ref 8–23)
CALCIUM SERPL-MCNC: 9.7 MG/DL (ref 8.7–10.5)
CHLORIDE SERPL-SCNC: 107 MMOL/L (ref 95–110)
CHOLEST SERPL-MCNC: 176 MG/DL (ref 120–199)
CHOLEST/HDLC SERPL: 4.2 {RATIO} (ref 2–5)
CO2 SERPL-SCNC: 24 MMOL/L (ref 23–29)
COMPLEXED PSA SERPL-MCNC: 3.2 NG/ML (ref 0–4)
CREAT SERPL-MCNC: 0.9 MG/DL (ref 0.5–1.4)
EST. GFR  (AFRICAN AMERICAN): >60 ML/MIN/1.73 M^2
EST. GFR  (NON AFRICAN AMERICAN): >60 ML/MIN/1.73 M^2
GLUCOSE SERPL-MCNC: 100 MG/DL (ref 70–110)
HDLC SERPL-MCNC: 42 MG/DL (ref 40–75)
HDLC SERPL: 23.9 % (ref 20–50)
LDLC SERPL CALC-MCNC: ABNORMAL MG/DL (ref 63–159)
NONHDLC SERPL-MCNC: 134 MG/DL
POTASSIUM SERPL-SCNC: 4.6 MMOL/L (ref 3.5–5.1)
PROT SERPL-MCNC: 7 G/DL (ref 6–8.4)
SODIUM SERPL-SCNC: 143 MMOL/L (ref 136–145)
TRIGL SERPL-MCNC: 698 MG/DL (ref 30–150)

## 2020-07-09 PROCEDURE — 80053 COMPREHEN METABOLIC PANEL: CPT

## 2020-07-09 PROCEDURE — 36415 COLL VENOUS BLD VENIPUNCTURE: CPT | Mod: PO

## 2020-07-09 PROCEDURE — 80061 LIPID PANEL: CPT

## 2020-07-09 PROCEDURE — 84153 ASSAY OF PSA TOTAL: CPT

## 2020-07-17 ENCOUNTER — TELEPHONE (OUTPATIENT)
Dept: FAMILY MEDICINE | Facility: CLINIC | Age: 80
End: 2020-07-17

## 2020-07-17 ENCOUNTER — OFFICE VISIT (OUTPATIENT)
Dept: PRIMARY CARE CLINIC | Facility: CLINIC | Age: 80
End: 2020-07-17
Payer: MEDICARE

## 2020-07-17 VITALS
HEART RATE: 86 BPM | SYSTOLIC BLOOD PRESSURE: 150 MMHG | RESPIRATION RATE: 18 BRPM | DIASTOLIC BLOOD PRESSURE: 70 MMHG | OXYGEN SATURATION: 98 % | TEMPERATURE: 98 F

## 2020-07-17 DIAGNOSIS — Z20.822 SUSPECTED COVID-19 VIRUS INFECTION: Primary | ICD-10-CM

## 2020-07-17 PROCEDURE — 99213 OFFICE O/P EST LOW 20 MIN: CPT | Mod: S$GLB,,, | Performed by: PHYSICIAN ASSISTANT

## 2020-07-17 PROCEDURE — 99213 PR OFFICE/OUTPT VISIT, EST, LEVL III, 20-29 MIN: ICD-10-PCS | Mod: S$GLB,,, | Performed by: PHYSICIAN ASSISTANT

## 2020-07-17 PROCEDURE — U0003 INFECTIOUS AGENT DETECTION BY NUCLEIC ACID (DNA OR RNA); SEVERE ACUTE RESPIRATORY SYNDROME CORONAVIRUS 2 (SARS-COV-2) (CORONAVIRUS DISEASE [COVID-19]), AMPLIFIED PROBE TECHNIQUE, MAKING USE OF HIGH THROUGHPUT TECHNOLOGIES AS DESCRIBED BY CMS-2020-01-R: HCPCS

## 2020-07-17 NOTE — PATIENT INSTRUCTIONS

## 2020-07-17 NOTE — TELEPHONE ENCOUNTER
----- Message from Herminia Shultz sent at 7/17/2020  8:37 AM CDT -----  Regarding: orders  Pt needing a call back regarding orders for the Covid-19 test. Pt states he woke up with flu like symptoms . Please assist pt   Please contact pt [phone]

## 2020-07-17 NOTE — PROGRESS NOTES
Subjective:        Time seen by provider: 9:50 AM on 07/17/2020    Iftikhar Lynn is a 80 y.o. male with HTN who presents for an evaluation of possible COVID-19. He complains of fevers, chills, sweating, and HA's. The patient states his symptoms began yesterday when he felt clammy and reports no exposure to a positive contact. No pertinent PSHx.     Review of Systems   Constitutional: Positive for chills, diaphoresis and fever. Negative for activity change, appetite change and fatigue.   HENT: Negative for congestion, rhinorrhea and sore throat.    Respiratory: Negative for cough, chest tightness, shortness of breath and wheezing.    Cardiovascular: Negative for chest pain and palpitations.   Gastrointestinal: Negative for diarrhea, nausea and vomiting.   Musculoskeletal: Negative for arthralgias and myalgias.   Skin: Negative for rash.   Neurological: Positive for headaches. Negative for weakness and light-headedness.       Objective:      Physical Exam  Vitals signs and nursing note reviewed.   Constitutional:       General: He is not in acute distress.     Appearance: He is well-developed. He is not diaphoretic.   HENT:      Head: Normocephalic and atraumatic.      Nose: Nose normal.   Eyes:      Conjunctiva/sclera: Conjunctivae normal.   Neck:      Musculoskeletal: Normal range of motion.   Cardiovascular:      Rate and Rhythm: Normal rate and regular rhythm.      Heart sounds: Normal heart sounds. No murmur.   Pulmonary:      Effort: Pulmonary effort is normal. No respiratory distress.      Breath sounds: Normal breath sounds. No wheezing.   Musculoskeletal: Normal range of motion.   Skin:     General: Skin is warm and dry.   Neurological:      Mental Status: He is alert and oriented to person, place, and time.         Assessment:       1. Suspected Covid-19 Virus Infection        Plan:       1. Suspected Covid-19 Virus Infection  - COVID-19 Routine Screening  2. Discharge home and await results.   3. Return to  clinic or ED for new or worsening symptoms.   4. Follow-up with PCP as needed.     Scribe Attestation:   I, Suad Mario, am scribing for, and in the presence of, Anastasiya Castro PA-C. I performed the above scribed service and the documentation accurately describes the services I performed. I attest to the accuracy of the note.    I, Anastasiya Castro PA-C, personally performed the services described in this documentation. All medical record entries made by the scribe were at my direction and in my presence.  I have reviewed the chart and agree that the record reflects my personal performance and is accurate and complete. Anastasiya Castro PA-C.  2:26 PM 07/17/2020

## 2020-07-19 LAB — SARS-COV-2 RNA RESP QL NAA+PROBE: NOT DETECTED

## 2020-08-24 ENCOUNTER — PATIENT OUTREACH (OUTPATIENT)
Dept: OTHER | Facility: OTHER | Age: 80
End: 2020-08-24

## 2020-08-24 NOTE — PROGRESS NOTES
Attempted to reach patient for routine follow up. Reviewed available blood pressure readings and labs. Per 2017 ACC/AHA Hypertension Guidelines, current 30-day average is at goal.     Last 5 Patient Entered Readings                                      Current 30 Day Average: 124/70     Recent Readings 8/12/2020 8/12/2020 8/10/2020 7/30/2020 7/30/2020    SBP (mmHg) 126 130 121 125 108    DBP (mmHg) 58 74 72 74 72    Pulse 70 69 72 78 78        Hypertension Medications             olmesartan (BENICAR) 40 MG tablet Take 1 tablet (40 mg total) by mouth once daily.        Left a voicemail and requested patient call back if he has any questions or concerns.     Will continue to monitor regularly. Will follow up in 4-6 months, sooner if blood pressure begins to trend up.

## 2020-09-09 ENCOUNTER — PATIENT OUTREACH (OUTPATIENT)
Dept: OTHER | Facility: OTHER | Age: 80
End: 2020-09-09

## 2020-09-23 NOTE — PROGRESS NOTES
Digital Medicine: Health  Follow-Up    The history is provided by the patient.                 Patient needs assistance troubleshooting: patient reminder needed.          Diet-Not assessed          Physical Activity-Not assessed    Medication Adherence-Medication Adherence not addressed.      Substance, Sleep, Stress-Not assessed      Additional monitoring needed.       Addressed patient questions and patient has my contact information if needed prior to next outreach. Patient verbalizes understanding.      Explained the importance of self-monitoring and medication adherence. Encouraged the patient to communicate with their health  for lifestyle modifications to help improve or maintain a healthy lifestyle.            There are no preventive care reminders to display for this patient.    Last 5 Patient Entered Readings                                      Current 30 Day Average:      Recent Readings 8/12/2020 8/12/2020 8/10/2020 7/30/2020 7/30/2020    SBP (mmHg) 126 130 121 125 108    DBP (mmHg) 58 74 72 74 72    Pulse 70 69 72 78 78

## 2020-11-11 ENCOUNTER — HOSPITAL ENCOUNTER (OUTPATIENT)
Dept: RADIOLOGY | Facility: CLINIC | Age: 80
Discharge: HOME | End: 2020-11-11
Attending: NURSE PRACTITIONER
Payer: MEDICARE

## 2020-11-11 ENCOUNTER — OFFICE VISIT (OUTPATIENT)
Dept: FAMILY MEDICINE | Facility: CLINIC | Age: 80
End: 2020-11-11
Payer: MEDICARE

## 2020-11-11 VITALS
HEIGHT: 72 IN | DIASTOLIC BLOOD PRESSURE: 64 MMHG | TEMPERATURE: 98 F | HEART RATE: 88 BPM | OXYGEN SATURATION: 96 % | SYSTOLIC BLOOD PRESSURE: 132 MMHG | BODY MASS INDEX: 24.04 KG/M2 | WEIGHT: 177.5 LBS

## 2020-11-11 DIAGNOSIS — M25.561 ACUTE PAIN OF BOTH KNEES: ICD-10-CM

## 2020-11-11 DIAGNOSIS — M25.562 ACUTE PAIN OF BOTH KNEES: ICD-10-CM

## 2020-11-11 DIAGNOSIS — M25.562 ACUTE PAIN OF BOTH KNEES: Primary | ICD-10-CM

## 2020-11-11 DIAGNOSIS — M25.561 ACUTE PAIN OF BOTH KNEES: Primary | ICD-10-CM

## 2020-11-11 PROCEDURE — 73560 X-RAY EXAM OF KNEE 1 OR 2: CPT | Mod: 26,50,S$GLB, | Performed by: RADIOLOGY

## 2020-11-11 PROCEDURE — 99214 PR OFFICE/OUTPT VISIT, EST, LEVL IV, 30-39 MIN: ICD-10-PCS | Mod: S$PBB,,, | Performed by: NURSE PRACTITIONER

## 2020-11-11 PROCEDURE — 99214 OFFICE O/P EST MOD 30 MIN: CPT | Mod: S$PBB,,, | Performed by: NURSE PRACTITIONER

## 2020-11-11 PROCEDURE — 99215 OFFICE O/P EST HI 40 MIN: CPT | Mod: PBBFAC,PO,25 | Performed by: NURSE PRACTITIONER

## 2020-11-11 PROCEDURE — 99999 PR PBB SHADOW E&M-EST. PATIENT-LVL V: ICD-10-PCS | Mod: PBBFAC,,, | Performed by: NURSE PRACTITIONER

## 2020-11-11 PROCEDURE — 99999 PR PBB SHADOW E&M-EST. PATIENT-LVL V: CPT | Mod: PBBFAC,,, | Performed by: NURSE PRACTITIONER

## 2020-11-11 PROCEDURE — 73560 X-RAY EXAM OF KNEE 1 OR 2: CPT | Mod: TC,50,FY,PO

## 2020-11-11 PROCEDURE — 73560 XR KNEE 1 OR 2 VIEW BILATERAL: ICD-10-PCS | Mod: 26,50,S$GLB, | Performed by: RADIOLOGY

## 2020-11-11 RX ORDER — IBUPROFEN 800 MG/1
800 TABLET ORAL EVERY 8 HOURS PRN
Qty: 30 TABLET | Refills: 1 | Status: SHIPPED | OUTPATIENT
Start: 2020-11-11 | End: 2021-07-01 | Stop reason: SDUPTHER

## 2020-11-11 NOTE — PROGRESS NOTES
Patient ID: Iftikhar Lynn is a 80 y.o. male.    Chief Complaint:   Knee Pain    Knee Pain   The incident occurred more than 1 week ago. The incident occurred at home. There was no injury mechanism. The pain is present in the left knee and right ankle. The quality of the pain is described as aching. The pain is at a severity of 4/10. The pain is moderate. The pain has been fluctuating since onset. Pertinent negatives include no inability to bear weight, loss of motion, loss of sensation, muscle weakness, numbness or tingling. Associated symptoms comments: Pain was severe earlier today making it difficult for patient to bear weight on left knee. Pain has decreased since taking 1000mg ibuprofen.. He reports no foreign bodies present. The symptoms are aggravated by palpation and weight bearing. He has tried NSAIDs for the symptoms. The treatment provided mild relief.      Patient is new to me. Reviewed past medical and social history.    Past Medical History:   Diagnosis Date    Cancer     basal cell ca rt arm / melanoma on back    GERD (gastroesophageal reflux disease)     Hyperlipidemia     Hypertension     Melanoma      Past Surgical History:   Procedure Laterality Date    BACK SURGERY      L 4 L5    COLONOSCOPY N/A 12/20/2017    Procedure: COLONOSCOPY;  Surgeon: Filipe Ramirez MD;  Location: [facility] ENDO;  Service: Endoscopy;  Laterality: N/A;    KNEE ARTHROSCOPY W/ MENISCECTOMY Left 1/17/2019    Procedure: ARTHROSCOPY, KNEE, WITH MENISCECTOMY;  Surgeon: Ehtan Díaz MD;  Location: [facility] OR;  Service: Orthopedics;  Laterality: Left;    SKIN CANCER EXCISION      multiple sites, derm dr martinez, basal cell ca    VASECTOMY       Current Outpatient Medications on File Prior to Visit   Medication Sig Dispense Refill    aspirin 81 MG chewable tablet Take 81 mg by mouth once daily.        multivitamin capsule Take 1 capsule by mouth once daily.      nystatin-triamcinolone (MYCOLOG II) cream Apply  topically 2 (two) times daily. 60 g 11    olmesartan (BENICAR) 40 MG tablet Take 1 tablet (40 mg total) by mouth once daily. 90 tablet 3    omeprazole (PRILOSEC OTC) 20 MG tablet Take 20 mg by mouth once daily.       pramoxine-hydrocortisone (PROCTOCREAM-HC) 1-1 % rectal cream Place rectally 2 (two) times daily. 28.4 g 11    simvastatin (ZOCOR) 40 MG tablet Take 1 tablet (40 mg total) by mouth nightly. 90 tablet 3    valacyclovir (VALTREX) 500 MG tablet Take 500 mg by mouth 2 (two) times daily.      [DISCONTINUED] diclofenac sodium 1 % Gel APPLY AS DIRECTED 100 g 1    [DISCONTINUED]  mg tablet TAKE 1 TABLET BY MOUTH THREE TIMES A DAY 60 tablet 0    [DISCONTINUED] meclizine (ANTIVERT) 12.5 mg tablet Take 1 tablet (12.5 mg total) by mouth 3 (three) times daily as needed. (Patient not taking: Reported on 11/11/2020) 45 tablet 0     No current facility-administered medications on file prior to visit.        Review of Systems   Constitutional: Negative for chills and fever.   HENT: Negative for congestion.    Respiratory: Negative for shortness of breath.    Cardiovascular: Negative for chest pain, palpitations and leg swelling.   Gastrointestinal: Negative for diarrhea, nausea and vomiting.   Musculoskeletal: Positive for arthralgias, gait problem and joint swelling.        Patient ambulating with a cane   Skin: Negative for color change and rash.   Neurological: Negative for tingling and numbness.   All other systems reviewed and are negative.      Objective:      Physical Exam  Vitals signs reviewed.   Constitutional:       Appearance: Normal appearance. He is normal weight.   HENT:      Head: Normocephalic and atraumatic.   Eyes:      Conjunctiva/sclera: Conjunctivae normal.   Neck:      Musculoskeletal: Normal range of motion.   Cardiovascular:      Rate and Rhythm: Normal rate and regular rhythm.   Pulmonary:      Effort: Pulmonary effort is normal.   Musculoskeletal:      Right knee: He exhibits  swelling. He exhibits normal range of motion, no deformity, no laceration and no erythema. Tenderness found.      Left knee: He exhibits normal range of motion and no erythema. Tenderness found.      Right lower leg: No edema.      Left lower leg: No edema.   Skin:     General: Skin is warm and dry.   Neurological:      General: No focal deficit present.      Mental Status: He is alert and oriented to person, place, and time.   Psychiatric:         Mood and Affect: Mood normal.         Behavior: Behavior normal.         Assessment:       1. Acute pain of both knees        Plan:       Iftikhar was seen today for knee pain.    Diagnoses and all orders for this visit:    Acute pain of both knees  -     Ambulatory referral/consult to Orthopedics; Future  -     diclofenac sodium 1 % Kit; Apply 1 application topically 2 (two) times daily as needed (pain).  -     X-Ray Knee 1 or 2 View Bilateral; Future  -     ibuprofen (IBU) 800 MG tablet; Take 1 tablet (800 mg total) by mouth every 8 (eight) hours as needed for Pain.    Patient education provided.  All questions and concerns addressed  RTC PRN and if symptoms worsen or fail to improve  Patient verbalizes understanding      Patient Instructions       R.I.C.E.    R.I.C.E. stands for Rest, Ice, Compression, and Elevation. Doing these things helps limit pain and swelling after an injury. R.I.C.E. also helps injuries heal faster. Use R.I.C.E. for sprains, strains, and severe bruises or bumps. Follow the tips on this handout and begin R.I.C.E. as soon as possible after an injury.  ? Rest  Pain is your bodys way of telling you to rest an injured area. Whether you have hurt an elbow, hand, foot, or knee, limiting its use will prevent further injury and help you heal.  ? Ice  Applying ice right after an injury helps prevent swelling and reduce pain. Dont place ice directly on your skin.  · Wrap a cold pack or bag of ice in a thin cloth. Place it over the injured area.  · Ice  for 10 minutes every 3 hours. Dont ice for more than 20 minutes at a time.  ? Compression  Putting pressure (compression) on an injury helps prevent swelling and provides support.  · Wrap the injured area firmly with an elastic bandage. If your hand or foot tingles, becomes discolored, or feels cold to the touch, the bandage may be too tight. Rewrap it more loosely.  · If your bandage becomes too loose, rewrap it.  · Do not wear an elastic bandage overnight.  ? Elevation  Keeping an injury elevated helps reduce swelling, pain, and throbbing. Elevation is most effective when the injury is kept elevated higher than the heart.     Call your healthcare provider if you notice any of the following:  · Fingers or toes feel numb, are cold to the touch, or change color  · Skin looks shiny or tight  · Pain, swelling, or bruising worsens and is not improved with elevation   Date Last Reviewed: 9/3/2015  © 3607-6262 [employer]. [address] [place] [address], [place] [address]. All rights reserved. This information is not intended as a substitute for professional medical care. Always follow your healthcare professional's instructions.

## 2020-11-11 NOTE — PATIENT INSTRUCTIONS

## 2020-11-12 ENCOUNTER — PATIENT OUTREACH (OUTPATIENT)
Dept: ADMINISTRATIVE | Facility: OTHER | Age: 80
End: 2020-11-12

## 2020-11-12 NOTE — PROGRESS NOTES
Chart was reviewed for overdue Proactive [facility] Encounters (NALLELY)  topics  Updates were requested from care everywhere  Health Maintenance has been updated  LINKS immunization registry triggered

## 2020-11-13 ENCOUNTER — PATIENT OUTREACH (OUTPATIENT)
Dept: OTHER | Facility: OTHER | Age: 80
End: 2020-11-13

## 2020-11-13 ENCOUNTER — OFFICE VISIT (OUTPATIENT)
Dept: ORTHOPEDICS | Facility: CLINIC | Age: 80
End: 2020-11-13
Payer: MEDICARE

## 2020-11-13 VITALS — HEIGHT: 72 IN | RESPIRATION RATE: 16 BRPM | BODY MASS INDEX: 23.98 KG/M2 | WEIGHT: 177 LBS

## 2020-11-13 DIAGNOSIS — M23.51 CHRONIC INSTABILITY OF RIGHT KNEE: ICD-10-CM

## 2020-11-13 DIAGNOSIS — M25.561 ACUTE PAIN OF RIGHT KNEE: ICD-10-CM

## 2020-11-13 PROCEDURE — 20610 LARGE JOINT ASPIRATION/INJECTION: R KNEE: ICD-10-PCS | Mod: S$PBB,RT,, | Performed by: ORTHOPAEDIC SURGERY

## 2020-11-13 PROCEDURE — 99999 PR PBB SHADOW E&M-EST. PATIENT-LVL III: CPT | Mod: PBBFAC,,, | Performed by: ORTHOPAEDIC SURGERY

## 2020-11-13 PROCEDURE — 99214 OFFICE O/P EST MOD 30 MIN: CPT | Mod: S$PBB,25,, | Performed by: ORTHOPAEDIC SURGERY

## 2020-11-13 PROCEDURE — 20610 DRAIN/INJ JOINT/BURSA W/O US: CPT | Mod: PBBFAC,PN | Performed by: ORTHOPAEDIC SURGERY

## 2020-11-13 PROCEDURE — 99214 PR OFFICE/OUTPT VISIT, EST, LEVL IV, 30-39 MIN: ICD-10-PCS | Mod: S$PBB,25,, | Performed by: ORTHOPAEDIC SURGERY

## 2020-11-13 PROCEDURE — 99999 PR PBB SHADOW E&M-EST. PATIENT-LVL III: ICD-10-PCS | Mod: PBBFAC,,, | Performed by: ORTHOPAEDIC SURGERY

## 2020-11-13 PROCEDURE — 99213 OFFICE O/P EST LOW 20 MIN: CPT | Mod: PBBFAC,PN | Performed by: ORTHOPAEDIC SURGERY

## 2020-11-13 RX ADMIN — TRIAMCINOLONE ACETONIDE 40 MG: 40 INJECTION, SUSPENSION INTRA-ARTICULAR; INTRAMUSCULAR at 09:11

## 2020-11-13 NOTE — LETTER
November 16, 2020      Ghanshyam Baum, TIMMY  [address]  [facility] [address]           [facility]  [facility] [address]  Phone: [phone]          Patient: Iftikhar Lynn   MR Number: [id number]   YOB: 1940   Date of Visit: 11/13/2020       Dear Ghanshyam Baum:    Thank you for referring Iftikhar Lynn to me for evaluation. Attached you will find relevant portions of my assessment and plan of care.    If you have questions, please do not hesitate to call me. I look forward to following Iftikhar Lynn along with you.    Sincerely,    Ethan Díaz MD    Enclosure  CC:  No Recipients    If you would like to receive this communication electronically, please contact [email or web address] or [phone] to request more information on "[employer] Link access.    For providers and/or their staff who would like to refer a patient to [facility], please contact us through our one-stop-shop provider referral line, [facility], at [phone].    If you feel you have received this communication in error or would no longer like to receive these types of communications, please e-mail [email or web address]

## 2020-11-16 RX ORDER — TRIAMCINOLONE ACETONIDE 40 MG/ML
40 INJECTION, SUSPENSION INTRA-ARTICULAR; INTRAMUSCULAR
Status: DISCONTINUED | OUTPATIENT
Start: 2020-11-13 | End: 2020-11-17 | Stop reason: HOSPADM

## 2020-11-17 NOTE — PROGRESS NOTES
Past Medical History:   Diagnosis Date    Cancer     basal cell ca rt arm / melanoma on back    GERD (gastroesophageal reflux disease)     Hyperlipidemia     Hypertension     Melanoma        Past Surgical History:   Procedure Laterality Date    BACK SURGERY      L 4 L5    COLONOSCOPY N/A 12/20/2017    Procedure: COLONOSCOPY;  Surgeon: Filipe Ramirez MD;  Location: [facility] ENDO;  Service: Endoscopy;  Laterality: N/A;    KNEE ARTHROSCOPY W/ MENISCECTOMY Left 1/17/2019    Procedure: ARTHROSCOPY, KNEE, WITH MENISCECTOMY;  Surgeon: Ethan Díaz MD;  Location: [facility] OR;  Service: Orthopedics;  Laterality: Left;    SKIN CANCER EXCISION      multiple sites, derm dr martinez, basal cell ca    VASECTOMY         Current Outpatient Medications   Medication Sig    aspirin 81 MG chewable tablet Take 81 mg by mouth once daily.      diclofenac sodium 1 % Kit Apply 1 application topically 2 (two) times daily as needed (pain).    ibuprofen (IBU) 800 MG tablet Take 1 tablet (800 mg total) by mouth every 8 (eight) hours as needed for Pain.    multivitamin capsule Take 1 capsule by mouth once daily.    nystatin-triamcinolone (MYCOLOG II) cream Apply topically 2 (two) times daily.    olmesartan (BENICAR) 40 MG tablet Take 1 tablet (40 mg total) by mouth once daily.    omeprazole (PRILOSEC OTC) 20 MG tablet Take 20 mg by mouth once daily.     pramoxine-hydrocortisone (PROCTOCREAM-HC) 1-1 % rectal cream Place rectally 2 (two) times daily.    simvastatin (ZOCOR) 40 MG tablet Take 1 tablet (40 mg total) by mouth nightly.    valacyclovir (VALTREX) 500 MG tablet Take 500 mg by mouth 2 (two) times daily.     No current facility-administered medications for this visit.        Review of patient's allergies indicates:  No Known Allergies    Family History   Problem Relation Age of Onset    Cancer Mother         non hogkins lumphoma    Heart disease Father     Cancer Brother         melanoma, arm and leg    Clotting  disorder Neg Hx     Anesthesia problems Neg Hx        Social History     Socioeconomic History    Marital status:      Spouse name: Not on file    Number of children: Not on file    Years of education: Not on file    Highest education level: Not on file   Occupational History    Not on file   Social Needs    Financial resource strain: Not on file    Food insecurity     Worry: Not on file     Inability: Not on file    Transportation needs     Medical: Not on file     Non-medical: Not on file   Tobacco Use    Smoking status: Former Smoker     Quit date: 2/15/1976     Years since quittin.7    Smokeless tobacco: Never Used   Substance and Sexual Activity    Alcohol use: Yes     Alcohol/week: 0.0 standard drinks     Comment: daily wine    Drug use: No    Sexual activity: Not on file   Lifestyle    Physical activity     Days per week: Not on file     Minutes per session: Not on file    Stress: Not on file   Relationships    Social connections     Talks on phone: Not on file     Gets together: Not on file     Attends Pentecostalism service: Not on file     Active member of club or organization: Not on file     Attends meetings of clubs or organizations: Not on file     Relationship status: Not on file   Other Topics Concern    Not on file   Social History Narrative    Not on file       Chief Complaint:   Chief Complaint   Patient presents with    Left Knee - Pain    Right Knee - Pain       Consulting Physician: Ghanshyam Baum, TIMMY    History of present illness:    This is a 80 y.o. year old male who complains of right knee pain for one month but worse last 2 days. Had sudden sharp pain. Pain now 4/10 and worse with use. Has been doing more manual labor recently.    Review of Systems:    Constitution:   Denies chills, fever, and sweats.  HENT:   Denies headaches or blurry vision.  Cardiovascular:  Denies chest pain or irregular heart beat.  Respiratory:   Denies cough or shortness of  breath.  Gastrointestinal:  Denies abdominal pain, nausea, or vomiting.  Musculoskeletal:   Denies muscle cramps.  Neurological:   Denies dizziness or focal weakness.  Psychiatric/Behavior: Normal mental status.  Hematology/Lymph:  Denies bleeding problem or easy bruising/bleeding.  Skin:    Denies rash or suspicious lesions.    Examination:    Vital Signs:    Vitals:    11/13/20 0938   Resp: 16   Weight: 80.3 kg (177 lb)   Height: 6' (1.829 m)   PainSc:   4       Body mass index is 24.01 kg/m².    Constitution:   Well-developed, well nourished patient in no acute distress.  Neurological:   Alert and oriented x 3 and cooperative to examination.     Psychiatric/Behavior: Normal mental status.  Respiratory:   No shortness of breath.  Eyes:    Extraoccular muscles intact  Skin:    No scars, rash or suspicious lesions.    Physical Exam: Right Knee Exam    Gait   Normal    Skin  Rash:   None  Scars:   None    Inspection  Erythema:  None  Bruising:  None  Effusion:  mild  Masses:  None  Lymphadenopathy: None  Calf   Soft, non-tender    Range of Motion: 0 to 130° with pain    Medial Joint : yes  Lateral Joint : no    Patellofemoral Tenderness: None  Patellofemoral Crepitus: None    Lachman:  Normal  Anterior Drawer: Normal  Posterior Drawer: Normal    Eulogio's:  positive  Apley's:  positive    Varus Stress:  Stable  Valgus Stress:  Stable    Strength:  5/5    Pulses:  Palpable distal    Sensation:  Intact          Imaging: X-rays ordered and images interpreted today personally by me of right knee show mild DJD.        Assessment: Acute pain of right knee  -     Ambulatory referral/consult to Orthopedics  -     Large Joint Aspiration/Injection: R knee        Plan:  Sudden pain along medial joint line. Possible meniscus injury. Will inject and MRI.      DISCLAIMER: This note may have been dictated using voice recognition software and may contain grammatical errors.     NOTE: Consult report sent to  referring provider via [employer] EMR.

## 2020-11-17 NOTE — PROCEDURES
Large Joint Aspiration/Injection: R knee    Date/Time: 11/13/2020 9:45 AM  Performed by: Ethan Díaz MD  Authorized by: Ethan Díaz MD     Consent Done?:  Yes (Verbal)  Indications:  Pain  Timeout: prior to procedure the correct patient, procedure, and site was verified    Approach:  Anteromedial  Location:  Knee  Site:  R knee  Medications:  40 mg triamcinolone acetonide 40 mg/mL

## 2020-11-18 ENCOUNTER — HOSPITAL ENCOUNTER (OUTPATIENT)
Dept: RADIOLOGY | Facility: HOSPITAL | Age: 80
Discharge: HOME | End: 2020-11-18
Attending: ORTHOPAEDIC SURGERY
Payer: MEDICARE

## 2020-11-18 DIAGNOSIS — M23.51 CHRONIC INSTABILITY OF RIGHT KNEE: ICD-10-CM

## 2020-11-18 PROCEDURE — 73721 MRI JNT OF LWR EXTRE W/O DYE: CPT | Mod: 26,RT,, | Performed by: RADIOLOGY

## 2020-11-18 PROCEDURE — 73721 MRI JNT OF LWR EXTRE W/O DYE: CPT | Mod: TC,RT

## 2020-11-18 PROCEDURE — 73721 MRI KNEE WITHOUT CONTRAST RIGHT: ICD-10-PCS | Mod: 26,RT,, | Performed by: RADIOLOGY

## 2020-11-20 ENCOUNTER — OFFICE VISIT (OUTPATIENT)
Dept: ORTHOPEDICS | Facility: CLINIC | Age: 80
End: 2020-11-20
Payer: MEDICARE

## 2020-11-20 VITALS — RESPIRATION RATE: 17 BRPM | HEIGHT: 72 IN | BODY MASS INDEX: 23.98 KG/M2 | WEIGHT: 177 LBS

## 2020-11-20 DIAGNOSIS — M25.561 ACUTE PAIN OF RIGHT KNEE: Primary | ICD-10-CM

## 2020-11-20 PROCEDURE — 99999 PR PBB SHADOW E&M-EST. PATIENT-LVL III: ICD-10-PCS | Mod: PBBFAC,,, | Performed by: ORTHOPAEDIC SURGERY

## 2020-11-20 PROCEDURE — 99213 PR OFFICE/OUTPT VISIT, EST, LEVL III, 20-29 MIN: ICD-10-PCS | Mod: S$PBB,,, | Performed by: ORTHOPAEDIC SURGERY

## 2020-11-20 PROCEDURE — 99213 OFFICE O/P EST LOW 20 MIN: CPT | Mod: S$PBB,,, | Performed by: ORTHOPAEDIC SURGERY

## 2020-11-20 PROCEDURE — 99213 OFFICE O/P EST LOW 20 MIN: CPT | Mod: PBBFAC,PN | Performed by: ORTHOPAEDIC SURGERY

## 2020-11-20 PROCEDURE — 99999 PR PBB SHADOW E&M-EST. PATIENT-LVL III: CPT | Mod: PBBFAC,,, | Performed by: ORTHOPAEDIC SURGERY

## 2020-11-20 NOTE — PROGRESS NOTES
Past Medical History:   Diagnosis Date    Cancer     basal cell ca rt arm / melanoma on back    GERD (gastroesophageal reflux disease)     Hyperlipidemia     Hypertension     Melanoma        Past Surgical History:   Procedure Laterality Date    BACK SURGERY      L 4 L5    COLONOSCOPY N/A 12/20/2017    Procedure: COLONOSCOPY;  Surgeon: Filipe Ramirez MD;  Location: [facility] ENDO;  Service: Endoscopy;  Laterality: N/A;    KNEE ARTHROSCOPY W/ MENISCECTOMY Left 1/17/2019    Procedure: ARTHROSCOPY, KNEE, WITH MENISCECTOMY;  Surgeon: Ethan Díaz MD;  Location: [facility] OR;  Service: Orthopedics;  Laterality: Left;    SKIN CANCER EXCISION      multiple sites, derm dr martinez, basal cell ca    VASECTOMY         Current Outpatient Medications   Medication Sig    aspirin 81 MG chewable tablet Take 81 mg by mouth once daily.      diclofenac sodium 1 % Kit Apply 1 application topically 2 (two) times daily as needed (pain).    ibuprofen (IBU) 800 MG tablet Take 1 tablet (800 mg total) by mouth every 8 (eight) hours as needed for Pain.    multivitamin capsule Take 1 capsule by mouth once daily.    nystatin-triamcinolone (MYCOLOG II) cream Apply topically 2 (two) times daily.    olmesartan (BENICAR) 40 MG tablet Take 1 tablet (40 mg total) by mouth once daily.    omeprazole (PRILOSEC OTC) 20 MG tablet Take 20 mg by mouth once daily.     pramoxine-hydrocortisone (PROCTOCREAM-HC) 1-1 % rectal cream Place rectally 2 (two) times daily.    simvastatin (ZOCOR) 40 MG tablet Take 1 tablet (40 mg total) by mouth nightly.    valacyclovir (VALTREX) 500 MG tablet Take 500 mg by mouth 2 (two) times daily.     No current facility-administered medications for this visit.        Review of patient's allergies indicates:  No Known Allergies    Family History   Problem Relation Age of Onset    Cancer Mother         non hogkins lumphoma    Heart disease Father     Cancer Brother         melanoma, arm and leg    Clotting  disorder Neg Hx     Anesthesia problems Neg Hx        Social History     Socioeconomic History    Marital status:      Spouse name: Not on file    Number of children: Not on file    Years of education: Not on file    Highest education level: Not on file   Occupational History    Not on file   Social Needs    Financial resource strain: Not on file    Food insecurity     Worry: Not on file     Inability: Not on file    Transportation needs     Medical: Not on file     Non-medical: Not on file   Tobacco Use    Smoking status: Former Smoker     Quit date: 2/15/1976     Years since quittin.7    Smokeless tobacco: Never Used   Substance and Sexual Activity    Alcohol use: Yes     Alcohol/week: 0.0 standard drinks     Comment: daily wine    Drug use: No    Sexual activity: Not on file   Lifestyle    Physical activity     Days per week: Not on file     Minutes per session: Not on file    Stress: Not on file   Relationships    Social connections     Talks on phone: Not on file     Gets together: Not on file     Attends Jain service: Not on file     Active member of club or organization: Not on file     Attends meetings of clubs or organizations: Not on file     Relationship status: Not on file   Other Topics Concern    Not on file   Social History Narrative    Not on file       Chief Complaint:   Chief Complaint   Patient presents with    Knee Pain     right knee MRI results       Consulting Physician: No ref. provider found    History of present illness:    This is a 80 y.o. year old male who complains of right knee pain for one month. Had sudden sharp pain. Pain now 3/10 and better after injection.     Review of Systems:    Constitution:   Denies chills, fever, and sweats.  HENT:   Denies headaches or blurry vision.  Cardiovascular:  Denies chest pain or irregular heart beat.  Respiratory:   Denies cough or shortness of breath.  Gastrointestinal:  Denies abdominal pain, nausea, or  vomiting.  Musculoskeletal:   Denies muscle cramps.  Neurological:   Denies dizziness or focal weakness.  Psychiatric/Behavior: Normal mental status.  Hematology/Lymph:  Denies bleeding problem or easy bruising/bleeding.  Skin:    Denies rash or suspicious lesions.    Examination:    Vital Signs:    Vitals:    11/20/20 0840   Resp: 17   Weight: 80.3 kg (177 lb)   Height: 6' (1.829 m)   PainSc:   3       Body mass index is 24.01 kg/m².    Constitution:   Well-developed, well nourished patient in no acute distress.  Neurological:   Alert and oriented x 3 and cooperative to examination.     Psychiatric/Behavior: Normal mental status.  Respiratory:   No shortness of breath.  Eyes:    Extraoccular muscles intact  Skin:    No scars, rash or suspicious lesions.    Physical Exam: Right Knee Exam    Gait   Normal    Skin  Rash:   None  Scars:   None    Inspection  Erythema:  None  Bruising:  None  Effusion:  mild  Masses:  None  Lymphadenopathy: None  Calf   Soft, non-tender    Range of Motion: 0 to 130°    Medial Joint : yes  Lateral Joint : no    Patellofemoral Tenderness: None  Patellofemoral Crepitus: None    Lachman:  Normal  Anterior Drawer: Normal  Posterior Drawer: Normal    Eulogio's:  positive  Apley's:  positive    Varus Stress:  Stable  Valgus Stress:  Stable    Strength:  5/5    Pulses:  Palpable distal    Sensation:  Intact          Imaging: X-rays of right knee show mild DJD. The MRI study images that were interpreted personally by me today and reviewed with the patient demonstrate a degenerative medial meniscus along with a tear of the lateral meniscus an overall degenerative changes of the knee.         Assessment: Acute pain of right knee        Plan:  He feels better after the injection.  He would like to have something done with this but would like to wait until the new year.  We will see him back at that time.    DISCLAIMER: This note may have been dictated using voice recognition  software and may contain grammatical errors.     NOTE: Consult report sent to referring provider via [employer] EMR.

## 2020-12-11 NOTE — PROGRESS NOTES
[facility]: Health  Follow-Up    The history is provided by the patient.             Reason for review: Blood pressure at goal        Topics Covered on Call: physical activity            Diet-no change to diet    No change to diet.        Physical Activity-Change      Additional physical activity details: Patient reports that he hasn't been walking in a while. He stated that he has been doing a little less exercise than he used to.    Patient reports that he injured his knee and expressed that he hasn't been able to walk as much because of it.      Medication Adherence-Medication adherence was assessed.        Substance, Sleep, Stress-No change  stress-  Details:  Intervention(s):    Sleep-  Details:  Intervention(s):    Alcohol -  Details:  Intervention(s):    Tobacco-  Details:  Intervention(s):          Continue current diet/physical activity routine.  Instructed to charge device.       Addressed patient questions and patient has my contact information if needed prior to next outreach.   Explained the importance of self-monitoring and medication adherence. Encouraged the patient to communicate with their health  for lifestyle modifications to help improve or maintain a healthy lifestyle.               There are no preventive care reminders to display for this patient.      Last 5 Patient Entered Readings                                      Current 30 Day Average: 135/79     Recent Readings 12/1/2020 12/1/2020 12/1/2020 12/1/2020 11/23/2020    SBP (mmHg) 146 137 140 153 137    DBP (mmHg) 75 79 86 92 88    Pulse 94 93 92 93 116

## 2020-12-28 ENCOUNTER — PATIENT OUTREACH (OUTPATIENT)
Dept: OTHER | Facility: OTHER | Age: 80
End: 2020-12-28

## 2021-01-20 ENCOUNTER — IMMUNIZATION (OUTPATIENT)
Dept: PRIMARY CARE CLINIC | Facility: CLINIC | Age: 81
End: 2021-01-20
Payer: MEDICARE

## 2021-01-20 DIAGNOSIS — Z23 NEED FOR VACCINATION: Primary | ICD-10-CM

## 2021-01-20 PROCEDURE — 91300 COVID-19, MRNA, LNP-S, PF, 30 MCG/0.3 ML DOSE VACCINE: ICD-10-PCS | Mod: S$GLB,,, | Performed by: NURSE PRACTITIONER

## 2021-01-20 PROCEDURE — 0001A COVID-19, MRNA, LNP-S, PF, 30 MCG/0.3 ML DOSE VACCINE: ICD-10-PCS | Mod: S$GLB,,, | Performed by: NURSE PRACTITIONER

## 2021-01-20 PROCEDURE — 0001A COVID-19, MRNA, LNP-S, PF, 30 MCG/0.3 ML DOSE VACCINE: CPT | Mod: S$GLB,,, | Performed by: NURSE PRACTITIONER

## 2021-01-20 PROCEDURE — 91300 COVID-19, MRNA, LNP-S, PF, 30 MCG/0.3 ML DOSE VACCINE: CPT | Mod: S$GLB,,, | Performed by: NURSE PRACTITIONER

## 2021-02-05 NOTE — TELEPHONE ENCOUNTER
----- Message from Miguel Nina sent at 10/22/2018  9:57 AM CDT -----  Contact: pt            Name of Who is Calling: pt      What is the request in detail: is returning a call      Can the clinic reply by [facility]: no      [facility] [phone]                                    
This matter is being handled in another encounter, result notes.   
pt. COVID19 (+) 1/19 presents c/o lightheadedness after albuterol inhaler. c/o weakness and generalized chest pressure, nausea, SPO2 100% on RA. denies any pain, ambulatory, no distress noted.

## 2021-02-11 ENCOUNTER — IMMUNIZATION (OUTPATIENT)
Dept: PRIMARY CARE CLINIC | Facility: CLINIC | Age: 81
End: 2021-02-11
Payer: MEDICARE

## 2021-02-11 DIAGNOSIS — Z23 NEED FOR VACCINATION: Primary | ICD-10-CM

## 2021-02-11 PROCEDURE — 91300 COVID-19, MRNA, LNP-S, PF, 30 MCG/0.3 ML DOSE VACCINE: CPT | Mod: S$GLB,,, | Performed by: FAMILY MEDICINE

## 2021-02-11 PROCEDURE — 0002A COVID-19, MRNA, LNP-S, PF, 30 MCG/0.3 ML DOSE VACCINE: ICD-10-PCS | Mod: S$GLB,,, | Performed by: FAMILY MEDICINE

## 2021-02-11 PROCEDURE — 91300 COVID-19, MRNA, LNP-S, PF, 30 MCG/0.3 ML DOSE VACCINE: ICD-10-PCS | Mod: S$GLB,,, | Performed by: FAMILY MEDICINE

## 2021-02-11 PROCEDURE — 0002A COVID-19, MRNA, LNP-S, PF, 30 MCG/0.3 ML DOSE VACCINE: CPT | Mod: S$GLB,,, | Performed by: FAMILY MEDICINE

## 2021-03-16 ENCOUNTER — TELEPHONE (OUTPATIENT)
Dept: FAMILY MEDICINE | Facility: CLINIC | Age: 81
End: 2021-03-16

## 2021-03-18 ENCOUNTER — HOSPITAL ENCOUNTER (OUTPATIENT)
Dept: RADIOLOGY | Facility: CLINIC | Age: 81
Discharge: HOME | End: 2021-03-18
Attending: NURSE PRACTITIONER
Payer: MEDICARE

## 2021-03-18 ENCOUNTER — OFFICE VISIT (OUTPATIENT)
Dept: FAMILY MEDICINE | Facility: CLINIC | Age: 81
End: 2021-03-18
Payer: MEDICARE

## 2021-03-18 VITALS
HEART RATE: 82 BPM | BODY MASS INDEX: 24.67 KG/M2 | WEIGHT: 182.13 LBS | HEIGHT: 72 IN | DIASTOLIC BLOOD PRESSURE: 68 MMHG | OXYGEN SATURATION: 97 % | TEMPERATURE: 98 F | SYSTOLIC BLOOD PRESSURE: 136 MMHG
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
## 2021-03-18 DIAGNOSIS — M54.6 ACUTE BILATERAL THORACIC BACK PAIN: ICD-10-CM
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
## 2021-03-18 DIAGNOSIS — L03.115 CELLULITIS OF RIGHT LOWER EXTREMITY: ICD-10-CM

## 2021-03-18 PROCEDURE — 71046 X-RAY EXAM CHEST 2 VIEWS: CPT | Mod: 26,,, | Performed by: RADIOLOGY

## 2021-03-18 PROCEDURE — 99214 PR OFFICE/OUTPT VISIT, EST, LEVL IV, 30-39 MIN: ICD-10-PCS | Mod: S$PBB,,, | Performed by: NURSE PRACTITIONER

## 2021-03-18 PROCEDURE — 99999 PR PBB SHADOW E&M-EST. PATIENT-LVL IV: ICD-10-PCS | Mod: PBBFAC,,, | Performed by: NURSE PRACTITIONER

## 2021-03-18 PROCEDURE — 99999 PR PBB SHADOW E&M-EST. PATIENT-LVL IV: CPT | Mod: PBBFAC,,, | Performed by: NURSE PRACTITIONER

## 2021-03-18 PROCEDURE — 99214 OFFICE O/P EST MOD 30 MIN: CPT | Mod: S$PBB,,, | Performed by: NURSE PRACTITIONER

## 2021-03-18 PROCEDURE — 71046 X-RAY EXAM CHEST 2 VIEWS: CPT | Mod: TC,FY,PO

## 2021-03-18 PROCEDURE — 99214 OFFICE O/P EST MOD 30 MIN: CPT | Mod: PBBFAC,PO | Performed by: NURSE PRACTITIONER

## 2021-03-18 PROCEDURE — 71046 XR CHEST PA AND LATERAL: ICD-10-PCS | Mod: 26,,, | Performed by: RADIOLOGY

## 2021-03-18 RX ORDER — SILVER SULFADIAZINE 10 G/1000G
CREAM TOPICAL DAILY
Qty: 20 G | Refills: 1 | Status: SHIPPED | OUTPATIENT
Start: 2021-03-18 | End: 2021-07-01 | Stop reason: ALTCHOICE

## 2021-03-18 RX ORDER — SULFAMETHOXAZOLE AND TRIMETHOPRIM 400; 80 MG/1; MG/1
1 TABLET ORAL 2 TIMES DAILY
Qty: 10 TABLET | Refills: 0 | Status: SHIPPED | OUTPATIENT
Start: 2021-03-18 | End: 2021-03-23

## 2021-03-19 ENCOUNTER — TELEPHONE (OUTPATIENT)
Dept: FAMILY MEDICINE | Facility: CLINIC | Age: 81
End: 2021-03-19

## 2021-03-19 ENCOUNTER — OFFICE VISIT (OUTPATIENT)
Dept: FAMILY MEDICINE | Facility: CLINIC | Age: 81
End: 2021-03-19
Payer: MEDICARE

## 2021-03-19 VITALS
SYSTOLIC BLOOD PRESSURE: 142 MMHG | BODY MASS INDEX: 25.14 KG/M2 | DIASTOLIC BLOOD PRESSURE: 82 MMHG | OXYGEN SATURATION: 97 % | WEIGHT: 185.63 LBS | TEMPERATURE: 98 F | HEART RATE: 89 BPM | HEIGHT: 72 IN

## 2021-03-19 DIAGNOSIS — S22.32XD CLOSED FRACTURE OF ONE RIB OF LEFT SIDE WITH ROUTINE HEALING, SUBSEQUENT ENCOUNTER: ICD-10-CM

## 2021-03-19 DIAGNOSIS — R07.81 RIB PAIN ON LEFT SIDE: Primary | ICD-10-CM

## 2021-03-19 DIAGNOSIS — S20.212S: ICD-10-CM

## 2021-03-19 PROCEDURE — 99213 OFFICE O/P EST LOW 20 MIN: CPT | Mod: S$PBB,,, | Performed by: PHYSICIAN ASSISTANT

## 2021-03-19 PROCEDURE — 99999 PR PBB SHADOW E&M-EST. PATIENT-LVL IV: ICD-10-PCS | Mod: PBBFAC,,, | Performed by: PHYSICIAN ASSISTANT

## 2021-03-19 PROCEDURE — 99214 OFFICE O/P EST MOD 30 MIN: CPT | Mod: PBBFAC,PO | Performed by: PHYSICIAN ASSISTANT

## 2021-03-19 PROCEDURE — 99999 PR PBB SHADOW E&M-EST. PATIENT-LVL IV: CPT | Mod: PBBFAC,,, | Performed by: PHYSICIAN ASSISTANT

## 2021-03-19 PROCEDURE — 99213 PR OFFICE/OUTPT VISIT, EST, LEVL III, 20-29 MIN: ICD-10-PCS | Mod: S$PBB,,, | Performed by: PHYSICIAN ASSISTANT

## 2021-03-19 RX ORDER — TRAMADOL HYDROCHLORIDE 50 MG/1
50 TABLET ORAL EVERY 6 HOURS PRN
Qty: 28 TABLET | Refills: 0 | Status: SHIPPED | OUTPATIENT
Start: 2021-03-19 | End: 2021-07-01 | Stop reason: ALTCHOICE

## 2021-03-22 ENCOUNTER — TELEPHONE (OUTPATIENT)
Dept: FAMILY MEDICINE | Facility: CLINIC | Age: 81
End: 2021-03-22

## 2021-04-07 ENCOUNTER — PES CALL (OUTPATIENT)
Dept: ADMINISTRATIVE | Facility: CLINIC | Age: 81
End: 2021-04-07

## 2021-05-14 DIAGNOSIS — I10 ESSENTIAL HYPERTENSION: ICD-10-CM

## 2021-05-14 RX ORDER — OLMESARTAN MEDOXOMIL 40 MG/1
TABLET ORAL
Qty: 90 TABLET | Refills: 3 | Status: SHIPPED | OUTPATIENT
Start: 2021-05-14 | End: 2021-07-01 | Stop reason: SDUPTHER

## 2021-06-25 ENCOUNTER — TELEPHONE (OUTPATIENT)
Dept: FAMILY MEDICINE | Facility: CLINIC | Age: 81
End: 2021-06-25

## 2021-06-25 DIAGNOSIS — Z11.59 NEED FOR HEPATITIS C SCREENING TEST: ICD-10-CM

## 2021-06-25 DIAGNOSIS — I10 ESSENTIAL HYPERTENSION: ICD-10-CM

## 2021-06-25 DIAGNOSIS — E78.2 MIXED HYPERLIPIDEMIA: Primary | ICD-10-CM

## 2021-06-25 DIAGNOSIS — K21.9 GASTROESOPHAGEAL REFLUX DISEASE WITHOUT ESOPHAGITIS: ICD-10-CM

## 2021-06-25 DIAGNOSIS — Z12.5 ENCOUNTER FOR SCREENING FOR MALIGNANT NEOPLASM OF PROSTATE: ICD-10-CM

## 2021-06-29 ENCOUNTER — LAB VISIT (OUTPATIENT)
Dept: LAB | Facility: HOSPITAL | Age: 81
End: 2021-06-29
Attending: FAMILY MEDICINE
Payer: MEDICARE

## 2021-06-29 DIAGNOSIS — E78.2 MIXED HYPERLIPIDEMIA: ICD-10-CM

## 2021-06-29 DIAGNOSIS — Z11.59 NEED FOR HEPATITIS C SCREENING TEST: ICD-10-CM

## 2021-06-29 DIAGNOSIS — K21.9 GASTROESOPHAGEAL REFLUX DISEASE WITHOUT ESOPHAGITIS: ICD-10-CM

## 2021-06-29 DIAGNOSIS — I10 ESSENTIAL HYPERTENSION: ICD-10-CM

## 2021-06-29 LAB
ALBUMIN SERPL BCP-MCNC: 4 G/DL (ref 3.5–5.2)
ALP SERPL-CCNC: 77 U/L (ref 55–135)
ALT SERPL W/O P-5'-P-CCNC: 27 U/L (ref 10–44)
ANION GAP SERPL CALC-SCNC: 7 MMOL/L (ref 8–16)
AST SERPL-CCNC: 26 U/L (ref 10–40)
BASOPHILS # BLD AUTO: 0.07 K/UL (ref 0–0.2)
BASOPHILS NFR BLD: 1 % (ref 0–1.9)
BILIRUB SERPL-MCNC: 0.5 MG/DL (ref 0.1–1)
BUN SERPL-MCNC: 16 MG/DL (ref 8–23)
CALCIUM SERPL-MCNC: 8.9 MG/DL (ref 8.7–10.5)
CHLORIDE SERPL-SCNC: 105 MMOL/L (ref 95–110)
CHOLEST SERPL-MCNC: 111 MG/DL (ref 120–199)
CHOLEST/HDLC SERPL: 2.5 {RATIO} (ref 2–5)
CO2 SERPL-SCNC: 26 MMOL/L (ref 23–29)
CREAT SERPL-MCNC: 0.8 MG/DL (ref 0.5–1.4)
DIFFERENTIAL METHOD: ABNORMAL
EOSINOPHIL # BLD AUTO: 0.3 K/UL (ref 0–0.5)
EOSINOPHIL NFR BLD: 3.9 % (ref 0–8)
ERYTHROCYTE [DISTWIDTH] IN BLOOD BY AUTOMATED COUNT: 12.7 % (ref 11.5–14.5)
EST. GFR  (AFRICAN AMERICAN): >60 ML/MIN/1.73 M^2
EST. GFR  (NON AFRICAN AMERICAN): >60 ML/MIN/1.73 M^2
GLUCOSE SERPL-MCNC: 99 MG/DL (ref 70–110)
HCT VFR BLD AUTO: 40.3 % (ref 40–54)
HDLC SERPL-MCNC: 45 MG/DL (ref 40–75)
HDLC SERPL: 40.5 % (ref 20–50)
HGB BLD-MCNC: 13.1 G/DL (ref 14–18)
IMM GRANULOCYTES # BLD AUTO: 0.02 K/UL (ref 0–0.04)
IMM GRANULOCYTES NFR BLD AUTO: 0.3 % (ref 0–0.5)
LDLC SERPL CALC-MCNC: 45.4 MG/DL (ref 63–159)
LYMPHOCYTES # BLD AUTO: 2.3 K/UL (ref 1–4.8)
LYMPHOCYTES NFR BLD: 34.2 % (ref 18–48)
MCH RBC QN AUTO: 33.1 PG (ref 27–31)
MCHC RBC AUTO-ENTMCNC: 32.5 G/DL (ref 32–36)
MCV RBC AUTO: 102 FL (ref 82–98)
MONOCYTES # BLD AUTO: 0.5 K/UL (ref 0.3–1)
MONOCYTES NFR BLD: 7.5 % (ref 4–15)
NEUTROPHILS # BLD AUTO: 3.5 K/UL (ref 1.8–7.7)
NEUTROPHILS NFR BLD: 53.1 % (ref 38–73)
NONHDLC SERPL-MCNC: 66 MG/DL
NRBC BLD-RTO: 0 /100 WBC
PLATELET # BLD AUTO: 234 K/UL (ref 150–450)
PMV BLD AUTO: 11.4 FL (ref 9.2–12.9)
POTASSIUM SERPL-SCNC: 4 MMOL/L (ref 3.5–5.1)
PROT SERPL-MCNC: 6.5 G/DL (ref 6–8.4)
RBC # BLD AUTO: 3.96 M/UL (ref 4.6–6.2)
SODIUM SERPL-SCNC: 138 MMOL/L (ref 136–145)
TRIGL SERPL-MCNC: 103 MG/DL (ref 30–150)
WBC # BLD AUTO: 6.67 K/UL (ref 3.9–12.7)

## 2021-06-29 PROCEDURE — 80061 LIPID PANEL: CPT | Performed by: FAMILY MEDICINE

## 2021-06-29 PROCEDURE — 36415 COLL VENOUS BLD VENIPUNCTURE: CPT | Mod: PO | Performed by: FAMILY MEDICINE

## 2021-06-29 PROCEDURE — 80053 COMPREHEN METABOLIC PANEL: CPT | Performed by: FAMILY MEDICINE

## 2021-06-29 PROCEDURE — 85025 COMPLETE CBC W/AUTO DIFF WBC: CPT | Performed by: FAMILY MEDICINE

## 2021-06-29 PROCEDURE — 86803 HEPATITIS C AB TEST: CPT | Performed by: FAMILY MEDICINE

## 2021-06-30 LAB — HCV AB SERPL QL IA: NEGATIVE

## 2021-07-01 ENCOUNTER — OFFICE VISIT (OUTPATIENT)
Dept: FAMILY MEDICINE | Facility: CLINIC | Age: 81
End: 2021-07-01
Payer: MEDICARE

## 2021-07-01 ENCOUNTER — LAB VISIT (OUTPATIENT)
Dept: LAB | Facility: HOSPITAL | Age: 81
End: 2021-07-01
Attending: FAMILY MEDICINE
Payer: MEDICARE

## 2021-07-01 VITALS
SYSTOLIC BLOOD PRESSURE: 136 MMHG | DIASTOLIC BLOOD PRESSURE: 74 MMHG | BODY MASS INDEX: 25.41 KG/M2 | OXYGEN SATURATION: 95 % | HEIGHT: 72 IN | WEIGHT: 187.63 LBS | HEART RATE: 78 BPM | TEMPERATURE: 98 F

## 2021-07-01 DIAGNOSIS — E78.2 MIXED HYPERLIPIDEMIA: ICD-10-CM

## 2021-07-01 DIAGNOSIS — I10 ESSENTIAL HYPERTENSION: Primary | ICD-10-CM

## 2021-07-01 DIAGNOSIS — K21.9 GASTROESOPHAGEAL REFLUX DISEASE WITHOUT ESOPHAGITIS: ICD-10-CM

## 2021-07-01 DIAGNOSIS — D51.9 ANEMIA DUE TO VITAMIN B12 DEFICIENCY, UNSPECIFIED B12 DEFICIENCY TYPE: ICD-10-CM

## 2021-07-01 DIAGNOSIS — M25.561 ACUTE PAIN OF BOTH KNEES: ICD-10-CM

## 2021-07-01 DIAGNOSIS — D64.89 HYPERCHROMIC ANEMIA: ICD-10-CM

## 2021-07-01 DIAGNOSIS — R53.82 CHRONIC FATIGUE, UNSPECIFIED: ICD-10-CM

## 2021-07-01 DIAGNOSIS — R07.9 CHEST PAIN, UNSPECIFIED TYPE: ICD-10-CM

## 2021-07-01 DIAGNOSIS — C43.61 MALIGNANT MELANOMA OF RIGHT UPPER EXTREMITY INCLUDING SHOULDER: ICD-10-CM

## 2021-07-01 DIAGNOSIS — M25.562 ACUTE PAIN OF BOTH KNEES: ICD-10-CM

## 2021-07-01 LAB
TSH SERPL DL<=0.005 MIU/L-ACNC: 1.74 UIU/ML (ref 0.4–4)
VIT B12 SERPL-MCNC: 281 PG/ML (ref 210–950)

## 2021-07-01 PROCEDURE — 99214 PR OFFICE/OUTPT VISIT, EST, LEVL IV, 30-39 MIN: ICD-10-PCS | Mod: S$PBB,,, | Performed by: FAMILY MEDICINE

## 2021-07-01 PROCEDURE — 36415 COLL VENOUS BLD VENIPUNCTURE: CPT | Mod: PO | Performed by: FAMILY MEDICINE

## 2021-07-01 PROCEDURE — 82607 VITAMIN B-12: CPT | Performed by: FAMILY MEDICINE

## 2021-07-01 PROCEDURE — 99214 OFFICE O/P EST MOD 30 MIN: CPT | Mod: S$PBB,,, | Performed by: FAMILY MEDICINE

## 2021-07-01 PROCEDURE — 99999 PR PBB SHADOW E&M-EST. PATIENT-LVL III: ICD-10-PCS | Mod: PBBFAC,,, | Performed by: FAMILY MEDICINE

## 2021-07-01 PROCEDURE — 99213 OFFICE O/P EST LOW 20 MIN: CPT | Mod: PBBFAC,PN | Performed by: FAMILY MEDICINE

## 2021-07-01 PROCEDURE — 84443 ASSAY THYROID STIM HORMONE: CPT | Performed by: FAMILY MEDICINE

## 2021-07-01 PROCEDURE — 99999 PR PBB SHADOW E&M-EST. PATIENT-LVL III: CPT | Mod: PBBFAC,,, | Performed by: FAMILY MEDICINE

## 2021-07-01 RX ORDER — VALACYCLOVIR HYDROCHLORIDE 500 MG/1
500 TABLET, FILM COATED ORAL 2 TIMES DAILY
Qty: 60 TABLET | Refills: 3 | Status: SHIPPED | OUTPATIENT
Start: 2021-07-01 | End: 2022-09-22

## 2021-07-01 RX ORDER — NYSTATIN AND TRIAMCINOLONE ACETONIDE 100000; 1 [USP'U]/G; MG/G
CREAM TOPICAL 2 TIMES DAILY
Qty: 60 G | Refills: 11 | Status: SHIPPED | OUTPATIENT
Start: 2021-07-01

## 2021-07-01 RX ORDER — SIMVASTATIN 40 MG/1
40 TABLET, FILM COATED ORAL NIGHTLY
Qty: 90 TABLET | Refills: 3 | Status: SHIPPED | OUTPATIENT
Start: 2021-07-01 | End: 2022-05-09

## 2021-07-01 RX ORDER — IBUPROFEN 800 MG/1
800 TABLET ORAL EVERY 8 HOURS PRN
Qty: 30 TABLET | Refills: 1 | Status: SHIPPED | OUTPATIENT
Start: 2021-07-01

## 2021-07-01 RX ORDER — OLMESARTAN MEDOXOMIL 40 MG/1
40 TABLET ORAL DAILY
Qty: 90 TABLET | Refills: 3 | Status: SHIPPED | OUTPATIENT
Start: 2021-07-01 | End: 2022-06-14

## 2021-07-01 RX ORDER — HYDROCORTISONE ACETATE PRAMOXINE HCL 1; 1 G/100G; G/100G
CREAM TOPICAL 2 TIMES DAILY
Qty: 28.4 G | Refills: 11 | Status: SHIPPED | OUTPATIENT
Start: 2021-07-01

## 2021-07-07 PROBLEM — I70.0 ABDOMINAL AORTIC ATHEROSCLEROSIS: Status: RESOLVED | Noted: 2017-07-11 | Resolved: 2021-07-07

## 2021-07-12 ENCOUNTER — TELEPHONE (OUTPATIENT)
Dept: CARDIOLOGY | Facility: HOSPITAL | Age: 81
End: 2021-07-12

## 2021-07-13 ENCOUNTER — CLINICAL SUPPORT (OUTPATIENT)
Dept: CARDIOLOGY | Facility: HOSPITAL | Age: 81
End: 2021-07-13
Attending: FAMILY MEDICINE
Payer: MEDICARE

## 2021-07-13 VITALS — BODY MASS INDEX: 24.95 KG/M2 | WEIGHT: 184 LBS

## 2021-07-13 DIAGNOSIS — R07.9 CHEST PAIN, UNSPECIFIED TYPE: ICD-10-CM

## 2021-07-13 DIAGNOSIS — E78.2 MIXED HYPERLIPIDEMIA: ICD-10-CM

## 2021-07-13 DIAGNOSIS — I10 ESSENTIAL HYPERTENSION: ICD-10-CM

## 2021-07-13 LAB
CV STRESS BASE HR: 77 BPM
DIASTOLIC BLOOD PRESSURE: 82 MMHG
EJECTION FRACTION: 55 %
OHS CV CPX 1 MINUTE RECOVERY HEART RATE: 115 BPM
OHS CV CPX 85 PERCENT MAX PREDICTED HEART RATE MALE: 120
OHS CV CPX ESTIMATED METS: 6
OHS CV CPX MAX PREDICTED HEART RATE: 141
OHS CV CPX PATIENT IS FEMALE: 0
OHS CV CPX PATIENT IS MALE: 1
OHS CV CPX PEAK DIASTOLIC BLOOD PRESSURE: 84 MMHG
OHS CV CPX PEAK HEAR RATE: 132 BPM
OHS CV CPX PEAK RATE PRESSURE PRODUCT: NORMAL
OHS CV CPX PEAK SYSTOLIC BLOOD PRESSURE: 196 MMHG
OHS CV CPX PERCENT MAX PREDICTED HEART RATE ACHIEVED: 94
OHS CV CPX RATE PRESSURE PRODUCT PRESENTING: NORMAL
STRESS ANGINA INDEX: 0
STRESS ECHO POST EXERCISE DUR MIN: 3 MINUTES
STRESS ECHO POST EXERCISE DUR SEC: 30 SECONDS
SYSTOLIC BLOOD PRESSURE: 159 MMHG

## 2021-07-13 PROCEDURE — 93351 STRESS TTE COMPLETE: CPT | Mod: 26,,, | Performed by: INTERNAL MEDICINE

## 2021-07-13 PROCEDURE — 93351 STRESS ECHO (CUPID ONLY): ICD-10-PCS | Mod: 26,,, | Performed by: INTERNAL MEDICINE

## 2021-07-13 PROCEDURE — 93351 STRESS TTE COMPLETE: CPT

## 2021-07-29 ENCOUNTER — PES CALL (OUTPATIENT)
Dept: ADMINISTRATIVE | Facility: CLINIC | Age: 81
End: 2021-07-29

## 2021-07-30 ENCOUNTER — TELEPHONE (OUTPATIENT)
Dept: GASTROENTEROLOGY | Facility: CLINIC | Age: 81
End: 2021-07-30

## 2021-08-02 ENCOUNTER — OFFICE VISIT (OUTPATIENT)
Dept: FAMILY MEDICINE | Facility: CLINIC | Age: 81
End: 2021-08-02
Payer: MEDICARE

## 2021-08-02 VITALS
HEART RATE: 87 BPM | TEMPERATURE: 97 F | HEIGHT: 72 IN | DIASTOLIC BLOOD PRESSURE: 70 MMHG | OXYGEN SATURATION: 95 % | BODY MASS INDEX: 24.73 KG/M2 | SYSTOLIC BLOOD PRESSURE: 138 MMHG | WEIGHT: 182.56 LBS

## 2021-08-02 DIAGNOSIS — I70.0 AORTIC ATHEROSCLEROSIS: ICD-10-CM

## 2021-08-02 DIAGNOSIS — I10 ESSENTIAL HYPERTENSION: ICD-10-CM

## 2021-08-02 DIAGNOSIS — Z00.00 ENCOUNTER FOR PREVENTIVE HEALTH EXAMINATION: Primary | ICD-10-CM

## 2021-08-02 DIAGNOSIS — E78.2 MIXED HYPERLIPIDEMIA: ICD-10-CM

## 2021-08-02 DIAGNOSIS — K64.9 HEMORRHOIDS, UNSPECIFIED HEMORRHOID TYPE: ICD-10-CM

## 2021-08-02 DIAGNOSIS — Z86.010 HX OF COLONIC POLYPS: Primary | ICD-10-CM

## 2021-08-02 DIAGNOSIS — Z85.820 HISTORY OF MELANOMA: ICD-10-CM

## 2021-08-02 DIAGNOSIS — Z12.5 SCREENING FOR PROSTATE CANCER: ICD-10-CM

## 2021-08-02 PROCEDURE — G0439 PPPS, SUBSEQ VISIT: HCPCS | Mod: S$GLB,,, | Performed by: NURSE PRACTITIONER

## 2021-08-02 PROCEDURE — G0439 PR MEDICARE ANNUAL WELLNESS SUBSEQUENT VISIT: ICD-10-PCS | Mod: S$GLB,,, | Performed by: NURSE PRACTITIONER

## 2021-08-17 ENCOUNTER — LAB VISIT (OUTPATIENT)
Dept: LAB | Facility: HOSPITAL | Age: 81
End: 2021-08-17
Attending: NURSE PRACTITIONER
Payer: MEDICARE

## 2021-08-17 ENCOUNTER — OFFICE VISIT (OUTPATIENT)
Dept: SURGERY | Facility: CLINIC | Age: 81
End: 2021-08-17
Payer: MEDICARE

## 2021-08-17 VITALS
HEART RATE: 95 BPM | HEIGHT: 72 IN | BODY MASS INDEX: 24.76 KG/M2 | TEMPERATURE: 99 F | SYSTOLIC BLOOD PRESSURE: 135 MMHG | DIASTOLIC BLOOD PRESSURE: 60 MMHG

## 2021-08-17 DIAGNOSIS — Z12.5 SCREENING FOR PROSTATE CANCER: ICD-10-CM

## 2021-08-17 DIAGNOSIS — K40.90 NON-RECURRENT UNILATERAL INGUINAL HERNIA WITHOUT OBSTRUCTION OR GANGRENE: Primary | ICD-10-CM

## 2021-08-17 DIAGNOSIS — K64.9 HEMORRHOIDS, UNSPECIFIED HEMORRHOID TYPE: ICD-10-CM

## 2021-08-17 LAB — COMPLEXED PSA SERPL-MCNC: 4.3 NG/ML (ref 0–4)

## 2021-08-17 PROCEDURE — 99999 PR PBB SHADOW E&M-EST. PATIENT-LVL III: CPT | Mod: PBBFAC,,, | Performed by: SURGERY

## 2021-08-17 PROCEDURE — 99213 OFFICE O/P EST LOW 20 MIN: CPT | Mod: PBBFAC,PN | Performed by: SURGERY

## 2021-08-17 PROCEDURE — 46600 DIAGNOSTIC ANOSCOPY SPX: CPT | Mod: PBBFAC,PN | Performed by: SURGERY

## 2021-08-17 PROCEDURE — 99203 PR OFFICE/OUTPT VISIT, NEW, LEVL III, 30-44 MIN: ICD-10-PCS | Mod: S$PBB,25,, | Performed by: SURGERY

## 2021-08-17 PROCEDURE — 46600 PR DIAG2STIC A2SCOPY: ICD-10-PCS | Mod: S$PBB,,, | Performed by: SURGERY

## 2021-08-17 PROCEDURE — 36415 COLL VENOUS BLD VENIPUNCTURE: CPT | Mod: PO | Performed by: NURSE PRACTITIONER

## 2021-08-17 PROCEDURE — 84153 ASSAY OF PSA TOTAL: CPT | Performed by: NURSE PRACTITIONER

## 2021-08-17 PROCEDURE — 99203 OFFICE O/P NEW LOW 30 MIN: CPT | Mod: S$PBB,25,, | Performed by: SURGERY

## 2021-08-17 PROCEDURE — 46600 DIAGNOSTIC ANOSCOPY SPX: CPT | Mod: S$PBB,,, | Performed by: SURGERY

## 2021-08-17 PROCEDURE — 99999 PR PBB SHADOW E&M-EST. PATIENT-LVL III: ICD-10-PCS | Mod: PBBFAC,,, | Performed by: SURGERY

## 2021-09-03 DIAGNOSIS — R97.20 ELEVATED PSA: Primary | ICD-10-CM

## 2021-09-23 ENCOUNTER — PATIENT MESSAGE (OUTPATIENT)
Dept: GASTROENTEROLOGY | Facility: CLINIC | Age: 81
End: 2021-09-23

## 2021-09-23 ENCOUNTER — TELEPHONE (OUTPATIENT)
Dept: GASTROENTEROLOGY | Facility: CLINIC | Age: 81
End: 2021-09-23

## 2021-09-24 ENCOUNTER — LAB VISIT (OUTPATIENT)
Dept: PRIMARY CARE CLINIC | Facility: CLINIC | Age: 81
End: 2021-09-24
Payer: MEDICARE

## 2021-09-24 DIAGNOSIS — Z01.818 PRE-OP TESTING: ICD-10-CM

## 2021-09-24 PROCEDURE — U0005 INFEC AGEN DETEC AMPLI PROBE: HCPCS | Performed by: INTERNAL MEDICINE

## 2021-09-24 PROCEDURE — U0003 INFECTIOUS AGENT DETECTION BY NUCLEIC ACID (DNA OR RNA); SEVERE ACUTE RESPIRATORY SYNDROME CORONAVIRUS 2 (SARS-COV-2) (CORONAVIRUS DISEASE [COVID-19]), AMPLIFIED PROBE TECHNIQUE, MAKING USE OF HIGH THROUGHPUT TECHNOLOGIES AS DESCRIBED BY CMS-2020-01-R: HCPCS | Performed by: INTERNAL MEDICINE

## 2021-09-25 LAB
SARS-COV-2 RNA RESP QL NAA+PROBE: NOT DETECTED
SARS-COV-2- CYCLE NUMBER: NORMAL

## 2021-09-27 ENCOUNTER — HOSPITAL ENCOUNTER (OUTPATIENT)
Facility: HOSPITAL | Age: 81
Discharge: HOME | End: 2021-09-27
Attending: INTERNAL MEDICINE | Admitting: INTERNAL MEDICINE
Payer: MEDICARE

## 2021-09-27 ENCOUNTER — ANESTHESIA (OUTPATIENT)
Dept: ENDOSCOPY | Facility: HOSPITAL | Age: 81
End: 2021-09-27
Payer: MEDICARE

## 2021-09-27 ENCOUNTER — ANESTHESIA EVENT (OUTPATIENT)
Dept: ENDOSCOPY | Facility: HOSPITAL | Age: 81
End: 2021-09-27
Payer: MEDICARE

## 2021-09-27 DIAGNOSIS — Z86.010 HISTORY OF COLONIC POLYPS: ICD-10-CM

## 2021-09-27 DIAGNOSIS — Z86.010 HX OF COLONIC POLYPS: ICD-10-CM

## 2021-09-27 DIAGNOSIS — K64.8 INTERNAL HEMORRHOIDS: ICD-10-CM

## 2021-09-27 DIAGNOSIS — K63.5 POLYP OF COLON, UNSPECIFIED PART OF COLON, UNSPECIFIED TYPE: Primary | ICD-10-CM

## 2021-09-27 PROBLEM — Z86.0100 HISTORY OF COLONIC POLYPS: Status: ACTIVE | Noted: 2021-09-27

## 2021-09-27 PROCEDURE — D9220A PRA ANESTHESIA: Mod: PT,CRNA,, | Performed by: NURSE ANESTHETIST, CERTIFIED REGISTERED

## 2021-09-27 PROCEDURE — D9220A PRA ANESTHESIA: ICD-10-PCS | Mod: PT,ANES,, | Performed by: ANESTHESIOLOGY

## 2021-09-27 PROCEDURE — D9220A PRA ANESTHESIA: Mod: PT,ANES,, | Performed by: ANESTHESIOLOGY

## 2021-09-27 PROCEDURE — 45385 COLONOSCOPY W/LESION REMOVAL: CPT | Mod: PT,,, | Performed by: INTERNAL MEDICINE

## 2021-09-27 PROCEDURE — 88305 TISSUE EXAM BY PATHOLOGIST: CPT | Performed by: PATHOLOGY

## 2021-09-27 PROCEDURE — 37000008 HC ANESTHESIA 1ST 15 MINUTES: Performed by: INTERNAL MEDICINE

## 2021-09-27 PROCEDURE — 37000009 HC ANESTHESIA EA ADD 15 MINS: Performed by: INTERNAL MEDICINE

## 2021-09-27 PROCEDURE — 45380 COLONOSCOPY AND BIOPSY: CPT | Mod: PT,59 | Performed by: INTERNAL MEDICINE

## 2021-09-27 PROCEDURE — 63600175 PHARM REV CODE 636 W HCPCS: Performed by: NURSE ANESTHETIST, CERTIFIED REGISTERED

## 2021-09-27 PROCEDURE — 45385 COLONOSCOPY W/LESION REMOVAL: CPT | Mod: PT | Performed by: INTERNAL MEDICINE

## 2021-09-27 PROCEDURE — 88305 TISSUE EXAM BY PATHOLOGIST: CPT | Mod: 26,,, | Performed by: PATHOLOGY

## 2021-09-27 PROCEDURE — 27201089 HC SNARE, DISP (ANY): Performed by: INTERNAL MEDICINE

## 2021-09-27 PROCEDURE — 45380 COLONOSCOPY AND BIOPSY: CPT | Mod: PT,59,, | Performed by: INTERNAL MEDICINE

## 2021-09-27 PROCEDURE — 88305 TISSUE EXAM BY PATHOLOGIST: ICD-10-PCS | Mod: 26,,, | Performed by: PATHOLOGY

## 2021-09-27 PROCEDURE — 25000003 PHARM REV CODE 250: Performed by: INTERNAL MEDICINE

## 2021-09-27 PROCEDURE — 45380 PR COLONOSCOPY,BIOPSY: ICD-10-PCS | Mod: PT,59,, | Performed by: INTERNAL MEDICINE

## 2021-09-27 PROCEDURE — 45385 PR COLONOSCOPY,REMV LESN,SNARE: ICD-10-PCS | Mod: PT,,, | Performed by: INTERNAL MEDICINE

## 2021-09-27 PROCEDURE — 27201012 HC FORCEPS, HOT/COLD, DISP: Performed by: INTERNAL MEDICINE

## 2021-09-27 PROCEDURE — D9220A PRA ANESTHESIA: ICD-10-PCS | Mod: PT,CRNA,, | Performed by: NURSE ANESTHETIST, CERTIFIED REGISTERED

## 2021-09-27 RX ORDER — ASPIRIN 81 MG/1
81 TABLET ORAL DAILY
COMMUNITY

## 2021-09-27 RX ORDER — PROPOFOL 10 MG/ML
VIAL (ML) INTRAVENOUS
Status: DISCONTINUED | OUTPATIENT
Start: 2021-09-27 | End: 2021-09-27

## 2021-09-27 RX ORDER — SODIUM CHLORIDE 9 MG/ML
INJECTION, SOLUTION INTRAVENOUS CONTINUOUS
Status: DISCONTINUED | OUTPATIENT
Start: 2021-09-27 | End: 2021-09-27 | Stop reason: HOSPADM

## 2021-09-27 RX ADMIN — PROPOFOL 40 MG: 10 INJECTION, EMULSION INTRAVENOUS at 07:09

## 2021-09-27 RX ADMIN — SODIUM CHLORIDE: 0.9 INJECTION, SOLUTION INTRAVENOUS at 07:09

## 2021-09-27 RX ADMIN — PROPOFOL 120 MG: 10 INJECTION, EMULSION INTRAVENOUS at 07:09

## 2021-09-28 ENCOUNTER — IMMUNIZATION (OUTPATIENT)
Dept: PRIMARY CARE CLINIC | Facility: CLINIC | Age: 81
End: 2021-09-28
Payer: MEDICARE

## 2021-09-28 VITALS
TEMPERATURE: 98 F | DIASTOLIC BLOOD PRESSURE: 76 MMHG | HEART RATE: 63 BPM | HEIGHT: 72 IN | SYSTOLIC BLOOD PRESSURE: 121 MMHG | WEIGHT: 175 LBS | OXYGEN SATURATION: 98 % | BODY MASS INDEX: 23.7 KG/M2 | RESPIRATION RATE: 16 BRPM

## 2021-09-28 DIAGNOSIS — Z23 NEED FOR VACCINATION: Primary | ICD-10-CM

## 2021-09-28 PROCEDURE — 0003A COVID-19, MRNA, LNP-S, PF, 30 MCG/0.3 ML DOSE VACCINE: CPT | Mod: S$GLB,,, | Performed by: FAMILY MEDICINE

## 2021-09-28 PROCEDURE — 91300 COVID-19, MRNA, LNP-S, PF, 30 MCG/0.3 ML DOSE VACCINE: ICD-10-PCS | Mod: S$GLB,,, | Performed by: FAMILY MEDICINE

## 2021-09-28 PROCEDURE — 0003A COVID-19, MRNA, LNP-S, PF, 30 MCG/0.3 ML DOSE VACCINE: ICD-10-PCS | Mod: S$GLB,,, | Performed by: FAMILY MEDICINE

## 2021-09-28 PROCEDURE — 91300 COVID-19, MRNA, LNP-S, PF, 30 MCG/0.3 ML DOSE VACCINE: CPT | Mod: S$GLB,,, | Performed by: FAMILY MEDICINE

## 2021-10-01 LAB
FINAL PATHOLOGIC DIAGNOSIS: NORMAL
GROSS: NORMAL
Lab: NORMAL

## 2021-10-04 ENCOUNTER — TELEPHONE (OUTPATIENT)
Dept: GASTROENTEROLOGY | Facility: CLINIC | Age: 81
End: 2021-10-04

## 2021-12-08 ENCOUNTER — OFFICE VISIT (OUTPATIENT)
Dept: UROLOGY | Facility: CLINIC | Age: 81
End: 2021-12-08
Payer: MEDICARE

## 2021-12-08 VITALS
SYSTOLIC BLOOD PRESSURE: 161 MMHG | WEIGHT: 188.06 LBS | HEART RATE: 86 BPM | BODY MASS INDEX: 25.47 KG/M2 | HEIGHT: 72 IN | DIASTOLIC BLOOD PRESSURE: 87 MMHG | RESPIRATION RATE: 18 BRPM

## 2021-12-08 DIAGNOSIS — K40.90 UNILATERAL INGUINAL HERNIA WITHOUT OBSTRUCTION OR GANGRENE, RECURRENCE NOT SPECIFIED: ICD-10-CM

## 2021-12-08 DIAGNOSIS — R97.20 ELEVATED PSA: Primary | ICD-10-CM

## 2021-12-08 DIAGNOSIS — I10 PRIMARY HYPERTENSION: ICD-10-CM

## 2021-12-08 DIAGNOSIS — N50.811 PAIN IN BOTH TESTICLES: ICD-10-CM

## 2021-12-08 DIAGNOSIS — N50.812 PAIN IN BOTH TESTICLES: ICD-10-CM

## 2021-12-08 DIAGNOSIS — E78.2 MIXED HYPERLIPIDEMIA: ICD-10-CM

## 2021-12-08 LAB
BILIRUB SERPL-MCNC: NORMAL MG/DL
BLOOD URINE, POC: NORMAL
CLARITY, POC UA: CLEAR
COLOR, POC UA: YELLOW
GLUCOSE UR QL STRIP: 100
KETONES UR QL STRIP: NORMAL
LEUKOCYTE ESTERASE URINE, POC: NORMAL
NITRITE, POC UA: NORMAL
PH, POC UA: 5.5
POC RESIDUAL URINE VOLUME: 0 ML (ref 0–100)
PROTEIN, POC: NORMAL
SPECIFIC GRAVITY, POC UA: 1.01
UROBILINOGEN, POC UA: 0.2

## 2021-12-08 PROCEDURE — 99213 OFFICE O/P EST LOW 20 MIN: CPT | Mod: PBBFAC,PN | Performed by: STUDENT IN AN ORGANIZED HEALTH CARE EDUCATION/TRAINING PROGRAM

## 2021-12-08 PROCEDURE — 99204 OFFICE O/P NEW MOD 45 MIN: CPT | Mod: S$PBB,,, | Performed by: STUDENT IN AN ORGANIZED HEALTH CARE EDUCATION/TRAINING PROGRAM

## 2021-12-08 PROCEDURE — 99999 PR PBB SHADOW E&M-EST. PATIENT-LVL III: ICD-10-PCS | Mod: PBBFAC,,, | Performed by: STUDENT IN AN ORGANIZED HEALTH CARE EDUCATION/TRAINING PROGRAM

## 2021-12-08 PROCEDURE — 99204 PR OFFICE/OUTPT VISIT, NEW, LEVL IV, 45-59 MIN: ICD-10-PCS | Mod: S$PBB,,, | Performed by: STUDENT IN AN ORGANIZED HEALTH CARE EDUCATION/TRAINING PROGRAM

## 2021-12-08 PROCEDURE — 51798 US URINE CAPACITY MEASURE: CPT | Mod: PBBFAC,PN | Performed by: STUDENT IN AN ORGANIZED HEALTH CARE EDUCATION/TRAINING PROGRAM

## 2021-12-08 PROCEDURE — 99999 PR PBB SHADOW E&M-EST. PATIENT-LVL III: CPT | Mod: PBBFAC,,, | Performed by: STUDENT IN AN ORGANIZED HEALTH CARE EDUCATION/TRAINING PROGRAM

## 2021-12-08 PROCEDURE — 81002 URINALYSIS NONAUTO W/O SCOPE: CPT | Mod: PBBFAC,PN | Performed by: STUDENT IN AN ORGANIZED HEALTH CARE EDUCATION/TRAINING PROGRAM

## 2021-12-15 ENCOUNTER — LAB VISIT (OUTPATIENT)
Dept: LAB | Facility: HOSPITAL | Age: 81
End: 2021-12-15
Attending: STUDENT IN AN ORGANIZED HEALTH CARE EDUCATION/TRAINING PROGRAM
Payer: MEDICARE

## 2021-12-15 DIAGNOSIS — R97.20 ELEVATED PSA: ICD-10-CM

## 2021-12-15 LAB — COMPLEXED PSA SERPL-MCNC: 2.6 NG/ML (ref 0–4)

## 2021-12-15 PROCEDURE — 36415 COLL VENOUS BLD VENIPUNCTURE: CPT | Mod: PO | Performed by: STUDENT IN AN ORGANIZED HEALTH CARE EDUCATION/TRAINING PROGRAM

## 2021-12-15 PROCEDURE — 84153 ASSAY OF PSA TOTAL: CPT | Performed by: STUDENT IN AN ORGANIZED HEALTH CARE EDUCATION/TRAINING PROGRAM

## 2022-03-09 ENCOUNTER — PATIENT MESSAGE (OUTPATIENT)
Dept: UROLOGY | Facility: CLINIC | Age: 82
End: 2022-03-09
Payer: MEDICARE

## 2022-03-09 ENCOUNTER — HOSPITAL ENCOUNTER (OUTPATIENT)
Dept: RADIOLOGY | Facility: HOSPITAL | Age: 82
Discharge: HOME | End: 2022-03-09
Attending: PHYSICIAN ASSISTANT
Payer: MEDICARE

## 2022-03-09 ENCOUNTER — OFFICE VISIT (OUTPATIENT)
Dept: FAMILY MEDICINE | Facility: CLINIC | Age: 82
End: 2022-03-09
Payer: MEDICARE

## 2022-03-09 VITALS
WEIGHT: 190.69 LBS | HEART RATE: 91 BPM | DIASTOLIC BLOOD PRESSURE: 76 MMHG | BODY MASS INDEX: 25.83 KG/M2 | HEIGHT: 72 IN | RESPIRATION RATE: 18 BRPM | SYSTOLIC BLOOD PRESSURE: 130 MMHG | TEMPERATURE: 98 F | OXYGEN SATURATION: 96 %

## 2022-03-09 DIAGNOSIS — R07.89 CHEST WALL PAIN: ICD-10-CM
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
## 2022-03-09 DIAGNOSIS — Z13.6 ENCOUNTER FOR ABDOMINAL AORTIC ANEURYSM SCREENING: Primary | ICD-10-CM
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
## 2022-03-09 DIAGNOSIS — I70.0 AORTIC ATHEROSCLEROSIS: ICD-10-CM

## 2022-03-09 PROBLEM — Z86.0100 HISTORY OF COLONIC POLYPS: Status: RESOLVED | Noted: 2021-09-27 | Resolved: 2022-03-09

## 2022-03-09 PROBLEM — Z86.010 HISTORY OF COLONIC POLYPS: Status: RESOLVED | Noted: 2021-09-27 | Resolved: 2022-03-09

## 2022-03-09 PROBLEM — C43.61 MALIGNANT MELANOMA OF RIGHT UPPER EXTREMITY INCLUDING SHOULDER: Status: RESOLVED | Noted: 2018-03-22 | Resolved: 2022-03-09

## 2022-03-09 PROBLEM — N50.811 PAIN IN BOTH TESTICLES: Status: RESOLVED | Noted: 2021-12-08 | Resolved: 2022-03-09

## 2022-03-09 PROBLEM — Z85.820 HX OF MALIGNANT SKIN MELANOMA: Status: ACTIVE | Noted: 2022-03-09

## 2022-03-09 PROBLEM — N50.812 PAIN IN BOTH TESTICLES: Status: RESOLVED | Noted: 2021-12-08 | Resolved: 2022-03-09

## 2022-03-09 PROBLEM — M25.562 ACUTE PAIN OF LEFT KNEE: Status: RESOLVED | Noted: 2019-01-17 | Resolved: 2022-03-09

## 2022-03-09 PROCEDURE — 99999 PR PBB SHADOW E&M-EST. PATIENT-LVL IV: ICD-10-PCS | Mod: PBBFAC,,, | Performed by: PHYSICIAN ASSISTANT

## 2022-03-09 PROCEDURE — 71101 X-RAY EXAM UNILAT RIBS/CHEST: CPT | Mod: TC,FY,RT

## 2022-03-09 PROCEDURE — 71101 X-RAY EXAM UNILAT RIBS/CHEST: CPT | Mod: 26,RT,, | Performed by: RADIOLOGY

## 2022-03-09 PROCEDURE — 99213 PR OFFICE/OUTPT VISIT, EST, LEVL III, 20-29 MIN: ICD-10-PCS | Mod: S$PBB,,, | Performed by: PHYSICIAN ASSISTANT

## 2022-03-09 PROCEDURE — 99999 PR PBB SHADOW E&M-EST. PATIENT-LVL IV: CPT | Mod: PBBFAC,,, | Performed by: PHYSICIAN ASSISTANT

## 2022-03-09 PROCEDURE — 71101 XR RIBS MIN 3 VIEWS W/ PA CHEST RIGHT: ICD-10-PCS | Mod: 26,RT,, | Performed by: RADIOLOGY

## 2022-03-09 PROCEDURE — 99214 OFFICE O/P EST MOD 30 MIN: CPT | Mod: PBBFAC,PN | Performed by: PHYSICIAN ASSISTANT

## 2022-03-09 PROCEDURE — 99213 OFFICE O/P EST LOW 20 MIN: CPT | Mod: S$PBB,,, | Performed by: PHYSICIAN ASSISTANT

## 2022-03-09 NOTE — PROGRESS NOTES
Subjective:       Patient ID: Iftikhar Lynn is a 82 y.o. male.    Chief Complaint: Fall and Rib Injury     Patient presents after falling on a bucket landing on his right ribs about a month ago.  He states the area is painful especially on deep breaths but he was lifting a car battery a day or so ago and felt a pop and became short of breath.  He states he found it very difficult to get a full breath of air.  He denies any syncope or shortness of breath now.  He has had broken ribs in the past and has not sought medical attention after this prior fall.  He has not been taking anything for the symptoms.    Review of Systems   Constitutional: Negative for activity change, appetite change, fatigue and fever.   Respiratory: Positive for shortness of breath. Negative for cough and chest tightness.         Rib pain on deep breaths   Cardiovascular: Negative for leg swelling.   Neurological: Negative for numbness.         Objective:      Physical Exam  Constitutional:       General: He is not in acute distress.     Appearance: He is well-developed. He is not diaphoretic.   HENT:      Head: Normocephalic and atraumatic.   Eyes:      Conjunctiva/sclera: Conjunctivae normal.      Pupils: Pupils are equal, round, and reactive to light.   Neck:      Thyroid: No thyromegaly.      Vascular: No JVD.   Cardiovascular:      Rate and Rhythm: Normal rate and regular rhythm.      Heart sounds: No murmur heard.    No friction rub. No gallop.   Pulmonary:      Effort: Pulmonary effort is normal. No respiratory distress.      Breath sounds: No wheezing or rales.   Chest:      Chest wall: Tenderness (  Tenderness along the right mid ribs) present.   Musculoskeletal:      Cervical back: Normal range of motion and neck supple.   Neurological:      Mental Status: He is alert and oriented to person, place, and time.         Assessment:       Problem List Items Addressed This Visit     Aortic atherosclerosis      Other Visit Diagnoses      Fall, initial encounter    -  Primary    ribs xray ordered    Relevant Orders    XR Ribs Min 3 Views w/PA Chest Right    Chest wall pain         rib x-ray ordered will call with results.    Relevant Orders    XR Ribs Min 3 Views w/PA Chest Right          Plan:       Iftikhar was seen today for fall and rib injury.    Diagnoses and all orders for this visit:    Fall, initial encounter  Comments:  ribs xray ordered  Orders:  -     XR Ribs Min 3 Views w/PA Chest Right; Future    Chest wall pain  Comments:   rib x-ray ordered will call with results.  Orders:  -     XR Ribs Min 3 Views w/PA Chest Right; Future    Aortic atherosclerosis  Comments:    Stable, continue to monitor, continue aspirin     will call patient with results and further recommendations.

## 2022-03-14 ENCOUNTER — PATIENT MESSAGE (OUTPATIENT)
Dept: UROLOGY | Facility: CLINIC | Age: 82
End: 2022-03-14
Payer: MEDICARE

## 2022-03-14 NOTE — TELEPHONE ENCOUNTER
Appointment scheduled for tomorrow 3-. Patient agreed to appointment date, time, and location. Location confirmed at the time of call.

## 2022-03-15 ENCOUNTER — TELEPHONE (OUTPATIENT)
Dept: FAMILY MEDICINE | Facility: CLINIC | Age: 82
End: 2022-03-15
Payer: MEDICARE

## 2022-03-15 ENCOUNTER — HOSPITAL ENCOUNTER (OUTPATIENT)
Dept: RADIOLOGY | Facility: HOSPITAL | Age: 82
Discharge: HOME | End: 2022-03-15
Attending: PHYSICIAN ASSISTANT
Payer: MEDICARE

## 2022-03-15 DIAGNOSIS — I77.819 AORTIC ECTASIA, UNSPECIFIED SITE: ICD-10-CM

## 2022-03-15 DIAGNOSIS — R91.1 LUNG NODULE: Primary | ICD-10-CM

## 2022-03-15 DIAGNOSIS — I72.3 ILIAC ARTERY ANEURYSM, LEFT: ICD-10-CM

## 2022-03-15 DIAGNOSIS — Z13.6 ENCOUNTER FOR ABDOMINAL AORTIC ANEURYSM SCREENING: ICD-10-CM

## 2022-03-15 PROCEDURE — 76775 US EXAM ABDO BACK WALL LIM: CPT | Mod: TC

## 2022-03-15 PROCEDURE — 76775 US ABDOMINAL AORTA: ICD-10-PCS | Mod: 26,,, | Performed by: RADIOLOGY

## 2022-03-15 PROCEDURE — 76775 US EXAM ABDO BACK WALL LIM: CPT | Mod: 26,,, | Performed by: RADIOLOGY

## 2022-03-15 NOTE — TELEPHONE ENCOUNTER
I called and spoke to Dr. Portillo and he would like to repeat chest CT to ensure stability of the nodules as well as to get a good look at the heart.  As far as the aneurysm goes we have 1 of 2 options.  We can continue to monitor yearly or we can send him to vascular surgery and have them monitor that way he is an established patient if he ever needs repair.  I am going to place the order for the CT chest

## 2022-03-15 NOTE — TELEPHONE ENCOUNTER
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 He says he has not had ctscan of chest any where else and thought he was told no longer needed to check lung nodules. He is willing to check it if needed.

## 2022-03-15 NOTE — TELEPHONE ENCOUNTER
----- Message from TREVON Elena sent at 3/15/2022  2:46 PM CDT -----  Please let patient know I have the results of his ultrasound.  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  This is something we are going to have to monitor yearly.  When I was going through his chart I also noticed back in 2018 that he had a CT of the chest with some lung nodules and it does not look like these have been checked in some time.  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

## 2022-03-16 NOTE — TELEPHONE ENCOUNTER
Patient has scheduled appointment for CT. Patient is in agreement with being monitored by Vascular Surgery. Will send follow up message to TREVON Carranza for notification.

## 2022-03-16 NOTE — TELEPHONE ENCOUNTER
Call placed to patient for notification. Patient verbalized understanding. Patient given the number to call and schedule appointment.

## 2022-03-24 ENCOUNTER — HOSPITAL ENCOUNTER (OUTPATIENT)
Dept: RADIOLOGY | Facility: HOSPITAL | Age: 82
Discharge: HOME | End: 2022-03-24
Attending: PHYSICIAN ASSISTANT
Payer: MEDICARE

## 2022-03-24 DIAGNOSIS — I77.819 AORTIC ECTASIA, UNSPECIFIED SITE: ICD-10-CM

## 2022-03-24 DIAGNOSIS — R91.1 LUNG NODULE: ICD-10-CM

## 2022-03-24 PROCEDURE — 71250 CT THORAX DX C-: CPT | Mod: TC

## 2022-03-24 PROCEDURE — 71250 CT THORAX DX C-: CPT | Mod: 26,,, | Performed by: RADIOLOGY

## 2022-03-24 PROCEDURE — 71250 CT CHEST WITHOUT CONTRAST: ICD-10-PCS | Mod: 26,,, | Performed by: RADIOLOGY

## 2022-04-23 ENCOUNTER — TELEPHONE (OUTPATIENT)
Dept: FAMILY MEDICINE | Facility: CLINIC | Age: 82
End: 2022-04-23
Payer: MEDICARE

## 2022-04-23 NOTE — TELEPHONE ENCOUNTER
I called the patient to schedule their free one hour Enhanced Annual Wellness Visit appointment with Joana Campbell NP

## 2022-05-08 DIAGNOSIS — E78.2 MIXED HYPERLIPIDEMIA: ICD-10-CM

## 2022-05-09 RX ORDER — SIMVASTATIN 40 MG/1
40 TABLET, FILM COATED ORAL NIGHTLY
Qty: 90 TABLET | Refills: 3 | Status: SHIPPED | OUTPATIENT
Start: 2022-05-09 | End: 2023-04-10

## 2022-05-09 NOTE — TELEPHONE ENCOUNTER
Care Due:                  Date            Visit Type   Department     Provider  --------------------------------------------------------------------------------                                EP -                              PRIMARY      [facility]  Last Visit: 07-      CARE ([facility])   PRACTICE       Chris Portillo  Next Visit: None Scheduled  None         None Found                                                            Last  Test          Frequency    Reason                     Performed    Due Date  --------------------------------------------------------------------------------    Office Visit  12 months..  olmesartan, simvastatin,   07- 06-                             valACYclovir.............    CBC.........  12 months..  valACYclovir.............  06- 06-    CMP.........  12 months..  olmesartan, simvastatin,   06- 06-                             valACYclovir.............    Lipid Panel.  12 months..  simvastatin..............  06- 06-    [facility] Embedded Care Gaps. Reference number: [id number]. 5/08/2022   8:45:39 PM CDT

## 2022-05-09 NOTE — TELEPHONE ENCOUNTER
Refill Routing Note   Medication(s) are not appropriate for processing by [facility] for the following reason(s):      - Patient has been seen in the ED/Hospital since the last PCP visit    [facility] action(s):  Defer Medication-related problems identified:   Requires labs  Requires appointment        Medication reconciliation completed: No     Appointments  past 12m or future 3m with PCP    Date Provider   Last Visit   7/1/2021 Chris Portillo Jr., MD   Next Visit   Visit date not found Chris Portillo Jr., MD   ED visits in past 90 days: 0        Note composed:9:11 AM 05/09/2022

## 2022-05-11 ENCOUNTER — OFFICE VISIT (OUTPATIENT)
Dept: FAMILY MEDICINE | Facility: CLINIC | Age: 82
End: 2022-05-11
Payer: MEDICARE

## 2022-05-11 VITALS
TEMPERATURE: 98 F | SYSTOLIC BLOOD PRESSURE: 122 MMHG | DIASTOLIC BLOOD PRESSURE: 70 MMHG | OXYGEN SATURATION: 97 % | HEART RATE: 80 BPM | BODY MASS INDEX: 24.94 KG/M2 | WEIGHT: 183.88 LBS

## 2022-05-11 DIAGNOSIS — R10.9 ABDOMINAL CRAMPING: ICD-10-CM

## 2022-05-11 DIAGNOSIS — R19.7 DIARRHEA, UNSPECIFIED TYPE: Primary | ICD-10-CM

## 2022-05-11 DIAGNOSIS — I70.0 AORTIC ATHEROSCLEROSIS: ICD-10-CM

## 2022-05-11 DIAGNOSIS — K21.9 GASTROESOPHAGEAL REFLUX DISEASE WITHOUT ESOPHAGITIS: ICD-10-CM

## 2022-05-11 DIAGNOSIS — I10 PRIMARY HYPERTENSION: ICD-10-CM

## 2022-05-11 PROCEDURE — 99214 PR OFFICE/OUTPT VISIT, EST, LEVL IV, 30-39 MIN: ICD-10-PCS | Mod: S$PBB,,, | Performed by: NURSE PRACTITIONER

## 2022-05-11 PROCEDURE — 99213 OFFICE O/P EST LOW 20 MIN: CPT | Mod: PBBFAC,PO | Performed by: NURSE PRACTITIONER

## 2022-05-11 PROCEDURE — 99214 OFFICE O/P EST MOD 30 MIN: CPT | Mod: S$PBB,,, | Performed by: NURSE PRACTITIONER

## 2022-05-11 PROCEDURE — 99999 PR PBB SHADOW E&M-EST. PATIENT-LVL III: ICD-10-PCS | Mod: PBBFAC,,, | Performed by: NURSE PRACTITIONER

## 2022-05-11 PROCEDURE — 99999 PR PBB SHADOW E&M-EST. PATIENT-LVL III: CPT | Mod: PBBFAC,,, | Performed by: NURSE PRACTITIONER

## 2022-05-11 RX ORDER — DICYCLOMINE HYDROCHLORIDE 10 MG/1
10 CAPSULE ORAL
Qty: 40 CAPSULE | Refills: 0 | Status: SHIPPED | OUTPATIENT
Start: 2022-05-11 | End: 2022-05-21

## 2022-05-11 NOTE — PATIENT INSTRUCTIONS
Emmanuel Buitrago,     If you are due for any health screening(s) below please notify me so we can arrange them to be ordered and scheduled to maintain your health. Most healthy patients complete it. Don't lose out on improving your health.     Health Maintenance   Topic Date Due    TETANUS VACCINE  08/05/2024    Colonoscopy  09/27/2024    Lipid Panel  06/29/2026     - clear liquids advance to bland diet as tolerated  - Bentyl 10 mg one before meals and a night for abdominal cramping and diarrhea

## 2022-05-11 NOTE — PROGRESS NOTES
Subjective:       Patient ID: Iftikhar Lynn is a 82 y.o. male.    Chief Complaint: Diarrhea (Diarrhea x 5 days with slight abdominal pain. Pt reports 4 episodes of diarrhea in the past 24 hours. No other sx's. Afebrile. No new medications.)    82-year-old male presents to the clinic today with complaint of abdominal cramping and diarrhea that started on Sunday after going to the [facility].  He states the diarrhea is decreasing some now.  He has taken several doses of Pepto-Bismol since he has been home with some relief.  He has been doing clear liquids which seems to be helping.  However, he is having some abdominal cramping.  He denies any fever, chills, nausea, vomiting, dysuria, hematuria, difficulty urination, chest pain, heart palpitations, shortness of breath, or swelling to lower extremities.  He denies any headaches, dizziness, or blurred vision.  He has hypertension which is well controlled today.  He takes omeprazole for acid reflux.    Past Medical History:   Diagnosis Date    Cancer     basal cell ca rt arm / melanoma on back    GERD (gastroesophageal reflux disease)     Hyperlipidemia     Hypertension     Melanoma      Past Surgical History:   Procedure Laterality Date    BACK SURGERY      L 4 L5    COLONOSCOPY N/A 12/20/2017    Procedure: COLONOSCOPY;  Surgeon: Filipe Ramirez MD;  Location: [facility] ENDO;  Service: Endoscopy;  Laterality: N/A;    COLONOSCOPY N/A 9/27/2021    Procedure: COLONOSCOPY;  Surgeon: Filipe Ramirez MD;  Location: [facility] ENDO;  Service: Endoscopy;  Laterality: N/A;    KNEE ARTHROSCOPY W/ MENISCECTOMY Left 1/17/2019    Procedure: ARTHROSCOPY, KNEE, WITH MENISCECTOMY;  Surgeon: Ethan Díaz MD;  Location: [facility] OR;  Service: Orthopedics;  Laterality: Left;    SKIN CANCER EXCISION      multiple sites, derm dr martinez, basal cell ca    VASECTOMY        reports that he quit smoking about 46 years ago. He has never used smokeless tobacco. He reports current  alcohol use. He reports that he does not use drugs.  Review of Systems   Constitutional: Negative for chills and fever.   Respiratory: Negative for cough, shortness of breath and wheezing.    Cardiovascular: Negative for chest pain, palpitations and leg swelling.   Gastrointestinal: Positive for abdominal pain, diarrhea and nausea. Negative for blood in stool and vomiting.   Genitourinary: Negative for difficulty urinating, dysuria, flank pain and frequency.   Musculoskeletal: Negative for gait problem.   Neurological: Negative for dizziness, light-headedness and headaches.       Objective:      Physical Exam  Vitals and nursing note reviewed.   Constitutional:       General: He is not in acute distress.     Appearance: Normal appearance. He is not ill-appearing, toxic-appearing or diaphoretic.   Cardiovascular:      Rate and Rhythm: Normal rate and regular rhythm.      Heart sounds: Normal heart sounds. No murmur heard.    No friction rub. No gallop.   Pulmonary:      Effort: Pulmonary effort is normal.      Breath sounds: Normal breath sounds. No wheezing or rhonchi.   Abdominal:      General: Bowel sounds are normal. There is no distension.      Palpations: Abdomen is soft.      Tenderness: There is no abdominal tenderness. There is no right CVA tenderness, left CVA tenderness, guarding or rebound.      Comments: Hyperactive bowel sounds in all 4 quadrants no rebound or guarding noted   Musculoskeletal:         General: No swelling. Normal range of motion.   Skin:     General: Skin is warm and dry.   Neurological:      Mental Status: He is alert and oriented to person, place, and time.   Psychiatric:         Mood and Affect: Mood normal.         Behavior: Behavior normal.         Thought Content: Thought content normal.         Judgment: Judgment normal.         Assessment:       1. Diarrhea, unspecified type    2. Abdominal cramping    3. Primary hypertension    4. Gastroesophageal reflux disease without  esophagitis    5. Aortic atherosclerosis        Plan:         Diarrhea, unspecified type  -     dicyclomine (BENTYL) 10 MG capsule; Take 1 capsule (10 mg total) by mouth 4 (four) times daily before meals and nightly. for 10 days  Dispense: 40 capsule; Refill: 0  - clear liquids advance to a bland diet as tolerated     Abdominal cramping  -     dicyclomine (BENTYL) 10 MG capsule; Take 1 capsule (10 mg total) by mouth 4 (four) times daily before meals and nightly. for 10 days  Dispense: 40 capsule; Refill: 0    Primary hypertension  - The current medical regimen is effective;  continue present plan and medications.    Gastroesophageal reflux disease without esophagitis  - continue Prilosec    Aortic atherosclerosis  - Stable / Asymptomatic is on blood pressure and cholesterol lowering medications

## 2022-06-13 DIAGNOSIS — I10 ESSENTIAL HYPERTENSION: ICD-10-CM

## 2022-06-13 NOTE — TELEPHONE ENCOUNTER
No new care gaps identified.  [facility] Embedded Care Gaps. Reference number: [id number]. 6/13/2022   12:38:26 PM CDT

## 2022-06-14 RX ORDER — OLMESARTAN MEDOXOMIL 40 MG/1
TABLET ORAL
Qty: 90 TABLET | Refills: 3 | Status: SHIPPED | OUTPATIENT
Start: 2022-06-14 | End: 2022-07-26 | Stop reason: DRUGHIGH

## 2022-06-14 NOTE — TELEPHONE ENCOUNTER
Refill Routing Note   Medication(s) are not appropriate for processing by [facility] for the following reason(s):      - Patient has been seen in the ED/Hospital since the last PCP visit    [facility] action(s):  Route          Medication reconciliation completed: No     Appointments  past 12m or future 3m with PCP    Date Provider   Last Visit   7/1/2021 Chris Portillo Jr., MD   Next Visit   Visit date not found Chris Portillo Jr., MD   ED visits in past 90 days: 0        Note composed:10:06 AM 06/14/2022

## 2022-07-20 ENCOUNTER — TELEPHONE (OUTPATIENT)
Dept: FAMILY MEDICINE | Facility: CLINIC | Age: 82
End: 2022-07-20
Payer: MEDICARE

## 2022-07-22 ENCOUNTER — NURSE TRIAGE (OUTPATIENT)
Dept: ADMINISTRATIVE | Facility: CLINIC | Age: 82
End: 2022-07-22
Payer: MEDICARE

## 2022-07-22 ENCOUNTER — HOSPITAL ENCOUNTER (EMERGENCY)
Facility: HOSPITAL | Age: 82
Discharge: HOME | End: 2022-07-22
Attending: EMERGENCY MEDICINE
Payer: MEDICARE

## 2022-07-22 VITALS
HEIGHT: 72 IN | BODY MASS INDEX: 24.38 KG/M2 | RESPIRATION RATE: 20 BRPM | DIASTOLIC BLOOD PRESSURE: 79 MMHG | WEIGHT: 180 LBS | SYSTOLIC BLOOD PRESSURE: 147 MMHG | OXYGEN SATURATION: 95 % | HEART RATE: 79 BPM | TEMPERATURE: 99 F

## 2022-07-22 DIAGNOSIS — R07.89 CHEST WALL PAIN: ICD-10-CM

## 2022-07-22 DIAGNOSIS — R07.81 RIB PAIN ON LEFT SIDE: ICD-10-CM

## 2022-07-22 DIAGNOSIS — I10 HYPERTENSION, UNSPECIFIED TYPE: Primary | ICD-10-CM

## 2022-07-22 LAB
ALBUMIN SERPL BCP-MCNC: 4.4 G/DL (ref 3.5–5.2)
ALP SERPL-CCNC: 99 U/L (ref 55–135)
ALT SERPL W/O P-5'-P-CCNC: 31 U/L (ref 10–44)
ANION GAP SERPL CALC-SCNC: 13 MMOL/L (ref 8–16)
AST SERPL-CCNC: 32 U/L (ref 10–40)
BASOPHILS # BLD AUTO: 0.09 K/UL (ref 0–0.2)
BASOPHILS NFR BLD: 1 % (ref 0–1.9)
BILIRUB SERPL-MCNC: 1.3 MG/DL (ref 0.1–1)
BNP SERPL-MCNC: 21 PG/ML (ref 0–99)
BUN SERPL-MCNC: 14 MG/DL (ref 8–23)
CALCIUM SERPL-MCNC: 10 MG/DL (ref 8.7–10.5)
CHLORIDE SERPL-SCNC: 106 MMOL/L (ref 95–110)
CO2 SERPL-SCNC: 22 MMOL/L (ref 23–29)
CREAT SERPL-MCNC: 0.8 MG/DL (ref 0.5–1.4)
DIFFERENTIAL METHOD: ABNORMAL
EOSINOPHIL # BLD AUTO: 0.4 K/UL (ref 0–0.5)
EOSINOPHIL NFR BLD: 4.8 % (ref 0–8)
ERYTHROCYTE [DISTWIDTH] IN BLOOD BY AUTOMATED COUNT: 12.9 % (ref 11.5–14.5)
EST. GFR  (AFRICAN AMERICAN): >60 ML/MIN/1.73 M^2
EST. GFR  (NON AFRICAN AMERICAN): >60 ML/MIN/1.73 M^2
GLUCOSE SERPL-MCNC: 108 MG/DL (ref 70–110)
HCT VFR BLD AUTO: 42.8 % (ref 40–54)
HGB BLD-MCNC: 14.9 G/DL (ref 14–18)
IMM GRANULOCYTES # BLD AUTO: 0.02 K/UL (ref 0–0.04)
IMM GRANULOCYTES NFR BLD AUTO: 0.2 % (ref 0–0.5)
LYMPHOCYTES # BLD AUTO: 2.8 K/UL (ref 1–4.8)
LYMPHOCYTES NFR BLD: 30.9 % (ref 18–48)
MCH RBC QN AUTO: 33.2 PG (ref 27–31)
MCHC RBC AUTO-ENTMCNC: 34.8 G/DL (ref 32–36)
MCV RBC AUTO: 95 FL (ref 82–98)
MONOCYTES # BLD AUTO: 0.6 K/UL (ref 0.3–1)
MONOCYTES NFR BLD: 6.8 % (ref 4–15)
NEUTROPHILS # BLD AUTO: 5.2 K/UL (ref 1.8–7.7)
NEUTROPHILS NFR BLD: 56.3 % (ref 38–73)
NRBC BLD-RTO: 0 /100 WBC
PLATELET # BLD AUTO: 254 K/UL (ref 150–450)
PMV BLD AUTO: 10.5 FL (ref 9.2–12.9)
POTASSIUM SERPL-SCNC: 4 MMOL/L (ref 3.5–5.1)
PROT SERPL-MCNC: 7.3 G/DL (ref 6–8.4)
RBC # BLD AUTO: 4.49 M/UL (ref 4.6–6.2)
SARS-COV-2 RDRP RESP QL NAA+PROBE: NEGATIVE
SODIUM SERPL-SCNC: 141 MMOL/L (ref 136–145)
TROPONIN I SERPL DL<=0.01 NG/ML-MCNC: <0.006 NG/ML (ref 0–0.03)
WBC # BLD AUTO: 9.17 K/UL (ref 3.9–12.7)

## 2022-07-22 PROCEDURE — 85025 COMPLETE CBC W/AUTO DIFF WBC: CPT | Performed by: NURSE PRACTITIONER

## 2022-07-22 PROCEDURE — 36415 COLL VENOUS BLD VENIPUNCTURE: CPT | Performed by: NURSE PRACTITIONER

## 2022-07-22 PROCEDURE — 83880 ASSAY OF NATRIURETIC PEPTIDE: CPT | Performed by: NURSE PRACTITIONER

## 2022-07-22 PROCEDURE — U0002 COVID-19 LAB TEST NON-CDC: HCPCS | Performed by: EMERGENCY MEDICINE

## 2022-07-22 PROCEDURE — 99285 EMERGENCY DEPT VISIT HI MDM: CPT

## 2022-07-22 PROCEDURE — 80053 COMPREHEN METABOLIC PANEL: CPT | Performed by: NURSE PRACTITIONER

## 2022-07-22 PROCEDURE — 84484 ASSAY OF TROPONIN QUANT: CPT | Performed by: NURSE PRACTITIONER

## 2022-07-22 PROCEDURE — 93010 ELECTROCARDIOGRAM REPORT: CPT | Mod: ,,, | Performed by: INTERNAL MEDICINE

## 2022-07-22 PROCEDURE — 93010 EKG 12-LEAD: ICD-10-PCS | Mod: ,,, | Performed by: INTERNAL MEDICINE

## 2022-07-22 PROCEDURE — 93005 ELECTROCARDIOGRAM TRACING: CPT

## 2022-07-22 NOTE — TELEPHONE ENCOUNTER
OOC RN  Patient calling c/o I tripped on hose.and against a tree ll 4-5 days ago, and hit head and ribs on bricks, did not go to Dr.   Left lateral laceration on head due to hitting tree.    I did not think much about it. Left knee bruised  Cuts bruises head and arm.  Left sided. Trunk, I think I hit my ribs, and now fell SOB at times.    Yesterday,   I am in the b/p program and it was high.   146/96 ,   HR 79       Yesterday 155/100  HR 98   I now have a dry cough, and sob.Took   Home Test  5 mins ago  Negative.  My ribs hurts,   4/10   Care advise is to go ED.  Or advise from PCP.   Fransisca refused dispo.  Would like PCP to order xray for his lungs.  For any new or worsening symptoms advised him to call back OOC RN  Patient VU.   Reason for Disposition   Age over 65 years with an area of head swelling or bruise    Additional Information   Negative: ACUTE NEUROLOGIC SYMPTOM and symptom present now   Negative: Knocked out (unconscious) > 1 minute   Negative: Seizure (convulsion) occurred (Exception: prior history of seizures and now alert and without Acute Neurologic Symptoms)   Negative: Neck pain after dangerous injury (e.g., MVA, diving, trampoline, contact sports, fall > 10 feet or 3 meters) (Exception: neck pain began > 1 hour after injury)   Negative: Major bleeding (actively dripping or spurting) that can't be stopped   Negative: Penetrating head injury (e.g., knife, gunshot wound, metal object)   Negative: Sounds like a life-threatening emergency to the triager   Negative: Can't remember what happened (amnesia)   Negative: Vomiting once or more   Negative: Watery or blood-tinged fluid dripping from the nose or ears   Negative: ACUTE NEUROLOGIC SYMPTOM and now fine   Negative: Knocked out (unconscious) < 1 minute and now fine   Negative: Severe headache   Negative: Dangerous injury (e.g., MVA, diving, trampoline, contact sports, fall > 10 feet or 3 meters) or severe blow from hard object (e.g.,  golf club or baseball bat)   Negative: Large swelling or bruise (> 2 inches or 5 cm)   Negative: Skin is split open or gaping (length > 1/2 inch or 12 mm)   Negative: Bleeding won't stop after 10 minutes of direct pressure (using correct technique)   Negative: One or two 'black eyes' (bruising, purple color of eyelids), and onset within 24 hours of head injury   Negative: Taking Coumadin (warfarin) or other strong blood thinner, or known bleeding disorder (e.g., thrombocytopenia)    Protocols used: HEAD INJURY-A-OH

## 2022-07-22 NOTE — ED PROVIDER NOTES
Encounter Date: 7/22/2022    SCRIBE #1 NOTE: I, Brooke Zimmerman, am scribing for, and in the presence of, Manuel Espinosa MD.       History     Chief Complaint   Patient presents with    Hypertension     Patient states he has been having bouts of high blood pressure and his haley told him to come to the ER, states he had a fall 3 days ago and has some rib pain and head pain only takes asa      Time seen by provider: 6:20 PM on 07/22/2022    Iftikhar Lynn is a 82 y.o. male who presents to the ED with hypertension. Patient called his PCP clinic today and informed them that he fell three days ago and that he was hypertensive at 190/100 at his blood pressure program today. His PCP then encouraged him to be evaluated in the ED. When he fell, he states he landed on his left side hitting his ribs, head, and left upper extremity. Patient did not lose consciousness. Since the incident, he's had SOB, nausea, headache, left sided rib pain, generalized fatigue, and diarrhea. Patient thought he may have COVID-19 so he took an at-home test today which was negative. He denies fever, throat pain, chest pain, vision changes, slurred speech, weakness to upper or lower extremities, or any other Sx at this time. Patient takes daily Aspirin. PMHx of HTN (on Losartan), GERD, melanoma, and HLD. No pertinent PSHx.    The history is provided by the patient.     Review of patient's allergies indicates:  No Known Allergies  Past Medical History:   Diagnosis Date    Cancer     basal cell ca rt arm / melanoma on back    GERD (gastroesophageal reflux disease)     Hyperlipidemia     Hypertension     Melanoma      Past Surgical History:   Procedure Laterality Date    BACK SURGERY      L 4 L5    COLONOSCOPY N/A 12/20/2017    Procedure: COLONOSCOPY;  Surgeon: Filipe Ramirez MD;  Location: [facility];  Service: Endoscopy;  Laterality: N/A;    COLONOSCOPY N/A 9/27/2021    Procedure: COLONOSCOPY;  Surgeon: Filipe Ramirez MD;   Location: [facility] ENDO;  Service: Endoscopy;  Laterality: N/A;    KNEE ARTHROSCOPY W/ MENISCECTOMY Left 2019    Procedure: ARTHROSCOPY, KNEE, WITH MENISCECTOMY;  Surgeon: Ethan Díaz MD;  Location: [facility] OR;  Service: Orthopedics;  Laterality: Left;    SKIN CANCER EXCISION      multiple sites, derm dr martinez, basal cell ca    VASECTOMY       Family History   Problem Relation Age of Onset    Cancer Mother         non hogkins lumphoma    Heart disease Father     Cancer Brother         melanoma, arm and leg    Clotting disorder Neg Hx     Anesthesia problems Neg Hx      Social History     Tobacco Use    Smoking status: Former Smoker     Quit date: 2/15/1976     Years since quittin.4    Smokeless tobacco: Never Used   Substance Use Topics    Alcohol use: Yes     Alcohol/week: 0.0 standard drinks     Comment: daily wine    Drug use: No     Review of Systems   Constitutional: Positive for fatigue. Negative for fever.   HENT: Negative for sore throat.    Eyes: Negative for visual disturbance.   Respiratory: Positive for shortness of breath.    Cardiovascular: Negative for chest pain.   Gastrointestinal: Positive for diarrhea and nausea. Negative for abdominal pain and vomiting.   Genitourinary: Negative for dysuria.   Musculoskeletal: Negative for back pain.        Positive for left sided rib pain.   Skin: Negative for rash.   Neurological: Positive for headaches. Negative for syncope, speech difficulty and weakness.   Hematological: Does not bruise/bleed easily.       Physical Exam     Initial Vitals [22 1732]   BP Pulse Resp Temp SpO2   (!) 165/99 100 20 98.5 °F (36.9 °C) 100 %      MAP       --         Physical Exam    Nursing note and vitals reviewed.  Constitutional: He appears well-developed and well-nourished. He is not diaphoretic. No distress.   HENT:   Head: Normocephalic and atraumatic.   Eyes: EOM are normal. Pupils are equal, round, and reactive to light.   Neck: Neck supple.    Normal range of motion.  Cardiovascular: Normal rate, regular rhythm, normal heart sounds and intact distal pulses. Exam reveals no gallop and no friction rub.    No murmur heard.  Pulmonary/Chest: Breath sounds normal. No respiratory distress. He has no wheezes. He has no rhonchi. He has no rales. He exhibits tenderness.   Left lateral rib tenderness.   Abdominal: Abdomen is soft. Bowel sounds are normal. There is no abdominal tenderness. There is no rebound and no guarding.   Musculoskeletal:         General: Normal range of motion.      Cervical back: Normal range of motion and neck supple.     Neurological: He is alert and oriented to person, place, and time. No cranial nerve deficit. GCS eye subscore is 4. GCS verbal subscore is 5. GCS motor subscore is 6.   Cranial nerves III through XII grossly intact. 5/5 strength with intact sensation to BUE's and BLE's.   Skin: Skin is warm.   Psychiatric: He has a normal mood and affect. His behavior is normal. Judgment and thought content normal.         ED Course   Procedures  Labs Reviewed   CBC W/ AUTO DIFFERENTIAL - Abnormal; Notable for the following components:       Result Value    RBC 4.49 (*)     MCH 33.2 (*)     All other components within normal limits   COMPREHENSIVE METABOLIC PANEL - Abnormal; Notable for the following components:    CO2 22 (*)     Total Bilirubin 1.3 (*)     All other components within normal limits   TROPONIN I   B-TYPE NATRIURETIC PEPTIDE   SARS-COV-2 RNA AMPLIFICATION, QUAL          Imaging Results          X-Ray Ribs 2 View Left (Final result)  Result time 07/23/22 06:21:25    Final result by Wu Weaver MD (07/23/22 06:21:25)                 Impression:      No radiographically evident displaced rib fracture.      Electronically signed by: Wu Weaver  Date:    07/23/2022  Time:    06:21             Narrative:    EXAMINATION:  XR RIBS 2 VIEW LEFT    CLINICAL HISTORY:  Pleurodynia    TECHNIQUE:  Two views of the left ribs were  performed.    COMPARISON:  None.    FINDINGS:  No radiographically evident displaced rib fracture. No pneumothorax.  Calcific plaque projects over the aortic arch.  S-shaped scoliosis and degenerative change of spine.                                 Medications - No data to display  Medical Decision Making:   History:   Old Medical Records: I decided to obtain old medical records.  Initial Assessment:   82-year-old male presented with multiple complaints.  Differential Diagnosis:   Initial differential diagnosis included but not limited to fracture, acute renal failure, and poorly controlled blood pressure.  Clinical Tests:   Lab Tests: Ordered and Reviewed  Radiological Study: Ordered and Reviewed  Medical Tests: Reviewed and Ordered  ED Management:  The patient was emergently evaluated in the emergency department, his evaluation was significant for an elderly male with a normal neurologic exam.  The patient's EKG showed no acute abnormalities per my independent interpretation.  The patient's labs showed no acute abnormalities.  The patient's x-rays showed no acute bony abnormalities per Radiology.  The patient's blood pressure was at an acceptable level while here in the emergency department.  He is stable for discharge to home.  He will continue to monitor his blood pressure at home.  He is to otherwise follow up with his PCP for further care.          Scribe Attestation:   Scribe #1: I performed the above scribed service and the documentation accurately describes the services I performed. I attest to the accuracy of the note.              I, Dr. Manuel Espinosa, personally performed the services described in this documentation. All medical record entries made by the scribe were at my direction and in my presence.  I have reviewed the chart and agree that the record reflects my personal performance and is accurate and complete. Manuel Espinosa MD.  11:35 PM 07/23/2022      Clinical Impression:   Final  diagnoses:  [R07.81] Rib pain on left side  [R07.89] Chest wall pain  [I10] Hypertension, unspecified type (Primary)          ED Disposition Condition    Discharge Stable        ED Prescriptions     None        Follow-up Information     Follow up With Specialties Details Why Contact Info    Chris Portillo Jr., MD Family Medicine Schedule an appointment as soon as possible for a visit   [address] [facility]  [address] [place] [id number]  [phone]             Manuel Espinosa MD  07/23/22 1212

## 2022-07-22 NOTE — FIRST PROVIDER EVALUATION
Emergency Department TeleTriage Encounter Note      CHIEF COMPLAINT    Chief Complaint   Patient presents with    Hypertension     Patient states he has been having bouts of high blood pressure and his haley told him to come to the ER, states he had a fall 3 days ago and has some rib pain and head pain only takes asa        VITAL SIGNS   Initial Vitals [07/22/22 1732]   BP Pulse Resp Temp SpO2   (!) 165/99 100 20 98.5 °F (36.9 °C) 100 %      MAP       --            ALLERGIES    Review of patient's allergies indicates:  No Known Allergies    PROVIDER TRIAGE NOTE  This is a teletriage evaluation of a 82 y.o. male presenting to the ED with c/o elevated BP.  Pt states that he had a fall 3 days ago.  Pt states he has had some rib pain and head pain since the fall.  Pt states throughout the day BP was elevated. Pt endorsing left sided chest pain, worse with inspiration.      PE: Slightly hypertensive on exam.  Speaking in full sentences.      Plan: labs, EKG, imaging    Limited physical exam via telehealth: The patient is awake, alert, answering questions appropriately and is not in respiratory distress. All ED beds are full at present; patient notified of this status.  Patient seen and medically screened by Nurse Practitioner via teletriage.      Initial orders will be placed and care will be transferred to an alternate provider when patient is roomed for a full evaluation. Any additional orders and the final disposition will be determined by that provider.         ORDERS  Labs Reviewed   CBC W/ AUTO DIFFERENTIAL   COMPREHENSIVE METABOLIC PANEL   TROPONIN I   B-TYPE NATRIURETIC PEPTIDE       ED Orders (720h ago, onward)    Start Ordered     Status Ordering Provider    07/22/22 1745 07/22/22 1744  EKG 12-lead  Once         Ordered BRIGID CARDENAS    07/22/22 1744 07/22/22 1744  X-Ray Ribs 2 View Left  1 time imaging         Ordered BRIGID CARDENAS    07/22/22 1744 07/22/22 1744  Complete Blood Count (CBC)  STAT          Pending Collection BRIGID CARDENAS ESTELLE.    07/22/22 1744 07/22/22 1744  Comprehensive Metabolic Panel (CMP)  STAT         Pending Collection BRIGID CARDENAS ESTELLE.    07/22/22 1744 07/22/22 1744  Troponin I  STAT         Pending Collection BRIGID CARDENAS.    07/22/22 1744 07/22/22 1744  Brain natriuretic peptide  STAT         Pending Collection RONALD BRIGID L.            Virtual Visit Note: The provider triage portion of this emergency department evaluation and documentation was performed via [employer], a HIPAA-compliant telemedicine application, in concert with a tele-presenter in the room. A face to face patient evaluation with one of my colleagues will occur once the patient is placed in an emergency department room.      DISCLAIMER: This note was prepared with [employer] voice recognition transcription software. Garbled syntax, mangled pronouns, and other bizarre constructions may be attributed to that software system.

## 2022-07-22 NOTE — ED NOTES
Patient also mentioned at closing that he has had fatigue, nausea, and diarrhea and has taken two home covid tests that are negative but still has covid concerns

## 2022-07-25 ENCOUNTER — TELEPHONE (OUTPATIENT)
Dept: FAMILY MEDICINE | Facility: CLINIC | Age: 82
End: 2022-07-25
Payer: MEDICARE

## 2022-07-25 NOTE — TELEPHONE ENCOUNTER
Yes, increase to 2 tablets daily (80 mg daily) and continue to monitor blood pressure.  Keep a log of the blood pressure readings and return to my office in 2 weeks

## 2022-07-25 NOTE — TELEPHONE ENCOUNTER
----- Message from Nelly Dickey sent at 7/25/2022  9:52 AM CDT -----  Contact: Self  Type: Needs Medical Advice  Who Called:  Patient  Symptoms (please be specific):  Pt was seen in [facility] ED 7/22 -   Best Call Back Number: [phone]  Additional Information: Pt would like for Dr Portillo to look over test results from this visit and reach out to come to recommend what he should do. Also, he is part of [facility] [employer] and would like him to look at the BP readings. Thank You.

## 2022-07-25 NOTE — TELEPHONE ENCOUNTER
Spoke with patient, he states that on 7/17 he fell and hit his head, chest and knee.  He started having a headache and took his BP and it was elevated.  Then on 7/22 he developed SOB, diarrhea and still had the headache.  He states that he has not missed any doses of his olmesartan 40 mg.  He took two tablets on Friday prior to going to the ER.  He has since went back to taking one tablet daily.  His BP this am was 155/81  Pulse of 81.  He wants to know if he should take two tablets daily from now on.  What are your recommendations?

## 2022-07-26 RX ORDER — OLMESARTAN MEDOXOMIL 40 MG/1
80 TABLET ORAL DAILY
COMMUNITY
End: 2022-07-26 | Stop reason: DRUGHIGH

## 2022-07-29 ENCOUNTER — PATIENT MESSAGE (OUTPATIENT)
Dept: FAMILY MEDICINE | Facility: CLINIC | Age: 82
End: 2022-07-29
Payer: MEDICARE

## 2022-08-01 ENCOUNTER — PATIENT MESSAGE (OUTPATIENT)
Dept: ADMINISTRATIVE | Facility: OTHER | Age: 82
End: 2022-08-01
Payer: MEDICARE

## 2022-08-03 ENCOUNTER — OFFICE VISIT (OUTPATIENT)
Dept: FAMILY MEDICINE | Facility: CLINIC | Age: 82
End: 2022-08-03
Payer: MEDICARE

## 2022-08-03 VITALS
BODY MASS INDEX: 25.63 KG/M2 | SYSTOLIC BLOOD PRESSURE: 124 MMHG | WEIGHT: 189.25 LBS | DIASTOLIC BLOOD PRESSURE: 60 MMHG | HEART RATE: 87 BPM | OXYGEN SATURATION: 96 % | HEIGHT: 72 IN | TEMPERATURE: 98 F

## 2022-08-03 DIAGNOSIS — I49.8 OTHER CARDIAC ARRHYTHMIA: Primary | ICD-10-CM

## 2022-08-03 DIAGNOSIS — E78.2 MIXED HYPERLIPIDEMIA: ICD-10-CM

## 2022-08-03 DIAGNOSIS — I10 PRIMARY HYPERTENSION: ICD-10-CM

## 2022-08-03 PROCEDURE — 99214 OFFICE O/P EST MOD 30 MIN: CPT | Mod: S$PBB,,, | Performed by: FAMILY MEDICINE

## 2022-08-03 PROCEDURE — 99214 PR OFFICE/OUTPT VISIT, EST, LEVL IV, 30-39 MIN: ICD-10-PCS | Mod: S$PBB,,, | Performed by: FAMILY MEDICINE

## 2022-08-03 PROCEDURE — 99999 PR PBB SHADOW E&M-EST. PATIENT-LVL III: ICD-10-PCS | Mod: PBBFAC,,, | Performed by: FAMILY MEDICINE

## 2022-08-03 PROCEDURE — 99213 OFFICE O/P EST LOW 20 MIN: CPT | Mod: PBBFAC,PN | Performed by: FAMILY MEDICINE

## 2022-08-03 PROCEDURE — 99999 PR PBB SHADOW E&M-EST. PATIENT-LVL III: CPT | Mod: PBBFAC,,, | Performed by: FAMILY MEDICINE

## 2022-08-03 NOTE — PROGRESS NOTES
Subjective:       Patient ID: Iftikhar Lynn is a 82 y.o. male.    Chief Complaint: Hypertension and Irregular Heart Beat    Patient presents here for evaluation of arrhythmia.  Earlier last month, he had been having some issues with his hypertension and was having his medications adjusted through the digital hypertension program.  At 1 point, his blood pressure was high enough where it warranted an emergency room visit.  Everything was normal at that time including his EKG which showed normal sinus rhythm.  Late last week he was having an elevated blood pressure and he noticed that his heart rate was irregular.  He is able to record these on his Fitbit and he downloaded this and send it to me.  He had 1 strip that showed atrial fibrillation.  Today he presents with other strips which showed normal sinus rhythm as well as somewhat tachycardia with questionable sinus tachycardia versus atrial fibrillation.  The tracings are not very good quality.  He denies any chest pains or even palpitations when these occur.  He has had several episodes of tachycardia documented up to 120 when he was not active at all.  This raises the suspicion that he is having some atrial fibrillation with rapid rate at times.  Today he feels normal and his pulse is normal and regular.  Blood pressure today is 124/60.  He denies any new medications although his hypertensive medication was recently changed from olmesartan to valsartan.  He requested this change when he read that olmesartan can cause some IBS symptoms which he was having.  He is tolerating the valsartan well and his blood pressure is well controlled.  He has no other new issues at this time.    Hypertension  This is a chronic problem. The problem is unchanged. The problem is controlled. Pertinent negatives include no chest pain, headaches, palpitations or shortness of breath. Risk factors for coronary artery disease include male gender. Past treatments include angiotensin  blockers. The current treatment provides moderate improvement. There are no compliance problems.  There is no history of kidney disease, CAD/MI, CVA or heart failure.     Review of Systems   Constitutional: Negative for fatigue and fever.   Respiratory: Negative for cough and shortness of breath.    Cardiovascular: Negative for chest pain and palpitations.   Gastrointestinal: Negative for nausea and vomiting.   Neurological: Negative for dizziness and headaches.         Objective:      Physical Exam  Vitals reviewed.   Constitutional:       General: He is not in acute distress.     Appearance: Normal appearance. He is well-developed.   HENT:      Right Ear: Tympanic membrane and external ear normal.      Left Ear: Tympanic membrane and external ear normal.      Mouth/Throat:      Pharynx: Oropharynx is clear. No posterior oropharyngeal erythema.   Neck:      Thyroid: No thyromegaly.   Cardiovascular:      Rate and Rhythm: Normal rate and regular rhythm.      Pulses: Normal pulses.      Heart sounds: Normal heart sounds. No murmur heard.  Pulmonary:      Effort: Pulmonary effort is normal.      Breath sounds: Normal breath sounds. No wheezing or rales.   Musculoskeletal:         General: Normal range of motion.      Cervical back: Normal range of motion and neck supple.      Right lower leg: No edema.      Left lower leg: No edema.   Lymphadenopathy:      Cervical: No cervical adenopathy.   Neurological:      General: No focal deficit present.      Mental Status: He is alert and oriented to person, place, and time.      Cranial Nerves: No cranial nerve deficit.      Deep Tendon Reflexes: Reflexes are normal and symmetric.         Assessment:       Problem List Items Addressed This Visit     HTN (hypertension)    Hyperlipidemia    Relevant Orders    Lipid Panel      Other Visit Diagnoses     Other cardiac arrhythmia    -  Primary    Relevant Orders    Holter monitor - 72 hour    TSH    Holter monitor - 48 hour           Plan:       1. Continue to monitor her blood pressure to make sure it remains well controlled  2. Holter monitor to try to document if he has any intermittent episodes of atrial fibrillation  3. Check TSH and lipid profile   4. Any further evaluation will depend on if the Holter monitor shows atrial fibrillation are not.  I did discuss with the patient the concerns with atrial fibrillation, those being tachycardia as well as increased risk of stroke due to atrial thrombi.  I will not place him on any anticoagulation at this time based on 1 small strip of atrial fibrillation until we can get further confirmation that it is or is not intermittent or persistent.  He does take aspirin 81 mg daily and I have told him to continue to take this

## 2022-08-05 ENCOUNTER — LAB VISIT (OUTPATIENT)
Dept: LAB | Facility: HOSPITAL | Age: 82
End: 2022-08-05
Attending: FAMILY MEDICINE
Payer: MEDICARE

## 2022-08-05 ENCOUNTER — TELEPHONE (OUTPATIENT)
Dept: ADMINISTRATIVE | Facility: CLINIC | Age: 82
End: 2022-08-05
Payer: MEDICARE

## 2022-08-05 DIAGNOSIS — E78.2 MIXED HYPERLIPIDEMIA: ICD-10-CM

## 2022-08-05 DIAGNOSIS — I49.8 OTHER CARDIAC ARRHYTHMIA: ICD-10-CM

## 2022-08-05 LAB
CHOLEST SERPL-MCNC: 127 MG/DL (ref 120–199)
CHOLEST/HDLC SERPL: 2.9 {RATIO} (ref 2–5)
HDLC SERPL-MCNC: 44 MG/DL (ref 40–75)
HDLC SERPL: 34.6 % (ref 20–50)
LDLC SERPL CALC-MCNC: 42 MG/DL (ref 63–159)
NONHDLC SERPL-MCNC: 83 MG/DL
TRIGL SERPL-MCNC: 205 MG/DL (ref 30–150)
TSH SERPL DL<=0.005 MIU/L-ACNC: 3.65 UIU/ML (ref 0.4–4)

## 2022-08-05 PROCEDURE — 84443 ASSAY THYROID STIM HORMONE: CPT | Performed by: FAMILY MEDICINE

## 2022-08-05 PROCEDURE — 80061 LIPID PANEL: CPT | Performed by: FAMILY MEDICINE

## 2022-08-05 PROCEDURE — 36415 COLL VENOUS BLD VENIPUNCTURE: CPT | Mod: PO | Performed by: FAMILY MEDICINE

## 2022-08-05 NOTE — TELEPHONE ENCOUNTER
Called pt, informed pt I was calling to remind pt of his in office EAWV on 8/8/22; clinic location provided to patient; pt confirmed appointment

## 2022-08-08 ENCOUNTER — OFFICE VISIT (OUTPATIENT)
Dept: FAMILY MEDICINE | Facility: CLINIC | Age: 82
End: 2022-08-08
Payer: MEDICARE

## 2022-08-08 VITALS
OXYGEN SATURATION: 96 % | WEIGHT: 190.13 LBS | TEMPERATURE: 98 F | DIASTOLIC BLOOD PRESSURE: 86 MMHG | BODY MASS INDEX: 25.75 KG/M2 | HEIGHT: 72 IN | SYSTOLIC BLOOD PRESSURE: 150 MMHG | HEART RATE: 81 BPM

## 2022-08-08 DIAGNOSIS — J84.10 CALCIFIED GRANULOMA OF LUNG: ICD-10-CM

## 2022-08-08 DIAGNOSIS — I72.3 ILIAC ARTERY ANEURYSM, LEFT: Chronic | ICD-10-CM

## 2022-08-08 DIAGNOSIS — I10 PRIMARY HYPERTENSION: ICD-10-CM

## 2022-08-08 DIAGNOSIS — K21.9 GASTROESOPHAGEAL REFLUX DISEASE WITHOUT ESOPHAGITIS: ICD-10-CM

## 2022-08-08 DIAGNOSIS — Z00.00 ENCOUNTER FOR PREVENTIVE HEALTH EXAMINATION: Primary | ICD-10-CM

## 2022-08-08 DIAGNOSIS — I77.819 AORTIC ECTASIA, UNSPECIFIED SITE: Chronic | ICD-10-CM

## 2022-08-08 DIAGNOSIS — E78.2 MIXED HYPERLIPIDEMIA: ICD-10-CM

## 2022-08-08 DIAGNOSIS — I70.0 AORTIC ATHEROSCLEROSIS: ICD-10-CM

## 2022-08-08 PROBLEM — J98.4 CALCIFIED GRANULOMA OF LUNG: Status: ACTIVE | Noted: 2022-08-08

## 2022-08-08 PROCEDURE — G0439 PR MEDICARE ANNUAL WELLNESS SUBSEQUENT VISIT: ICD-10-PCS | Mod: ,,, | Performed by: PHYSICIAN ASSISTANT

## 2022-08-08 PROCEDURE — 99999 PR PBB SHADOW E&M-EST. PATIENT-LVL IV: CPT | Mod: PBBFAC,,, | Performed by: PHYSICIAN ASSISTANT

## 2022-08-08 PROCEDURE — 99214 OFFICE O/P EST MOD 30 MIN: CPT | Mod: PBBFAC,PN | Performed by: PHYSICIAN ASSISTANT

## 2022-08-08 PROCEDURE — G0439 PPPS, SUBSEQ VISIT: HCPCS | Mod: ,,, | Performed by: PHYSICIAN ASSISTANT

## 2022-08-08 PROCEDURE — 99999 PR PBB SHADOW E&M-EST. PATIENT-LVL IV: ICD-10-PCS | Mod: PBBFAC,,, | Performed by: PHYSICIAN ASSISTANT

## 2022-08-08 NOTE — PATIENT INSTRUCTIONS
Counseling and Referral of Other Preventative  (Italic type indicates deductible and co-insurance are waived)    Patient Name: Iftikhar Lynn  Today's Date: 8/8/2022    Health Maintenance       Date Due Completion Date    COVID-19 Vaccine (4 - Booster for Pfizer series) 01/28/2022 9/28/2021    Influenza Vaccine (1) 09/01/2022 9/15/2021    TETANUS VACCINE 08/05/2024 8/5/2014    Colonoscopy 09/27/2024 9/27/2021    Lipid Panel 08/05/2027 8/5/2022        No orders of the defined types were placed in this encounter.    The following information is provided to all patients.  This information is to help you find resources for any of the problems found today that may be affecting your health:                Living healthy guide: www.[facility].[place].gov      Understanding Diabetes: www.diabetes.org      Eating healthy: www.cdc.gov/healthyweight      CDC home safety checklist: www.cdc.gov/steadi/patient.html      Agency on Aging: www.goea.[place].[facility]      Alcoholics anonymous (AA): www.aa.org      Physical Activity: www.guero.nih.gov/zu2iimw      Tobacco use: www.quitwithusla.org

## 2022-08-08 NOTE — Clinical Note
Good afternoon,  This patient was in my office today for a wellness exam.  We compared his blood pressure cuff to the cuff here in the office and there is a large discrepancy between the 2 readings.  The blood pressure his automatic Cuff gave him was 168/98 and our manual reading here was 150/86.  I just wanted to make you aware.  Aleja Carranza PA-C

## 2022-08-08 NOTE — PROGRESS NOTES
Iftikhar Lynn presented for a  Medicare AWV and comprehensive Health Risk Assessment today. The following components were reviewed and updated:    · Medical history  · Family History  · Social history  · Allergies and Current Medications  · Health Risk Assessment  · Health Maintenance  · Care Team     ** See Completed Assessments for Annual Wellness Visit within the encounter summary.**       The following assessments were completed:  · Living Situation  · CAGE  · Depression Screening  · Timed Get Up and Go  · Whisper Test  · Cognitive Function Screening  · Nutrition Screening  · ADL Screening  · PAQ Screening    Vitals:    08/08/22 1407   BP: (!) 150/86   Pulse: 81   Temp: 98.3 °F (36.8 °C)   SpO2: 96%   Weight: 86.3 kg (190 lb 2.4 oz)   Height: 6' (1.829 m)     Body mass index is 25.79 kg/m².  Physical Exam  Constitutional:       General: He is not in acute distress.     Appearance: He is well-developed. He is not diaphoretic.   HENT:      Head: Normocephalic and atraumatic.   Neck:      Vascular: No JVD.   Pulmonary:      Effort: Pulmonary effort is normal.   Musculoskeletal:      Cervical back: Normal range of motion and neck supple.   Neurological:      Mental Status: He is alert and oriented to person, place, and time.               Diagnoses and health risks identified today and associated recommendations/orders:    Iftikhar was seen today for medicare awv follow up.    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  Patient having trouble with blood pressure cuff we will reach out.    Mixed  hyperlipidemia  Comments:  Uncontrolled, continue medication    Gastroesophageal reflux disease without esophagitis  Comments:  Controlled, continue current regimen        Provided Iftikhar with a 5-10 year written screening schedule and personal prevention plan. Recommendations were developed using the USPSTF age appropriate recommendations. Education, counseling, and referrals were provided as needed. After Visit Summary printed and given to patient which includes a list of additional screenings\tests needed.    No follow-ups on file.    TREVON Severino  I offered to discuss advanced care planning, including how to pick a person who would make decisions for you if you were unable to make them for yourself, called a health care power of , and what kind of decisions you might make such as use of life sustaining treatments such as ventilators and tube feeding when faced with a life limiting illness recorded on a living will that they will need to know. (How you want to be cared for as you near the end of your natural life)     X  Patient has advanced directives on file, which we reviewed, and they do not wish to make changes.

## 2022-08-25 DIAGNOSIS — I10 ESSENTIAL HYPERTENSION: ICD-10-CM

## 2022-08-25 RX ORDER — VALSARTAN 320 MG/1
320 TABLET ORAL DAILY
Qty: 90 TABLET | Refills: 1 | Status: SHIPPED | OUTPATIENT
Start: 2022-08-25 | End: 2023-03-05

## 2022-08-25 NOTE — TELEPHONE ENCOUNTER
On call refill request. Dr. Portillo is on vacation until 9-7-22.    Patient requesting to transfer Valsartan prescription to mail order. Please advise. Thank you.    LOV--8-8-22  FOV--8-3-23

## 2022-08-25 NOTE — TELEPHONE ENCOUNTER
No new care gaps identified.  [facility] Embedded Care Gaps. Reference number: [id number]. 8/25/2022   12:14:01 PM CDT

## 2022-08-25 NOTE — TELEPHONE ENCOUNTER
----- Message from [facility] sent at 8/25/2022 11:58 AM CDT -----  .Type:  RX Refill Request    Who Called: PT     Refill or New Rx: REFILL     RX Name and Strength: valsartan (DIOVAN) 320 MG tablet 90 DAY SUPPLY/1 YEAR     Preferred Pharmacy with phone number:     [facility] - [place], [place] - [address] AT [address] [facility]   Phone: [phone]  Fax:  [phone]          Pt Call Back Number: [phone]    Additional Information: Thank You

## 2022-08-31 ENCOUNTER — CLINICAL SUPPORT (OUTPATIENT)
Dept: CARDIOLOGY | Facility: HOSPITAL | Age: 82
End: 2022-08-31
Attending: FAMILY MEDICINE
Payer: MEDICARE

## 2022-08-31 DIAGNOSIS — I49.8 OTHER CARDIAC ARRHYTHMIA: ICD-10-CM

## 2022-08-31 PROCEDURE — 93226 XTRNL ECG REC<48 HR SCAN A/R: CPT

## 2022-08-31 PROCEDURE — 93227 XTRNL ECG REC<48 HR R&I: CPT | Mod: ,,, | Performed by: INTERNAL MEDICINE

## 2022-08-31 PROCEDURE — 93227 HOLTER MONITOR - 48 HOUR (CUPID ONLY): ICD-10-PCS | Mod: ,,, | Performed by: INTERNAL MEDICINE

## 2022-09-08 LAB
OHS CV EVENT MONITOR DAY: 2
OHS CV HOLTER LENGTH DECIMAL HOURS: 96
OHS CV HOLTER LENGTH HOURS: 48
OHS CV HOLTER LENGTH MINUTES: 0
OHS CV HOLTER SINUS AVERAGE HR: 82
OHS CV HOLTER SINUS MAX HR: 129
OHS CV HOLTER SINUS MIN HR: 48

## 2022-09-13 ENCOUNTER — IMMUNIZATION (OUTPATIENT)
Dept: PRIMARY CARE CLINIC | Facility: CLINIC | Age: 82
End: 2022-09-13
Payer: MEDICARE

## 2022-09-13 ENCOUNTER — PATIENT MESSAGE (OUTPATIENT)
Dept: FAMILY MEDICINE | Facility: CLINIC | Age: 82
End: 2022-09-13
Payer: MEDICARE

## 2022-09-13 DIAGNOSIS — I49.8 OTHER CARDIAC ARRHYTHMIA: Primary | ICD-10-CM

## 2022-09-13 DIAGNOSIS — Z23 NEED FOR VACCINATION: Primary | ICD-10-CM

## 2022-09-13 PROCEDURE — 91312 COVID-19, MRNA, LNP-S, BIVALENT BOOSTER, PF, 30 MCG/0.3 ML DOSE: CPT | Mod: S$GLB,,, | Performed by: FAMILY MEDICINE

## 2022-09-13 PROCEDURE — 91312 COVID-19, MRNA, LNP-S, BIVALENT BOOSTER, PF, 30 MCG/0.3 ML DOSE: ICD-10-PCS | Mod: S$GLB,,, | Performed by: FAMILY MEDICINE

## 2022-09-15 RX ORDER — METOPROLOL SUCCINATE 25 MG/1
25 TABLET, EXTENDED RELEASE ORAL DAILY
Qty: 30 TABLET | Refills: 11 | Status: SHIPPED | OUTPATIENT
Start: 2022-09-15 | End: 2022-09-22 | Stop reason: SDUPTHER

## 2022-09-15 NOTE — TELEPHONE ENCOUNTER
Please call patient to schedule appointment with cardiology. Per Dr. Portillo appt in October is fine.Thank you.

## 2022-09-15 NOTE — TELEPHONE ENCOUNTER
Spoke to patient regarding his Holter monitor.  He has been asymptomatic.  Holter monitor shows fairly significant amounts of PVCs averaging 35 per hour.  In addition he does have some couplets and triplets as well as polymorphic PVCs.  I did explain to him that this has changed from his previous Holter in 2016. I will place him on a low dose beta blocker until he can see Cardiology. I explained that they may keep him on this, change to a different medication, or they may feel that this does not need to be treated.    Please schedule cardiology appointment and let patient know.

## 2022-09-22 ENCOUNTER — OFFICE VISIT (OUTPATIENT)
Dept: CARDIOLOGY | Facility: CLINIC | Age: 82
End: 2022-09-22
Payer: MEDICARE

## 2022-09-22 VITALS
WEIGHT: 188.94 LBS | SYSTOLIC BLOOD PRESSURE: 173 MMHG | DIASTOLIC BLOOD PRESSURE: 101 MMHG | HEIGHT: 72 IN | BODY MASS INDEX: 25.59 KG/M2 | HEART RATE: 98 BPM

## 2022-09-22 DIAGNOSIS — I77.819 AORTIC ECTASIA, UNSPECIFIED SITE: Primary | Chronic | ICD-10-CM

## 2022-09-22 DIAGNOSIS — I10 PRIMARY HYPERTENSION: ICD-10-CM

## 2022-09-22 DIAGNOSIS — K21.9 GASTROESOPHAGEAL REFLUX DISEASE WITHOUT ESOPHAGITIS: ICD-10-CM

## 2022-09-22 DIAGNOSIS — I49.8 OTHER CARDIAC ARRHYTHMIA: ICD-10-CM

## 2022-09-22 DIAGNOSIS — I70.0 AORTIC ATHEROSCLEROSIS: ICD-10-CM

## 2022-09-22 DIAGNOSIS — E78.2 MIXED HYPERLIPIDEMIA: ICD-10-CM

## 2022-09-22 DIAGNOSIS — Z91.89 CARDIOVASCULAR EVENT RISK: ICD-10-CM

## 2022-09-22 PROCEDURE — 99204 OFFICE O/P NEW MOD 45 MIN: CPT | Mod: S$PBB,,, | Performed by: INTERNAL MEDICINE

## 2022-09-22 PROCEDURE — 99204 PR OFFICE/OUTPT VISIT, NEW, LEVL IV, 45-59 MIN: ICD-10-PCS | Mod: S$PBB,,, | Performed by: INTERNAL MEDICINE

## 2022-09-22 PROCEDURE — 99999 PR PBB SHADOW E&M-EST. PATIENT-LVL III: CPT | Mod: PBBFAC,,, | Performed by: INTERNAL MEDICINE

## 2022-09-22 PROCEDURE — 99213 OFFICE O/P EST LOW 20 MIN: CPT | Mod: PBBFAC,PO | Performed by: INTERNAL MEDICINE

## 2022-09-22 PROCEDURE — 99999 PR PBB SHADOW E&M-EST. PATIENT-LVL III: ICD-10-PCS | Mod: PBBFAC,,, | Performed by: INTERNAL MEDICINE

## 2022-09-22 RX ORDER — METOPROLOL SUCCINATE 25 MG/1
25 TABLET, EXTENDED RELEASE ORAL DAILY
Qty: 30 TABLET | Refills: 11 | Status: SHIPPED | OUTPATIENT
Start: 2022-09-22 | End: 2023-05-29 | Stop reason: SDUPTHER

## 2022-09-22 NOTE — PROGRESS NOTES
Subjective:    Patient ID:  Iftikhar Lynn is a 82 y.o. male patient here for evaluation Establish Care (Discuss results from recent cardiac tests)      History of Present Illness:  New patient cardiac evaluation.  ER visit July of 2022 for hypertension, ACS ruled out patient sent home without any change in major therapy.  Abnormal Holter monitor with frequent polymorphic PVCs, proximally 34 PVCs per hour.  No significant supraventricular arrhythmia.  No AFib.  Heart rates varied between 50 and 120 beats per minute.  Patient is symptomatic with palpitations.  Today's blood pressure elevated heart rate 100.  Recent prescribed Toprol-XL 25 daily, yet to be refilled.  No significant dyspnea, no syncope/presyncope.  No angina chest pain.  No prior history of known structural ischemic or arrhythmic heart disease.  Risk factors hypertension dyslipidemia.    No DVT PE.  No history of CVA Ca.  No chronic kidney disease.  No hepatic disease.      Review of patient's allergies indicates:  No Known Allergies    Past Medical History:   Diagnosis Date    Cancer     basal cell ca rt arm / melanoma on back    GERD (gastroesophageal reflux disease)     Hyperlipidemia     Hypertension     Melanoma      Past Surgical History:   Procedure Laterality Date    BACK SURGERY      L 4 L5    COLONOSCOPY N/A 12/20/2017    Procedure: COLONOSCOPY;  Surgeon: Filipe Ramirez MD;  Location: [facility] ENDO;  Service: Endoscopy;  Laterality: N/A;    COLONOSCOPY N/A 9/27/2021    Procedure: COLONOSCOPY;  Surgeon: Filipe Ramirez MD;  Location: [facility] ENDO;  Service: Endoscopy;  Laterality: N/A;    KNEE ARTHROSCOPY W/ MENISCECTOMY Left 1/17/2019    Procedure: ARTHROSCOPY, KNEE, WITH MENISCECTOMY;  Surgeon: Ethan Díaz MD;  Location: [facility] OR;  Service: Orthopedics;  Laterality: Left;    SKIN CANCER EXCISION      multiple sites, derm dr martinez, basal cell ca    VASECTOMY       Social History     Tobacco Use    Smoking status: Former     Types:  Cigarettes     Quit date: 2/15/1976     Years since quittin.6    Smokeless tobacco: Never   Substance Use Topics    Alcohol use: Yes     Alcohol/week: 0.0 standard drinks     Comment: daily wine    Drug use: No        Review of Systems:    As noted in HPI in addition         REVIEW OF SYSTEMS  Review of Systems   Constitutional: Negative for decreased appetite, diaphoresis, night sweats, weight gain and weight loss.   HENT:  Negative for nosebleeds and odynophagia.    Eyes:  Negative for double vision and photophobia.   Cardiovascular:  Positive for palpitations. Negative for chest pain, claudication, cyanosis, dyspnea on exertion, irregular heartbeat, leg swelling, near-syncope, orthopnea, paroxysmal nocturnal dyspnea and syncope.   Respiratory:  Negative for cough, hemoptysis, shortness of breath and wheezing.    Hematologic/Lymphatic: Negative for adenopathy.   Skin:  Negative for flushing, skin cancer and suspicious lesions.   Musculoskeletal:  Negative for gout, myalgias and neck pain.   Gastrointestinal:  Negative for abdominal pain, heartburn, hematemesis and hematochezia.   Genitourinary:  Negative for bladder incontinence, hesitancy and nocturia.   Neurological:  Negative for focal weakness, headaches, light-headedness and paresthesias.   Psychiatric/Behavioral:  Negative for memory loss and substance abuse.      Objective:        Vitals:    22 1352   BP: (!) 173/101   Pulse: 98       Lab Results   Component Value Date    WBC 9.17 2022    HGB 14.9 2022    HCT 42.8 2022     2022    CHOL 127 2022    TRIG 205 (H) 2022    HDL 44 2022    ALT 31 2022    AST 32 2022     2022    K 4.0 2022     2022    CREATININE 0.8 2022    BUN 14 2022    CO2 22 (L) 2022    TSH 3.649 2022    PSA 4.3 (H) 2021    HGBA1C 5.0 10/10/2018      CARDIOGRAM RESULTS  Results for orders placed in visit on  07/13/21    Stress Echo Which stress agent will be used? Treadmill Exercise; Color Flow Doppler? No    Interpretation Summary  · The test was stopped because the patient experienced fatigue.  · During stress, the following significant arrhythmias were observed: frequent PVCs.  · The left ventricle is normal in size with normal systolic function.  · Normal left ventricular diastolic function.  · The estimated ejection fraction is 55%.  · Normal right ventricular size with normal right ventricular systolic function.  · The stress echo portion of this study is negative for myocardial ischemia.  · The ECG portion of this study is negative for myocardial ischemia.        CURRENT/PREVIOUS VISIT EKG  Results for orders placed or performed during the hospital encounter of 07/22/22   EKG 12-lead    Collection Time: 07/22/22  6:51 PM    Narrative    Test Reason : R07.89,    Vent. Rate : 078 BPM     Atrial Rate : 078 BPM     P-R Int : 208 ms          QRS Dur : 096 ms      QT Int : 402 ms       P-R-T Axes : 045 016 030 degrees     QTc Int : 458 ms    Normal sinus rhythm  Normal ECG  When compared with ECG of 16-JAN-2019 12:38,  No significant change was found  Confirmed by Rich Villarreal MD ([id number]) on 7/27/2022 10:37:33 PM    Referred By: [email or web address]   SELF           Confirmed By:Rich Villarreal MD     No valid procedures specified.   No results found for this or any previous visit.    No valid procedures specified.    PHYSICAL EXAM  CONSTITUTIONAL: Well built, well nourished in no apparent distress  NECK: no carotid bruit, no JVD  LUNGS: CTA  CHEST WALL: no tenderness,  HEART: regular rate and rhythm, S1, S2 normal, no murmur, click, rub or gallop   ABDOMEN: soft, non-tender; bowel sounds normal; no masses,  no organomegaly  EXTREMITIES: Extremities normal, no edema, no calf tenderness noted  VASCULAR EXAM: 2 PLUS UPPER AND LOWER EXT PULSES  NEURO: AAO X 3, NO ACUTE FOCAL OR LATERALIZING FINDINGS    I HAVE REVIEWED :    The vital  signs, nurses notes, and all the pertinent radiology and labs.         Current Outpatient Medications   Medication Instructions    aspirin (ECOTRIN) 81 mg, Oral, Daily    ibuprofen (IBU) 800 mg, Oral, Every 8 hours PRN    metoprolol succinate (TOPROL-XL) 25 mg, Oral, Daily    multivitamin capsule 1 capsule, Oral, Daily    nystatin-triamcinolone (MYCOLOG II) cream Topical (Top), 2 times daily    omeprazole (PRILOSEC OTC) 20 mg, Oral, Every Mon, Fri,      pramoxine-hydrocortisone (PROCTOCREAM-HC) 1-1 % rectal cream Rectal, 2 times daily    simvastatin (ZOCOR) 40 mg, Oral, Nightly    valACYclovir (VALTREX) 500 mg, Oral, 2 times daily    valsartan (DIOVAN) 320 mg, Oral, Daily, Stop olmsartan 40.          Assessment:   Hypertensive cardiovascular disease.  Continue Diovan.  Fill prescription for Toprol-XL in initiate therapy.  Holter monitor with frequent PVCs, mostly polymorphic no VT.  No complex SVT.  Stress echo negative for ischemia 7/21.          Plan:   Begin Toprol XL 25 daily.  Repeat cardiac echo.  Continue to monitor home blood pressure.  Follow-up 2 weeks.  Repeat blood pressure by me 142/70.  Heart rate in the 80s.  Consider nuclear study to further evaluate underlying ischemia.    No follow-ups on file.          Closure 3 Information: This tab is for additional flaps and grafts above and beyond our usual structured repairs.  Please note if you enter information here it will not currently bill and you will need to add the billing information manually.

## 2022-10-03 ENCOUNTER — CLINICAL SUPPORT (OUTPATIENT)
Dept: CARDIOLOGY | Facility: HOSPITAL | Age: 82
End: 2022-10-03
Attending: INTERNAL MEDICINE
Payer: MEDICARE

## 2022-10-03 VITALS — BODY MASS INDEX: 25.47 KG/M2 | WEIGHT: 188 LBS | HEIGHT: 72 IN

## 2022-10-03 DIAGNOSIS — I70.0 AORTIC ATHEROSCLEROSIS: ICD-10-CM

## 2022-10-03 DIAGNOSIS — I49.8 OTHER CARDIAC ARRHYTHMIA: ICD-10-CM

## 2022-10-03 DIAGNOSIS — I10 PRIMARY HYPERTENSION: ICD-10-CM

## 2022-10-03 DIAGNOSIS — Z91.89 CARDIOVASCULAR EVENT RISK: ICD-10-CM

## 2022-10-03 DIAGNOSIS — K21.9 GASTROESOPHAGEAL REFLUX DISEASE WITHOUT ESOPHAGITIS: ICD-10-CM

## 2022-10-03 DIAGNOSIS — E78.2 MIXED HYPERLIPIDEMIA: ICD-10-CM

## 2022-10-03 DIAGNOSIS — I77.819 AORTIC ECTASIA, UNSPECIFIED SITE: ICD-10-CM

## 2022-10-03 PROCEDURE — 93306 ECHO (CUPID ONLY): ICD-10-PCS | Mod: 26,,, | Performed by: INTERNAL MEDICINE

## 2022-10-03 PROCEDURE — 93306 TTE W/DOPPLER COMPLETE: CPT | Mod: PO

## 2022-10-03 PROCEDURE — 93306 TTE W/DOPPLER COMPLETE: CPT | Mod: 26,,, | Performed by: INTERNAL MEDICINE

## 2022-10-04 LAB
ASCENDING AORTA: 3.37 CM
AV INDEX (PROSTH): 0.56
AV MEAN GRADIENT: 7 MMHG
AV PEAK GRADIENT: 12 MMHG
AV REGURGITATION PRESSURE HALF TIME: 505.16 MS
AV VALVE AREA: 2.2 CM2
AV VELOCITY RATIO: 0.53
BSA FOR ECHO PROCEDURE: 2.08 M2
CV ECHO LV RWT: 0.29 CM
DOP CALC AO PEAK VEL: 1.76 M/S
DOP CALC AO VTI: 43.3 CM
DOP CALC LVOT AREA: 3.9 CM2
DOP CALC LVOT DIAMETER: 2.23 CM
DOP CALC LVOT PEAK VEL: 0.93 M/S
DOP CALC LVOT STROKE VOLUME: 95.25 CM3
DOP CALCLVOT PEAK VEL VTI: 24.4 CM
E WAVE DECELERATION TIME: 137.35 MSEC
E/A RATIO: 0.75
E/E' RATIO: 8.94 M/S
ECHO LV POSTERIOR WALL: 0.85 CM (ref 0.6–1.1)
EJECTION FRACTION: 55 %
FRACTIONAL SHORTENING: 35 % (ref 28–44)
INTERVENTRICULAR SEPTUM: 0.9 CM (ref 0.6–1.1)
LA MAJOR: 5.34 CM
LA MINOR: 5.07 CM
LA WIDTH: 4.4 CM
LEFT ATRIUM SIZE: 3.67 CM
LEFT ATRIUM VOLUME INDEX: 34.3 ML/M2
LEFT ATRIUM VOLUME: 71.39 CM3
LEFT INTERNAL DIMENSION IN SYSTOLE: 3.78 CM (ref 2.1–4)
LEFT VENTRICLE DIASTOLIC VOLUME INDEX: 79.63 ML/M2
LEFT VENTRICLE DIASTOLIC VOLUME: 165.64 ML
LEFT VENTRICLE MASS INDEX: 94 G/M2
LEFT VENTRICLE SYSTOLIC VOLUME INDEX: 29.3 ML/M2
LEFT VENTRICLE SYSTOLIC VOLUME: 60.99 ML
LEFT VENTRICULAR INTERNAL DIMENSION IN DIASTOLE: 5.79 CM (ref 3.5–6)
LEFT VENTRICULAR MASS: 195.75 G
LV LATERAL E/E' RATIO: 8.44 M/S
LV SEPTAL E/E' RATIO: 9.5 M/S
LVOT MG: 2.06 MMHG
LVOT MV: 0.67 CM/S
MV PEAK A VEL: 1.01 M/S
MV PEAK E VEL: 0.76 M/S
MV STENOSIS PRESSURE HALF TIME: 39.83 MS
MV VALVE AREA P 1/2 METHOD: 5.52 CM2
PISA AR MAX VEL: 3.97 M/S
PISA TR MAX VEL: 2.36 M/S
RA MAJOR: 4.37 CM
RA PRESSURE: 3 MMHG
RIGHT VENTRICULAR END-DIASTOLIC DIMENSION: 3.85 CM
RIGHT VENTRICULAR LENGTH IN DIASTOLE (APICAL 4-CHAMBER VIEW): 7.82 CM
RV MID DIAMA: 16.53 CM
RV TISSUE DOPPLER FREE WALL SYSTOLIC VELOCITY 1 (APICAL 4 CHAMBER VIEW): 0.01 CM/S
SINUS: 3.56 CM
STJ: 3.22 CM
TDI LATERAL: 0.09 M/S
TDI SEPTAL: 0.08 M/S
TDI: 0.09 M/S
TR MAX PG: 22 MMHG
TRICUSPID ANNULAR PLANE SYSTOLIC EXCURSION: 2.27 CM
TV REST PULMONARY ARTERY PRESSURE: 25 MMHG

## 2022-10-12 ENCOUNTER — OFFICE VISIT (OUTPATIENT)
Dept: CARDIOLOGY | Facility: CLINIC | Age: 82
End: 2022-10-12
Payer: MEDICARE

## 2022-10-12 VITALS
SYSTOLIC BLOOD PRESSURE: 147 MMHG | HEIGHT: 72 IN | DIASTOLIC BLOOD PRESSURE: 85 MMHG | WEIGHT: 188.5 LBS | BODY MASS INDEX: 25.53 KG/M2 | HEART RATE: 82 BPM

## 2022-10-12 DIAGNOSIS — Z91.89 CARDIOVASCULAR EVENT RISK: ICD-10-CM

## 2022-10-12 DIAGNOSIS — I10 PRIMARY HYPERTENSION: Primary | ICD-10-CM

## 2022-10-12 DIAGNOSIS — E78.2 MIXED HYPERLIPIDEMIA: ICD-10-CM

## 2022-10-12 DIAGNOSIS — I70.0 AORTIC ATHEROSCLEROSIS: ICD-10-CM

## 2022-10-12 PROCEDURE — 99214 OFFICE O/P EST MOD 30 MIN: CPT | Mod: S$PBB,,, | Performed by: INTERNAL MEDICINE

## 2022-10-12 PROCEDURE — 99999 PR PBB SHADOW E&M-EST. PATIENT-LVL III: ICD-10-PCS | Mod: PBBFAC,,, | Performed by: INTERNAL MEDICINE

## 2022-10-12 PROCEDURE — 99213 OFFICE O/P EST LOW 20 MIN: CPT | Mod: PBBFAC,PO | Performed by: INTERNAL MEDICINE

## 2022-10-12 PROCEDURE — 99999 PR PBB SHADOW E&M-EST. PATIENT-LVL III: CPT | Mod: PBBFAC,,, | Performed by: INTERNAL MEDICINE

## 2022-10-12 PROCEDURE — 99214 PR OFFICE/OUTPT VISIT, EST, LEVL IV, 30-39 MIN: ICD-10-PCS | Mod: S$PBB,,, | Performed by: INTERNAL MEDICINE

## 2022-10-12 NOTE — PROGRESS NOTES
Subjective:    Patient ID:  Iftikhar Lynn is a 82 y.o. male patient here for evaluation Follow-up (Echo results)      History of Present Illness:  Cardiology follow-up.  Holter monitor in the past with frequent PVCs, mostly monomorphic, no VT.  No sustained SVT.  Negative stress echo .  Blood pressure heart rate elevated last admission, has responded well to Toprol-XL 25 daily in addition to his regularly prescribed medications.    Patient exercises regularly walks regularly no symptoms.  No chest pain no PND or no orthopnea.  No edema.    No past documented ischemic structural arrhythmic heart disease.             Review of patient's allergies indicates:  No Known Allergies    Past Medical History:   Diagnosis Date    Cancer     basal cell ca rt arm / melanoma on back    GERD (gastroesophageal reflux disease)     Hyperlipidemia     Hypertension     Melanoma      Past Surgical History:   Procedure Laterality Date    BACK SURGERY      L 4 L5    COLONOSCOPY N/A 2017    Procedure: COLONOSCOPY;  Surgeon: Filipe Ramirez MD;  Location: [facility] ENDO;  Service: Endoscopy;  Laterality: N/A;    COLONOSCOPY N/A 2021    Procedure: COLONOSCOPY;  Surgeon: Filipe Ramirez MD;  Location: [facility];  Service: Endoscopy;  Laterality: N/A;    KNEE ARTHROSCOPY W/ MENISCECTOMY Left 2019    Procedure: ARTHROSCOPY, KNEE, WITH MENISCECTOMY;  Surgeon: Ethan Díaz MD;  Location: [facility];  Service: Orthopedics;  Laterality: Left;    SKIN CANCER EXCISION      multiple sites, derm dr martinez, basal cell ca    VASECTOMY       Social History     Tobacco Use    Smoking status: Former     Types: Cigarettes     Quit date: 2/15/1976     Years since quittin.6    Smokeless tobacco: Never   Substance Use Topics    Alcohol use: Yes     Alcohol/week: 0.0 standard drinks     Comment: daily wine    Drug use: No        Review of Systems:    As noted in HPI in addition      REVIEW OF SYSTEMS  Review of Systems    Constitutional: Negative for decreased appetite, diaphoresis, night sweats, weight gain and weight loss.   HENT:  Negative for nosebleeds and odynophagia.    Eyes:  Negative for double vision and photophobia.   Cardiovascular:  Negative for chest pain, claudication, cyanosis, dyspnea on exertion, irregular heartbeat, leg swelling, near-syncope, orthopnea, palpitations, paroxysmal nocturnal dyspnea and syncope.   Respiratory:  Negative for cough, hemoptysis, shortness of breath and wheezing.    Hematologic/Lymphatic: Negative for adenopathy.   Skin:  Negative for flushing, skin cancer and suspicious lesions.   Musculoskeletal:  Negative for gout, myalgias and neck pain.   Gastrointestinal:  Negative for abdominal pain, heartburn, hematemesis and hematochezia.   Genitourinary:  Negative for bladder incontinence, hesitancy and nocturia.   Neurological:  Negative for focal weakness, headaches, light-headedness and paresthesias.   Psychiatric/Behavioral:  Negative for memory loss and substance abuse.             Objective:        Vitals:    10/12/22 1132   BP: (!) 147/85   Pulse: 82       Lab Results   Component Value Date    WBC 9.17 07/22/2022    HGB 14.9 07/22/2022    HCT 42.8 07/22/2022     07/22/2022    CHOL 127 08/05/2022    TRIG 205 (H) 08/05/2022    HDL 44 08/05/2022    ALT 31 07/22/2022    AST 32 07/22/2022     07/22/2022    K 4.0 07/22/2022     07/22/2022    CREATININE 0.8 07/22/2022    BUN 14 07/22/2022    CO2 22 (L) 07/22/2022    TSH 3.649 08/05/2022    PSA 4.3 (H) 08/17/2021    HGBA1C 5.0 10/10/2018        ECHOCARDIOGRAM RESULTS  Results for orders placed in visit on 10/03/22    Echo    Interpretation Summary  · Normal systolic function.  · The estimated ejection fraction is 55%.  · Indeterminate left ventricular diastolic function.  · Normal right ventricular size with normal right ventricular systolic function.  · Mild-to-moderate mitral regurgitation.  · Mild to moderate tricuspid  regurgitation.  · Normal central venous pressure (3 mmHg).  · The estimated PA systolic pressure is 25 mmHg.        CURRENT/PREVIOUS VISIT EKG  Results for orders placed or performed during the hospital encounter of 07/22/22   EKG 12-lead    Collection Time: 07/22/22  6:51 PM    Narrative    Test Reason : R07.89,    Vent. Rate : 078 BPM     Atrial Rate : 078 BPM     P-R Int : 208 ms          QRS Dur : 096 ms      QT Int : 402 ms       P-R-T Axes : 045 016 030 degrees     QTc Int : 458 ms    Normal sinus rhythm  Normal ECG  When compared with ECG of 16-JAN-2019 12:38,  No significant change was found  Confirmed by Rich Villarreal MD ([id number]) on 7/27/2022 10:37:33 PM    Referred By: DONNA   SELF           Confirmed By:Rich Villarreal MD     No valid procedures specified.   No results found for this or any previous visit.    No valid procedures specified.    PHYSICAL EXAM  CONSTITUTIONAL: Well built, well nourished in no apparent distress  NECK: no carotid bruit, no JVD  LUNGS: CTA  CHEST WALL: no tenderness,  HEART: regular rate and rhythm, S1, S2 normal, no murmur, click, rub or gallop   ABDOMEN: soft, non-tender; bowel sounds normal; no masses,  no organomegaly  EXTREMITIES: Extremities normal, no edema, no calf tenderness noted  NEURO: AAO X 3    I HAVE REVIEWED :    The vital signs, nurses notes, and all the pertinent radiology and labs.         Current Outpatient Medications   Medication Instructions    aspirin (ECOTRIN) 81 mg, Oral, Daily    ibuprofen (IBU) 800 mg, Oral, Every 8 hours PRN    metoprolol succinate (TOPROL-XL) 25 mg, Oral, Daily    multivitamin capsule 1 capsule, Oral, Daily    nystatin-triamcinolone (MYCOLOG II) cream Topical (Top), 2 times daily    omeprazole (PRILOSEC OTC) 20 mg, Oral, Every Mon, Fri,      pramoxine-hydrocortisone (PROCTOCREAM-HC) 1-1 % rectal cream Rectal, 2 times daily    simvastatin (ZOCOR) 40 mg, Oral, Nightly    valsartan (DIOVAN) 320 mg, Oral, Daily, Stop olmsartan 40.           Assessment:   History of abnormal Holter monitor with PVCs.  Negative stress echo 7/21.  Repeat echo 9/22 with mild moderate MR preserved ejection fraction.  Today's blood pressure heart rate as well as home monitoring within acceptable limits.    Repeat Holter monitor 8/22 with occasional to frequent PVCs, non complex.  No complex atrial ectopy.  No sustained arrhythmia.    Plan:   Patient asymptomatic.  Continue Diovan 320 daily, Toprol-XL 25 daily, simvastatin 40 daily    Risk factor modification.  Exercise, diet.    Six months.  No follow-ups on file.

## 2022-11-15 NOTE — TRANSFER OF CARE
Anesthesia Transfer of Care Note    Patient: Iftikhar Lynn    Procedure(s) Performed: Procedure(s) (LRB):  COLONOSCOPY (N/A)    Patient location: GI    Anesthesia Type: general    Transport from OR: Transported from OR on room air with adequate spontaneous ventilation    Post pain: adequate analgesia    Post assessment: no apparent anesthetic complications    Post vital signs: stable    Level of consciousness: awake, oriented and alert    Nausea/Vomiting: no nausea/vomiting    Complications: none    Transfer of care protocol was followed      Last vitals:   Visit Vitals  BP (!) 150/78 (BP Location: Left arm, Patient Position: Lying)   Pulse 72   Temp 36.9 °C (98.4 °F) (Temporal)   Resp 16   Ht 6' (1.829 m)   Wt 80.7 kg (178 lb)   SpO2 95%   BMI 24.14 kg/m²      Ok with letter

## 2022-12-06 ENCOUNTER — PATIENT MESSAGE (OUTPATIENT)
Dept: CARDIOLOGY | Facility: CLINIC | Age: 82
End: 2022-12-06
Payer: MEDICARE

## 2022-12-06 ENCOUNTER — TELEPHONE (OUTPATIENT)
Dept: CARDIOLOGY | Facility: CLINIC | Age: 82
End: 2022-12-06
Payer: MEDICARE

## 2022-12-06 NOTE — TELEPHONE ENCOUNTER
----- Message from Nini Montenegro sent at 12/6/2022  9:43 AM CST -----  Contact: pt at [phone]  Type: Needs Medical Advice  Who Called:  Pt   Best Call Back Number: [phone]    Additional Information: Pt called in to get a call back from the doctor or the nurse he has some questions

## 2023-02-15 ENCOUNTER — TELEPHONE (OUTPATIENT)
Dept: FAMILY MEDICINE | Facility: CLINIC | Age: 83
End: 2023-02-15
Payer: MEDICARE

## 2023-02-15 NOTE — TELEPHONE ENCOUNTER
----- Message from Marilynn Cruz sent at 2/15/2023 12:51 PM CST -----  Regarding: appoinment request  Name of Who is Calling:KUSH GUERRA [[id number]]          What is the request in detail:    Pt is having SOB and tiredness and would like a appointment           Can the clinic reply by [facility]: no           What [facility][phone] (home)

## 2023-02-15 NOTE — TELEPHONE ENCOUNTER
----- Message from Leslieann marie Day sent at 2/15/2023  4:33 PM CST -----  Contact: [phone]  Type:  Patient Returning Call    Who Called:  Pt   Who Left Message for Patient:  Giovanni   Does the patient know what this is regarding?:  Appt tomorrow   Best Call Back Number:  [phone] (home)     Additional Information:  Pt stated he received a message from Giovanni telling him his appt for tomorrow was at 9am with DR Molina. Pt not sure who Dr Molina is and where he is located. Pls send message on [employer]

## 2023-02-15 NOTE — TELEPHONE ENCOUNTER
----- Message from Marilynn Cruz sent at 2/15/2023 12:51 PM CST -----  Regarding: appoinment request  Name of Who is Calling:KUSH GUERRA [[id number]]          What is the request in detail:    Pt is having SOB and tiredness and would like a appointment           Can the clinic reply by [facility]: no           What Number to Call Back [facility][phone] (home)

## 2023-02-15 NOTE — TELEPHONE ENCOUNTER
Spoke with patient he is having a cough with SOB, does not want to go to urgent care.  Appointment with Dr. Pretty for 9 am tomorrow 2/16/23

## 2023-02-16 ENCOUNTER — OFFICE VISIT (OUTPATIENT)
Dept: FAMILY MEDICINE | Facility: CLINIC | Age: 83
End: 2023-02-16
Payer: MEDICARE

## 2023-02-16 ENCOUNTER — HOSPITAL ENCOUNTER (OUTPATIENT)
Dept: RADIOLOGY | Facility: HOSPITAL | Age: 83
Discharge: HOME | End: 2023-02-16
Attending: FAMILY MEDICINE
Payer: MEDICARE

## 2023-02-16 VITALS
TEMPERATURE: 98 F | WEIGHT: 185.63 LBS | HEIGHT: 72 IN | DIASTOLIC BLOOD PRESSURE: 62 MMHG | RESPIRATION RATE: 16 BRPM | HEART RATE: 97 BPM | BODY MASS INDEX: 25.14 KG/M2 | OXYGEN SATURATION: 98 % | SYSTOLIC BLOOD PRESSURE: 144 MMHG

## 2023-02-16 DIAGNOSIS — R06.02 SHORTNESS OF BREATH: ICD-10-CM

## 2023-02-16 DIAGNOSIS — R06.02 SHORTNESS OF BREATH: Primary | ICD-10-CM

## 2023-02-16 DIAGNOSIS — R91.8 MULTIPLE PULMONARY NODULES: ICD-10-CM

## 2023-02-16 DIAGNOSIS — R05.3 CHRONIC COUGH: ICD-10-CM

## 2023-02-16 PROCEDURE — 71046 XR CHEST PA AND LATERAL: ICD-10-PCS | Mod: 26,,, | Performed by: RADIOLOGY

## 2023-02-16 PROCEDURE — 99214 PR OFFICE/OUTPT VISIT, EST, LEVL IV, 30-39 MIN: ICD-10-PCS | Mod: S$PBB,,, | Performed by: FAMILY MEDICINE

## 2023-02-16 PROCEDURE — 99214 OFFICE O/P EST MOD 30 MIN: CPT | Mod: PBBFAC,PO | Performed by: FAMILY MEDICINE

## 2023-02-16 PROCEDURE — 99214 OFFICE O/P EST MOD 30 MIN: CPT | Mod: S$PBB,,, | Performed by: FAMILY MEDICINE

## 2023-02-16 PROCEDURE — 71046 X-RAY EXAM CHEST 2 VIEWS: CPT | Mod: TC,FY

## 2023-02-16 PROCEDURE — 71250 CT THORAX DX C-: CPT | Mod: 26,,, | Performed by: RADIOLOGY

## 2023-02-16 PROCEDURE — 99999 PR PBB SHADOW E&M-EST. PATIENT-LVL IV: CPT | Mod: PBBFAC,,, | Performed by: FAMILY MEDICINE

## 2023-02-16 PROCEDURE — 71250 CT THORAX DX C-: CPT | Mod: TC

## 2023-02-16 PROCEDURE — 71046 X-RAY EXAM CHEST 2 VIEWS: CPT | Mod: 26,,, | Performed by: RADIOLOGY

## 2023-02-16 PROCEDURE — 99999 PR PBB SHADOW E&M-EST. PATIENT-LVL IV: ICD-10-PCS | Mod: PBBFAC,,, | Performed by: FAMILY MEDICINE

## 2023-02-16 PROCEDURE — 71250 CT CHEST WITHOUT CONTRAST: ICD-10-PCS | Mod: 26,,, | Performed by: RADIOLOGY

## 2023-02-16 RX ORDER — ALBUTEROL SULFATE 90 UG/1
2 AEROSOL, METERED RESPIRATORY (INHALATION) 4 TIMES DAILY
Qty: 18 G | Refills: 0 | Status: SHIPPED | OUTPATIENT
Start: 2023-02-16 | End: 2023-02-28 | Stop reason: SDUPTHER

## 2023-02-16 NOTE — PROGRESS NOTES
Subjective:       Patient ID: Iftikhar Lynn is a 83 y.o. male.    Chief Complaint: No chief complaint on file.    New to me patient here for UC visit.  CC- SOB - noted past month along with cough.  Can be SOB at rest and more so with exertion.  No wheezing noted.  No anginal or pl chest pain.  Cough typically non-productive or occasionally sees white sputum.  No fever.  No leg edema.  Some palpitations - onset of tachycardia with increased BP last summer - Metoprolol added and he feels those issues are improved.  PVC's and PAC's noted on [employer] mobile device.    Pulm Hx - Peds - Asthma until early-mid teens then no problems; ran track from 10th grade on and no problems.  No occupational exposure.  Tobacco - mostly cigars in past and approx 7.5 pack years of cigarettes in remote past.  CT Chest - followed for multiple pulmonary nodules.    Review of Systems   Constitutional:  Negative for fever.   Respiratory:  Positive for cough and shortness of breath.    Cardiovascular:  Positive for palpitations. Negative for chest pain and leg swelling.   Gastrointestinal:  Negative for abdominal pain and nausea.   Skin:  Negative for rash.   Neurological:  Negative for numbness.   All other systems reviewed and are negative.    Objective:      Physical Exam  Vitals reviewed.   Constitutional:       General: He is not in acute distress.     Appearance: He is well-developed.   HENT:      Mouth/Throat:      Mouth: Mucous membranes are moist.      Pharynx: No posterior oropharyngeal erythema.   Cardiovascular:      Rate and Rhythm: Normal rate and regular rhythm.      Heart sounds: No murmur heard.  Pulmonary:      Effort: Pulmonary effort is normal.      Comments: BS clear, full and equal in all ling fields; no wheezing.  Prolonged expiratory phase is noted.  Musculoskeletal:      Cervical back: Neck supple.      Right lower leg: No edema.      Left lower leg: No edema.   Lymphadenopathy:      Cervical: No cervical  adenopathy.   Skin:     General: Skin is warm and dry.   Neurological:      Mental Status: He is alert.       Assessment:       1. Shortness of breath    2. Multiple pulmonary nodules    3. Chronic cough          Plan:       Shortness of breath  -     X-Ray Chest PA And Lateral; Future; Expected date: 02/16/2023  -     Ambulatory referral/consult to Pulmonology; Future; Expected date: 02/23/2023  -     albuterol (PROVENTIL/VENTOLIN HFA) 90 mcg/actuation inhaler; Inhale 2 puffs into the lungs 4 (four) times daily.  Dispense: 18 g; Refill: 0    Multiple pulmonary nodules  -     CT Chest Without Contrast; Future; Expected date: 02/16/2023    Chronic cough  -     X-Ray Chest PA And Lateral; Future; Expected date: 02/16/2023  -     CT Chest Without Contrast; Future; Expected date: 02/16/2023  -     Ambulatory referral/consult to Pulmonology; Future; Expected date: 02/23/2023  -     albuterol (PROVENTIL/VENTOLIN HFA) 90 mcg/actuation inhaler; Inhale 2 puffs into the lungs 4 (four) times daily.  Dispense: 18 g; Refill: 0

## 2023-02-27 ENCOUNTER — TELEPHONE (OUTPATIENT)
Dept: FAMILY MEDICINE | Facility: CLINIC | Age: 83
End: 2023-02-27
Payer: MEDICARE

## 2023-02-27 NOTE — TELEPHONE ENCOUNTER
Patient reports on yesterday afternoon he suddenly started to experience difficulty ambulating. Patient states he is unable to extend right leg. C/O pain in right calf, tingling sensation to right foot, unexplained cough, and sob. Denies swelling, redness, or warmth to extremity. Writer advised for patient to be seen at ER as soon as possible. Patient verbalized understanding.

## 2023-02-27 NOTE — TELEPHONE ENCOUNTER
"Susy RENTERIA Jr. Staff  Caller: Unspecified (Today,  8:23 AM)    ----- Message from Susy Basilio sent at 2/27/2023  8:23 AM CST -----  "Type:  Patient Call Back    Who Called:PT    What is the reqeust in detail:Pt requesting call back not sure who he needs to talk with pt states he has some symptoms of a blood clot in rt leg. Please advise    Can the clinic reply by [facility]?no    Best Call Back Number:[phone]      Additional Information:            "

## 2023-02-28 ENCOUNTER — OFFICE VISIT (OUTPATIENT)
Dept: PULMONOLOGY | Facility: CLINIC | Age: 83
End: 2023-02-28
Payer: MEDICARE

## 2023-02-28 VITALS
HEIGHT: 72 IN | WEIGHT: 190.38 LBS | BODY MASS INDEX: 25.78 KG/M2 | DIASTOLIC BLOOD PRESSURE: 82 MMHG | OXYGEN SATURATION: 95 % | HEART RATE: 78 BPM | SYSTOLIC BLOOD PRESSURE: 178 MMHG

## 2023-02-28 DIAGNOSIS — J45.30 MILD PERSISTENT ASTHMA WITHOUT COMPLICATION: Primary | ICD-10-CM

## 2023-02-28 DIAGNOSIS — J47.9 BRONCHIECTASIS WITHOUT COMPLICATION: ICD-10-CM

## 2023-02-28 DIAGNOSIS — R05.3 CHRONIC COUGH: ICD-10-CM

## 2023-02-28 DIAGNOSIS — R06.02 SHORTNESS OF BREATH: ICD-10-CM

## 2023-02-28 PROCEDURE — 99214 OFFICE O/P EST MOD 30 MIN: CPT | Mod: PBBFAC,PO | Performed by: INTERNAL MEDICINE

## 2023-02-28 PROCEDURE — 99204 PR OFFICE/OUTPT VISIT, NEW, LEVL IV, 45-59 MIN: ICD-10-PCS | Mod: S$PBB,,, | Performed by: INTERNAL MEDICINE

## 2023-02-28 PROCEDURE — 99204 OFFICE O/P NEW MOD 45 MIN: CPT | Mod: S$PBB,,, | Performed by: INTERNAL MEDICINE

## 2023-02-28 PROCEDURE — 99999 PR PBB SHADOW E&M-EST. PATIENT-LVL IV: CPT | Mod: PBBFAC,,, | Performed by: INTERNAL MEDICINE

## 2023-02-28 PROCEDURE — 99999 PR PBB SHADOW E&M-EST. PATIENT-LVL IV: ICD-10-PCS | Mod: PBBFAC,,, | Performed by: INTERNAL MEDICINE

## 2023-02-28 RX ORDER — ALBUTEROL SULFATE 90 UG/1
2 AEROSOL, METERED RESPIRATORY (INHALATION) 4 TIMES DAILY
Qty: 18 G | Refills: 11 | Status: SHIPPED | OUTPATIENT
Start: 2023-02-28 | End: 2023-08-03

## 2023-02-28 RX ORDER — FLUTICASONE FUROATE, UMECLIDINIUM BROMIDE AND VILANTEROL TRIFENATATE 200; 62.5; 25 UG/1; UG/1; UG/1
1 POWDER RESPIRATORY (INHALATION) DAILY
Qty: 60 EACH | Refills: 11 | Status: SHIPPED | OUTPATIENT
Start: 2023-02-28 | End: 2023-08-03

## 2023-02-28 NOTE — PATIENT INSTRUCTIONS
You have mild structural disease seen on ct, bronchiectasis, that may cause your lung symptoms.    You have symptoms of mild persistent asthma    Would recommend trial controller -- trelegy once daily.      Use albuterol before exercise.    May have lung infection in future???    Call if needed.    Doubt pft will help--

## 2023-02-28 NOTE — PROGRESS NOTES
2/28/2023    Iftikhar Lynn  New Patient Consult    Chief Complaint   Patient presents with    Cough     White sputum from time to time    Shortness of Breath     Finding it harder to breathe sometimes - gets better after time       HPI: 2/28/2023 from  [place] , [facility].    Had ct abd in past with lung nodules followed for yrs.  Pt had had cough early am with good response to inhalers in.past.  has albuterol at home but hasnt tried.      Pt had some khan, and upon arising in am has cough.  Pt feels has increased impedence breathing in morning.  Pt notes mucous scant and clear.      Ct chest 2/2023 c/w 2017 and 2018 nodules stable, numerous upper lung nodules 2 mm or so.  Lwoer lung bronchiectasis seen -- mild.  6 mm rll nodule stable. Scott coronaries not impressive.        Had right leg pain and ? Blood clot. Pt later felt knee arthritic in nature.  No h/o dvt.   Ct no contrast 2/16/2023 6 mm stable lung nodule.       PFSH:  Past Medical History:   Diagnosis Date    Cancer     basal cell ca rt arm / melanoma on back    GERD (gastroesophageal reflux disease)     Hyperlipidemia     Hypertension     Melanoma          Past Surgical History:   Procedure Laterality Date    BACK SURGERY      L 4 L5    COLONOSCOPY N/A 12/20/2017    Procedure: COLONOSCOPY;  Surgeon: Filipe Ramirez MD;  Location: [facility] ENDO;  Service: Endoscopy;  Laterality: N/A;    COLONOSCOPY N/A 9/27/2021    Procedure: COLONOSCOPY;  Surgeon: Filipe Ramirez MD;  Location: [facility] ENDO;  Service: Endoscopy;  Laterality: N/A;    KNEE ARTHROSCOPY W/ MENISCECTOMY Left 1/17/2019    Procedure: ARTHROSCOPY, KNEE, WITH MENISCECTOMY;  Surgeon: Ethan Díaz MD;  Location: [facility] OR;  Service: Orthopedics;  Laterality: Left;    SKIN CANCER EXCISION      multiple sites, derm dr martinez, basal cell ca    VASECTOMY       Social History     Tobacco Use    Smoking status: Former     Packs/day: 0.50     Years: 15.00     Pack  years: 7.50     Types: Cigarettes, Cigars     Start date:      Quit date: 2/15/1976     Years since quittin.0    Smokeless tobacco: Never   Substance Use Topics    Alcohol use: Yes     Alcohol/week: 0.0 standard drinks     Comment: daily wine    Drug use: No     Family History   Problem Relation Age of Onset    Cancer Mother         non hogkins lumphoma    Heart disease Father     Cancer Brother         melanoma, arm and leg    Clotting disorder Neg Hx     Anesthesia problems Neg Hx      Review of patient's allergies indicates:  No Known Allergies       Review of Systems:  a review of eleven systems covering constitutional, Eye, HEENT, Psych, Respiratory, Cardiac, GI, , Musculoskeletal, Endocrine, Dermatologic was negative except for pertinent findings as listed ABOVE and below:  pertinent positives as above, rest good        Exam:Comprehensive exam done. BP (!) 178/82 (BP Location: Left arm, Patient Position: Sitting, BP Method: Medium (Automatic))   Pulse 78   Ht 6' (1.829 m)   Wt 86.4 kg (190 lb 5.9 oz)   SpO2 95% Comment: on room air at rest  BMI 25.82 kg/m²   Exam included Vitals as listed, and patient's appearance and affect and alertness and mood, oral exam for yeast and hygiene and pharynx lesions and Mallapatti (M) score, neck with inspection for jvd and masses and thyroid abnormalities and lymph nodes (supraclavicular and infraclavicular nodes and axillary also examined and noted if abn), chest exam included symmetry and effort and fremitus and percussion and auscultation, cardiac exam included rhythm and gallops and murmur and rubs and jvd and edema, abdominal exam for mass and hepatosplenomegaly and tenderness and hernias and bowel sounds, Musculoskeletal exam with muscle tone and posture and mobility/gait and  strength, and skin for rashes and cyanosis and pallor and turgor, extremity for clubbing.  Findings were normal except for pertinent findings listed below:  M1, chest is  symmetric, no distress, normal percussion, normal fremitus and good normal breath sounds  Lipoma suggested right shoulder  Right knee looks nl.          Radiographs (ct chest and cxr) reviewed: view by direct vision      Labs reviewed   eos up        PFT was not done       Plan:  Clinical impression is apparently straight forward and impression with management as below.    Iftikhar was seen today for cough and shortness of breath.    Diagnoses and all orders for this visit:    Mild persistent asthma without complication  -     fluticasone-umeclidin-vilanter (TRELEGY ELLIPTA) 200-62.5-25 mcg inhaler; Inhale 1 puff into the lungs once daily.    Chronic cough  -     Ambulatory referral/consult to Pulmonology  -     albuterol (PROVENTIL/VENTOLIN HFA) 90 mcg/actuation inhaler; Inhale 2 puffs into the lungs 4 (four) times daily.    Shortness of breath  -     Ambulatory referral/consult to Pulmonology  -     albuterol (PROVENTIL/VENTOLIN HFA) 90 mcg/actuation inhaler; Inhale 2 puffs into the lungs 4 (four) times daily.    Bronchiectasis without complication  -     fluticasone-umeclidin-vilanter (TRELEGY ELLIPTA) 200-62.5-25 mcg inhaler; Inhale 1 puff into the lungs once daily.        Follow up if symptoms worsen or fail to improve.    Discussed with patient above for education the following:      Patient Instructions   You have mild structural disease seen on ct, bronchiectasis, that may cause your lung symptoms.    You have symptoms of mild persistent asthma    Would recommend trial controller -- trelegy once daily.      Use albuterol before exercise.    May have lung infection in future???    Call if needed.    Doubt pft will help--     Mar took 51 min

## 2023-03-05 DIAGNOSIS — I10 ESSENTIAL HYPERTENSION: ICD-10-CM

## 2023-03-05 RX ORDER — VALSARTAN 320 MG/1
TABLET ORAL
Qty: 90 TABLET | Refills: 3 | Status: SHIPPED | OUTPATIENT
Start: 2023-03-05 | End: 2023-08-03 | Stop reason: SDUPTHER

## 2023-03-06 NOTE — TELEPHONE ENCOUNTER
Refill Routing Note   Medication(s) are not appropriate for processing by [facility] for the following reason(s):       Required vitals abnormal    [facility] action(s):  Defer         Appointments  past 12m or future 3m with PCP    Date Provider   Last Visit   8/3/2022 Chris Portillo Jr., MD   Next Visit   8/3/2023 Chris Portillo Jr., MD   ED visits in past 90 days: 0        Note composed:9:57 PM 03/05/2023

## 2023-03-06 NOTE — TELEPHONE ENCOUNTER
No new care gaps identified.  [facility] Embedded Care Gaps. Reference number: [id number]. 3/05/2023   9:17:35 PM CST

## 2023-04-17 ENCOUNTER — OFFICE VISIT (OUTPATIENT)
Dept: CARDIOLOGY | Facility: CLINIC | Age: 83
End: 2023-04-17
Payer: MEDICARE

## 2023-04-17 VITALS
SYSTOLIC BLOOD PRESSURE: 164 MMHG | HEIGHT: 72 IN | WEIGHT: 190.5 LBS | HEART RATE: 78 BPM | BODY MASS INDEX: 25.8 KG/M2 | DIASTOLIC BLOOD PRESSURE: 87 MMHG

## 2023-04-17 DIAGNOSIS — Z85.820 HX OF MALIGNANT SKIN MELANOMA: ICD-10-CM

## 2023-04-17 DIAGNOSIS — R05.3 CHRONIC COUGH: ICD-10-CM

## 2023-04-17 DIAGNOSIS — J84.10 CALCIFIED GRANULOMA OF LUNG: Primary | ICD-10-CM

## 2023-04-17 DIAGNOSIS — Z91.89 CARDIOVASCULAR EVENT RISK: ICD-10-CM

## 2023-04-17 DIAGNOSIS — R94.31 ABNORMAL ELECTROCARDIOGRAM (ECG) (EKG): ICD-10-CM

## 2023-04-17 DIAGNOSIS — I77.819 AORTIC ECTASIA, UNSPECIFIED SITE: Chronic | ICD-10-CM

## 2023-04-17 DIAGNOSIS — R06.02 SHORTNESS OF BREATH: ICD-10-CM

## 2023-04-17 DIAGNOSIS — I10 PRIMARY HYPERTENSION: ICD-10-CM

## 2023-04-17 DIAGNOSIS — I72.3 ILIAC ARTERY ANEURYSM, LEFT: Chronic | ICD-10-CM

## 2023-04-17 PROCEDURE — 99999 PR PBB SHADOW E&M-EST. PATIENT-LVL III: ICD-10-PCS | Mod: PBBFAC,,, | Performed by: INTERNAL MEDICINE

## 2023-04-17 PROCEDURE — 99214 OFFICE O/P EST MOD 30 MIN: CPT | Mod: S$PBB,,, | Performed by: INTERNAL MEDICINE

## 2023-04-17 PROCEDURE — 99999 PR PBB SHADOW E&M-EST. PATIENT-LVL III: CPT | Mod: PBBFAC,,, | Performed by: INTERNAL MEDICINE

## 2023-04-17 PROCEDURE — 99213 OFFICE O/P EST LOW 20 MIN: CPT | Mod: PBBFAC,PO | Performed by: INTERNAL MEDICINE

## 2023-04-17 PROCEDURE — 99214 PR OFFICE/OUTPT VISIT, EST, LEVL IV, 30-39 MIN: ICD-10-PCS | Mod: S$PBB,,, | Performed by: INTERNAL MEDICINE

## 2023-04-17 NOTE — PROGRESS NOTES
Subjective:    Patient ID:  Iftikhar Lynn is a 83 y.o. male patient here for evaluation Follow-up      History of Present Illness:  Follow-up.  History of frequent PVCs, non complex.  Stress echo-.  Echo 2022 with preserved ejection fraction, no significant valvular heart issues.  Patient continues to complain of dyspnea.  Pulmonary evaluation of recent negative.  No definite angina             Review of patient's allergies indicates:  No Known Allergies    Past Medical History:   Diagnosis Date    Cancer     basal cell ca rt arm / melanoma on back    GERD (gastroesophageal reflux disease)     Hyperlipidemia     Hypertension     Melanoma      Past Surgical History:   Procedure Laterality Date    BACK SURGERY      L 4 L5    COLONOSCOPY N/A 2017    Procedure: COLONOSCOPY;  Surgeon: Filipe Ramirez MD;  Location: [facility] ENDO;  Service: Endoscopy;  Laterality: N/A;    COLONOSCOPY N/A 2021    Procedure: COLONOSCOPY;  Surgeon: Filipe Ramirez MD;  Location: [facility] ENDO;  Service: Endoscopy;  Laterality: N/A;    KNEE ARTHROSCOPY W/ MENISCECTOMY Left 2019    Procedure: ARTHROSCOPY, KNEE, WITH MENISCECTOMY;  Surgeon: Ethan Díaz MD;  Location: [facility] OR;  Service: Orthopedics;  Laterality: Left;    SKIN CANCER EXCISION      multiple sites, derm dr martinez, basal cell ca    VASECTOMY       Social History     Tobacco Use    Smoking status: Former     Packs/day: 0.50     Years: 15.00     Pack years: 7.50     Types: Cigarettes, Cigars     Start date:      Quit date: 2/15/1976     Years since quittin.2    Smokeless tobacco: Never   Substance Use Topics    Alcohol use: Yes     Alcohol/week: 0.0 standard drinks     Comment: daily wine    Drug use: No        Review of Systems:    As noted in HPI in addition      REVIEW OF SYSTEMS  Review of Systems   Constitutional: Negative for decreased appetite, diaphoresis, night sweats, weight gain and weight loss.   HENT:  Negative for  nosebleeds and odynophagia.    Eyes:  Negative for double vision and photophobia.   Cardiovascular:  Negative for chest pain, claudication, cyanosis, dyspnea on exertion, irregular heartbeat, leg swelling, near-syncope, orthopnea, palpitations, paroxysmal nocturnal dyspnea and syncope.   Respiratory:  Positive for cough and shortness of breath. Negative for hemoptysis and wheezing.    Hematologic/Lymphatic: Negative for adenopathy.   Skin:  Negative for flushing, skin cancer and suspicious lesions.   Musculoskeletal:  Negative for gout, myalgias and neck pain.   Gastrointestinal:  Negative for abdominal pain, heartburn, hematemesis and hematochezia.   Genitourinary:  Negative for bladder incontinence, hesitancy and nocturia.   Neurological:  Negative for focal weakness, headaches, light-headedness and paresthesias.   Psychiatric/Behavioral:  Negative for memory loss and substance abuse.             Objective:        Vitals:    04/17/23 1359   BP: (!) 164/87   Pulse: 78       Lab Results   Component Value Date    WBC 9.17 07/22/2022    HGB 14.9 07/22/2022    HCT 42.8 07/22/2022     07/22/2022    CHOL 127 08/05/2022    TRIG 205 (H) 08/05/2022    HDL 44 08/05/2022    ALT 31 07/22/2022    AST 32 07/22/2022     07/22/2022    K 4.0 07/22/2022     07/22/2022    CREATININE 0.8 07/22/2022    BUN 14 07/22/2022    CO2 22 (L) 07/22/2022    TSH 3.649 08/05/2022    PSA 4.3 (H) 08/17/2021    HGBA1C 5.0 10/10/2018        ECHOCARDIOGRAM RESULTS  Results for orders placed in visit on 10/03/22    Echo    Interpretation Summary  · Normal systolic function.  · The estimated ejection fraction is 55%.  · Indeterminate left ventricular diastolic function.  · Normal right ventricular size with normal right ventricular systolic function.  · Mild-to-moderate mitral regurgitation.  · Mild to moderate tricuspid regurgitation.  · Normal central venous pressure (3 mmHg).  · The estimated PA systolic pressure is 25  mmHg.        CURRENT/PREVIOUS VISIT EKG  Results for orders placed or performed during the hospital encounter of 07/22/22   EKG 12-lead    Collection Time: 07/22/22  6:51 PM    Narrative    Test Reason : R07.89,    Vent. Rate : 078 BPM     Atrial Rate : 078 BPM     P-R Int : 208 ms          QRS Dur : 096 ms      QT Int : 402 ms       P-R-T Axes : 045 016 030 degrees     QTc Int : 458 ms    Normal sinus rhythm  Normal ECG  When compared with ECG of 16-JAN-2019 12:38,  No significant change was found  Confirmed by Rich Villarreal MD ([id number]) on 7/27/2022 10:37:33 PM    Referred By: DONNA   SELF           Confirmed By:Rich Villarreal MD     No valid procedures specified.   No results found for this or any previous visit.    No valid procedures specified.    PHYSICAL EXAM  CONSTITUTIONAL: Well built, well nourished in no apparent distress  NECK: no carotid bruit, no JVD  LUNGS: CTA  CHEST WALL: no tenderness,  HEART: regular rate and rhythm, S1, S2 normal, no murmur, click, rub or gallop   ABDOMEN: soft, non-tender; bowel sounds normal; no masses,  no organomegaly  EXTREMITIES: Extremities normal, no edema, no calf tenderness noted  NEURO: AAO X 3    I HAVE REVIEWED :    The vital signs, nurses notes, and all the pertinent radiology and labs.         Current Outpatient Medications   Medication Instructions    albuterol (PROVENTIL/VENTOLIN HFA) 90 mcg/actuation inhaler 2 puffs, Inhalation, 4 times daily    aspirin (ECOTRIN) 81 mg, Oral, Daily    fluticasone-umeclidin-vilanter (TRELEGY ELLIPTA) 200-62.5-25 mcg inhaler 1 puff, Inhalation, Daily    ibuprofen (IBU) 800 mg, Oral, Every 8 hours PRN    metoprolol succinate (TOPROL-XL) 25 mg, Oral, Daily    multivitamin capsule 1 capsule, Oral, Daily    nystatin-triamcinolone (MYCOLOG II) cream Topical (Top), 2 times daily    omeprazole (PRILOSEC OTC) 20 mg, Oral, Every Mon, Fri,      pramoxine-hydrocortisone (PROCTOCREAM-HC) 1-1 % rectal cream Rectal, 2 times daily     simvastatin (ZOCOR) 40 MG tablet TAKE 1 TABLET NIGHTLY    triamcinolone acetonide 0.1% (KENALOG) 0.1 % cream Apply to rash on wrists bid.  Avoid face and groin area.    valsartan (DIOVAN) 320 MG tablet TAKE 1 TABLET ONCE DAILY   (STOP OLMESARTAN 40MG)          Assessment:   Dyspnea  Follows with Pulmonary suspect chronic lung disease.  Patient recently prescribed Trelegy.  PVCs, echo 09/2022 with moderate MR, preserved ejection fraction.        Plan:   Suggest repeat echo and Holter monitor follow up results.  Rule out ectopy as a cause of dyspnea.  Re-evaluate EF.  Add Lexiscan.        No follow-ups on file.

## 2023-05-12 ENCOUNTER — PATIENT MESSAGE (OUTPATIENT)
Dept: CARDIOLOGY | Facility: HOSPITAL | Age: 83
End: 2023-05-12
Payer: MEDICARE

## 2023-05-15 ENCOUNTER — HOSPITAL ENCOUNTER (OUTPATIENT)
Dept: RADIOLOGY | Facility: HOSPITAL | Age: 83
Discharge: HOME | End: 2023-05-15
Attending: INTERNAL MEDICINE
Payer: MEDICARE

## 2023-05-15 ENCOUNTER — CLINICAL SUPPORT (OUTPATIENT)
Dept: CARDIOLOGY | Facility: HOSPITAL | Age: 83
End: 2023-05-15
Attending: INTERNAL MEDICINE
Payer: MEDICARE

## 2023-05-15 VITALS
SYSTOLIC BLOOD PRESSURE: 160 MMHG | BODY MASS INDEX: 25.73 KG/M2 | DIASTOLIC BLOOD PRESSURE: 80 MMHG | HEIGHT: 72 IN | WEIGHT: 190 LBS

## 2023-05-15 VITALS — WEIGHT: 190 LBS | HEIGHT: 72 IN | BODY MASS INDEX: 25.73 KG/M2

## 2023-05-15 DIAGNOSIS — Z91.89 CARDIOVASCULAR EVENT RISK: ICD-10-CM

## 2023-05-15 DIAGNOSIS — J84.10 CALCIFIED GRANULOMA OF LUNG: ICD-10-CM

## 2023-05-15 DIAGNOSIS — I10 PRIMARY HYPERTENSION: ICD-10-CM

## 2023-05-15 DIAGNOSIS — R94.31 ABNORMAL ELECTROCARDIOGRAM (ECG) (EKG): ICD-10-CM

## 2023-05-15 LAB
ASCENDING AORTA: 3.81 CM
AV INDEX (PROSTH): 0.56
AV MEAN GRADIENT: 9 MMHG
AV PEAK GRADIENT: 15 MMHG
AV VALVE AREA: 2.1 CM2
AV VELOCITY RATIO: 0.56
BSA FOR ECHO PROCEDURE: 2.09 M2
CV ECHO LV RWT: 0.31 CM
CV PHARM DOSE: 0.4 MG
CV STRESS BASE HR: 68 BPM
DIASTOLIC BLOOD PRESSURE: 45 MMHG
DOP CALC AO PEAK VEL: 1.94 M/S
DOP CALC AO VTI: 43.8 CM
DOP CALC LVOT AREA: 3.8 CM2
DOP CALC LVOT DIAMETER: 2.19 CM
DOP CALC LVOT PEAK VEL: 1.08 M/S
DOP CALC LVOT STROKE VOLUME: 91.86 CM3
DOP CALCLVOT PEAK VEL VTI: 24.4 CM
E WAVE DECELERATION TIME: 136.8 MSEC
E/A RATIO: 0.93
E/E' RATIO: 7.36 M/S
ECHO LV POSTERIOR WALL: 0.85 CM (ref 0.6–1.1)
EJECTION FRACTION: 55 %
FRACTIONAL SHORTENING: 27 % (ref 28–44)
INTERVENTRICULAR SEPTUM: 0.91 CM (ref 0.6–1.1)
LA MAJOR: 4.91 CM
LA MINOR: 4.64 CM
LA WIDTH: 4 CM
LEFT ATRIUM SIZE: 3.44 CM
LEFT ATRIUM VOLUME INDEX: 26.8 ML/M2
LEFT ATRIUM VOLUME: 55.8 CM3
LEFT INTERNAL DIMENSION IN SYSTOLE: 4.04 CM (ref 2.1–4)
LEFT VENTRICLE DIASTOLIC VOLUME INDEX: 71.63 ML/M2
LEFT VENTRICLE DIASTOLIC VOLUME: 148.99 ML
LEFT VENTRICLE MASS INDEX: 88 G/M2
LEFT VENTRICLE SYSTOLIC VOLUME INDEX: 34.4 ML/M2
LEFT VENTRICLE SYSTOLIC VOLUME: 71.54 ML
LEFT VENTRICULAR INTERNAL DIMENSION IN DIASTOLE: 5.53 CM (ref 3.5–6)
LEFT VENTRICULAR MASS: 182.23 G
LV LATERAL E/E' RATIO: 7.36 M/S
LV SEPTAL E/E' RATIO: 7.36 M/S
LVOT MG: 2.71 MMHG
LVOT MV: 0.78 CM/S
MV PEAK A VEL: 0.87 M/S
MV PEAK E VEL: 0.81 M/S
MV STENOSIS PRESSURE HALF TIME: 39.67 MS
MV VALVE AREA P 1/2 METHOD: 5.55 CM2
NUC REST EJECTION FRACTION: 61
OHS CV CPX 1 MINUTE RECOVERY HEART RATE: 85 BPM
OHS CV CPX 85 PERCENT MAX PREDICTED HEART RATE MALE: 118
OHS CV CPX MAX PREDICTED HEART RATE: 139
OHS CV CPX PATIENT IS FEMALE: 0
OHS CV CPX PATIENT IS MALE: 1
OHS CV CPX PEAK DIASTOLIC BLOOD PRESSURE: 69 MMHG
OHS CV CPX PEAK HEAR RATE: 93 BPM
OHS CV CPX PEAK RATE PRESSURE PRODUCT: NORMAL
OHS CV CPX PEAK SYSTOLIC BLOOD PRESSURE: 167 MMHG
OHS CV CPX PERCENT MAX PREDICTED HEART RATE ACHIEVED: 67
OHS CV CPX RATE PRESSURE PRODUCT PRESENTING: NORMAL
OHS CV PHARM TIME: 1355 MIN
PISA TR MAX VEL: 2.61 M/S
PULM VEIN S/D RATIO: 1.4
PV PEAK D VEL: 0.47 M/S
PV PEAK S VEL: 0.66 M/S
RA MAJOR: 4.44 CM
RA PRESSURE: 3 MMHG
RIGHT VENTRICULAR END-DIASTOLIC DIMENSION: 3.95 CM
RIGHT VENTRICULAR LENGTH IN DIASTOLE (APICAL 4-CHAMBER VIEW): 7.63 CM
RV MID DIAMA: 2.31 CM
RV TISSUE DOPPLER FREE WALL SYSTOLIC VELOCITY 1 (APICAL 4 CHAMBER VIEW): 0.02 CM/S
SINUS: 3.29 CM
STJ: 3.32 CM
SYSTOLIC BLOOD PRESSURE: 150 MMHG
TDI LATERAL: 0.11 M/S
TDI SEPTAL: 0.11 M/S
TDI: 0.11 M/S
TR MAX PG: 27 MMHG
TRICUSPID ANNULAR PLANE SYSTOLIC EXCURSION: 2.98 CM
TV REST PULMONARY ARTERY PRESSURE: 30 MMHG

## 2023-05-15 PROCEDURE — 78452 HT MUSCLE IMAGE SPECT MULT: CPT | Mod: 26,,, | Performed by: INTERNAL MEDICINE

## 2023-05-15 PROCEDURE — A9502 TC99M TETROFOSMIN: HCPCS | Mod: PO

## 2023-05-15 PROCEDURE — 93018 PR CARDIAC STRESS TST,INTERP/REPT ONLY: ICD-10-PCS | Mod: ,,, | Performed by: INTERNAL MEDICINE

## 2023-05-15 PROCEDURE — 63600175 PHARM REV CODE 636 W HCPCS: Mod: PO | Performed by: INTERNAL MEDICINE

## 2023-05-15 PROCEDURE — 93306 TTE W/DOPPLER COMPLETE: CPT | Mod: PO

## 2023-05-15 PROCEDURE — 93016 NUCLEAR STRESS - CARDIOLOGY INTERPRETED (CUPID ONLY): ICD-10-PCS | Mod: ,,, | Performed by: INTERNAL MEDICINE

## 2023-05-15 PROCEDURE — 93306 ECHO (CUPID ONLY): ICD-10-PCS | Mod: 26,,, | Performed by: INTERNAL MEDICINE

## 2023-05-15 PROCEDURE — 93306 TTE W/DOPPLER COMPLETE: CPT | Mod: 26,,, | Performed by: INTERNAL MEDICINE

## 2023-05-15 PROCEDURE — 78452 HT MUSCLE IMAGE SPECT MULT: CPT | Mod: PO

## 2023-05-15 PROCEDURE — 78452 NUCLEAR STRESS - CARDIOLOGY INTERPRETED (CUPID ONLY): ICD-10-PCS | Mod: 26,,, | Performed by: INTERNAL MEDICINE

## 2023-05-15 PROCEDURE — 93016 CV STRESS TEST SUPVJ ONLY: CPT | Mod: ,,, | Performed by: INTERNAL MEDICINE

## 2023-05-15 PROCEDURE — 93018 CV STRESS TEST I&R ONLY: CPT | Mod: ,,, | Performed by: INTERNAL MEDICINE

## 2023-05-15 PROCEDURE — 93017 CV STRESS TEST TRACING ONLY: CPT | Mod: PO

## 2023-05-15 RX ORDER — REGADENOSON 0.08 MG/ML
0.4 INJECTION, SOLUTION INTRAVENOUS
Status: COMPLETED | OUTPATIENT
Start: 2023-05-15 | End: 2023-05-15

## 2023-05-15 RX ADMIN — REGADENOSON 0.4 MG: 0.08 INJECTION, SOLUTION INTRAVENOUS at 01:05

## 2023-05-18 ENCOUNTER — CLINICAL SUPPORT (OUTPATIENT)
Dept: CARDIOLOGY | Facility: HOSPITAL | Age: 83
End: 2023-05-18
Attending: INTERNAL MEDICINE
Payer: MEDICARE

## 2023-05-18 DIAGNOSIS — I10 PRIMARY HYPERTENSION: ICD-10-CM

## 2023-05-18 DIAGNOSIS — J84.10 CALCIFIED GRANULOMA OF LUNG: ICD-10-CM

## 2023-05-18 DIAGNOSIS — Z91.89 CARDIOVASCULAR EVENT RISK: ICD-10-CM

## 2023-05-18 PROCEDURE — 93242 EXT ECG>48HR<7D RECORDING: CPT | Mod: PO

## 2023-05-18 PROCEDURE — 93244 HOLTER MONITOR - 72 HOUR: ICD-10-PCS | Mod: ,,, | Performed by: INTERNAL MEDICINE

## 2023-05-18 PROCEDURE — 93244 EXT ECG>48HR<7D REV&INTERPJ: CPT | Mod: ,,, | Performed by: INTERNAL MEDICINE

## 2023-05-29 ENCOUNTER — OFFICE VISIT (OUTPATIENT)
Dept: CARDIOLOGY | Facility: CLINIC | Age: 83
End: 2023-05-29
Payer: MEDICARE

## 2023-05-29 VITALS
WEIGHT: 186.5 LBS | BODY MASS INDEX: 25.26 KG/M2 | DIASTOLIC BLOOD PRESSURE: 78 MMHG | SYSTOLIC BLOOD PRESSURE: 135 MMHG | HEART RATE: 107 BPM | HEIGHT: 72 IN

## 2023-05-29 DIAGNOSIS — J45.30 MILD PERSISTENT ASTHMA WITHOUT COMPLICATION: Primary | ICD-10-CM

## 2023-05-29 DIAGNOSIS — E78.2 MIXED HYPERLIPIDEMIA: ICD-10-CM

## 2023-05-29 DIAGNOSIS — I77.819 AORTIC ECTASIA, UNSPECIFIED SITE: Chronic | ICD-10-CM

## 2023-05-29 DIAGNOSIS — I72.3 ILIAC ARTERY ANEURYSM, LEFT: Chronic | ICD-10-CM

## 2023-05-29 DIAGNOSIS — Z91.89 CARDIOVASCULAR EVENT RISK: ICD-10-CM

## 2023-05-29 DIAGNOSIS — I49.8 OTHER CARDIAC ARRHYTHMIA: ICD-10-CM

## 2023-05-29 LAB
OHS CV EVENT MONITOR DAY: 1
OHS CV HOLTER LENGTH DECIMAL HOURS: 43
OHS CV HOLTER LENGTH HOURS: 19
OHS CV HOLTER LENGTH MINUTES: 0
OHS CV HOLTER SINUS AVERAGE HR: 79
OHS CV HOLTER SINUS MAX HR: 124
OHS CV HOLTER SINUS MIN HR: 59

## 2023-05-29 PROCEDURE — 99214 OFFICE O/P EST MOD 30 MIN: CPT | Mod: S$PBB,,, | Performed by: INTERNAL MEDICINE

## 2023-05-29 PROCEDURE — 99213 OFFICE O/P EST LOW 20 MIN: CPT | Mod: PBBFAC,PO | Performed by: INTERNAL MEDICINE

## 2023-05-29 PROCEDURE — 99214 PR OFFICE/OUTPT VISIT, EST, LEVL IV, 30-39 MIN: ICD-10-PCS | Mod: S$PBB,,, | Performed by: INTERNAL MEDICINE

## 2023-05-29 PROCEDURE — 99999 PR PBB SHADOW E&M-EST. PATIENT-LVL III: ICD-10-PCS | Mod: PBBFAC,,, | Performed by: INTERNAL MEDICINE

## 2023-05-29 PROCEDURE — 93005 ELECTROCARDIOGRAM TRACING: CPT | Mod: PO

## 2023-05-29 PROCEDURE — 93010 ELECTROCARDIOGRAM REPORT: CPT | Mod: ,,, | Performed by: INTERNAL MEDICINE

## 2023-05-29 PROCEDURE — 93010 EKG 12-LEAD: ICD-10-PCS | Mod: ,,, | Performed by: INTERNAL MEDICINE

## 2023-05-29 PROCEDURE — 99999 PR PBB SHADOW E&M-EST. PATIENT-LVL III: CPT | Mod: PBBFAC,,, | Performed by: INTERNAL MEDICINE

## 2023-05-29 RX ORDER — METOPROLOL SUCCINATE 25 MG/1
25 TABLET, EXTENDED RELEASE ORAL 2 TIMES DAILY
Qty: 180 TABLET | Refills: 3 | Status: SHIPPED | OUTPATIENT
Start: 2023-05-29 | End: 2024-05-28

## 2023-05-29 NOTE — PROGRESS NOTES
Subjective:    Patient ID:  Iftikhar Lynn is a 83 y.o. male patient here for evaluation Results      History of Present Illness:  Follow-up test results.  History of recent pulmonary evaluation no major disease.  Recent echo  with moderate MR preserved ejection fraction.  Past history of negative stress echo, . Patient underwent nuclear stress test Holter monitor and repeat, only abnormality was 5.7% non complex PVCs.  No ischemia.  No structural heart abnormalities.             Review of patient's allergies indicates:  No Known Allergies    Past Medical History:   Diagnosis Date    Cancer     basal cell ca rt arm / melanoma on back    GERD (gastroesophageal reflux disease)     Hyperlipidemia     Hypertension     Melanoma      Past Surgical History:   Procedure Laterality Date    BACK SURGERY      L 4 L5    COLONOSCOPY N/A 2017    Procedure: COLONOSCOPY;  Surgeon: Filipe Ramirez MD;  Location: [facility] ENDO;  Service: Endoscopy;  Laterality: N/A;    COLONOSCOPY N/A 2021    Procedure: COLONOSCOPY;  Surgeon: Filipe Ramirez MD;  Location: [facility] ENDO;  Service: Endoscopy;  Laterality: N/A;    KNEE ARTHROSCOPY W/ MENISCECTOMY Left 2019    Procedure: ARTHROSCOPY, KNEE, WITH MENISCECTOMY;  Surgeon: Ethan Díaz MD;  Location: [facility] OR;  Service: Orthopedics;  Laterality: Left;    SKIN CANCER EXCISION      multiple sites, derm dr martinez, basal cell ca    VASECTOMY       Social History     Tobacco Use    Smoking status: Former     Packs/day: 0.50     Years: 15.00     Pack years: 7.50     Types: Cigarettes, Cigars     Start date:      Quit date: 2/15/1976     Years since quittin.3    Smokeless tobacco: Never   Substance Use Topics    Alcohol use: Yes     Alcohol/week: 0.0 standard drinks     Comment: daily wine    Drug use: No        Review of Systems:    As noted in HPI in addition      REVIEW OF SYSTEMS  Review of Systems   Constitutional: Negative for decreased appetite,  diaphoresis, night sweats, weight gain and weight loss.   HENT:  Negative for nosebleeds and odynophagia.    Eyes:  Negative for double vision and photophobia.   Cardiovascular:  Negative for chest pain, claudication, cyanosis, dyspnea on exertion, irregular heartbeat, leg swelling, near-syncope, orthopnea, palpitations, paroxysmal nocturnal dyspnea and syncope.   Respiratory:  Negative for cough, hemoptysis, shortness of breath and wheezing.    Hematologic/Lymphatic: Negative for adenopathy.   Skin:  Negative for flushing, skin cancer and suspicious lesions.   Musculoskeletal:  Negative for gout, myalgias and neck pain.   Gastrointestinal:  Negative for abdominal pain, heartburn, hematemesis and hematochezia.   Genitourinary:  Negative for bladder incontinence, hesitancy and nocturia.   Neurological:  Negative for focal weakness, headaches, light-headedness and paresthesias.   Psychiatric/Behavioral:  Negative for memory loss and substance abuse.             Objective:        Vitals:    05/29/23 1330   BP: 135/78   Pulse: 107       Lab Results   Component Value Date    WBC 9.17 07/22/2022    HGB 14.9 07/22/2022    HCT 42.8 07/22/2022     07/22/2022    CHOL 127 08/05/2022    TRIG 205 (H) 08/05/2022    HDL 44 08/05/2022    ALT 31 07/22/2022    AST 32 07/22/2022     07/22/2022    K 4.0 07/22/2022     07/22/2022    CREATININE 0.8 07/22/2022    BUN 14 07/22/2022    CO2 22 (L) 07/22/2022    TSH 3.649 08/05/2022    PSA 4.3 (H) 08/17/2021    HGBA1C 5.0 10/10/2018        ECHOCARDIOGRAM RESULTS  Results for orders placed in visit on 05/15/23    Echo    Interpretation Summary  · The left ventricle is normal in size with normal systolic function.  · The estimated ejection fraction is 55%.  · Normal left ventricular diastolic function.  · Normal right ventricular size with normal right ventricular systolic function.  · Mild mitral regurgitation.  · Mild tricuspid regurgitation.  · Normal central venous  pressure (3 mmHg).  · The estimated PA systolic pressure is 30 mmHg.        CURRENT/PREVIOUS VISIT EKG  Results for orders placed or performed during the hospital encounter of 07/22/22   EKG 12-lead    Collection Time: 07/22/22  6:51 PM    Narrative    Test Reason : R07.89,    Vent. Rate : 078 BPM     Atrial Rate : 078 BPM     P-R Int : 208 ms          QRS Dur : 096 ms      QT Int : 402 ms       P-R-T Axes : 045 016 030 degrees     QTc Int : 458 ms    Normal sinus rhythm  Normal ECG  When compared with ECG of 16-JAN-2019 12:38,  No significant change was found  Confirmed by Rich Villarreal MD ([id number]) on 7/27/2022 10:37:33 PM    Referred By: DONNA   SELF           Confirmed By:Rich Villarreal MD     No valid procedures specified.   Results for orders placed during the hospital encounter of 05/15/23    Nuclear Stress - Cardiology Interpreted    Interpretation Summary    Normal myocardial perfusion scan. There is no evidence of myocardial ischemia or infarction.    The gated perfusion images showed an ejection fraction of 61% at rest.    There is normal wall motion at rest.    The ECG portion of the study is negative for ischemia.    The patient reported no chest pain during the stress test.    During stress, rare PVCs are noted.    There are no prior studies for comparison.    No valid procedures specified.    PHYSICAL EXAM  CONSTITUTIONAL: Well built, well nourished in no apparent distress  NECK: no carotid bruit, no JVD  LUNGS: CTA  CHEST WALL: no tenderness,  HEART: regular rate and rhythm, S1, S2 normal, no murmur, click, rub or gallop   ABDOMEN: soft, non-tender; bowel sounds normal; no masses,  no organomegaly  EXTREMITIES: Extremities normal, no edema, no calf tenderness noted  NEURO: AAO X 3    I HAVE REVIEWED :    The vital signs, nurses notes, and all the pertinent radiology and labs.         Current Outpatient Medications   Medication Instructions    albuterol (PROVENTIL/VENTOLIN HFA) 90 mcg/actuation  inhaler 2 puffs, Inhalation, 4 times daily    aspirin (ECOTRIN) 81 mg, Oral, Daily    fluticasone-umeclidin-vilanter (TRELEGY ELLIPTA) 200-62.5-25 mcg inhaler 1 puff, Inhalation, Daily    ibuprofen (IBU) 800 mg, Oral, Every 8 hours PRN    metoprolol succinate (TOPROL-XL) 25 mg, Oral, Daily    multivitamin capsule 1 capsule, Oral, Daily    nystatin-triamcinolone (MYCOLOG II) cream Topical (Top), 2 times daily    omeprazole (PRILOSEC OTC) 20 mg, Oral, Every Mon, Fri,      pramoxine-hydrocortisone (PROCTOCREAM-HC) 1-1 % rectal cream Rectal, 2 times daily    simvastatin (ZOCOR) 40 MG tablet TAKE 1 TABLET NIGHTLY    triamcinolone acetonide 0.1% (KENALOG) 0.1 % cream Apply to rash on wrists bid.  Avoid face and groin area.    valsartan (DIOVAN) 320 MG tablet TAKE 1 TABLET ONCE DAILY   (STOP OLMESARTAN 40MG)          Assessment:   Dyspnea, followed by Pulmonary.    PVCs on recent Holter monitor, 5.7%.  Negative workup for ischemic or structural heart issues.    Hypertension    Dyslipidemia        Plan:   Repeat EKG with rhythm strip today, sinus with first-degree AV block, sinus tachycardia PVCs non complex.    Continue to monitor clinically.  Follow up about 3 months.  If continued symptoms consider additional CV evaluation.    Increase Toprol-XL 25 b.i.d..    No follow-ups on file.

## 2023-07-31 ENCOUNTER — TELEPHONE (OUTPATIENT)
Dept: FAMILY MEDICINE | Facility: CLINIC | Age: 83
End: 2023-07-31
Payer: MEDICARE

## 2023-07-31 DIAGNOSIS — E78.2 MIXED HYPERLIPIDEMIA: ICD-10-CM

## 2023-07-31 DIAGNOSIS — I10 PRIMARY HYPERTENSION: Primary | ICD-10-CM

## 2023-07-31 DIAGNOSIS — Z12.5 SPECIAL SCREENING FOR MALIGNANT NEOPLASM OF PROSTATE: ICD-10-CM

## 2023-07-31 NOTE — TELEPHONE ENCOUNTER
----- Message from Cathi Caba MA sent at 7/31/2023  1:27 PM CDT -----  Type: Needs Medical Advice  Who Called:  pt  Best Call Back Number: [phone]    Additional Information:  pt would like provider to put in lab work piror to his visit, pt would like a PSA test as well  please advise

## 2023-08-02 ENCOUNTER — LAB VISIT (OUTPATIENT)
Dept: LAB | Facility: HOSPITAL | Age: 83
End: 2023-08-02
Attending: FAMILY MEDICINE
Payer: MEDICARE

## 2023-08-02 DIAGNOSIS — Z12.5 SPECIAL SCREENING FOR MALIGNANT NEOPLASM OF PROSTATE: ICD-10-CM

## 2023-08-02 DIAGNOSIS — I10 PRIMARY HYPERTENSION: ICD-10-CM

## 2023-08-02 DIAGNOSIS — E78.2 MIXED HYPERLIPIDEMIA: ICD-10-CM

## 2023-08-02 LAB
ALBUMIN SERPL BCP-MCNC: 3.9 G/DL (ref 3.5–5.2)
ALP SERPL-CCNC: 86 U/L (ref 55–135)
ALT SERPL W/O P-5'-P-CCNC: 26 U/L (ref 10–44)
ANION GAP SERPL CALC-SCNC: 10 MMOL/L (ref 8–16)
AST SERPL-CCNC: 30 U/L (ref 10–40)
BILIRUB SERPL-MCNC: 0.5 MG/DL (ref 0.1–1)
BUN SERPL-MCNC: 17 MG/DL (ref 8–23)
CALCIUM SERPL-MCNC: 9.2 MG/DL (ref 8.7–10.5)
CHLORIDE SERPL-SCNC: 107 MMOL/L (ref 95–110)
CHOLEST SERPL-MCNC: 149 MG/DL (ref 120–199)
CHOLEST/HDLC SERPL: 3.7 {RATIO} (ref 2–5)
CO2 SERPL-SCNC: 24 MMOL/L (ref 23–29)
COMPLEXED PSA SERPL-MCNC: 3.2 NG/ML (ref 0–4)
CREAT SERPL-MCNC: 0.8 MG/DL (ref 0.5–1.4)
EST. GFR  (NO RACE VARIABLE): >60 ML/MIN/1.73 M^2
GLUCOSE SERPL-MCNC: 100 MG/DL (ref 70–110)
HDLC SERPL-MCNC: 40 MG/DL (ref 40–75)
HDLC SERPL: 26.8 % (ref 20–50)
LDLC SERPL CALC-MCNC: 29.4 MG/DL (ref 63–159)
NONHDLC SERPL-MCNC: 109 MG/DL
POTASSIUM SERPL-SCNC: 4.2 MMOL/L (ref 3.5–5.1)
PROT SERPL-MCNC: 6.4 G/DL (ref 6–8.4)
SODIUM SERPL-SCNC: 141 MMOL/L (ref 136–145)
TRIGL SERPL-MCNC: 398 MG/DL (ref 30–150)

## 2023-08-02 PROCEDURE — 36415 COLL VENOUS BLD VENIPUNCTURE: CPT | Mod: PO | Performed by: FAMILY MEDICINE

## 2023-08-02 PROCEDURE — 80061 LIPID PANEL: CPT | Performed by: FAMILY MEDICINE

## 2023-08-02 PROCEDURE — 84153 ASSAY OF PSA TOTAL: CPT | Performed by: FAMILY MEDICINE

## 2023-08-02 PROCEDURE — 80053 COMPREHEN METABOLIC PANEL: CPT | Performed by: FAMILY MEDICINE

## 2023-08-03 ENCOUNTER — OFFICE VISIT (OUTPATIENT)
Dept: FAMILY MEDICINE | Facility: CLINIC | Age: 83
End: 2023-08-03
Payer: MEDICARE

## 2023-08-03 VITALS
SYSTOLIC BLOOD PRESSURE: 130 MMHG | DIASTOLIC BLOOD PRESSURE: 68 MMHG | BODY MASS INDEX: 25.56 KG/M2 | TEMPERATURE: 98 F | OXYGEN SATURATION: 96 % | HEIGHT: 72 IN | HEART RATE: 80 BPM | WEIGHT: 188.69 LBS

## 2023-08-03 DIAGNOSIS — Z85.820 HX OF MALIGNANT SKIN MELANOMA: ICD-10-CM

## 2023-08-03 DIAGNOSIS — E78.2 MIXED HYPERLIPIDEMIA: ICD-10-CM

## 2023-08-03 DIAGNOSIS — K21.9 GASTROESOPHAGEAL REFLUX DISEASE WITHOUT ESOPHAGITIS: ICD-10-CM

## 2023-08-03 DIAGNOSIS — I10 ESSENTIAL HYPERTENSION: Primary | ICD-10-CM

## 2023-08-03 PROCEDURE — 99213 OFFICE O/P EST LOW 20 MIN: CPT | Mod: PBBFAC,PN | Performed by: FAMILY MEDICINE

## 2023-08-03 PROCEDURE — 99999 PR PBB SHADOW E&M-EST. PATIENT-LVL III: ICD-10-PCS | Mod: PBBFAC,,, | Performed by: FAMILY MEDICINE

## 2023-08-03 PROCEDURE — 99999 PR PBB SHADOW E&M-EST. PATIENT-LVL III: CPT | Mod: PBBFAC,,, | Performed by: FAMILY MEDICINE

## 2023-08-03 PROCEDURE — 99214 PR OFFICE/OUTPT VISIT, EST, LEVL IV, 30-39 MIN: ICD-10-PCS | Mod: S$PBB,,, | Performed by: FAMILY MEDICINE

## 2023-08-03 PROCEDURE — 99214 OFFICE O/P EST MOD 30 MIN: CPT | Mod: S$PBB,,, | Performed by: FAMILY MEDICINE

## 2023-08-03 RX ORDER — SIMVASTATIN 40 MG/1
40 TABLET, FILM COATED ORAL NIGHTLY
Qty: 90 TABLET | Refills: 3 | Status: SHIPPED | OUTPATIENT
Start: 2023-08-03

## 2023-08-03 RX ORDER — VALSARTAN 320 MG/1
320 TABLET ORAL DAILY
Qty: 90 TABLET | Refills: 3 | Status: SHIPPED | OUTPATIENT
Start: 2023-08-03

## 2023-08-06 NOTE — PROGRESS NOTES
Subjective     Patient ID: Iftikhar Lynn is a 83 y.o. male.    Chief Complaint: Hypertension, Hyperlipidemia, and Gastroesophageal Reflux    Patient presents here for annual follow-up of hypertension, hyperlipidemia, GERD, and history of malignant melanoma.  His weight is stable since last year and his BMI today is 25.59.  He does exercise on a regular basis and watches his diet fairly well.  His hypertension is well controlled with his present medication and he is tolerating his medication well.  Since I saw him last, his metoprolol was increased from 25 mg daily to 50 mg daily by his cardiologist.  He saw him again and was having some increase in PVCs that he could feel although his workup did not show any significant arrhythmias.  With the increased from 25 mg up to 50 mg, he has noticed a decrease in the PVCs and feels better.  His hyperlipidemia is well controlled with his present use of simvastatin 40 mg daily.  Recent lab work showed his total cholesterol 149, HDL 40, LDL 29, and triglycerides 398.  His triglycerides had been double that within the last year or 2 so this represents a significant decrease.  We did discuss his moderation of alcohol to see if this will help bring the triglycerides down further.  His GERD is well controlled with his present use of omeprazole 20 mg daily.  He does follow-up with dermatology regularly with his history of malignant melanoma and he was seen by Dermatology in February of 2023.  He has no other new complaints at this time.  As far as health maintenance, he is up-to-date with all of his recommended screening exams and immunizations with the exception of a 3rd COVID booster.        Review of Systems   Constitutional:  Negative for chills, fatigue, fever and unexpected weight change.   HENT:  Negative for nasal congestion, ear pain, postnasal drip and sore throat.    Respiratory:  Negative for cough and shortness of breath.    Cardiovascular:  Positive for  palpitations. Negative for chest pain.   Gastrointestinal:  Negative for abdominal pain, diarrhea, nausea and vomiting.   Genitourinary:  Negative for difficulty urinating and dysuria.   Musculoskeletal:  Negative for arthralgias and back pain.   Neurological:  Negative for dizziness, light-headedness and headaches.   Psychiatric/Behavioral:  Negative for sleep disturbance. The patient is not nervous/anxious.           Objective     Physical Exam  Vitals reviewed.   Constitutional:       General: He is not in acute distress.     Appearance: Normal appearance. He is well-developed.   HENT:      Right Ear: Tympanic membrane and external ear normal.      Left Ear: Tympanic membrane and external ear normal.      Mouth/Throat:      Pharynx: Oropharynx is clear. No posterior oropharyngeal erythema.   Neck:      Thyroid: No thyromegaly.      Vascular: No carotid bruit.   Cardiovascular:      Rate and Rhythm: Normal rate and regular rhythm.      Pulses: Normal pulses.      Heart sounds: Normal heart sounds. No murmur heard.  Pulmonary:      Effort: Pulmonary effort is normal.      Breath sounds: Normal breath sounds. No wheezing or rales.   Musculoskeletal:         General: Normal range of motion.      Cervical back: Normal range of motion and neck supple.      Right lower leg: No edema.      Left lower leg: No edema.   Lymphadenopathy:      Cervical: No cervical adenopathy.   Neurological:      General: No focal deficit present.      Mental Status: He is alert and oriented to person, place, and time.      Cranial Nerves: No cranial nerve deficit.      Deep Tendon Reflexes: Reflexes are normal and symmetric.            Assessment and Plan     1. Essential hypertension  -     valsartan (DIOVAN) 320 MG tablet; Take 1 tablet (320 mg total) by mouth once daily.  Dispense: 90 tablet; Refill: 3    2. Mixed hyperlipidemia  -     simvastatin (ZOCOR) 40 MG tablet; Take 1 tablet (40 mg total) by mouth every evening.  Dispense: 90  tablet; Refill: 3    3. Gastroesophageal reflux disease without esophagitis    4. Hx of malignant skin melanoma        1. Continue present medication as his hypertension, hyperlipidemia, GERD, and history of malignant melanoma are stable   2. Continue low-sodium, low-fat low-cholesterol diet and exercise.  BMI today is 25.59   3. Follow-up with me in 1 year or p.r.n.         Follow up in about 1 year (around 8/3/2024).

## 2023-09-12 ENCOUNTER — OFFICE VISIT (OUTPATIENT)
Dept: CARDIOLOGY | Facility: CLINIC | Age: 83
End: 2023-09-12
Payer: MEDICARE

## 2023-09-12 VITALS
DIASTOLIC BLOOD PRESSURE: 80 MMHG | HEIGHT: 72 IN | HEART RATE: 72 BPM | SYSTOLIC BLOOD PRESSURE: 154 MMHG | WEIGHT: 192.69 LBS | BODY MASS INDEX: 26.1 KG/M2

## 2023-09-12 DIAGNOSIS — K21.00 GASTROESOPHAGEAL REFLUX DISEASE WITH ESOPHAGITIS WITHOUT HEMORRHAGE: ICD-10-CM

## 2023-09-12 DIAGNOSIS — I72.3 ILIAC ARTERY ANEURYSM, LEFT: Chronic | ICD-10-CM

## 2023-09-12 DIAGNOSIS — Z91.89 CARDIOVASCULAR EVENT RISK: ICD-10-CM

## 2023-09-12 DIAGNOSIS — I70.0 AORTIC ATHEROSCLEROSIS: ICD-10-CM

## 2023-09-12 DIAGNOSIS — I10 PRIMARY HYPERTENSION: ICD-10-CM

## 2023-09-12 DIAGNOSIS — E78.2 MIXED HYPERLIPIDEMIA: ICD-10-CM

## 2023-09-12 DIAGNOSIS — J47.9 BRONCHIECTASIS WITHOUT COMPLICATION: Primary | ICD-10-CM

## 2023-09-12 DIAGNOSIS — J45.30 MILD PERSISTENT ASTHMA WITHOUT COMPLICATION: ICD-10-CM

## 2023-09-12 DIAGNOSIS — I77.819 AORTIC ECTASIA, UNSPECIFIED SITE: Chronic | ICD-10-CM

## 2023-09-12 PROCEDURE — 99999 PR PBB SHADOW E&M-EST. PATIENT-LVL III: ICD-10-PCS | Mod: PBBFAC,,, | Performed by: INTERNAL MEDICINE

## 2023-09-12 PROCEDURE — 99214 OFFICE O/P EST MOD 30 MIN: CPT | Mod: S$PBB,,, | Performed by: INTERNAL MEDICINE

## 2023-09-12 PROCEDURE — 99999 PR PBB SHADOW E&M-EST. PATIENT-LVL III: CPT | Mod: PBBFAC,,, | Performed by: INTERNAL MEDICINE

## 2023-09-12 PROCEDURE — 99213 OFFICE O/P EST LOW 20 MIN: CPT | Mod: PBBFAC,PO | Performed by: INTERNAL MEDICINE

## 2023-09-12 PROCEDURE — 99214 PR OFFICE/OUTPT VISIT, EST, LEVL IV, 30-39 MIN: ICD-10-PCS | Mod: S$PBB,,, | Performed by: INTERNAL MEDICINE

## 2023-09-12 NOTE — PROGRESS NOTES
Subjective:    Patient ID:  Iftikhar Lynn is a 83 y.o. male patient here for evaluation Follow-up      History of Present Illness:  Cardiology follow-up.  Hypertensive cardiovascular disease, history of ventricular ectopy benign, abnormal Holter monitor.  Doing well with the combination of beta-blockade and Ace receptor blocker.  Negative noninvasive cardiac assessment for ischemic or structural heart disease earlier this year.             Review of patient's allergies indicates:  No Known Allergies    Past Medical History:   Diagnosis Date    Cancer     basal cell ca rt arm / melanoma on back    GERD (gastroesophageal reflux disease)     Hyperlipidemia     Hypertension     Melanoma      Past Surgical History:   Procedure Laterality Date    BACK SURGERY      L 4 L5    COLONOSCOPY N/A 2017    Procedure: COLONOSCOPY;  Surgeon: Filipe Ramirez MD;  Location: [facility] ENDO;  Service: Endoscopy;  Laterality: N/A;    COLONOSCOPY N/A 2021    Procedure: COLONOSCOPY;  Surgeon: Filipe Ramirez MD;  Location: [facility] ENDO;  Service: Endoscopy;  Laterality: N/A;    KNEE ARTHROSCOPY W/ MENISCECTOMY Left 2019    Procedure: ARTHROSCOPY, KNEE, WITH MENISCECTOMY;  Surgeon: Ethan Díaz MD;  Location: [facility] OR;  Service: Orthopedics;  Laterality: Left;    SKIN CANCER EXCISION      multiple sites, derm dr martinez, basal cell ca    VASECTOMY       Social History     Tobacco Use    Smoking status: Former     Current packs/day: 0.00     Average packs/day: 0.5 packs/day for 15.1 years (7.6 ttl pk-yrs)     Types: Cigarettes, Cigars     Start date:      Quit date: 2/15/1976     Years since quittin.6    Smokeless tobacco: Never   Substance Use Topics    Alcohol use: Yes     Alcohol/week: 0.0 standard drinks of alcohol     Comment: daily wine    Drug use: No        Review of Systems:    As noted in HPI in addition      REVIEW OF SYSTEMS  Review of Systems   Constitutional: Negative for decreased appetite,  diaphoresis, night sweats, weight gain and weight loss.   HENT:  Negative for nosebleeds and odynophagia.    Eyes:  Negative for double vision and photophobia.   Cardiovascular:  Negative for chest pain, claudication, cyanosis, dyspnea on exertion, irregular heartbeat, leg swelling, near-syncope, orthopnea, palpitations, paroxysmal nocturnal dyspnea and syncope.   Respiratory:  Negative for cough, hemoptysis, shortness of breath and wheezing.    Hematologic/Lymphatic: Negative for adenopathy.   Skin:  Negative for flushing, skin cancer and suspicious lesions.   Musculoskeletal:  Negative for gout, myalgias and neck pain.   Gastrointestinal:  Negative for abdominal pain, heartburn, hematemesis and hematochezia.   Genitourinary:  Negative for bladder incontinence, hesitancy and nocturia.   Neurological:  Negative for focal weakness, headaches, light-headedness and paresthesias.   Psychiatric/Behavioral:  Negative for memory loss and substance abuse.               Objective:        Vitals:    09/12/23 1340   BP: (!) 154/80   Pulse: 72       Lab Results   Component Value Date    WBC 9.17 07/22/2022    HGB 14.9 07/22/2022    HCT 42.8 07/22/2022     07/22/2022    CHOL 149 08/02/2023    TRIG 398 (H) 08/02/2023    HDL 40 08/02/2023    ALT 26 08/02/2023    AST 30 08/02/2023     08/02/2023    K 4.2 08/02/2023     08/02/2023    CREATININE 0.8 08/02/2023    BUN 17 08/02/2023    CO2 24 08/02/2023    TSH 3.649 08/05/2022    PSA 3.2 08/02/2023    HGBA1C 5.0 10/10/2018        ECHOCARDIOGRAM RESULTS  Results for orders placed in visit on 05/15/23    Echo    Interpretation Summary  · The left ventricle is normal in size with normal systolic function.  · The estimated ejection fraction is 55%.  · Normal left ventricular diastolic function.  · Normal right ventricular size with normal right ventricular systolic function.  · Mild mitral regurgitation.  · Mild tricuspid regurgitation.  · Normal central venous pressure  (3 mmHg).  · The estimated PA systolic pressure is 30 mmHg.        CURRENT/PREVIOUS VISIT EKG  Results for orders placed or performed in visit on 05/29/23   IN OFFICE EKG 12-LEAD (to [place])    Collection Time: 05/29/23  1:01 PM    Narrative    Test Reason : Z00.00    Vent. Rate : 106 BPM     Atrial Rate : 106 BPM     P-R Int : 236 ms          QRS Dur : 088 ms      QT Int : 334 ms       P-R-T Axes : 031 033 040 degrees     QTc Int : 443 ms    Sinus tachycardia with 1st degree A-V block with frequent Premature  ventricular complexes and Fusion complexes  Otherwise normal ECG    Confirmed by Mark Herrera MD ([id number]) on 6/1/2023 7:25:06 PM    Referred By: DONALD TONG           Confirmed By:Mark Herrera MD     No valid procedures specified.   Results for orders placed during the hospital encounter of 05/15/23    Nuclear Stress - Cardiology Interpreted    Interpretation Summary    Normal myocardial perfusion scan. There is no evidence of myocardial ischemia or infarction.    The gated perfusion images showed an ejection fraction of 61% at rest.    There is normal wall motion at rest.    The ECG portion of the study is negative for ischemia.    The patient reported no chest pain during the stress test.    During stress, rare PVCs are noted.    There are no prior studies for comparison.    No valid procedures specified.    PHYSICAL EXAM  CONSTITUTIONAL: Well built, well nourished in no apparent distress  NECK: no carotid bruit, no JVD  LUNGS: CTA  CHEST WALL: no tenderness,  HEART: regular rate and rhythm, S1, S2 normal, no murmur, click, rub or gallop   ABDOMEN: soft, non-tender; bowel sounds normal; no masses,  no organomegaly  EXTREMITIES: Extremities normal, no edema, no calf tenderness noted  NEURO: AAO X 3    I HAVE REVIEWED :    The vital signs, nurses notes, and all the pertinent radiology and labs.         Current Outpatient Medications   Medication Instructions    aspirin (ECOTRIN) 81 mg, Oral, Daily    ibuprofen (IBU) 800  mg, Oral, Every 8 hours PRN    metoprolol succinate (TOPROL-XL) 25 mg, Oral, 2 times daily    multivitamin capsule 1 capsule, Oral, Daily    nystatin-triamcinolone (MYCOLOG II) cream Topical (Top), 2 times daily    omeprazole (PRILOSEC OTC) 20 mg, Oral, Every Mon, Fri,      pramoxine-hydrocortisone (PROCTOCREAM-HC) 1-1 % rectal cream Rectal, 2 times daily    simvastatin (ZOCOR) 40 mg, Oral, Nightly    valsartan (DIOVAN) 320 mg, Oral, Daily          Assessment:   Hypertension, dyslipidemia.  Well controlled with current medications.    Negative noninvasive cardiac assessment earlier this year.  Prior abnormal Holter monitor 5.7 % ventricular ectopy, resolved with beta-blocker therapy.        Plan:   Meds reviewed and reconciled.  No change current medications.  Six months.  Weight loss, diet, exercise.          No follow-ups on file.

## 2023-09-18 ENCOUNTER — TELEPHONE (OUTPATIENT)
Dept: ADMINISTRATIVE | Facility: CLINIC | Age: 83
End: 2023-09-18
Payer: MEDICARE

## 2023-11-06 ENCOUNTER — TELEPHONE (OUTPATIENT)
Dept: FAMILY MEDICINE | Facility: CLINIC | Age: 83
End: 2023-11-06
Payer: MEDICARE

## 2023-11-06 NOTE — TELEPHONE ENCOUNTER
Spoke to patient   He cut his finger on hand with a rat trap with a rat in it.   He is due for a Tetanus  Instructed to clean well.   He applied Neosporin to the area.   Appointment scheduled tomorrow with Toya Carranza      ---- Message from Krysta Phipps sent at 11/6/2023 12:36 PM CST -----  Contact: Self  Type: Needs Medical Advice  Who Called:  Pt     Best Call Back Number: [phone]    Additional Information: Pt states he was cut by a rat trap that had fresh rats blood on it and he has not had a tetanus shot in 10 years. Please call and advise.

## 2023-11-13 ENCOUNTER — TELEPHONE (OUTPATIENT)
Dept: ADMINISTRATIVE | Facility: CLINIC | Age: 83
End: 2023-11-13
Payer: MEDICARE

## 2023-11-13 NOTE — TELEPHONE ENCOUNTER
Called pt, no answer; left message informing pt I was calling to remind pt of his in office EAWV on 11/14/23 and to return my call if he had any questions; left my name and number.

## 2023-11-14 ENCOUNTER — OFFICE VISIT (OUTPATIENT)
Dept: FAMILY MEDICINE | Facility: CLINIC | Age: 83
End: 2023-11-14
Payer: MEDICARE

## 2023-11-14 VITALS
OXYGEN SATURATION: 98 % | SYSTOLIC BLOOD PRESSURE: 150 MMHG | WEIGHT: 183.44 LBS | DIASTOLIC BLOOD PRESSURE: 70 MMHG | HEART RATE: 68 BPM | HEIGHT: 72 IN | TEMPERATURE: 98 F | BODY MASS INDEX: 24.85 KG/M2

## 2023-11-14 DIAGNOSIS — Z00.00 ENCOUNTER FOR PREVENTIVE HEALTH EXAMINATION: Primary | ICD-10-CM

## 2023-11-14 DIAGNOSIS — I77.819 AORTIC ECTASIA, UNSPECIFIED SITE: Chronic | ICD-10-CM

## 2023-11-14 DIAGNOSIS — K21.00 GASTROESOPHAGEAL REFLUX DISEASE WITH ESOPHAGITIS WITHOUT HEMORRHAGE: ICD-10-CM

## 2023-11-14 DIAGNOSIS — I10 PRIMARY HYPERTENSION: ICD-10-CM

## 2023-11-14 DIAGNOSIS — J84.10 CALCIFIED GRANULOMA OF LUNG: ICD-10-CM

## 2023-11-14 DIAGNOSIS — J47.9 BRONCHIECTASIS WITHOUT COMPLICATION: ICD-10-CM

## 2023-11-14 DIAGNOSIS — I72.3 ILIAC ARTERY ANEURYSM, LEFT: Chronic | ICD-10-CM

## 2023-11-14 DIAGNOSIS — I70.0 AORTIC ATHEROSCLEROSIS: ICD-10-CM

## 2023-11-14 DIAGNOSIS — E78.2 MIXED HYPERLIPIDEMIA: ICD-10-CM

## 2023-11-14 DIAGNOSIS — Z91.89 CARDIOVASCULAR EVENT RISK: ICD-10-CM

## 2023-11-14 PROBLEM — R06.02 SHORTNESS OF BREATH: Status: RESOLVED | Noted: 2023-02-28 | Resolved: 2023-11-14

## 2023-11-14 PROCEDURE — G0439 PPPS, SUBSEQ VISIT: HCPCS | Mod: ,,, | Performed by: PHYSICIAN ASSISTANT

## 2023-11-14 PROCEDURE — 99213 OFFICE O/P EST LOW 20 MIN: CPT | Mod: PBBFAC,PN | Performed by: PHYSICIAN ASSISTANT

## 2023-11-14 PROCEDURE — 99999 PR PBB SHADOW E&M-EST. PATIENT-LVL III: ICD-10-PCS | Mod: PBBFAC,,, | Performed by: PHYSICIAN ASSISTANT

## 2023-11-14 PROCEDURE — 99999 PR PBB SHADOW E&M-EST. PATIENT-LVL III: CPT | Mod: PBBFAC,,, | Performed by: PHYSICIAN ASSISTANT

## 2023-11-14 PROCEDURE — G0439 PR MEDICARE ANNUAL WELLNESS SUBSEQUENT VISIT: ICD-10-PCS | Mod: ,,, | Performed by: PHYSICIAN ASSISTANT

## 2023-11-14 NOTE — PATIENT INSTRUCTIONS
Counseling and Referral of Other Preventative  (Italic type indicates deductible and co-insurance are waived)    Patient Name: Iftikhar Lynn  Today's Date: 11/14/2023    Health Maintenance       Date Due Completion Date    COVID-19 Vaccine (5 - 2023-24 season) 09/01/2023 9/13/2022    Colonoscopy 09/27/2024 9/27/2021    Lipid Panel 08/02/2028 8/2/2023    TETANUS VACCINE 11/07/2033 11/7/2023        No orders of the defined types were placed in this encounter.      The following information is provided to all patients.  This information is to help you find resources for any of the problems found today that may be affecting your health:                Living healthy guide: www.[facility].[place].gov      Understanding Diabetes: www.diabetes.org      Eating healthy: www.cdc.gov/healthyweight      [facility] home safety checklist: www.cdc.gov/steadi/patient.html      Agency on Aging: www.goea.[place].[facility]      Alcoholics anonymous (AA): www.aa.org      Physical Activity: www.guero.nih.gov/cb1lttd      Tobacco use: www.quitwithusla.org

## 2023-11-14 NOTE — PROGRESS NOTES
Iftikhar Lynn presented for a  Medicare AWV and comprehensive Health Risk Assessment today. The following components were reviewed and updated:    Medical history  Family History  Social history  Allergies and Current Medications  Health Risk Assessment  Health Maintenance  Care Team     ** See Completed Assessments for Annual Wellness Visit within the encounter summary.**       The following assessments were completed:  Living Situation  CAGE  Depression Screening  Timed Get Up and Go  Whisper Test  Cognitive Function Screening  Nutrition Screening  ADL Screening  PAQ Screening    Vitals:    11/14/23 1005   BP: (!) 150/70   BP Location: Left arm   Patient Position: Sitting   BP Method: Medium (Manual)   Pulse: 68   Temp: 98.2 °F (36.8 °C)   TempSrc: Oral   SpO2: 98%   Weight: 83.2 kg (183 lb 6.8 oz)   Height: 6' (1.829 m)     Body mass index is 24.88 kg/m².  Physical Exam  Constitutional:       Appearance: Normal appearance. He is well-developed.   HENT:      Head: Normocephalic and atraumatic.   Neck:      Vascular: No JVD.   Pulmonary:      Effort: Pulmonary effort is normal.   Musculoskeletal:      Cervical back: Normal range of motion and neck supple.   Neurological:      Mental Status: He is alert and oriented to person, place, and time.   Psychiatric:         Mood and Affect: Mood normal.         Behavior: Behavior normal.         Thought Content: Thought content normal.         Judgment: Judgment normal.           Diagnoses and health risks identified today and associated recommendations/orders:    Iftikhar was seen today for health risk assessment.    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 Portillo    Gastroesophageal reflux disease with esophagitis without hemorrhage  Comments:  Controlled, continue current regimen    Cardiovascular event risk, ASCVD 10-year risk 30.7%  Comments:  Stable, followed by Cardiology    Body mass index (BMI) 24.0-24.9, adult  Comments:  Improving, continue diet and exercise        Provided Iftikhar with a 5-10 year written screening schedule and personal prevention plan. Recommendations were developed using the USPSTF age appropriate recommendations. Education, counseling, and referrals were provided as needed. After Visit Summary printed and given to patient which includes a list of additional screenings\tests needed.    No follow-ups on file.    TREVON Severino    I offered to discuss advanced care planning, including how to pick a person who would make decisions for you if you were unable to make them for yourself, called a health care power of , and what kind of decisions you might make such as use of life sustaining treatments such as ventilators and tube feeding when faced with a life limiting illness recorded on a living will that they will need to know. (How you want to be cared for as you near the end of your natural life)     X  Patient has advanced directives on file, which we reviewed, and they do not wish to make changes.

## 2023-11-20 ENCOUNTER — TELEPHONE (OUTPATIENT)
Dept: FAMILY MEDICINE | Facility: CLINIC | Age: 83
End: 2023-11-20
Payer: MEDICARE

## 2023-11-20 RX ORDER — AMOXICILLIN 875 MG/1
875 TABLET, FILM COATED ORAL 2 TIMES DAILY
Qty: 20 TABLET | Refills: 0 | Status: SHIPPED | OUTPATIENT
Start: 2023-11-20 | End: 2024-02-26 | Stop reason: ALTCHOICE

## 2023-11-20 NOTE — TELEPHONE ENCOUNTER
Spoke with patient   Requesting antibiotic for strept throat.   His wife has strept   Please advise    ----- Message from Jaimee Giron sent at 11/20/2023 11:00 AM CST -----  Contact: self  Type: Needs Medical Advice  Who Called:  pt  Symptoms (please be specific):  sore throat/headache   How long has patient had these symptoms:  1 day  Pharmacy name and phone #:    [id number] - [place] - [address]  [address] [place] [address]  Phone: [phone] Fax: [phone]    Best Call Back Number: [phone]   Additional Information: pt's wife tested positive for strep and would like to know if you can prescribe an antibiotic for him because he is now felling the same systoms.please advise

## 2024-01-23 ENCOUNTER — PATIENT MESSAGE (OUTPATIENT)
Dept: ADMINISTRATIVE | Facility: OTHER | Age: 84
End: 2024-01-23
Payer: MEDICARE

## 2024-02-22 ENCOUNTER — TELEPHONE (OUTPATIENT)
Dept: FAMILY MEDICINE | Facility: CLINIC | Age: 84
End: 2024-02-22
Payer: MEDICARE

## 2024-02-22 NOTE — TELEPHONE ENCOUNTER
----- Message from Bertin Gray sent at 2/22/2024 10:00 AM CST -----  Regarding: Sooner Appt  Type:  Sooner Appointment Request    Caller is requesting a sooner appointment.  Caller declined first available appointment listed below.  Caller will not accept being placed on the waitlist and is requesting a message be sent to doctor.    Name of Caller:PT    When is the first available appointment?march 12    Symptoms:Hernia    Would the patient rather a call back or a response via [employer]? Call back    Best Call Back Number:[phone]      Additional Information: Sts its not an emergency but feels he needs to be seen sooner than march 12.   Please advise -- Thank you

## 2024-02-22 NOTE — TELEPHONE ENCOUNTER
Spoke with patient he states that he was out of town, and after eating he felt a bulge in his abdomen.  He thinks it may be his hernia starting to protrude.   Appointment with Aleja for 02/26/24.

## 2024-02-26 ENCOUNTER — LAB VISIT (OUTPATIENT)
Dept: LAB | Facility: HOSPITAL | Age: 84
End: 2024-02-26
Attending: PHYSICIAN ASSISTANT
Payer: MEDICARE

## 2024-02-26 ENCOUNTER — OFFICE VISIT (OUTPATIENT)
Dept: FAMILY MEDICINE | Facility: CLINIC | Age: 84
End: 2024-02-26
Payer: MEDICARE

## 2024-02-26 VITALS
HEART RATE: 72 BPM | HEIGHT: 72 IN | TEMPERATURE: 98 F | SYSTOLIC BLOOD PRESSURE: 130 MMHG | BODY MASS INDEX: 22.43 KG/M2 | OXYGEN SATURATION: 98 % | DIASTOLIC BLOOD PRESSURE: 64 MMHG | WEIGHT: 165.56 LBS

## 2024-02-26 DIAGNOSIS — J47.9 BRONCHIECTASIS WITHOUT COMPLICATION: ICD-10-CM

## 2024-02-26 DIAGNOSIS — Z87.898 HISTORY OF WEIGHT LOSS: ICD-10-CM

## 2024-02-26 DIAGNOSIS — M54.9 BACK PAIN, UNSPECIFIED BACK LOCATION, UNSPECIFIED BACK PAIN LATERALITY, UNSPECIFIED CHRONICITY: ICD-10-CM

## 2024-02-26 DIAGNOSIS — I70.0 AORTIC ATHEROSCLEROSIS: ICD-10-CM

## 2024-02-26 DIAGNOSIS — R10.31 RLQ ABDOMINAL PAIN: ICD-10-CM

## 2024-02-26 DIAGNOSIS — K40.90 HERNIA OF TESTICLE: Primary | ICD-10-CM

## 2024-02-26 LAB
ALBUMIN SERPL BCP-MCNC: 4.5 G/DL (ref 3.5–5.2)
ALP SERPL-CCNC: 95 U/L (ref 55–135)
ALT SERPL W/O P-5'-P-CCNC: 60 U/L (ref 10–44)
ANION GAP SERPL CALC-SCNC: 10 MMOL/L (ref 8–16)
AST SERPL-CCNC: 34 U/L (ref 10–40)
BILIRUB SERPL-MCNC: 0.7 MG/DL (ref 0.1–1)
BUN SERPL-MCNC: 16 MG/DL (ref 8–23)
CALCIUM SERPL-MCNC: 9.8 MG/DL (ref 8.7–10.5)
CHLORIDE SERPL-SCNC: 107 MMOL/L (ref 95–110)
CO2 SERPL-SCNC: 25 MMOL/L (ref 23–29)
CREAT SERPL-MCNC: 0.8 MG/DL (ref 0.5–1.4)
EST. GFR  (NO RACE VARIABLE): >60 ML/MIN/1.73 M^2
GLUCOSE SERPL-MCNC: 105 MG/DL (ref 70–110)
POTASSIUM SERPL-SCNC: 4.2 MMOL/L (ref 3.5–5.1)
PROT SERPL-MCNC: 6.9 G/DL (ref 6–8.4)
SODIUM SERPL-SCNC: 142 MMOL/L (ref 136–145)

## 2024-02-26 PROCEDURE — 36415 COLL VENOUS BLD VENIPUNCTURE: CPT | Performed by: PHYSICIAN ASSISTANT

## 2024-02-26 PROCEDURE — 99214 OFFICE O/P EST MOD 30 MIN: CPT | Mod: PBBFAC,PN | Performed by: PHYSICIAN ASSISTANT

## 2024-02-26 PROCEDURE — 80053 COMPREHEN METABOLIC PANEL: CPT | Performed by: PHYSICIAN ASSISTANT

## 2024-02-26 PROCEDURE — 99999 PR PBB SHADOW E&M-EST. PATIENT-LVL IV: CPT | Mod: PBBFAC,,, | Performed by: PHYSICIAN ASSISTANT

## 2024-02-26 PROCEDURE — 99214 OFFICE O/P EST MOD 30 MIN: CPT | Mod: S$PBB,,, | Performed by: PHYSICIAN ASSISTANT

## 2024-02-26 NOTE — PROGRESS NOTES
Subjective     Patient ID: Iftikhar Lynn is a 84 y.o. male.    Chief Complaint: Abdominal Pain (RE OCCURRING ABDOMINAL PAIN (POSSIBLE HERNIA))    Patient presents to clinic with numerous complaints.  First patient has lost 30 lb in the last 3 months intentionally by intermittent fasting and exercise.  Would like to check his electrolytes to make sure he has not depleted anything or hurt his kidneys with some of the regimens he has done.  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  He describes a shooting pain down both legs which are causing his toes tingle.  He denies any weakness in the legs and denies any loss of bowel or bladder habits.  He did denies any fall or new injury.  He has not been taking anything over-the-counter but has been using a heating pad with no relief.  Patient also has a chronic right inguinal hernia which was 1st evaluated back in 2016 by Dr. Pang then followed by Dr. Campuzano.  Patient has been resistant to surgery but has noticed since he has started walking regularly that he does develop a right lower quadrant abdominal pain or after he drinks carbonated beverages he develops the pain.  He is unsure if this is related to his hernia as he has been told this could be referred pain.  He states the pain happens only occasionally and he denies any pain associated with the inguinal or testicular hernia.  He would like to have imaging done to see if the hernia has progressed at all and if he is being forced to have it repaired before becomes an emergency.  He denies any other complaints today.  Patient states he has had several colonoscopies while having this abdominal and inguinal pain and all of his colonoscopies have been fine.      Review of Systems   Constitutional:  Negative for activity change, appetite change, chills, fatigue, fever and unexpected weight change.   HENT: Negative.     Eyes:  Negative for  photophobia, pain, discharge, redness and visual disturbance.   Respiratory:  Negative for cough, chest tightness and shortness of breath.    Cardiovascular:  Negative for chest pain, palpitations and leg swelling.   Gastrointestinal:  Negative for abdominal distention, abdominal pain, anal bleeding, blood in stool, change in bowel habit, constipation, diarrhea, nausea, rectal pain, vomiting, reflux and fecal incontinence.   Genitourinary:  Negative for decreased urine volume, difficulty urinating, discharge, dysuria, frequency, hematuria, penile pain, penile swelling, scrotal swelling, testicular pain and urgency.   Musculoskeletal:  Positive for back pain and leg pain. Negative for arthralgias, gait problem and myalgias.   Neurological:  Negative for dizziness, weakness, light-headedness, numbness and headaches.          Objective   Vitals:    02/26/24 1138   BP: 130/64   BP Location: Right arm   Patient Position: Sitting   Pulse: 72   Temp: 98.2 °F (36.8 °C)   TempSrc: Oral   SpO2: 98%   Weight: 75.1 kg (165 lb 9.1 oz)   Height: 6' (1.829 m)      Physical Exam  Constitutional:       General: He is not in acute distress.     Appearance: He is well-developed. He is not diaphoretic.   HENT:      Head: Normocephalic and atraumatic.      Right Ear: External ear normal.      Left Ear: External ear normal.      Mouth/Throat:      Pharynx: No oropharyngeal exudate.   Eyes:      General:         Right eye: No discharge.         Left eye: No discharge.      Conjunctiva/sclera: Conjunctivae normal.      Pupils: Pupils are equal, round, and reactive to light.   Neck:      Thyroid: No thyromegaly.   Cardiovascular:      Rate and Rhythm: Normal rate and regular rhythm.      Heart sounds: Normal heart sounds. No murmur heard.     No friction rub. No gallop.   Pulmonary:      Effort: Pulmonary effort is normal. No respiratory distress.      Breath sounds: Normal breath sounds. No wheezing or rales.   Chest:      Chest wall: No  tenderness.   Abdominal:      General: Bowel sounds are normal. There is no distension.      Palpations: Abdomen is soft. There is no mass.      Tenderness: There is abdominal tenderness in the right lower quadrant. There is no guarding.      Hernia: No hernia is present. There is no hernia in the umbilical area or right inguinal area.       Musculoskeletal:         General: Normal range of motion.      Cervical back: Normal range of motion and neck supple.   Lymphadenopathy:      Cervical: No cervical adenopathy.   Skin:     General: Skin is warm and dry.   Neurological:      Mental Status: He is alert and oriented to person, place, and time.   Psychiatric:         Mood and Affect: Mood normal.         Behavior: Behavior normal.         Thought Content: Thought content normal.         Judgment: Judgment normal.            Assessment and Plan     1. Hernia of testicle  Comments:  Ultrasound ordered to evaluate progression of area  Orders:  -     US Scrotum And Testicles; Future; Expected date: 02/26/2024    2. RLQ abdominal pain  Comments:  Abdominal ultrasound ordered  Orders:  -     US Abdomen Complete; Future; Expected date: 02/26/2024    3. Back pain, unspecified back location, unspecified back pain laterality, unspecified chronicity  Comments:  Back x-ray today.  Will call with results  Orders:  -     X-Ray Lumbar Spine 5 View; Future; Expected date: 02/26/2024    4. History of weight loss  Comments:  CMP to check electrolytes after recent intentional weight loss  Orders:  -     Comprehensive Metabolic Panel; Future; Expected date: 02/26/2024    5. Bronchiectasis without complication  Comments:  Stable, followed by pulmonology    6. Aortic atherosclerosis  Comments:  Stable, continue regimen      Will call with results and further recommendations         No follow-ups on file.

## 2024-02-29 ENCOUNTER — HOSPITAL ENCOUNTER (OUTPATIENT)
Dept: RADIOLOGY | Facility: HOSPITAL | Age: 84
Discharge: HOME | End: 2024-02-29
Attending: PHYSICIAN ASSISTANT
Payer: MEDICARE

## 2024-02-29 DIAGNOSIS — R10.31 RLQ ABDOMINAL PAIN: ICD-10-CM

## 2024-02-29 DIAGNOSIS — M54.9 BACK PAIN, UNSPECIFIED BACK LOCATION, UNSPECIFIED BACK PAIN LATERALITY, UNSPECIFIED CHRONICITY: ICD-10-CM

## 2024-02-29 DIAGNOSIS — K40.90 HERNIA OF TESTICLE: ICD-10-CM

## 2024-02-29 PROCEDURE — 76700 US EXAM ABDOM COMPLETE: CPT | Mod: TC

## 2024-02-29 PROCEDURE — 72110 X-RAY EXAM L-2 SPINE 4/>VWS: CPT | Mod: TC

## 2024-02-29 PROCEDURE — 76870 US EXAM SCROTUM: CPT | Mod: TC

## 2024-02-29 PROCEDURE — 72110 X-RAY EXAM L-2 SPINE 4/>VWS: CPT | Mod: 26,,, | Performed by: RADIOLOGY

## 2024-02-29 PROCEDURE — 76700 US EXAM ABDOM COMPLETE: CPT | Mod: 26,,, | Performed by: RADIOLOGY

## 2024-02-29 PROCEDURE — 76870 US EXAM SCROTUM: CPT | Mod: 26,,, | Performed by: RADIOLOGY

## 2024-03-05 ENCOUNTER — PATIENT MESSAGE (OUTPATIENT)
Dept: FAMILY MEDICINE | Facility: CLINIC | Age: 84
End: 2024-03-05
Payer: MEDICARE

## 2024-03-05 DIAGNOSIS — M54.9 BACK PAIN, UNSPECIFIED BACK LOCATION, UNSPECIFIED BACK PAIN LATERALITY, UNSPECIFIED CHRONICITY: Primary | ICD-10-CM

## 2024-03-06 NOTE — TELEPHONE ENCOUNTER
Dr. Portillo see my response to this patient as well as the print out that he sent regarding his message.  I placed it on your desk for you to review tomorrow when you come in.  Anything input you would like to lend to this situation would be appreciated.

## 2024-03-11 ENCOUNTER — TELEPHONE (OUTPATIENT)
Dept: FAMILY MEDICINE | Facility: CLINIC | Age: 84
End: 2024-03-11
Payer: MEDICARE

## 2024-03-11 NOTE — TELEPHONE ENCOUNTER
----- Message from Evonne Holguin sent at 3/11/2024  1:47 PM CDT -----  Contact: self  Type:  Needs Medical Advice    Who Called: self  Symptoms (please be specific): pt needs to speak to nurse Deangelo Camargo, pt has replied to message. Pt is requesting nurse give her a call back   Would the patient rather a call back or a response via [facility]? call  Best Call Back Number:   Additional Information: please advise and thank you.

## 2024-03-19 ENCOUNTER — TELEPHONE (OUTPATIENT)
Dept: FAMILY MEDICINE | Facility: CLINIC | Age: 84
End: 2024-03-19
Payer: MEDICARE

## 2024-03-19 NOTE — TELEPHONE ENCOUNTER
----- Message from Holley [place] sent at 3/18/2024  2:37 PM CDT -----  Contact: Patient  Type:  Patient Returning Call    Who Called:   Patient  Who Left Message for Patient:  Nicki  Does the patient know what this is regarding?:   Appointment in [facility]    Would the patient rather a call back or a response via [facility]?   Call back  Best Call Back Number:  [phone]    Additional Information:   States he is returning a missed call - please call back - thank you

## 2024-03-20 ENCOUNTER — OFFICE VISIT (OUTPATIENT)
Dept: SPINE | Facility: CLINIC | Age: 84
End: 2024-03-20
Payer: MEDICARE

## 2024-03-20 VITALS — HEIGHT: 72 IN | BODY MASS INDEX: 22.43 KG/M2 | WEIGHT: 165.56 LBS

## 2024-03-20 DIAGNOSIS — G89.29 CHRONIC RIGHT-SIDED LOW BACK PAIN WITH RIGHT-SIDED SCIATICA: Primary | ICD-10-CM

## 2024-03-20 DIAGNOSIS — M54.41 CHRONIC RIGHT-SIDED LOW BACK PAIN WITH RIGHT-SIDED SCIATICA: Primary | ICD-10-CM

## 2024-03-20 DIAGNOSIS — M54.9 BACK PAIN, UNSPECIFIED BACK LOCATION, UNSPECIFIED BACK PAIN LATERALITY, UNSPECIFIED CHRONICITY: ICD-10-CM

## 2024-03-20 PROCEDURE — 99999 PR PBB SHADOW E&M-EST. PATIENT-LVL III: CPT | Mod: PBBFAC,,, | Performed by: PHYSICAL MEDICINE & REHABILITATION

## 2024-03-20 PROCEDURE — 99204 OFFICE O/P NEW MOD 45 MIN: CPT | Mod: S$PBB,,, | Performed by: PHYSICAL MEDICINE & REHABILITATION

## 2024-03-20 PROCEDURE — 99213 OFFICE O/P EST LOW 20 MIN: CPT | Mod: PBBFAC,PN | Performed by: PHYSICAL MEDICINE & REHABILITATION

## 2024-03-20 NOTE — PROGRESS NOTES
SUBJECTIVE:    Patient ID: Iftikhar Lynn is a 84 y.o. male.    Chief Complaint: Back Pain    This is an 84-year-old man who sees Dr. Portillo for his primary care.  History of hypertension hyperlipidemia and melanoma that has been confined to the skin.  Remote history of low back surgery at L4-5 for herniated disc.  Dramatic improvement postoperatively.  Has chronic complaints of right greater than left-sided intermittent low back pain at the lumbosacral junction the radiates down the right greater than left legs towards the lateral portion of the right greater than left ankles.  No bowel or bladder dysfunction fever chills sweats or unexpected weight loss.  He had helps.  He tries to avoid nonsteroidal anti-inflammatory medications she.  His current pain level is 6/10 interferes with quality of life in terms of activities of daily living recreation and social activities.  Current flare-up has been going on for about a month he thinks related to lifting.  I reviewed x-rays of the lumbar spine done 02/29/2024.  He has advanced degenerative disc disease L3-4, L4-5 and L5-S1          Past Medical History:   Diagnosis Date    Cancer     basal cell ca rt arm / melanoma on back    GERD (gastroesophageal reflux disease)     Hyperlipidemia     Hypertension     Melanoma      Social History     Socioeconomic History    Marital status:    Tobacco Use    Smoking status: Former     Types: Cigars    Smokeless tobacco: Never   Substance and Sexual Activity    Alcohol use: Yes     Alcohol/week: 28.0 standard drinks of alcohol     Types: 28 Glasses of wine per week     Comment: daily wine    Drug use: No    Sexual activity: Yes     Partners: Female     Birth control/protection: None     Social Determinants of Health     Financial Resource Strain: Low Risk  (11/14/2023)    Overall Financial Resource Strain (CARDIA)     Difficulty of Paying Living Expenses: Not hard at all   Food Insecurity: No Food Insecurity  (2/25/2024)    Hunger Vital Sign     Worried About Running Out of Food in the Last Year: Never true     Ran Out of Food in the Last Year: Never true   Transportation Needs: No Transportation Needs (2/25/2024)    PRAPARE - Transportation     Lack of Transportation (Medical): No     Lack of Transportation (Non-Medical): No   Physical Activity: Sufficiently Active (2/25/2024)    Exercise Vital Sign     Days of Exercise per Week: 6 days     Minutes of Exercise per Session: 150+ min   Stress: No Stress Concern Present (2/25/2024)    [employer] [place] [employer] - Occupational Stress Questionnaire     Feeling of Stress : Only a little   Social Connections: Socially Integrated (2/25/2024)    Social Connection and Isolation Panel [NHANES]     Frequency of Communication with Friends and Family: More than three times a week     Frequency of Social Gatherings with Friends and Family: Three times a week     Attends Baptism Services: 1 to 4 times per year     Active Member of Clubs or Organizations: Yes     Attends Club or Organization Meetings: More than 4 times per year     Marital Status:    Housing Stability: Low Risk  (2/25/2024)    Housing Stability Vital Sign     Unable to Pay for Housing in the Last Year: No     Number of Places Lived in the Last Year: 1     Unstable Housing in the Last Year: No     Past Surgical History:   Procedure Laterality Date    BACK SURGERY      L 4 L5    COLONOSCOPY N/A 12/20/2017    Procedure: COLONOSCOPY;  Surgeon: Filipe Ramirez MD;  Location: [facility];  Service: Endoscopy;  Laterality: N/A;    COLONOSCOPY N/A 09/27/2021    Procedure: COLONOSCOPY;  Surgeon: Filipe Ramirez MD;  Location: [facility];  Service: Endoscopy;  Laterality: N/A;    EYE SURGERY  2010    cataract    KNEE ARTHROSCOPY W/ MENISCECTOMY Left 01/17/2019    Procedure: ARTHROSCOPY, KNEE, WITH MENISCECTOMY;  Surgeon: Ethan Díaz MD;  [facility]  Service: Orthopedics;  Laterality: Left;     "SKIN CANCER EXCISION      multiple sites, derm dr martinez, basal cell ca    SPINE SURGERY  1990    L4L5 disc    VASECTOMY       Family History   Problem Relation Age of Onset    Cancer Mother         non hogkins lumphoma    Heart disease Father     Cancer Brother         melanoma, arm and leg    Clotting disorder Neg Hx     Anesthesia problems Neg Hx      Vitals:    03/20/24 0952   Weight: 75.1 kg (165 lb 9.1 oz)   Height: 6' 0.01" (1.829 m)       Review of Systems   Constitutional:  Negative for chills, diaphoresis, fatigue, fever and unexpected weight change.   HENT:  Negative for trouble swallowing.    Eyes:  Negative for visual disturbance.   Respiratory:  Negative for shortness of breath.    Cardiovascular:  Negative for chest pain.   Gastrointestinal:  Negative for abdominal pain, constipation, diarrhea, nausea and vomiting.   Genitourinary:  Negative for difficulty urinating.   Musculoskeletal:  Negative for arthralgias, back pain, gait problem, joint swelling, myalgias, neck pain and neck stiffness.   Neurological:  Negative for dizziness, speech difficulty, weakness, light-headedness, numbness and headaches.          Objective:      Physical Exam  Neurological:      Mental Status: He is alert and oriented to person, place, and time.      Comments: He is awake and in no acute distress  Mild tenderness palpation right lumbar paraspinous musculature at the lumbosacral junction with no external lesions or palpable masses noted  Forward flexion of the lumbar spine is to about 60° before complaint pain at the lumbosacral junction.  Extension combined with rotation to the right causes pain at the lumbosacral junction  He can heel and toe walk normally  Reflexes- +1-+2 reflexes at the following:   C5-Biceps   C6-Brachioradialis   C7-Triceps   L3/4-Patellar   S1-Achilles   Dior sign is negative bilaterally  Strength testing- 5/5 strength in the following muscle groups:  C5-Elbow flexion  C6-Wrist " extension  C7-Elbow extension  C8-Finger flexion  T1-Finger abduction  L2-Hip flexion  L3-Knee extension  L4-Ankle dorsiflexion  L5-Great toe extension  S1-Ankle plantar flexion                    Assessment:       1. 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 Back pain, unspecified back location, unspecified back pain laterality, unspecified chronicity           Plan:     He has a nonfocal neurological examination and no historical red flags.  I suspect has low back pain on basis of degenerative disc disease and facet arthropathy.  He has pain with facet loading.  He has symptoms of right greater than left S1 radiculitis with no evidence of nerve root dysfunction.  I think he can be treated conservatively.  I am going to get him started in some physical therapy.  If he fails that consider MRI in preparation for epidural steroid injections or radiofrequency ablation.      Chronic right-sided low back pain with right-sided sciatica  -     Ambulatory referral/consult to Physical/Occupational Therapy; Future; Expected date: 03/27/2024    Back pain, unspecified back location, unspecified back pain laterality, unspecified chronicity  -     Ambulatory referral[facility]consult to [facility]

## 2024-04-01 PROBLEM — M54.41 CHRONIC RIGHT-SIDED LOW BACK PAIN WITH RIGHT-SIDED SCIATICA: Status: ACTIVE | Noted: 2024-04-01

## 2024-04-01 PROBLEM — G89.29 CHRONIC RIGHT-SIDED LOW BACK PAIN WITH RIGHT-SIDED SCIATICA: Status: ACTIVE | Noted: 2024-04-01

## 2024-04-02 ENCOUNTER — CLINICAL SUPPORT (OUTPATIENT)
Dept: REHABILITATION | Facility: HOSPITAL | Age: 84
End: 2024-04-02
Payer: MEDICARE

## 2024-04-02 DIAGNOSIS — R29.898 IMPAIRED FLEXIBILITY OF LOWER EXTREMITY: ICD-10-CM

## 2024-04-02 DIAGNOSIS — R29.898 DECREASED STRENGTH OF LOWER EXTREMITY: ICD-10-CM

## 2024-04-02 DIAGNOSIS — M54.41 CHRONIC RIGHT-SIDED LOW BACK PAIN WITH RIGHT-SIDED SCIATICA: Primary | ICD-10-CM

## 2024-04-02 DIAGNOSIS — M53.86 DECREASED ROM OF INTERVERTEBRAL DISCS OF LUMBAR SPINE: ICD-10-CM

## 2024-04-02 DIAGNOSIS — G89.29 CHRONIC RIGHT-SIDED LOW BACK PAIN WITH RIGHT-SIDED SCIATICA: Primary | ICD-10-CM

## 2024-04-02 PROCEDURE — 97161 PT EVAL LOW COMPLEX 20 MIN: CPT | Mod: PN | Performed by: PHYSICAL THERAPIST

## 2024-04-02 PROCEDURE — 97530 THERAPEUTIC ACTIVITIES: CPT | Mod: PN | Performed by: PHYSICAL THERAPIST

## 2024-04-02 NOTE — PLAN OF CARE
"[facility]   Physical Therapy Initial Evaluation     Date: 4/2/2024   Name: Iftikhar Lynn  Clinic Number: [id number]    Therapy Diagnosis:   Encounter Diagnoses   Name Primary?    Chronic right-sided low back pain with right-sided sciatica Yes    Decreased strength of lower extremity     Decreased ROM of intervertebral discs of lumbar spine     Impaired flexibility of lower extremity      Physician: Zacarias Siegel MD    Physician Orders: PT Eval and Treat   Medical Diagnosis from Referral: Chronic right-sided low back pain with right-sided sciatica   Evaluation Date: 4/2/2024  Authorization Period Expiration: 3/20/2025  Plan of Care Expiration: 5/17/2024  Visit # / Visits authorized: 1/ 1   (POC 1/20)   FOTO Due visit 5  FOTO Due visit 10    Precautions: Standard and history of skin cancer  Insurance: Payor: MEDICARE / Plan: MEDICARE PART A & B / Product Type: Government /     Time In: 1000  Time Out: 1100  Total Appointment Time (timed & untimed codes): 60 minutes      SUBJECTIVE   Date of onset: 3 months ago    History of current condition - Iftikhar reports: the onset of right sided lumbar pain after carry 2 batteries from his truck to his boat. He states the symptoms progressed to his right lower extremity. He notes pain with standing and sitting still. He states his pain is decreased with lying on a heat pad. He noted increased pain when going into lumbar extension. He stated he had a previous lumbar surgery (possibly partial discectomy) He also stated he has had a recent weight loss due to living a healthier lifestyle.    Falls: 0    Imaging, X-rays: "Mild rotatory dextroconvex curvature of the lumbar spine. Mild retrolisthesis of L3 on L4 and grade 1 anterolisthesis of L4 on L5. Vertebral body heights appear grossly maintained without definite fracture. Multilevel degenerative anterior marginal osteophyte formation, sclerotic endplate change. Moderate to severe disc " "space narrowing at L3-4 through L5-S1. Moderate lower lumbar facet arthropathy "    Prior Therapy: None noted  Social History:  lives with their spouse  Occupation: Retired but does some part time work from home for the [employer]  Prior Level of Function: Independent  Current Level of Function: Decreased ability to perform ADL and limited tolerance to standing and walking.    Pain:  Current 2/10, worst 7/10, best 0/10   Location: right lumbar and intermittently into the right lower extremity     Description: Shooting  Aggravating Factors: Sitting and Standing  Easing Factors: lying down and heating pad    Patients goals: Decrease pain and return to performing exercise at his gym     Medical History:   Past Medical History:   Diagnosis Date    Cancer     basal cell ca rt arm / melanoma on back    GERD (gastroesophageal reflux disease)     Hyperlipidemia     Hypertension     Melanoma        Surgical History:   Iftikhar Lynn  has a past surgical history that includes Back surgery; Vasectomy; Skin cancer excision; Colonoscopy (N/A, 12/20/2017); Knee arthroscopy w/ meniscectomy (Left, 01/17/2019); Colonoscopy (N/A, 09/27/2021); Eye surgery (2010); and Spine surgery (1990).    Medications:   Iftikhar has a current medication list which includes the following prescription(s): aspirin, ibuprofen, metoprolol succinate, multivitamin, nystatin-triamcinolone, omeprazole, pramoxine-hydrocortisone, simvastatin, and valsartan.    Allergies:   Review of patient's allergies indicates:  No Known Allergies       OBJECTIVE     Posture: Decreased lumbar lordosis and forward head in standing   Palpation: Moderate point tenderness noted with palpation of the right lumbar paraspinals  Sensation: Intact      Lumbar AROM: ROM-   Response to repeated movements   Flexion 12 degrees - end range pain  No worse as a result    Extension 4 degrees - end range pain  worse   Right side bending 6 degrees   Left side bending 6 " degrees     L/E MMT Left Right   Hip Flexion 4+/5. 4+/5.   Hip Extension 4/5. 4/5.   Hip Abduction 4/5. 4/5.   Hip Adduction 4+/5. 4+/5.   Hip IR 4+/5. 4+/5.   Hip ER 4/5. 4/5.   Knee Flexion 4+/5. 4+/5.   Knee Extension 4+/5. 4+/5.   Ankle DF 5/5. 5/5.   Ankle PF 5/5. 5/5.   Ankle Inversion 5/5. 5/5.   Ankle Eversion 5/5. 5/5.       Flexibility Left Right   Hamstrings 42 degrees 40 degrees   Achilles 2 degrees 2 degrees       Special Tests Left Right   SLR negative. negative.   GUILLAUME negative. negative.   Piriformis negative. negative.   Slump negative. negative.       Gait Without AD   Analysis The patient ambulates with bilateral LE externally rotated in stance phase.       Limitation/Restriction for FOTO  Survey    Therapist reviewed FOTO scores for Iftikhar Lynn on 4/2/2024.   FOTO documents entered into EPIC - see Media section.    Intake Score: 53%         TREATMENT     Total Treatment time (time-based codes) separate from Evaluation: 30 minutes     Iftikhar received the treatments listed below:      THERAPEUTIC ACTIVITIES to improve dynamic and functional performance for 30 minutes including :.     Seated Hamstring stretch 3 x 30 sec B  Seated piriformis stretch 3 x 30 sec B  Seated Gastroc-soleus stretch 3 x 30 sec B  Single knee to chest 3 x 10 B  Seated lumbar flexion 3 x 10  Seated adductor squeeze 3 x 10  Seated hip abduction 3 x 10 Green theraband   Seated sciatic nerve glides (level 1) 2 x 10    PATIENT EDUCATION AND HOME EXERCISES     Education provided:   - Role of PT and information regarding the [facility]    Written Home Exercises Provided: yes. Exercises were reviewed and Iftikhar was able to demonstrate them prior to the end of the session.  Iftikhar demonstrated good  understanding of the education provided. See EMR under Patient Instructions for exercises provided during therapy sessions.    ASSESSMENT     Iftikhar is a 84 y.o. male referred to outpatient Physical Therapy with a  medical diagnosis of Chronic right-sided low back pain with right-sided sciatica . Patient presents with :    Right sided lumbar and intermittent right lower extremity pain  Decreased lumbar range of motion   Decreased lower extremity strength  Decreased lower extremity flexibility  Decreased ability to perform ADL and limited tolerance to standing.    Patient prognosis is Good.   Patientt will benefit from skilled outpatient Physical Therapy to address the deficits stated above and in the chart below, provide patient /family education, and to maximize patientt's level of independence.     Plan of care discussed with patient: Yes  Patient's spiritual, cultural and educational needs considered and patient is agreeable to the plan of care and goals as stated below:     Anticipated Barriers for therapy: none    Medical Necessity is demonstrated by the following  History  Co-morbidities and personal factors that may impact the plan of care [x] LOW: no personal factors / co-morbidities  [] MODERATE: 1-2 personal factors / co-morbidities  [] HIGH: 3+ personal factors / co-morbidities    Moderate / High Support Documentation:      Examination  Body Structures and Functions, activity limitations and participation restrictions that may impact the plan of care [x] LOW: addressing 1-2 elements  [] MODERATE: 3+ elements  [] HIGH: 4+ elements (please support below)    Moderate / High Support Documentation:      Clinical Presentation [x] LOW: stable  [] MODERATE: Evolving  [] HIGH: Unstable     Decision Making/ Complexity Score: low         Goals:  SHORT TERM GOALS:  5 weeks  Progress Date met   The patient will begin a written HEP [] Met  [] Not Met  [] Progressing     Increase hamstring flexibility to 60 [] Met  [] Not Met  [] Progressing     Decrease soft tissue tenderness to mild [] Met  [] Not Met  [] Progressing     Increase lower extremity strength to 4+/5 throughout [] Met  [] Not Met  [] Progressing        LONG TERM  GOALS: 10 weeks  Progress Date met   The patient will be independent with his HEP for maintenance [] Met  [] Not Met  [] Progressing     Increase hamstring flexibility to 70* [] Met  [] Not Met  [] Progressing     Increase FOTO score to 69% [] Met  [] Not Met  [] Progressing     Increase lower extremity strength to 5/5 [] Met  [] Not Met  [] Progressing     Patient will be able to tolerate 1 hour of standing without the onset of symptoms [] Met  [] Not Met  [] Progressing         PLAN   Plan of care Certification: 4/2/2024 to 5/17/2024.    Outpatient Physical Therapy 2 times weekly for 10 weeks to include the following interventions: Manual Therapy, Neuromuscular Re-ed, Patient Education, Therapeutic Activities, and Therapeutic Exercise. The patient will be transferred to the [facility]. New primary PT may update or change goals to better align with the [facility] as deemed appropriate    Tommy Doshi, PT      I CERTIFY THE NEED FOR THESE SERVICES FURNISHED UNDER THIS PLAN OF TREATMENT AND WHILE UNDER MY CARE   Physician's comments:     Physician's Signature: ___________________________________________________

## 2024-04-05 ENCOUNTER — CLINICAL SUPPORT (OUTPATIENT)
Dept: REHABILITATION | Facility: HOSPITAL | Age: 84
End: 2024-04-05
Payer: MEDICARE

## 2024-04-05 DIAGNOSIS — M53.86 DECREASED ROM OF INTERVERTEBRAL DISCS OF LUMBAR SPINE: ICD-10-CM

## 2024-04-05 DIAGNOSIS — R29.898 IMPAIRED FLEXIBILITY OF LOWER EXTREMITY: ICD-10-CM

## 2024-04-05 DIAGNOSIS — G89.29 CHRONIC RIGHT-SIDED LOW BACK PAIN WITH RIGHT-SIDED SCIATICA: Primary | ICD-10-CM

## 2024-04-05 DIAGNOSIS — R29.898 DECREASED STRENGTH OF LOWER EXTREMITY: ICD-10-CM

## 2024-04-05 DIAGNOSIS — M54.41 CHRONIC RIGHT-SIDED LOW BACK PAIN WITH RIGHT-SIDED SCIATICA: Primary | ICD-10-CM

## 2024-04-05 PROCEDURE — 97112 NEUROMUSCULAR REEDUCATION: CPT | Mod: PN

## 2024-04-05 PROCEDURE — 97530 THERAPEUTIC ACTIVITIES: CPT | Mod: PN

## 2024-04-05 PROCEDURE — 97750 PHYSICAL PERFORMANCE TEST: CPT | Mod: 59,PN

## 2024-04-05 NOTE — PROGRESS NOTES
[facility]  Physical Therapy Treatment Note     Name: Iftikhar Lynn  Clinic Number: [id number]    Therapy Diagnosis:   Encounter Diagnoses   Name Primary?    Chronic right-sided low back pain with right-sided sciatica Yes    Decreased strength of lower extremity     Decreased ROM of intervertebral discs of lumbar spine     Impaired flexibility of lower extremity      Physician: Zacarias Siegel MD    Visit Date: 4/5/2024    Physician Orders: PT Eval and Treat   Medical Diagnosis from Referral: Chronic right-sided low back pain with right-sided sciatica   Evaluation Date: 4/2/2024  Authorization Period Expiration: 3/20/2025  Plan of Care Expiration: 5/17/2024  Visit # / Visits author 1/20 for HB  (POC 2/ eval)   FOTO Due visit 5  FOTO Due visit 10     Precautions: Standard and history of skin cancer  Insurance: Payor: MEDICARE / Plan: MEDICARE PART A & B / Product Type: Government /     MedX Testing:MedX testing visit 2    PTA Visit #: 0/5     Time In: 0900  Time Out: 1000  Total Billable Time: 55 minutes  INSURANCE and OUTCOMES: Fee for Service with FOTO Outcomes 1/3    Precautions: standard    Pattern of pain determined: 3, anticipated that he will respond as 1 PEP ultimately    Subjective     Iftikhar Lynn reports he as been doing exercise as per initial evaluation.  States that he has a new kareen style chair for his office and it has lumbar support.      Patient reports tolerating previous visit well  Patient reports their pain to be 3/10 on a 0-10 scale with 0 being no pain and 10 being the worst pain imaginable.  FACES scale  Pain Location: right leg laterally to foot     Occupation: Retired but does some part time work from home for the [employer]  Prior Level of Function: Independent  Current Level of Function: Decreased ability to perform ADL and limited tolerance to standing and walking.     Pain:  Current 2/10, worst 7/10, best 0/10  "  Location: right lumbar and intermittently into the right lower extremity     Description: Shooting  Aggravating Factors: Sitting and Standing  Easing Factors: lying down and heating pad     Patients goals: Decrease pain and return to performing exercise at his gym    Objective      MOVEMENT LOSS - Lumbar   Norms ROM Loss Initial   Flexion Fingers touch toes, sacral angle >/= 70 deg, uniform spinal curvature, posterior weight shift  moderate loss   Extension ASIS surpasses toes, spine of scapulae surpasses heels, uniform spinal curve moderate loss   Side glide Right  moderate loss   Side glide Left  moderate loss   Rotation Right PT observes contralateral shoulder moderate loss   Rotation Left PT observes contralateral shoulder moderate loss     Lower Extremity Strength tested in formal positions  Right LE  Left LE    Hip flexion: 4+/5 Hip flexion: 4+/5   Hip extension:  2+/5 Hip extension: 2+/5   Hip abduction: 2+/5 Hip abduction: 3-/5   Hip adduction:   Hip adduction:     Hip External Rotation 4+/5 Hip External Rotation 4+/5                                     Special Tests:   Test Name  Test Result   Prone Instability Test    SI Joint Provocation Test (--)   Straight Leg Raise (--)   Neural Tension Test Equivocable on right, (-) left    Crossed Straight Leg Raise    Walking on toes Able   Walking on heels  Not able     NEUROLOGICAL SCREENING:     Sensory deficits: complaint of "numbness and tingling" to the soles of his bilateral feet and his forefeet.    REPEATED TEST MOVEMENTS:    Baseline symptoms:  Repeated Flexion in Standing Decreased leg symptoms and increased central pain, right of center   Repeated Extension in Standing better  Decreased radicular symptoms.    Repeated Flexion in lying    Repeated Extension in lying         STATIC TESTS and other movements:   Prone lie no effect   Prone lie on elbows end range pain  worse   Sitting slouched  worse   Sitting erect better   Standing slouched    Standing " erect     Lying prone in extension  End range of motion  pain, worse   Long sitting      Sustained flexion    Sustained prone using mat      Lumbar testing Visit 2  Lumbar  Isometric Testing on Med X equipment: Testing administered by PT    Test Initial Baseline Midpoint Final   Date 04-     ROM 0-48 deg     Max Peak Torque 85      Min Peak Torque 13      Flex/Ext Ratio ~ 6.5 : 1     % below normative data 75%     % gain from initial test Not available visit 1           Outcomes:  Intake Score: 53%  Visit 6 Score:   Visit 10 Score:   Discharge Score:  Goal Score: 69%       Treatment     Iftikhar received the treatments listed below:      Medical MedX Treatment as follows:  MedX testing performed day 2: Patient  received neuromuscular education to engage spinal musculature correctly for motor control and engagement of musculature for 20 minutes including the MedX exercise component and practice and standard testing. MedX dynamic exercise and baseline isometric test performed with instructions to guide the patient safely through the testing procedure. Patient instructed to perform isometric test correctly and safely while building to an optimal force with a pain-free effort. Patient also instructed that they should feel support/pressure from MedX restraints but no pain/discomfort, and encouraged to report any pain to therapist. Patient demonstrated appropriate understanding of information and tolerance of test.  Education regarding purpose of test, safety during test given, and reviewed possible more soreness and strategies.           4/5/2024    11:05 AM   [facility]   Visit Number 2   VAS Pain Rating 3   Lumbar Extension Seat Pad 0   Femur Restraint 6   Counterweight 183   Lumbar Flexion 48   Lumbar Extension 0   Lumbar Peak Torque 85 ft. lbs.   Min Torque 13   Test Percent Below Normative Data 75 %   Ice - Z Lie (in min.) 5       Iftikhar participated in neuromuscular re-education activities to  improve balance, coordination, proprioception, motor control and/or posture for 0 minutes. The following activities were included:    NEXT visit:  initiate lumbar active range of motion, extension as tolerated, hip strengthening     Iftikhar participated in therapeutic exercises to develop strength, endurance, ROM, flexibility, posture, and core stabilization for 0 minutes including:      Peripheral muscle strengthening which included one set of 15-20 repetitions at a slow and controlled 10-13 second per rep pace focused on strengthening supporting musculature in order to improve body mechanics and functional mobility. Patient and therapist focused on proper form during treatment to ensure optimal strengthening of each targeted muscle group.  Machines utilized included:  To be added next visit:Torso rotation, Chest Press, Rowing, and Leg Press      Iftikhar participated in dynamic functional therapeutic activities to improve functional performance and simulate household and community activities for 25  minutes. The following activities were included:    Education as noted below.   Issued updated home exercise program per [facility] program pattern 3, and added extension in stance and slouch overcorrection also.    Iftikhar received manual therapy techniques for 0  minutes. The following activities were included:    Pt given cold pack for 5 minutes to low back  in z-lie .    Patient Education and Home Exercises     Home exercises include:  See wrap up  Cardio program (V5): -  Lifting education (V11): -  Posture/Lumbar roll: recommended  Fridge Magnet Discharge handout (date given): -  Equipment at home/gym membership: yes    Education provided:   - PT role and POC  - HEP  - - Patient was given an [facility] Visit 1 handout which discusses the following:  - what to expect in therapy  - an overview of the program, including health coaching and wellness  - importance of spinal hygiene, proper posture, lifting  mechanics, sleep quality, and nutrition/hydration   - Katharina roll trialed, recommended, and purchase information was provided.  - Patient received a handout regarding anticipated muscular soreness following the isometric test and strategies for management were reviewed with patient including stretching, using ice and scheduled rest.   - Patient received verbal education on the following:   - [facility] program   - purpose of the isometric test  - safe progression of lumbar strengthening, wellness approach, and systemic strengthening.   - safe usage of MedX machine and testing protocols.    Written Home Exercises Provided: yes.  Prior exercises were discharged  Exercises were reviewed and Iftikhar was able to demonstrate them prior to the end of the session.  Iftikhar demonstrated good  understanding of the education provided.     See EMR under Patient Instructions for exercises provided 4/5/2024.    Assessment     Iftikhar is a 83 yo gentleman referred to [facility] with a medical diagnosis of Chronic right-sided low back pain with right-sided sciatica; he reports that his radicular symptoms are worse than his central symptoms.     Pt presents with reported chronic right lower extremities and low back pain  that is reproduced with repeated forward flexion  and reduced with extension in stance indicating directional preference of extension .   Upon physical assessment, pt demonstrates slouched posturing in sitting with reversed lumbar lordosis, mild hip weakness bilaterally, and trunk and hip mobility deficits. Upon further local examination, hip, lumbar, and thoracic rotation were all limited significantly. Per lumbar MedX testing, pt was 75% below average in strength when compared to those of  same age/height/weight/gender. 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 Pt  would benefit from LE and trunk mobility training, stability training,  improved cardiovascular and muscular endurance, neuromuscular re-education for posture, coordination, and muscular recruitment and education on positional offloading techniques to decrease the intensity and frequency of flare-ups.    Pain Pattern: lumbar 3 with (+) centralization of symptoms at initial Hb assessment       Pt prognosis is Good.     Pt will benefit from skilled outpatient Physical Therapy to address the deficits stated above and in the chart below, to provide pt/family education, and to maximize pt's level of independence. Based on the above history and physical examination an active physical therapy program is recommended    Patient is making good progress towards established goals.  Pt will continue to benefit from skilled outpatient physical therapy to address the deficits stated in the impairment chart, provide pt/family education and to maximize pt's level of independence in the home and community environment.     Anticipated Barriers for therapy: chronicity of symptoms   Pt's spiritual, cultural and educational needs considered and pt agreeable to plan of care and goals as stated below:     Goals:   Updated to reflect [facility] program protocol 04-  GOALS: Pt is in agreement with the following goals.    Short term goals:  6 weeks or 10 visits   - Pt will demonstrate increased lumbar MedX ROM by at least 3 degrees from the initial ROM value with improvements noted in functional ROM and ability to perform ADLs. Appropriate and Ongoing  - Pt will demonstrate increased MedX average isometric strength value by 25% from initial test resulting in improved ability to perform bending, lifting, and carrying activities safely, confidently. Appropriate and Ongoing  - Pt will report a reduction in worst pain score by 1-2 points for improved tolerance for sitting at his desk. Appropriate and Ongoing  - Pt able to perform HEP  correctly with minimal cueing or supervision from therapist to encourage independent management of symptoms. Appropriate and Ongoing    Long term goals: 10 weeks or 20 visits   - Pt will demonstrate increased lumbar MedX ROM by at least 6 degrees from initial ROM value, resulting in improved ability to perform functional forward bending while standing and sitting. Appropriate and Ongoing  - Pt will demonstrate increased MedX average isometric strength value by 40% from initial test resulting in improved ability to perform bending, lifting, and carrying activities safely and confidently. Appropriate and Ongoing  - Pt to demonstrate ability to independently control and reduce their pain through posture positioning and mechanical movements throughout a typical day. Appropriate and Ongoing  - Pt will demonstrate reduced pain and improved functional outcomes as reported on the FOTO by reaching an intake score of >/= 70% functional ability in order to demonstrate subjective improvement in patient's condition. . Appropriate and Ongoing  - Pt will demonstrate independence with the HEP at discharge. Appropriate and Ongoing  - Patient to report improved quality of life evidenced by capacity to complete IADL's without increased symptoms. (patient goal) Appropriate and Ongoing    Plan     Continue with established Plan of Care towards established PT goals.     Therapist: Myrna Barber, PT CLT  4/5/2024

## 2024-04-07 NOTE — PLAN OF CARE
[facility] [place] [facility]  Physical Therapy Treatment Note     Name: Iftikhar Lynn  Clinic Number: [id number]    Therapy Diagnosis:   Encounter Diagnoses   Name Primary?    Chronic right-sided low back pain with right-sided sciatica Yes    Decreased strength of lower extremity     Decreased ROM of intervertebral discs of lumbar spine     Impaired flexibility of lower extremity      Physician: Zacarias Siegel MD    Visit Date: 4/5/2024    Physician Orders: PT Eval and Treat   Medical Diagnosis from Referral: Chronic right-sided low back pain with right-sided sciatica   Evaluation Date: 4/2/2024  Authorization Period Expiration: 3/20/2025  Plan of Care Expiration: 5/17/2024  Visit # / Visits author 1/20 for HB  (POC 2/ eval)   FOTO Due visit 5  FOTO Due visit 10     Precautions: Standard and history of skin cancer  Insurance: Payor: MEDICARE / Plan: MEDICARE PART A & B / Product Type: Government /     MedX Testing:MedX testing visit 2    PTA Visit #: 0/5     Time In: 0900  Time Out: 1000  Total Billable Time: 55 minutes  INSURANCE and OUTCOMES: Fee for Service with FOTO Outcomes 1/3    Precautions: standard    Pattern of pain determined: 3, anticipated that he will respond as 1 PEP ultimately    Subjective     Iftikhar Lynn reports he as been doing exercise as per initial evaluation.  States that he has a new kareen style chair for his office and it has lumbar support.      Patient reports tolerating previous visit well  Patient reports their pain to be 3/10 on a 0-10 scale with 0 being no pain and 10 being the worst pain imaginable.  FACES scale  Pain Location: right leg laterally to foot     Occupation: Retired but does some part time work from home for the [employer]  Prior Level of Function: Independent  Current Level of Function: Decreased ability to perform ADL and limited tolerance to standing and walking.     Pain:  Current 2/10, worst 7/10, best 0/10  "  Location: right lumbar and intermittently into the right lower extremity     Description: Shooting  Aggravating Factors: Sitting and Standing  Easing Factors: lying down and heating pad     Patients goals: Decrease pain and return to performing exercise at his gym    Objective      MOVEMENT LOSS - Lumbar   Norms ROM Loss Initial   Flexion Fingers touch toes, sacral angle >/= 70 deg, uniform spinal curvature, posterior weight shift  moderate loss   Extension ASIS surpasses toes, spine of scapulae surpasses heels, uniform spinal curve moderate loss   Side glide Right  moderate loss   Side glide Left  moderate loss   Rotation Right PT observes contralateral shoulder moderate loss   Rotation Left PT observes contralateral shoulder moderate loss     Lower Extremity Strength tested in formal positions  Right LE  Left LE    Hip flexion: 4+/5 Hip flexion: 4+/5   Hip extension:  2+/5 Hip extension: 2+/5   Hip abduction: 2+/5 Hip abduction: 3-/5   Hip adduction:   Hip adduction:     Hip External Rotation 4+/5 Hip External Rotation 4+/5                                     Special Tests:   Test Name  Test Result   Prone Instability Test    SI Joint Provocation Test (--)   Straight Leg Raise (--)   Neural Tension Test Equivocable on right, (-) left    Crossed Straight Leg Raise    Walking on toes Able   Walking on heels  Not able     NEUROLOGICAL SCREENING:     Sensory deficits: complaint of "numbness and tingling" to the soles of his bilateral feet and his forefeet.    REPEATED TEST MOVEMENTS:    Baseline symptoms:  Repeated Flexion in Standing Decreased leg symptoms and increased central pain, right of center   Repeated Extension in Standing better  Decreased radicular symptoms.    Repeated Flexion in lying    Repeated Extension in lying         STATIC TESTS and other movements:   Prone lie no effect   Prone lie on elbows end range pain  worse   Sitting slouched  worse   Sitting erect better   Standing slouched    Standing " erect     Lying prone in extension  End range of motion  pain, worse   Long sitting      Sustained flexion    Sustained prone using mat      Lumbar testing Visit 2  Lumbar  Isometric Testing on Med X equipment: Testing administered by PT    Test Initial Baseline Midpoint Final   Date 04-     ROM 0-48 deg     Max Peak Torque 85      Min Peak Torque 13      Flex/Ext Ratio ~ 6.5 : 1     % below normative data 75%     % gain from initial test Not available visit 1           Outcomes:  Intake Score: 53%  Visit 6 Score:   Visit 10 Score:   Discharge Score:  Goal Score: 69%       Treatment     Iftikhar received the treatments listed below:      Medical MedX Treatment as follows:  MedX testing performed day 2: Patient  received neuromuscular education to engage spinal musculature correctly for motor control and engagement of musculature for 20 minutes including the MedX exercise component and practice and standard testing. MedX dynamic exercise and baseline isometric test performed with instructions to guide the patient safely through the testing procedure. Patient instructed to perform isometric test correctly and safely while building to an optimal force with a pain-free effort. Patient also instructed that they should feel support/pressure from MedX restraints but no pain/discomfort, and encouraged to report any pain to therapist. Patient demonstrated appropriate understanding of information and tolerance of test.  Education regarding purpose of test, safety during test given, and reviewed possible more soreness and strategies.           4/5/2024    11:05 AM   [facility]   Visit Number 2   VAS Pain Rating 3   Lumbar Extension Seat Pad 0   Femur Restraint 6   Counterweight 183   Lumbar Flexion 48   Lumbar Extension 0   Lumbar Peak Torque 85 ft. lbs.   Min Torque 13   Test Percent Below Normative Data 75 %   Ice - Z Lie (in min.) 5       Iftikhar participated in neuromuscular re-education activities to  improve balance, coordination, proprioception, motor control and/or posture for 0 minutes. The following activities were included:    NEXT visit:  initiate lumbar active range of motion, extension as tolerated, hip strengthening     Iftikhar participated in therapeutic exercises to develop strength, endurance, ROM, flexibility, posture, and core stabilization for 0 minutes including:      Peripheral muscle strengthening which included one set of 15-20 repetitions at a slow and controlled 10-13 second per rep pace focused on strengthening supporting musculature in order to improve body mechanics and functional mobility. Patient and therapist focused on proper form during treatment to ensure optimal strengthening of each targeted muscle group.  Machines utilized included:  To be added next visit:Torso rotation, Chest Press, Rowing, and Leg Press      Iftikhar participated in dynamic functional therapeutic activities to improve functional performance and simulate household and community activities for 25  minutes. The following activities were included:    Education as noted below.   Issued updated home exercise program per [facility] program pattern 3, and added extension in stance and slouch overcorrection also.    Iftikhar received manual therapy techniques for 0  minutes. The following activities were included:    Pt given cold pack for 5 minutes to low back  in z-lie .    Patient Education and Home Exercises     Home exercises include:  See wrap up  Cardio program (V5): -  Lifting education (V11): -  Posture/Lumbar roll: recommended  Fridge Magnet Discharge handout (date given): -  Equipment at home/gym membership: yes    Education provided:   - PT role and POC  - HEP  - - Patient was given an [facility] Visit 1 handout which discusses the following:  - what to expect in therapy  - an overview of the program, including health coaching and wellness  - importance of spinal hygiene, proper posture, lifting  mechanics, sleep quality, and nutrition/hydration   - Katharina roll trialed, recommended, and purchase information was provided.  - Patient received a handout regarding anticipated muscular soreness following the isometric test and strategies for management were reviewed with patient including stretching, using ice and scheduled rest.   - Patient received verbal education on the following:   - [facility] program   - purpose of the isometric test  - safe progression of lumbar strengthening, wellness approach, and systemic strengthening.   - safe usage of MedX machine and testing protocols.    Written Home Exercises Provided: yes.  Prior exercises were discharged  Exercises were reviewed and Iftikhar was able to demonstrate them prior to the end of the session.  Iftikhar demonstrated good  understanding of the education provided.     See EMR under Patient Instructions for exercises provided 4/5/2024.    Assessment     Iftikhar is a 85 yo gentleman referred to [facility] with a medical diagnosis of Chronic right-sided low back pain with right-sided sciatica; he reports that his radicular symptoms are worse than his central symptoms.     Pt presents with reported chronic right lower extremities and low back pain  that is reproduced with repeated forward flexion  and reduced with extension in stance indicating directional preference of extension .   Upon physical assessment, pt demonstrates slouched posturing in sitting with reversed lumbar lordosis, mild hip weakness bilaterally, and trunk and hip mobility deficits. Upon further local examination, hip, lumbar, and thoracic rotation were all limited significantly. Per lumbar MedX testing, pt was 75% below average in strength when compared to those of  same age/height/weight/gender. 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 Pt  would benefit from LE and trunk mobility training, stability training,  improved cardiovascular and muscular endurance, neuromuscular re-education for posture, coordination, and muscular recruitment and education on positional offloading techniques to decrease the intensity and frequency of flare-ups.    Pain Pattern: lumbar 3 with (+) centralization of symptoms at initial Hb assessment       Pt prognosis is Good.     Pt will benefit from skilled outpatient Physical Therapy to address the deficits stated above and in the chart below, to provide pt/family education, and to maximize pt's level of independence. Based on the above history and physical examination an active physical therapy program is recommended    Patient is making good progress towards established goals.  Pt will continue to benefit from skilled outpatient physical therapy to address the deficits stated in the impairment chart, provide pt/family education and to maximize pt's level of independence in the home and community environment.     Anticipated Barriers for therapy: chronicity of symptoms   Pt's spiritual, cultural and educational needs considered and pt agreeable to plan of care and goals as stated below:     Goals:   Updated to reflect [facility] program protocol 04-  GOALS: Pt is in agreement with the following goals.    Short term goals:  6 weeks or 10 visits   - Pt will demonstrate increased lumbar MedX ROM by at least 3 degrees from the initial ROM value with improvements noted in functional ROM and ability to perform ADLs. Appropriate and Ongoing  - Pt will demonstrate increased MedX average isometric strength value by 25% from initial test resulting in improved ability to perform bending, lifting, and carrying activities safely, confidently. Appropriate and Ongoing  - Pt will report a reduction in worst pain score by 1-2 points for improved tolerance for sitting at his desk. Appropriate and Ongoing  - Pt able to perform HEP  correctly with minimal cueing or supervision from therapist to encourage independent management of symptoms. Appropriate and Ongoing    Long term goals: 10 weeks or 20 visits   - Pt will demonstrate increased lumbar MedX ROM by at least 6 degrees from initial ROM value, resulting in improved ability to perform functional forward bending while standing and sitting. Appropriate and Ongoing  - Pt will demonstrate increased MedX average isometric strength value by 40% from initial test resulting in improved ability to perform bending, lifting, and carrying activities safely and confidently. Appropriate and Ongoing  - Pt to demonstrate ability to independently control and reduce their pain through posture positioning and mechanical movements throughout a typical day. Appropriate and Ongoing  - Pt will demonstrate reduced pain and improved functional outcomes as reported on the FOTO by reaching an intake score of >/= 70% functional ability in order to demonstrate subjective improvement in patient's condition. . Appropriate and Ongoing  - Pt will demonstrate independence with the HEP at discharge. Appropriate and Ongoing  - Patient to report improved quality of life evidenced by capacity to complete IADL's without increased symptoms. (patient goal) Appropriate and Ongoing    Plan     Continue with established Plan of Care towards established PT goals.     Therapist: Myrna Barber, PT CLT  4/5/2024

## 2024-04-09 ENCOUNTER — CLINICAL SUPPORT (OUTPATIENT)
Dept: REHABILITATION | Facility: HOSPITAL | Age: 84
End: 2024-04-09
Payer: MEDICARE

## 2024-04-09 DIAGNOSIS — G89.29 CHRONIC RIGHT-SIDED LOW BACK PAIN WITH RIGHT-SIDED SCIATICA: Primary | ICD-10-CM

## 2024-04-09 DIAGNOSIS — M54.41 CHRONIC RIGHT-SIDED LOW BACK PAIN WITH RIGHT-SIDED SCIATICA: Primary | ICD-10-CM

## 2024-04-09 DIAGNOSIS — M53.86 DECREASED ROM OF INTERVERTEBRAL DISCS OF LUMBAR SPINE: ICD-10-CM

## 2024-04-09 DIAGNOSIS — R29.898 DECREASED STRENGTH OF LOWER EXTREMITY: ICD-10-CM

## 2024-04-09 DIAGNOSIS — R29.898 IMPAIRED FLEXIBILITY OF LOWER EXTREMITY: ICD-10-CM

## 2024-04-09 PROCEDURE — 97112 NEUROMUSCULAR REEDUCATION: CPT | Mod: PN

## 2024-04-09 PROCEDURE — 97530 THERAPEUTIC ACTIVITIES: CPT | Mod: PN

## 2024-04-09 NOTE — PROGRESS NOTES
[facility]  Physical Therapy Treatment Note     Name: Iftikhar Lynn  Clinic Number: [id number]    Therapy Diagnosis:   Encounter Diagnoses   Name Primary?    Chronic right-sided low back pain with right-sided sciatica Yes    Decreased strength of lower extremity     Decreased ROM of intervertebral discs of lumbar spine     Impaired flexibility of lower extremity      Physician: Zacarias Siegel MD    Visit Date: 4/9/2024    Physician Orders: PT Eval and Treat   Medical Diagnosis from Referral: Chronic right-sided low back pain with right-sided sciatica   Evaluation Date: 4/2/2024  Authorization Period Expiration: 3/20/2025  Plan of Care Expiration: 5/17/2024  Visit # / Visits author 2 / 20 for HB  (POC 3/ eval)   FOTO Due visit 5  FOTO Due visit 10     Precautions: Standard and history of skin cancer  Insurance: Payor: MEDICARE / Plan: MEDICARE PART A & B / Product Type: Government /     MedX Testing:MedX testing visit 2    PTA Visit #: 0/5     Time In: 905 AM  Time Out: 1010 AM  Total Billable Time: 55 minutes  INSURANCE and OUTCOMES: Fee for Service with FOTO Outcomes 1/3    Precautions: standard    Pattern of pain determined: 3, anticipated that he will respond as 1 PEP ultimately    Subjective     Iftikhar Lynn reports has been doing some exercises given at evaluation. No change in symptoms yet. Walking helps some. Sitting is the worst. Once he gets up it gets better. He has been doing pelvic tilts and Zlie.     Patient reports tolerating previous visit well  Patient reports their pain to be 1/10 on a 0-10 scale with 0 being no pain and 10 being the worst pain imaginable.  FACES scale  Pain Location: right leg laterally to foot     Occupation: Retired but does some part time work from home for the [employer]  Prior Level of Function: Independent  Current Level of Function: Decreased ability to perform ADL and limited tolerance to standing and  walking.     Pain:  Current 2/10, worst 7/10, best 0/10   Location: right lumbar and intermittently into the right lower extremity     Description: Shooting  Aggravating Factors: Sitting and Standing  Easing Factors: lying down and heating pad     Patients goals: Decrease pain and return to performing exercise at his gym    Objective      NT    Treatment     Iftikhar received the treatments listed below:        Iftikhar participated in neuromuscular re-education activities to improve balance, coordination, proprioception, motor control and/or posture for 12 minutes. The following activities were included:    MEDX:         4/9/2024    11:03 AM   [facility]   Visit Number 3   VAS Pain Rating 1   Treadmill Time (in min.) 3 min   Speed 2 mph   Lumbar Weight 42 lbs   Repetitions 20   Rating of Perceived Exertion 2   Ice - Z Lie (in min.) 10     Sidelying open books 10 repetitions each side     Iftikhar participated in therapeutic exercises to develop strength, endurance, ROM, flexibility, posture, and core stabilization for 3 minutes including:    Torso rotation 4/9/2024:    Peripheral muscle strengthening which included one set of 15-20 repetitions at a slow and controlled 10-13 second per rep pace focused on strengthening supporting musculature in order to improve body mechanics and functional mobility. Patient and therapist focused on proper form during treatment to ensure optimal strengthening of each targeted muscle group.  Machines utilized included:  To be added next visit:Torso rotation, Chest Press, Rowing, and Leg Press      Iftikhar participated in dynamic functional therapeutic activities to improve functional performance and simulate household and community activities for 40 minutes. The following activities were included:    Standing assessment:    Flexion: moderate limitation , no pain    Extension: severe limitation , midline to R sided pain    SG: R SG some R sided pain - severe limitation, L SG no  pain moderate limitation    Rotation: moderate limitation bilaterally , no pain     Prone press ups 3 x 10 , decrease pain with standing extension, better range of motion   Standing extensions over edge of mat 10 repetitions, better with R SG continuation restrictions, no onset of peripheralization throughout extensions   Free standing extensions following medx machine 10 repetitions decrease better in sciatica symptoms no change in R sided low back pain     Educated on use of lumbar roll and seated posture in chair at home     Bridging to improve transfers 20 repetitions     Iftikhar received manual therapy techniques for 0  minutes. The following activities were included:    Pt given cold pack for 5 minutes to low back  in z-lie .    Patient Education and Home Exercises     Home exercises include:  See wrap up  Cardio program (V5): -  Lifting education (V11): -  Posture/Lumbar roll: recommended  Fridge Magnet Discharge handout (date given): -  Equipment at home/gym membership: yes    Education provided:   - PT role and POC  - HEP    Written Home Exercises Provided: yes.  Prior exercises were discharged  Exercises were reviewed and Iftikhar was able to demonstrate them prior to the end of the session.  Iftikhar demonstrated good  understanding of the education provided.     See EMR under Patient Instructions for exercises provided 4/5/2024.    Assessment     Arrives to tx session with continuation of R sided sciatica. Minimal symptoms reported upon arrival. He arrives with complaints of worse with sitting and better with standing and walking. Started with education of use of lumbar roll at home to improve sitting posture to hopefully address radicular symptoms in this position. Also started with repeated extensions to assess change in pain. Minimal change in pain with repeated movements but improvement demonstrated in range of motion into extension but minimal change with R SG range of motion. He reported slight onset  of sciatica towards end of medx machine around rep 17. Free standing extensions improved R sided sciatica symptoms but no change in R sided low back pain. Physical Therapist updated HEP to include repeated extension to assess change in sciatica symptoms and educated on use of lumbar roll to encourage extension while sitting. Educated patient to cease if worsening of symptoms occurred. Patient verbalized good understanding.     Pain Pattern: 3 PEP    Pt prognosis is Good.     Pt will benefit from skilled outpatient Physical Therapy to address the deficits stated above and in the chart below, to provide pt/family education, and to maximize pt's level of independence. Based on the above history and physical examination an active physical therapy program is recommended    Patient is making good progress towards established goals.  Pt will continue to benefit from skilled outpatient physical therapy to address the deficits stated in the impairment chart, provide pt/family education and to maximize pt's level of independence in the home and community environment.     Anticipated Barriers for therapy: chronicity of symptoms   Pt's spiritual, cultural and educational needs considered and pt agreeable to plan of care and goals as stated below:     Goals:   Updated to reflect [facility] program protocol 04-  GOALS: Pt is in agreement with the following goals.    Short term goals:  6 weeks or 10 visits   - Pt will demonstrate increased lumbar MedX ROM by at least 3 degrees from the initial ROM value with improvements noted in functional ROM and ability to perform ADLs. Appropriate and Ongoing  - Pt will demonstrate increased MedX average isometric strength value by 25% from initial test resulting in improved ability to perform bending, lifting, and carrying activities safely, confidently. Appropriate and Ongoing  - Pt will report a reduction in worst pain score by 1-2 points for improved tolerance for sitting at his  desk. Appropriate and Ongoing  - Pt able to perform HEP correctly with minimal cueing or supervision from therapist to encourage independent management of symptoms. Appropriate and Ongoing    Long term goals: 10 weeks or 20 visits   - Pt will demonstrate increased lumbar MedX ROM by at least 6 degrees from initial ROM value, resulting in improved ability to perform functional forward bending while standing and sitting. Appropriate and Ongoing  - Pt will demonstrate increased MedX average isometric strength value by 40% from initial test resulting in improved ability to perform bending, lifting, and carrying activities safely and confidently. Appropriate and Ongoing  - Pt to demonstrate ability to independently control and reduce their pain through posture positioning and mechanical movements throughout a typical day. Appropriate and Ongoing  - Pt will demonstrate reduced pain and improved functional outcomes as reported on the FOTO by reaching an intake score of >/= 70% functional ability in order to demonstrate subjective improvement in patient's condition. . Appropriate and Ongoing  - Pt will demonstrate independence with the HEP at discharge. Appropriate and Ongoing  - Patient to report improved quality of life evidenced by capacity to complete IADL's without increased symptoms. (patient goal) Appropriate and Ongoing    Plan     Continue with established Plan of Care towards established PT goals.     Therapist: Ambreen Amador, PT  4/9/2024

## 2024-04-11 ENCOUNTER — CLINICAL SUPPORT (OUTPATIENT)
Dept: REHABILITATION | Facility: HOSPITAL | Age: 84
End: 2024-04-11
Payer: MEDICARE

## 2024-04-11 DIAGNOSIS — R29.898 DECREASED STRENGTH OF LOWER EXTREMITY: ICD-10-CM

## 2024-04-11 DIAGNOSIS — M54.41 CHRONIC RIGHT-SIDED LOW BACK PAIN WITH RIGHT-SIDED SCIATICA: Primary | ICD-10-CM

## 2024-04-11 DIAGNOSIS — R29.898 IMPAIRED FLEXIBILITY OF LOWER EXTREMITY: ICD-10-CM

## 2024-04-11 DIAGNOSIS — G89.29 CHRONIC RIGHT-SIDED LOW BACK PAIN WITH RIGHT-SIDED SCIATICA: Primary | ICD-10-CM

## 2024-04-11 DIAGNOSIS — M53.86 DECREASED ROM OF INTERVERTEBRAL DISCS OF LUMBAR SPINE: ICD-10-CM

## 2024-04-11 PROCEDURE — 97112 NEUROMUSCULAR REEDUCATION: CPT | Mod: PN

## 2024-04-11 PROCEDURE — 97530 THERAPEUTIC ACTIVITIES: CPT | Mod: PN,CQ

## 2024-04-11 PROCEDURE — 97110 THERAPEUTIC EXERCISES: CPT | Mod: PN,CQ

## 2024-04-11 NOTE — PROGRESS NOTES
Vertigo assessment and treatment:     Time IN: 1105  Time OUT: 1115  Total 10 minutes     Subjective: reports vertigo for about one week without change. Symptoms occurs when turning the head or after working overhead.     Objective: + Bibi hallpike R side , - L ear    Treatment:     Iftikhar participated in neuromuscular re-education activities to improve: balance for 10 minutes. The following activities were included:    Epley maneuver x 1 for R ear    Reassessment of bibi hallpike without return of symptoms    Educated on how to perform this independently at home if symptoms returned via therapist demonstration     Assessment:   Arrives with complaints of vertigo today that started about 1 week ago. Bibi hallpike performed with confirmation of BPPV of R ear. Patient was treated via epley maneuver with abolishemt of symptoms with reassessment of bibi hallpike.     Plan: assess for return of symptoms at next tx session     Ambreen Amador, PT
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
## 2024-04-16 ENCOUNTER — CLINICAL SUPPORT (OUTPATIENT)
Dept: REHABILITATION | Facility: HOSPITAL | Age: 84
End: 2024-04-16
Payer: MEDICARE

## 2024-04-16 DIAGNOSIS — R29.898 DECREASED STRENGTH OF LOWER EXTREMITY: ICD-10-CM

## 2024-04-16 DIAGNOSIS — M54.41 CHRONIC RIGHT-SIDED LOW BACK PAIN WITH RIGHT-SIDED SCIATICA: Primary | ICD-10-CM

## 2024-04-16 DIAGNOSIS — R29.898 IMPAIRED FLEXIBILITY OF LOWER EXTREMITY: ICD-10-CM

## 2024-04-16 DIAGNOSIS — G89.29 CHRONIC RIGHT-SIDED LOW BACK PAIN WITH RIGHT-SIDED SCIATICA: Primary | ICD-10-CM

## 2024-04-16 DIAGNOSIS — M53.86 DECREASED ROM OF INTERVERTEBRAL DISCS OF LUMBAR SPINE: ICD-10-CM

## 2024-04-16 PROCEDURE — 97530 THERAPEUTIC ACTIVITIES: CPT | Mod: PN

## 2024-04-16 PROCEDURE — 97112 NEUROMUSCULAR REEDUCATION: CPT | Mod: PN

## 2024-04-16 NOTE — PROGRESS NOTES
[facility]  Physical Therapy Treatment Note     Name: Iftikhar Lynn  Clinic Number: [id number]    Therapy Diagnosis:   Encounter Diagnoses   Name Primary?    Chronic right-sided low back pain with right-sided sciatica Yes    Decreased strength of lower extremity     Decreased ROM of intervertebral discs of lumbar spine     Impaired flexibility of lower extremity      Physician: Zacarias Siegel MD    Visit Date: 4/16/2024    Physician Orders: PT Eval and Treat   Medical Diagnosis from Referral: Chronic right-sided low back pain with right-sided sciatica   Evaluation Date: 4/2/2024  Authorization Period Expiration: 3/20/2025  Plan of Care Expiration: 5/17/2024  Visit # / Visits author 3 / 20 for HB  (POC 4 / eval)   FOTO Due visit 5  FOTO Due visit 10     Precautions: Standard and history of skin cancer  Insurance: Payor: MEDICARE / Plan: MEDICARE PART A & B / Product Type: Government /     MedX Testing:MedX testing visit 2    PTA Visit #: 0/5     Time In: 1005 AM  Time Out: 1100 AM  Total Billable Time 35 minutes   INSURANCE and OUTCOMES: Fee for Service with FOTO Outcomes 1/3    Precautions: standard    Pattern of pain determined: 3, anticipated that he will respond as 1 PEP ultimately    Subjective     Iftikhar Lynn reports minimal change in symptoms. He is compliant with sitting with lumbar roll. He drove sport car to clinic today with roll in car which felt good. He still has trouble sitting for long periods of time. He does get up every hour. Walking feels good.     Patient reports tolerating previous visit well  Patient reports their pain to be 1-2/10 on a 0-10 scale with 0 being no pain and 10 being the worst pain imaginable.  FACES scale  Pain Location: right leg laterally to foot     Occupation: Retired but does some part time work from home for the [employer]  Prior Level of Function: Independent  Current Level of Function: Decreased  ability to perform ADL and limited tolerance to standing and walking.     Pain:  Current 1-2/10  Location: right lumbar and intermittently into the right lower extremity       Pain Pattern: 3 PEP         Patients goals: Decrease pain and return to performing exercise at his gym    Objective      NT    Treatment     Iftikhar received the treatments listed below:        Physical Therapy technician assisted with treatment under direct supervision of treating therapist. Patient received 1:1 treatments for 35 minutes with Physical Therapist.     Iftikhar participated in neuromuscular re-education activities to improve balance, coordination, proprioception, motor control and/or posture for 25 minutes. The following activities were included:    MEDX:          4/16/2024    10:04 AM   [facility]   Visit Number 5   VAS Pain Rating 2   Lumbar Weight 46 lbs   Repetitions 20   Rating of Perceived Exertion 2   Ice - Z Lie (in min.) 0     Bridging 20 repetitions green hip abduction theraband ( onset of B foot tingling)-  Ceased following prone press ups   Sidelying open books 10 repetitions each side  + prone alternating arm and leg 2 x 5 repetitions each side   + bird/dog 2 x 5 repetitions each side     NP:  Sidelying external rotation 2 x 10 reps Blue Theraband (tingling in to bilateral feet)    Iftikhar participated in therapeutic exercises to develop strength, endurance, ROM, flexibility, posture, and core stabilization for 20 minutes including:    Treadmill x 4 minutes, @ 2.5 mph  Machines done today: torso, chest press, rows, hip Abduction,hip add  Peripheral muscle strengthening which included one set of 15-20 repetitions at a slow and controlled 10-13 second per rep pace focused on strengthening supporting musculature in order to improve body mechanics and functional mobility. Patient and therapist focused on proper form during treatment to ensure optimal strengthening of each targeted muscle group.  Machines  utilized included:  To be added next visit:Torso rotation, Chest Press, Rowing, and Leg Press    Iftikhar participated in dynamic functional therapeutic activities to improve functional performance and simulate household and community activities for 10 minutes. The following activities were included:    Prone press ups 3 x 10 repetitions ( throughout tx session)     Wall shift/ closing down R side ( R SG)    Decrease better with R SG in standing, improvement in R sided low back pain and range of motion     Iftikhar received manual therapy techniques for 0  minutes. The following activities were included:    Pt given cold pack for 5 minutes to low back  in z-lie .    Patient Education and Home Exercises     Home exercises include:  See wrap up  Cardio program (V5): -  Lifting education (V11): -  Posture/Lumbar roll: recommended  Fridge Magnet Discharge handout (date given): -  Equipment at home/gym membership: yes    Education provided:   - PT role and Plan of Care   - Home exercise program     Written Home Exercises Provided: yes.  Prior exercises were discharged  Exercises were reviewed and Iftikhar was able to demonstrate them prior to the end of the session.  Iftikhar demonstrated good  understanding of the education provided.     See EMR under Patient Instructions for exercises provided 4/5/2024.    Assessment     Patient arrives with minimal pain. He continues to get R sided low back pan with intermittent sciatica. Arrives with R sided sciatica into R lateral thigh with abolishment following walking on treadmill. He has been compliant with HEP but continues to have these symptoms. He continues to demonstrate improvement in symptoms during tx sessions following repeated extensions. Added wall shifts today due to continuation of R sided low back pain following repeated prone press ups today with improvement in range of motion and symptoms. Medx increased by 10% with continuation of 2/10 RPE. He is progressing well.  Added wall shifts to HEP to address continuation of R sided low back pain.     Pt prognosis is Good.     Pt will benefit from skilled outpatient Physical Therapy to address the deficits stated above and in the chart below, to provide pt/family education, and to maximize pt's level of independence. Based on the above history and physical examination an active physical therapy program is recommended    Patient is making good progress towards established goals.  Pt will continue to benefit from skilled outpatient physical therapy to address the deficits stated in the impairment chart, provide pt/family education and to maximize pt's level of independence in the home and community environment.     Anticipated Barriers for therapy: chronicity of symptoms   Pt's spiritual, cultural and educational needs considered and pt agreeable to plan of care and goals as stated below:     Goals:   Updated to reflect [facility] program protocol 04-  GOALS: Pt is in agreement with the following goals.    Short term goals:  6 weeks or 10 visits   - Pt will demonstrate increased lumbar MedX ROM by at least 3 degrees from the initial ROM value with improvements noted in functional ROM and ability to perform ADLs. Appropriate and Ongoing  - Pt will demonstrate increased MedX average isometric strength value by 25% from initial test resulting in improved ability to perform bending, lifting, and carrying activities safely, confidently. Appropriate and Ongoing  - Pt will report a reduction in worst pain score by 1-2 points for improved tolerance for sitting at his desk. Appropriate and Ongoing  - Pt able to perform HEP correctly with minimal cueing or supervision from therapist to encourage independent management of symptoms. Appropriate and Ongoing    Long term goals: 10 weeks or 20 visits   - Pt will demonstrate increased lumbar MedX ROM by at least 6 degrees from initial ROM value, resulting in improved ability to perform  functional forward bending while standing and sitting. Appropriate and Ongoing  - Pt will demonstrate increased MedX average isometric strength value by 40% from initial test resulting in improved ability to perform bending, lifting, and carrying activities safely and confidently. Appropriate and Ongoing  - Pt to demonstrate ability to independently control and reduce their pain through posture positioning and mechanical movements throughout a typical day. Appropriate and Ongoing  - Pt will demonstrate reduced pain and improved functional outcomes as reported on the FOTO by reaching an intake score of >/= 70% functional ability in order to demonstrate subjective improvement in patient's condition. . Appropriate and Ongoing  - Pt will demonstrate independence with the HEP at discharge. Appropriate and Ongoing  - Patient to report improved quality of life evidenced by capacity to complete IADL's without increased symptoms. (patient goal) Appropriate and Ongoing    Plan     Continue with established Plan of Care towards established PT goals.     Therapist: Ambreen Amador, PT  4/16/2024

## 2024-04-18 ENCOUNTER — CLINICAL SUPPORT (OUTPATIENT)
Dept: REHABILITATION | Facility: HOSPITAL | Age: 84
End: 2024-04-18
Payer: MEDICARE

## 2024-04-18 DIAGNOSIS — M54.41 CHRONIC RIGHT-SIDED LOW BACK PAIN WITH RIGHT-SIDED SCIATICA: Primary | ICD-10-CM

## 2024-04-18 DIAGNOSIS — R29.898 IMPAIRED FLEXIBILITY OF LOWER EXTREMITY: ICD-10-CM

## 2024-04-18 DIAGNOSIS — R29.898 DECREASED STRENGTH OF LOWER EXTREMITY: ICD-10-CM

## 2024-04-18 DIAGNOSIS — M53.86 DECREASED ROM OF INTERVERTEBRAL DISCS OF LUMBAR SPINE: ICD-10-CM

## 2024-04-18 DIAGNOSIS — G89.29 CHRONIC RIGHT-SIDED LOW BACK PAIN WITH RIGHT-SIDED SCIATICA: Primary | ICD-10-CM

## 2024-04-18 PROCEDURE — 97110 THERAPEUTIC EXERCISES: CPT | Mod: PN

## 2024-04-18 PROCEDURE — 97530 THERAPEUTIC ACTIVITIES: CPT | Mod: PN

## 2024-04-18 PROCEDURE — 97112 NEUROMUSCULAR REEDUCATION: CPT | Mod: PN

## 2024-04-18 NOTE — PROGRESS NOTES
[facility]  Physical Therapy Treatment Note     Name: Iftikhar Lynn  Clinic Number: [id number]    Therapy Diagnosis:   Encounter Diagnoses   Name Primary?    Chronic right-sided low back pain with right-sided sciatica Yes    Decreased strength of lower extremity     Decreased ROM of intervertebral discs of lumbar spine     Impaired flexibility of lower extremity      Physician: Zacarias Siegel MD    Visit Date: 4/18/2024    Physician Orders: PT Eval and Treat   Medical Diagnosis from Referral: Chronic right-sided low back pain with right-sided sciatica   Evaluation Date: 4/2/2024  Authorization Period Expiration: 3/20/2025  Plan of Care Expiration: 5/17/2024  Visit # / Visits author 4 / 20 for HB  (POC 5 / eval)   FOTO Due visit 5  FOTO Due visit 10     Precautions: Standard and history of skin cancer  Insurance: Payor: MEDICARE / Plan: MEDICARE PART A & B / Product Type: Government /     MedX Testing:MedX testing visit 2    PTA Visit #: 0/5     Time In: 903 AM  Time Out: 1005 AM  Total Billable Time 60 minutes   INSURANCE and OUTCOMES: Fee for Service with FOTO Outcomes 1/3    Precautions: standard    Pattern of pain determined: 1 PEP / 4 PEP    Subjective     Iftikhar Lynn reports exercises help and sitting continues to bother his sciatica. However he continues to get it. Sitting with lumbar roll helps.     Patient reports tolerating previous visit well  Patient reports their pain to be 1-2/10 on a 0-10 scale with 0 being no pain and 10 being the worst pain imaginable. FACES scale  Pain Location: right leg laterally to foot     Occupation: Retired but does some part time work from home for the [employer]  Prior Level of Function: Independent  Current Level of Function: Decreased ability to perform ADL and limited tolerance to standing and walking.     Pain:  Current 1-2/10  Location: right lumbar and intermittently into the right lower  "extremity       Pain Pattern: 3 PEP  Patients goals: Decrease pain and return to performing exercise at his gym    Objective      NT    Treatment     Iftikhar received the treatments listed below:      Iftikhar participated in neuromuscular re-education activities to improve balance, coordination, proprioception, motor control and/or posture for 25 minutes. The following activities were included:    MEDX:         4/18/2024    10:01 AM   [facility]   Visit Number 6   VAS Pain Rating 0   Treadmill Time (in min.) 3 min   Speed 3 mph   Lumbar Weight 50 lbs   Repetitions 20   Rating of Perceived Exertion 2   Ice - Z Lie (in min.) 0     Bridging 20 repetitions green hip abduction theraband  Sidelying external rotation 2 x 10 reps green theraband    Continues to report onset of B toe tingling , ceases after patient ceases exercise  + bird/dog 2 x 5 repetitions each side   + prone hip extension knee flexion 3 x 8 repetitions each side     NP:  + prone alternating arm and leg 2 x 5 repetitions each side   Sidelying open books 10 repetitions each side    Iftikhar participated in therapeutic exercises to develop strength, endurance, ROM, flexibility, posture, and core stabilization for 20 minutes including:    Treadmill x 3 minutes, @ 2 mph  Piriformis stretch to assess onset of B toe tingling or reduction in symptoms    3 x 30 second each side "feels good", some relief in toe tingling but onset of midline low back pain when performing RLE only      Peripheral muscle strengthening which included one set of 15-20 repetitions at a slow and controlled 10-13 second per rep pace focused on strengthening supporting musculature in order to improve body mechanics and functional mobility. Patient and therapist focused on proper form during treatment to ensure optimal strengthening of each targeted muscle group.  Machines utilized included:  To be added next visit:Torso rotation, Chest Press, Rowing, and Leg Press    Iftikhar " participated in dynamic functional therapeutic activities to improve functional performance and simulate household and community activities for 15 minutes. The following activities were included:    Prone press ups 2 x 10 repetitions  Prone press up 1 x 10 therapist overpressure   Mobilization overpressure Physical Therapist x 3 minutes pressure on pressure off   + side step red theraband 3 x 10 ft each way     NP:   Wall shift/ closing down R side ( R SG)    Decrease better with R SG in standing, improvement in R sided low back pain and range of motion     Iftikhar received manual therapy techniques for 0  minutes. The following activities were included:    Pt given cold pack for 5 minutes to low back  in z-lie .    Patient Education and Home Exercises     Home exercises include:  See wrap up  Cardio program (V5): -  Lifting education (V11): -  Posture/Lumbar roll: recommended  [facility]charge handout (date given): -  Equipment at home/gym membership: yes    Education provided:   - PT role and Plan of Care   - Home exercise program     Written Home Exercises Provided: yes.  Prior exercises were discharged  Exercises were reviewed and Iftikhar was able to demonstrate them prior to the end of the session.  Iftikhar demonstrated good  understanding of the education provided.     See EMR under Patient Instructions for exercises provided 4/5/2024.    Assessment     Continues to get R sided sciatic into the R thigh but reports improvement with his exercises he has been doing. He continues to get pain with sitting but it is improved when using lumbar roll. He continues to get onset of toe tingling with sidelying ER clams and also reported this onset with hip abduction machine. He also reported some burning in the L4 dermatome with hamstring curl machine. This onset may be due to the position of his back on these machines and with the sidelying clams. He continues to respond well to extension and use of lumbar roll.  Medx continues to be progressed by 10% given continuation of 2/10 RPE.     Pt prognosis is Good.     Pt will benefit from skilled outpatient Physical Therapy to address the deficits stated above and in the chart below, to provide pt/family education, and to maximize pt's level of independence. Based on the above history and physical examination an active physical therapy program is recommended    Patient is making good progress towards established goals.  Pt will continue to benefit from skilled outpatient physical therapy to address the deficits stated in the impairment chart, provide pt/family education and to maximize pt's level of independence in the home and community environment.     Anticipated Barriers for therapy: chronicity of symptoms   Pt's spiritual, cultural and educational needs considered and pt agreeable to plan of care and goals as stated below:     Goals:   Updated to reflect [facility] program protocol 04-  GOALS: Pt is in agreement with the following goals.    Short term goals:  6 weeks or 10 visits   - Pt will demonstrate increased lumbar MedX ROM by at least 3 degrees from the initial ROM value with improvements noted in functional ROM and ability to perform ADLs. Appropriate and Ongoing  - Pt will demonstrate increased MedX average isometric strength value by 25% from initial test resulting in improved ability to perform bending, lifting, and carrying activities safely, confidently. Appropriate and Ongoing  - Pt will report a reduction in worst pain score by 1-2 points for improved tolerance for sitting at his desk. Appropriate and Ongoing  - Pt able to perform HEP correctly with minimal cueing or supervision from therapist to encourage independent management of symptoms. Appropriate and Ongoing    Long term goals: 10 weeks or 20 visits   - Pt will demonstrate increased lumbar MedX ROM by at least 6 degrees from initial ROM value, resulting in improved ability to perform functional  forward bending while standing and sitting. Appropriate and Ongoing  - Pt will demonstrate increased MedX average isometric strength value by 40% from initial test resulting in improved ability to perform bending, lifting, and carrying activities safely and confidently. Appropriate and Ongoing  - Pt to demonstrate ability to independently control and reduce their pain through posture positioning and mechanical movements throughout a typical day. Appropriate and Ongoing  - Pt will demonstrate reduced pain and improved functional outcomes as reported on the FOTO by reaching an intake score of >/= 70% functional ability in order to demonstrate subjective improvement in patient's condition. . Appropriate and Ongoing  - Pt will demonstrate independence with the HEP at discharge. Appropriate and Ongoing  - Patient to report improved quality of life evidenced by capacity to complete IADL's without increased symptoms. (patient goal) Appropriate and Ongoing    Plan     Continue with established Plan of Care towards established PT goals.     Therapist: Ambreen Amador, PT  4/18/2024

## 2024-04-23 ENCOUNTER — CLINICAL SUPPORT (OUTPATIENT)
Dept: REHABILITATION | Facility: HOSPITAL | Age: 84
End: 2024-04-23
Payer: MEDICARE

## 2024-04-23 DIAGNOSIS — M53.86 DECREASED ROM OF INTERVERTEBRAL DISCS OF LUMBAR SPINE: ICD-10-CM

## 2024-04-23 DIAGNOSIS — G89.29 CHRONIC RIGHT-SIDED LOW BACK PAIN WITH RIGHT-SIDED SCIATICA: Primary | ICD-10-CM

## 2024-04-23 DIAGNOSIS — M54.41 CHRONIC RIGHT-SIDED LOW BACK PAIN WITH RIGHT-SIDED SCIATICA: Primary | ICD-10-CM

## 2024-04-23 DIAGNOSIS — R29.898 DECREASED STRENGTH OF LOWER EXTREMITY: ICD-10-CM

## 2024-04-23 DIAGNOSIS — R29.898 IMPAIRED FLEXIBILITY OF LOWER EXTREMITY: ICD-10-CM

## 2024-04-23 PROCEDURE — 97530 THERAPEUTIC ACTIVITIES: CPT | Mod: PN

## 2024-04-23 PROCEDURE — 97112 NEUROMUSCULAR REEDUCATION: CPT | Mod: PN

## 2024-04-23 NOTE — PROGRESS NOTES
[facility] OUTPATIENT THERAPY AND WELLNESS - [facility]  Physical Therapy Treatment Note     Name: Iftikhar Lynn  Clinic Number: [id number]    Therapy Diagnosis:   Encounter Diagnoses   Name Primary?    Chronic right-sided low back pain with right-sided sciatica Yes    Decreased strength of lower extremity     Decreased ROM of intervertebral discs of lumbar spine     Impaired flexibility of lower extremity      Physician: Zacarias Siegel MD    Visit Date: 4/23/2024    Physician Orders: PT Eval and Treat   Medical Diagnosis from Referral: Chronic right-sided low back pain with right-sided sciatica   Evaluation Date: 4/2/2024  Authorization Period Expiration: 3/20/2025  Plan of Care Expiration: 5/17/2024  Visit # / Visits author 5 / 20 for HB  (POC 6 / eval)   FOTO Due visit 5  FOTO Due visit 10     Precautions: Standard and history of skin cancer  Insurance: Payor: MEDICARE / Plan: MEDICARE PART A & B / Product Type: Government /     MedX Testing:MedX testing visit 2    PTA Visit #: 0/5     Time In: 1112  Time Out: 1200   Total Billable Time 48 minutes   INSURANCE and OUTCOMES: Fee for Service with FOTO Outcomes 1/3    Precautions: standard    Pattern of pain determined: 1 PEP / 4 PEP    Subjective     Iftikhar Lynn reports continuation of pain with siting and driving.     Patient reports tolerating previous visit well  Patient reports their pain to be 1-2/10 on a 0-10 scale with 0 being no pain and 10 being the worst pain imaginable. FACES scale  Pain Location: right leg laterally to foot     Occupation: Retired but does some part time work from home for the [employer]  Prior Level of Function: Independent  Current Level of Function: Decreased ability to perform ADL and limited tolerance to standing and walking.     Pain:  Current 1-2/10  Location: right lumbar and intermittently into the right lower extremity       Pain Pattern: 3 PEP  Patients goals: Decrease pain and return to  performing exercise at his gym    Objective      NT    Treatment     Iftikhar received the treatments listed below:      Iftikhar participated in neuromuscular re-education activities to improve balance, coordination, proprioception, motor control and/or posture for 20 minutes. The following activities were included:    MEDX:         4/23/2024    12:02 PM   [facility]   Visit Number 7   VAS Pain Rating 0   Lumbar Weight 55 lbs   Repetitions 20   Rating of Perceived Exertion 3     Bridging 20 repetitions green hip abduction theraband  + prone hip extension knee flexion 3 x 8 repetitions each side     NP:  + bird/dog 2 x 5 repetitions each side   + prone alternating arm and leg 2 x 5 repetitions each side   Sidelying open books 10 repetitions each side    Iftikhar participated in therapeutic exercises to develop strength, endurance, ROM, flexibility, posture, and core stabilization for 0 minutes including:     Peripheral muscle strengthening which included one set of 15-20 repetitions at a slow and controlled 10-13 second per rep pace focused on strengthening supporting musculature in order to improve body mechanics and functional mobility. Patient and therapist focused on proper form during treatment to ensure optimal strengthening of each targeted muscle group.  Machines utilized included:  To be added next visit:Torso rotation, Chest Press, Rowing, and Leg Press    Iftikhar participated in dynamic functional therapeutic activities to improve functional performance and simulate household and community activities for 28 minutes. The following activities were included:    Prone press ups 1 x 10 repetitions  Prone press up 1 x 5 therapist overpressure   Prone press up with sag x 8 repetitions   Mobilization overpressure Physical Therapist x 3 minutes pressure on pressure off   Standing repeated movement R SG on wall 10 repetitions   Stand to sit 2 x 10     NP:   + side step red theraband 3 x 10 ft each way   Wall  shift/ closing down R side ( R SG)    Decrease better with R SG in standing, improvement in R sided low back pain and range of motion     Iftikhar received manual therapy techniques for 0  minutes. The following activities were included:    Pt given cold pack for 5 minutes to low back  in z-lie .    Patient Education and Home Exercises     Home exercises include:    Prone or standing extensions, side glides on wall     Cardio program (V5): -  Lifting education (V11): -  Posture/Lumbar roll: recommended  [employer] Magnet Discharge handout (date given): -  Equipment at home/gym membership: yes    Education provided:   - PT role and Plan of Care   - Home exercise program     Written Home Exercises Provided: yes.  Prior exercises were discharged  Exercises were reviewed and Iftikhar was able to demonstrate them prior to the end of the session.  Iftikhar demonstrated good  understanding of the education provided.     See EMR under Patient Instructions for exercises provided 4/5/2024.    Assessment     Arrives with continuation of sciatica with sitting/driving. He continues to do well with repeated extensions without onset of symptoms. He did report tingling in the toes with bridges. He also had some R side sciatica with L thoracic rotation/ open books. Medx increased by 10% with continuation of 3/10 RPE. He is doing well but minimal progress as far as his symptoms go.     Pt prognosis is Good.     Pt will benefit from skilled outpatient Physical Therapy to address the deficits stated above and in the chart below, to provide pt/family education, and to maximize pt's level of independence. Based on the above history and physical examination an active physical therapy program is recommended    Patient is making good progress towards established goals.  Pt will continue to benefit from skilled outpatient physical therapy to address the deficits stated in the impairment chart, provide pt/family education and to maximize pt's level  of independence in the home and community environment.     Anticipated Barriers for therapy: chronicity of symptoms   Pt's spiritual, cultural and educational needs considered and pt agreeable to plan of care and goals as stated below:     Goals:   Updated to reflect [facility] program protocol 04-  GOALS: Pt is in agreement with the following goals.    Short term goals:  6 weeks or 10 visits   - Pt will demonstrate increased lumbar MedX ROM by at least 3 degrees from the initial ROM value with improvements noted in functional ROM and ability to perform ADLs. Appropriate and Ongoing  - Pt will demonstrate increased MedX average isometric strength value by 25% from initial test resulting in improved ability to perform bending, lifting, and carrying activities safely, confidently. Appropriate and Ongoing  - Pt will report a reduction in worst pain score by 1-2 points for improved tolerance for sitting at his desk. Appropriate and Ongoing  - Pt able to perform HEP correctly with minimal cueing or supervision from therapist to encourage independent management of symptoms. Appropriate and Ongoing    Long term goals: 10 weeks or 20 visits   - Pt will demonstrate increased lumbar MedX ROM by at least 6 degrees from initial ROM value, resulting in improved ability to perform functional forward bending while standing and sitting. Appropriate and Ongoing  - Pt will demonstrate increased MedX average isometric strength value by 40% from initial test resulting in improved ability to perform bending, lifting, and carrying activities safely and confidently. Appropriate and Ongoing  - Pt to demonstrate ability to independently control and reduce their pain through posture positioning and mechanical movements throughout a typical day. Appropriate and Ongoing  - Pt will demonstrate reduced pain and improved functional outcomes as reported on the FOTO by reaching an intake score of >/= 70% functional ability in order to  demonstrate subjective improvement in patient's condition. . Appropriate and Ongoing  - Pt will demonstrate independence with the HEP at discharge. Appropriate and Ongoing  - Patient to report improved quality of life evidenced by capacity to complete IADL's without increased symptoms. (patient goal) Appropriate and Ongoing    Plan     Continue with established Plan of Care towards established PT goals.     Therapist: Ambreen Amador, PT  4/23/2024

## 2024-04-25 ENCOUNTER — CLINICAL SUPPORT (OUTPATIENT)
Dept: REHABILITATION | Facility: HOSPITAL | Age: 84
End: 2024-04-25
Payer: MEDICARE

## 2024-04-25 DIAGNOSIS — R29.898 DECREASED STRENGTH OF LOWER EXTREMITY: ICD-10-CM

## 2024-04-25 DIAGNOSIS — M54.41 CHRONIC RIGHT-SIDED LOW BACK PAIN WITH RIGHT-SIDED SCIATICA: Primary | ICD-10-CM

## 2024-04-25 DIAGNOSIS — R29.898 IMPAIRED FLEXIBILITY OF LOWER EXTREMITY: ICD-10-CM

## 2024-04-25 DIAGNOSIS — G89.29 CHRONIC RIGHT-SIDED LOW BACK PAIN WITH RIGHT-SIDED SCIATICA: Primary | ICD-10-CM

## 2024-04-25 DIAGNOSIS — M53.86 DECREASED ROM OF INTERVERTEBRAL DISCS OF LUMBAR SPINE: ICD-10-CM

## 2024-04-25 PROCEDURE — 97530 THERAPEUTIC ACTIVITIES: CPT | Mod: PN,CQ

## 2024-04-25 PROCEDURE — 97112 NEUROMUSCULAR REEDUCATION: CPT | Mod: PN,CQ

## 2024-04-25 PROCEDURE — 97110 THERAPEUTIC EXERCISES: CPT | Mod: PN,CQ

## 2024-04-25 PROCEDURE — 97140 MANUAL THERAPY 1/> REGIONS: CPT | Mod: PN,CQ

## 2024-04-25 NOTE — PROGRESS NOTES
"[facility]  Physical Therapy Treatment Note     Name: Iftikhar Lynn  Clinic Number: [id number]    Therapy Diagnosis:   Encounter Diagnoses   Name Primary?    Chronic right-sided low back pain with right-sided sciatica Yes    Decreased strength of lower extremity     Decreased ROM of intervertebral discs of lumbar spine     Impaired flexibility of lower extremity        Physician: Zacarias Siegel MD    Visit Date: 4/25/2024    Physician Orders: PT Eval and Treat   Medical Diagnosis from Referral: Chronic right-sided low back pain with right-sided sciatica   Evaluation Date: 4/2/2024  Authorization Period Expiration: 3/20/2025  Plan of Care Expiration: 5/17/2024  Visit # / Visits author 8 / 20 for HB  (POC 7 / eval)   FOTO Due visit 5  FOTO Due visit 10     Precautions: Standard and history of skin cancer  Insurance: Payor: MEDICARE / Plan: MEDICARE PART A & B / Product Type: Government /     MedX Testing:MedX testing visit 2    PTA Visit #: 1/5     Time In: 0855  Time Out: 1058  Total Billable Time 63 minutes   INSURANCE and OUTCOMES: Fee for Service with FOTO Outcomes 1/3    Precautions: standard    Pattern of pain determined: 1 PEP / 4 PEP    Subjective     Iftikhar Lynn he is having Right sided pain today.  Pain comes and goes.    The only thing that "fixes it" is lying on a heating pad.      Patient reports tolerating previous visit well  Patient reports their pain to be 4-5/10 on a 0-10 scale with 0 being no pain and 10 being the worst pain imaginable. FACES scale  Pain Location: right leg laterally to foot     Occupation: Retired but does some part time work from home for the [employer]  Prior Level of Function: Independent  Current Level of Function: Decreased ability to perform ADL and limited tolerance to standing and walking.     Pain:  Current 1-2/10  Location: right lumbar and intermittently into the right lower extremity       Pain " Pattern: 3 PEP  Patients goals: Decrease pain and return to performing exercise at his gym    Objective      NT    Treatment     Iftikhar received the treatments listed below:      Iftikhar participated in neuromuscular re-education activities to improve balance, coordination, proprioception, motor control and/or posture for 23 minutes. The following activities were included:    MEDX:       4/25/2024    10:02 AM   [facility]   Visit Number 8   VAS Pain Rating 5   Treadmill Time (in min.) 4 min   Speed 3 mph   Extension in Lying 30   Lumbar Weight 55 lbs   Repetitions 20   Rating of Perceived Exertion 3        Bridging x 20 repetitions green hip abduction theraband  prone hip extension knee flexion 3 x 8 repetitions each side   Sidelying open books 10 repetitions each side    Not Performed:  + bird/dog 2 x 5 repetitions each side   + prone alternating arm and leg 2 x 5 repetitions each side       Iftikhar participated in therapeutic exercises to develop strength, endurance, ROM, flexibility, posture, and core stabilization for 12 minutes including:     Treadmill x 4 minutes, level 3    Peripheral muscle strengthening which included one set of 15-20 repetitions at a slow and controlled 10-13 second per rep pace focused on strengthening supporting musculature in order to improve body mechanics and functional mobility. Patient and therapist focused on proper form during treatment to ensure optimal strengthening of each targeted muscle group.  Machines utilized included:  To be added next visit:Torso rotation, Chest Press, Rowing, and Leg Press    Iftikhar participated in dynamic functional therapeutic activities to improve functional performance and simulate household and community activities for 20 minutes. The following activities were included:    Prone press ups 1 x 10 repetitions  Prone press up 1 x 5 therapist overpressure   Prone press up with sag x 10 repetitions   Mobilization overpressure Physical  Therapist x 3 minutes pressure on pressure off   Standing repeated movement Right  Side Glide on wall x 10 repetitions (feels good)  Stand to sit 2 x 10     Not Performed:   side step red theraband 3 x 10 ft each way   Wall shift/ closing down R side ( R SG)    Decrease better with R SG in standing, improvement in R sided low back pain and range of motion     Iftikhar received manual therapy techniques for 8  minutes. The following activities were included:    -Soft Tissue Mobs, PA glides (felt good)    Pt given cold pack for 5 minutes to low back  in z-lie .    Patient Education and Home Exercises     Home exercises include:    Prone or standing extensions, side glides on wall     Cardio program (V5): -  Lifting education (V11): -  Posture/Lumbar roll: recommended  Fridge Magnet Discharge handout (date given): -  Equipment at home/gym membership: yes    Education provided:   - PT role and Plan of Care   - Home exercise program     Written Home Exercises Provided: yes.  Prior exercises were discharged  Exercises were reviewed and Iftikhar was able to demonstrate them prior to the end of the session.  Iftikhar demonstrated good  understanding of the education provided.     See EMR under Patient Instructions for exercises provided 4/5/2024.    Assessment     Iftikhar presents to PT reporting pain to his Right side.  Pain is over SI area.  His pain comes and goes and is not consistent.  He reported that Soft Tissue Mobs and PA glides along lower thoracic and lumbar area felt good.  He had no adverse effects with the exercises.        Pt prognosis is Good.     Pt will benefit from skilled outpatient Physical Therapy to address the deficits stated above and in the chart below, to provide pt/family education, and to maximize pt's level of independence. Based on the above history and physical examination an active physical therapy program is recommended    Patient is making good progress towards established goals.  Pt will  continue to benefit from skilled outpatient physical therapy to address the deficits stated in the impairment chart, provide pt/family education and to maximize pt's level of independence in the home and community environment.     Anticipated Barriers for therapy: chronicity of symptoms   Pt's spiritual, cultural and educational needs considered and pt agreeable to plan of care and goals as stated below:     Goals:   Updated to reflect [facility] program protocol 04-  GOALS: Pt is in agreement with the following goals.    Short term goals:  6 weeks or 10 visits   - Pt will demonstrate increased lumbar MedX ROM by at least 3 degrees from the initial ROM value with improvements noted in functional ROM and ability to perform ADLs. Appropriate and Ongoing  - Pt will demonstrate increased MedX average isometric strength value by 25% from initial test resulting in improved ability to perform bending, lifting, and carrying activities safely, confidently. Appropriate and Ongoing  - Pt will report a reduction in worst pain score by 1-2 points for improved tolerance for sitting at his desk. Appropriate and Ongoing  - Pt able to perform HEP correctly with minimal cueing or supervision from therapist to encourage independent management of symptoms. Appropriate and Ongoing    Long term goals: 10 weeks or 20 visits   - Pt will demonstrate increased lumbar MedX ROM by at least 6 degrees from initial ROM value, resulting in improved ability to perform functional forward bending while standing and sitting. Appropriate and Ongoing  - Pt will demonstrate increased MedX average isometric strength value by 40% from initial test resulting in improved ability to perform bending, lifting, and carrying activities safely and confidently. Appropriate and Ongoing  - Pt to demonstrate ability to independently control and reduce their pain through posture positioning and mechanical movements throughout a typical day. Appropriate and  Ongoing  - Pt will demonstrate reduced pain and improved functional outcomes as reported on the FOTO by reaching an intake score of >/= 70% functional ability in order to demonstrate subjective improvement in patient's condition. . Appropriate and Ongoing  - Pt will demonstrate independence with the HEP at discharge. Appropriate and Ongoing  - Patient to report improved quality of life evidenced by capacity to complete IADL's without increased symptoms. (patient goal) Appropriate and Ongoing    Plan     Continue with established Plan of Care towards established PT goals.     Poonam Escoto, PTA  4/25/2024

## 2024-04-30 ENCOUNTER — DOCUMENTATION ONLY (OUTPATIENT)
Dept: REHABILITATION | Facility: HOSPITAL | Age: 84
End: 2024-04-30
Payer: MEDICARE

## 2024-04-30 ENCOUNTER — CLINICAL SUPPORT (OUTPATIENT)
Dept: REHABILITATION | Facility: HOSPITAL | Age: 84
End: 2024-04-30
Payer: MEDICARE

## 2024-04-30 DIAGNOSIS — G89.29 CHRONIC RIGHT-SIDED LOW BACK PAIN WITH RIGHT-SIDED SCIATICA: Primary | ICD-10-CM

## 2024-04-30 DIAGNOSIS — R29.898 DECREASED STRENGTH OF LOWER EXTREMITY: ICD-10-CM

## 2024-04-30 DIAGNOSIS — R29.898 IMPAIRED FLEXIBILITY OF LOWER EXTREMITY: ICD-10-CM

## 2024-04-30 DIAGNOSIS — M53.86 DECREASED ROM OF INTERVERTEBRAL DISCS OF LUMBAR SPINE: ICD-10-CM

## 2024-04-30 DIAGNOSIS — M54.41 CHRONIC RIGHT-SIDED LOW BACK PAIN WITH RIGHT-SIDED SCIATICA: Primary | ICD-10-CM

## 2024-04-30 PROCEDURE — 97112 NEUROMUSCULAR REEDUCATION: CPT | Mod: PN,CQ

## 2024-04-30 PROCEDURE — 97110 THERAPEUTIC EXERCISES: CPT | Mod: PN,CQ

## 2024-04-30 PROCEDURE — 97530 THERAPEUTIC ACTIVITIES: CPT | Mod: PN,CQ

## 2024-04-30 NOTE — PROGRESS NOTES
[facility]  Physical Therapy Treatment Note     Name: Iftikhar Lynn  Clinic Number: [id number]    Therapy Diagnosis:   Encounter Diagnoses   Name Primary?    Chronic right-sided low back pain with right-sided sciatica Yes    Decreased strength of lower extremity     Decreased ROM of intervertebral discs of lumbar spine     Impaired flexibility of lower extremity          Physician: Zacarias Siegel MD    Visit Date: 4/30/2024    Physician Orders: PT Eval and Treat   Medical Diagnosis from Referral: Chronic right-sided low back pain with right-sided sciatica   Evaluation Date: 4/2/2024  Authorization Period Expiration: 3/20/2025  Plan of Care Expiration: 5/17/2024  Visit # / Visits author 8 / 20 for HB  (POC 7 / eval)   FOTO Due visit 5  FOTO Due visit 10     Precautions: Standard and history of skin cancer  Insurance: Payor: MEDICARE / Plan: MEDICARE PART A & B / Product Type: Government /     MedX Testing:MedX testing visit 2    PTA Visit #: 1/5     Time In: 0900  Time Out: 0955  Total Billable Time  55 minutes   INSURANCE and OUTCOMES: Fee for Service with FOTO Outcomes 1/3    Precautions: standard    Pattern of pain determined: 1 PEP / 4 PEP    Subjective     Iftikhar Lynn he is still having problems with his sciatica.  Pt stated his pain fluctuates and sitting is his worse position.  He cannot sit a long time.  He has to get up and move around.  Pt also reporting problems with his vertigo today.      Patient reports tolerating previous visit well  Patient reports their pain to be 2/10 on a 0-10 scale with 0 being no pain and 10 being the worst pain imaginable. FACES scale  Pain Location: right leg laterally to foot     Occupation: Retired but does some part time work from home for the [employer]  Prior Level of Function: Independent  Current Level of Function: Decreased ability to perform ADL and limited tolerance to standing and walking.      Pain:  Current  2/10  Location: right lumbar and intermittently into the right lower extremity       Pain Pattern: 3 PEP  Patients goals: Decrease pain and return to performing exercise at his gym    Objective      NT    Treatment     Iftikhar received the treatments listed below:        Iftikhar participated in neuromuscular re-education activities to improve balance, coordination, proprioception, motor control and/or posture for 30 minutes. This included participation on the DutyCalculator Machine.      MEDX:            4/30/2024    10:21 AM   [facility]   Visit Number 9   VAS Pain Rating 2   Treadmill Time (in min.) 5 min   Speed 3 mph   Extension in Lying 30   Lumbar Weight 55 lbs   Repetitions 20   Rating of Perceived Exertion 3        Bridging x 20 repetitions green hip abduction theraband  ++Sidelying external rotation Green Theraband x 20 reps each  prone hip extension knee flexion 3 x 8 repetitions each side   Sidelying open books x 15 repetitions each side    Not Performed:  + bird/dog 2 x 5 repetitions each side   + prone alternating arm and leg 2 x 5 repetitions each side       Iftikhar participated in therapeutic exercises to develop strength, endurance, ROM, flexibility, posture, and core stabilization for 15 minutes including:     Treadmill x 5 minutes, level 3    Peripheral muscle strengthening which included one set of 15-20 repetitions at a slow and controlled 10-13 second per rep pace focused on strengthening supporting musculature in order to improve body mechanics and functional mobility. Patient and therapist focused on proper form during treatment to ensure optimal strengthening of each targeted muscle group.  Machines utilized included:  To be added next visit:Torso rotation, Chest Press, Rowing, and Leg Press    Iftikhar participated in dynamic functional therapeutic activities to improve functional performance and simulate household and community activities for 15 minutes. The  following activities were included:    Prone press ups 1 x 10 repetitions  Prone press up 1 x 5 therapist overpressure   Prone press up with sag x 10 repetitions   Mobilization overpressure Physical Therapist x 3 minutes pressure on pressure off   Standing repeated movement Right  Side Glide on wall x 10 repetitions (feels good)  Stand to sit 2 x 10 reps     Not Performed:   side step red theraband 3 x 10 ft each way   Wall shift/ closing down R side ( R SG)       Iftikhar received manual therapy techniques for 4 minutes. The following activities were included:    -Soft Tissue Mobs, PA glides (felt good)    Pt given cold pack for  minutes to low back  in z-lie .    Patient Education and Home Exercises     Home exercises include:    Prone or standing extensions, side glides on wall     Cardio program (V5): -  Lifting education (V11): -  Posture/Lumbar roll: recommended  Frie Magnet Discharge handout (date given): -  Equipment at home/gym membership: yes    Education provided:   - PT role and Plan of Care   - Home exercise program     Written Home Exercises Provided: yes.  Prior exercises were discharged  Exercises were reviewed and Iftikhar was able to demonstrate them prior to the end of the session.  Iftikhar demonstrated good  understanding of the education provided.     See EMR under Patient Instructions for exercises provided 4/5/2024.    Assessment     Iftikhar presents to PT reporting pain to his Right side but he feels it is getting better.  Pt did report some tingling down his Right lower extremity when doing chest press.  Will monitor next visit and make adjustments as needed next visit. Pt requires lots of cuing during exercises.  Green Theraband given to patient to do home exercises.     Pt prognosis is Good.     Patient is making good progress towards established goals.  Pt will continue to benefit from skilled outpatient physical therapy to address the deficits stated in the impairment chart, provide  pt/family education and to maximize pt's level of independence in the home and community environment.     Anticipated Barriers for therapy: chronicity of symptoms   Pt's spiritual, cultural and educational needs considered and pt agreeable to plan of care and goals as stated below:     Goals:   Updated to reflect [facility] program protocol 04-  GOALS: Pt is in agreement with the following goals.    Short term goals:  6 weeks or 10 visits   - Pt will demonstrate increased lumbar MedX ROM by at least 3 degrees from the initial ROM value with improvements noted in functional ROM and ability to perform ADLs. Appropriate and Ongoing  - Pt will demonstrate increased MedX average isometric strength value by 25% from initial test resulting in improved ability to perform bending, lifting, and carrying activities safely, confidently. Appropriate and Ongoing  - Pt will report a reduction in worst pain score by 1-2 points for improved tolerance for sitting at his desk. Appropriate and Ongoing  - Pt able to perform HEP correctly with minimal cueing or supervision from therapist to encourage independent management of symptoms. Appropriate and Ongoing    Long term goals: 10 weeks or 20 visits   - Pt will demonstrate increased lumbar MedX ROM by at least 6 degrees from initial ROM value, resulting in improved ability to perform functional forward bending while standing and sitting. Appropriate and Ongoing  - Pt will demonstrate increased MedX average isometric strength value by 40% from initial test resulting in improved ability to perform bending, lifting, and carrying activities safely and confidently. Appropriate and Ongoing  - Pt to demonstrate ability to independently control and reduce their pain through posture positioning and mechanical movements throughout a typical day. Appropriate and Ongoing  - Pt will demonstrate reduced pain and improved functional outcomes as reported on the FOTO by reaching an intake score  of >/= 70% functional ability in order to demonstrate subjective improvement in patient's condition. . Appropriate and Ongoing  - Pt will demonstrate independence with the HEP at discharge. Appropriate and Ongoing  - Patient to report improved quality of life evidenced by capacity to complete IADL's without increased symptoms. (patient goal) Appropriate and Ongoing    Plan     Continue with established Plan of Care towards established PT goals.     Poonam Escoto, PTA  4/30/2024

## 2024-05-01 NOTE — PROGRESS NOTES
[facility]  Physical Therapy Treatment Note        Name: Iftikhar Lynn  Clinic Number: [id number]    Therapy Diagnosis:   Encounter Diagnoses   Name Primary?    Chronic right-sided low back pain with right-sided sciatica Yes    Decreased strength of lower extremity     Decreased ROM of intervertebral discs of lumbar spine     Impaired flexibility of lower extremity        Physician: Zacarias Siegel MD    Visit Date: 5/2/2024    Physician Orders: PT Eval and Treat   Medical Diagnosis from Referral: Chronic right-sided low back pain with right-sided sciatica   Evaluation Date: 4/2/2024  Authorization Period Expiration: 3/20/2025  Plan of Care Expiration: 5/17/2024  Visit # / Visits author 8 / 20 for HB  (POC 7 / eval)   FOTO Due visit 5  FOTO Due visit 10     Precautions: Standard and history of skin cancer  Insurance: Payor: MEDICARE / Plan: MEDICARE PART A & B / Product Type: Government /     MedX Testing:MedX testing visit 2    PTA Visit #: 1/5     Time In: 0900  Time Out: 1000  Total Billable Time  60 minutes   INSURANCE and OUTCOMES: Fee for Service with FOTO Outcomes 1/3    Precautions: standard    Pattern of pain determined: 1 PEP / 4 PEP    Subjective     Iftikhar Lynn reports he is feeling better but still having some sciatic pain.  He has been able to increase his walking distance.      Patient reports tolerating previous visit well   Occupation: Retired but does some part time work from home for the [employer]  Prior Level of Function: Independent  Current Level of Function: Decreased ability to perform ADL and limited tolerance to standing and walking.     Pain:  Current  2/10  Location: right lumbar and intermittently into the right lower extremity       Pain Pattern: 3 PEP  Patients goals: Decrease pain and return to performing exercise at his gym    Objective      NT    Treatment     Iftikhar received the treatments listed  below:        Iftikhar participated in neuromuscular re-education activities to improve balance, coordination, proprioception, motor control and/or posture for 30 minutes. This included participation on the Moerae Matrix Machine.    MEDX:       5/2/2024     1:54 PM   [facility]   Visit Number 10   VAS Pain Rating 2   Treadmill Time (in min.) 6 min   Speed 3 mph   Extension in Lying 30   Lumbar Weight 58 lbs   Repetitions 20   Rating of Perceived Exertion 4         Bridging x 20 repetitions green hip abduction theraband (tingling in bilateral feet)  Sidelying external rotation Green Theraband x 20 reps each (tingling decreased but not as pronounced)  prone hip extension knee flexion 3 x 8 repetitions each side   Sidelying open books x 15 repetitions each side (feels good)    Not Performed:  + bird/dog 2 x 5 repetitions each side   + prone alternating arm and leg 2 x 5 repetitions each side       Iftikhar participated in therapeutic exercises to develop strength, endurance, ROM, flexibility, posture, and core stabilization for 15 minutes including:     Treadmill x 5 minutes, level 3    Peripheral muscle strengthening which included one set of 15-20 repetitions at a slow and controlled 10-13 second per rep pace focused on strengthening supporting musculature in order to improve body mechanics and functional mobility. Patient and therapist focused on proper form during treatment to ensure optimal strengthening of each targeted muscle group.  Machines utilized included:  To be added next visit:Torso rotation, Chest Press, Rowing, and Leg Press    Iftikhar participated in dynamic functional therapeutic activities to improve functional performance and simulate household and community activities for 15 minutes. The following activities were included:    Prone press ups 2 x 10 repetitions  Prone press up 1 x 5 therapist overpressure   Prone press up with sag x 10 repetitions   Mobilization overpressure Physical  Therapist x 3 minutes pressure on pressure off (not performed)  Standing repeated movement Right Side Glide on wall x 10 repetitions (feels good)  Stand to sit 2 x 10 reps     Not Performed:   side step red theraband 3 x 10 ft each way   Wall shift/ closing down R side ( R SG)     Iftikhar received manual therapy techniques for  minutes. The following activities were included:  Soft Tissue Mobs, PA glides (felt good)  Pt given cold pack for  minutes to low back  in z-lie .    Patient Education and Home Exercises     Home exercises include:    Prone or standing extensions, side glides on wall     Cardio program (V5): -  Lifting education (V11): -  Posture/Lumbar roll: recommended  Fridge Magnet Discharge handout (date given): -  Equipment at home/gym membership: yes    Education provided:   - PT role and Plan of Care   - Home exercise program     Written Home Exercises Provided: yes.  Prior exercises were discharged  Exercises were reviewed and Iftikhar was able to demonstrate them prior to the end of the session.  Iftikhar demonstrated good  understanding of the education provided.     See EMR under Patient Instructions for exercises provided 4/5/2024.    Assessment     Iftikhar presents to PT reporting that his pain has been decreasing.  He has been able to resume his walking routine and increases his distance as he progresses.        Pt prognosis is Good.     Patient is making good progress towards established goals.  Pt will continue to benefit from skilled outpatient physical therapy to address the deficits stated in the impairment chart, provide pt/family education and to maximize pt's level of independence in the home and community environment.     Anticipated Barriers for therapy: chronicity of symptoms   Pt's spiritual, cultural and educational needs considered and pt agreeable to plan of care and goals as stated below:     Goals:   Updated to reflect [facility] program protocol 04-  GOALS: Pt is in  agreement with the following goals.    Short term goals:  6 weeks or 10 visits   - Pt will demonstrate increased lumbar MedX ROM by at least 3 degrees from the initial ROM value with improvements noted in functional ROM and ability to perform ADLs. Appropriate and Ongoing  - Pt will demonstrate increased MedX average isometric strength value by 25% from initial test resulting in improved ability to perform bending, lifting, and carrying activities safely, confidently. Appropriate and Ongoing  - Pt will report a reduction in worst pain score by 1-2 points for improved tolerance for sitting at his desk. Appropriate and Ongoing  - Pt able to perform HEP correctly with minimal cueing or supervision from therapist to encourage independent management of symptoms. Appropriate and Ongoing    Long term goals: 10 weeks or 20 visits   - Pt will demonstrate increased lumbar MedX ROM by at least 6 degrees from initial ROM value, resulting in improved ability to perform functional forward bending while standing and sitting. Appropriate and Ongoing  - Pt will demonstrate increased MedX average isometric strength value by 40% from initial test resulting in improved ability to perform bending, lifting, and carrying activities safely and confidently. Appropriate and Ongoing  - Pt to demonstrate ability to independently control and reduce their pain through posture positioning and mechanical movements throughout a typical day. Appropriate and Ongoing  - Pt will demonstrate reduced pain and improved functional outcomes as reported on the FOTO by reaching an intake score of >/= 70% functional ability in order to demonstrate subjective improvement in patient's condition. . Appropriate and Ongoing  - Pt will demonstrate independence with the HEP at discharge. Appropriate and Ongoing  - Patient to report improved quality of life evidenced by capacity to complete IADL's without increased symptoms. (patient goal) Appropriate and  Ongoing    Plan     Continue with established Plan of Care towards established PT goals.     Poonam Escoto, PTA  5/2/2024

## 2024-05-02 ENCOUNTER — CLINICAL SUPPORT (OUTPATIENT)
Dept: REHABILITATION | Facility: HOSPITAL | Age: 84
End: 2024-05-02
Payer: MEDICARE

## 2024-05-02 DIAGNOSIS — R29.898 DECREASED STRENGTH OF LOWER EXTREMITY: ICD-10-CM

## 2024-05-02 DIAGNOSIS — M53.86 DECREASED ROM OF INTERVERTEBRAL DISCS OF LUMBAR SPINE: ICD-10-CM

## 2024-05-02 DIAGNOSIS — R29.898 IMPAIRED FLEXIBILITY OF LOWER EXTREMITY: ICD-10-CM

## 2024-05-02 DIAGNOSIS — G89.29 CHRONIC RIGHT-SIDED LOW BACK PAIN WITH RIGHT-SIDED SCIATICA: Primary | ICD-10-CM

## 2024-05-02 DIAGNOSIS — M54.41 CHRONIC RIGHT-SIDED LOW BACK PAIN WITH RIGHT-SIDED SCIATICA: Primary | ICD-10-CM

## 2024-05-02 PROCEDURE — 97110 THERAPEUTIC EXERCISES: CPT | Mod: PN,CQ

## 2024-05-02 PROCEDURE — 97530 THERAPEUTIC ACTIVITIES: CPT | Mod: PN,CQ

## 2024-05-02 PROCEDURE — 97112 NEUROMUSCULAR REEDUCATION: CPT | Mod: PN,CQ

## 2024-05-07 ENCOUNTER — CLINICAL SUPPORT (OUTPATIENT)
Dept: REHABILITATION | Facility: HOSPITAL | Age: 84
End: 2024-05-07
Payer: MEDICARE

## 2024-05-07 DIAGNOSIS — M53.86 DECREASED ROM OF INTERVERTEBRAL DISCS OF LUMBAR SPINE: ICD-10-CM

## 2024-05-07 DIAGNOSIS — R29.898 IMPAIRED FLEXIBILITY OF LOWER EXTREMITY: ICD-10-CM

## 2024-05-07 DIAGNOSIS — G89.29 CHRONIC RIGHT-SIDED LOW BACK PAIN WITH RIGHT-SIDED SCIATICA: Primary | ICD-10-CM

## 2024-05-07 DIAGNOSIS — M54.41 CHRONIC RIGHT-SIDED LOW BACK PAIN WITH RIGHT-SIDED SCIATICA: Primary | ICD-10-CM

## 2024-05-07 DIAGNOSIS — R29.898 DECREASED STRENGTH OF LOWER EXTREMITY: ICD-10-CM

## 2024-05-07 PROCEDURE — 97112 NEUROMUSCULAR REEDUCATION: CPT | Mod: PN

## 2024-05-07 PROCEDURE — 97530 THERAPEUTIC ACTIVITIES: CPT | Mod: PN

## 2024-05-07 NOTE — PROGRESS NOTES
[facility]  Physical Therapy Treatment Note        Name: Iftikhar Lynn  Clinic Number: [id number]    Therapy Diagnosis:   Encounter Diagnoses   Name Primary?    Chronic right-sided low back pain with right-sided sciatica Yes    Decreased strength of lower extremity     Decreased ROM of intervertebral discs of lumbar spine     Impaired flexibility of lower extremity      Physician: Zacarias Siegel MD    Visit Date: 5/7/2024    Physician Orders: PT Eval and Treat   Medical Diagnosis from Referral: Chronic right-sided low back pain with right-sided sciatica   Evaluation Date: 4/2/2024  Authorization Period Expiration: 3/20/2025  Plan of Care Expiration: 5/17/2024  Visit # / Visits author 9 / 20 for HB  (POC 8 / eval)   FOTO Due visit 5  FOTO Due visit 10     Precautions: Standard and history of skin cancer  Insurance: Payor: MEDICARE / Plan: MEDICARE PART A & B / Product Type: Government /     MedX Testing:MedX testing visit 2    PTA Visit #: 0/5     NEEDS FOTO !!    Time In: 902 AM  Time Out: 1002 AM  Total Billable Time  30 minutes   INSURANCE and OUTCOMES: Fee for Service with FOTO Outcomes 1/3    Precautions: standard    Pattern of pain determined: 1 PEP / 4 PEP    Subjective     Iftikhar Lynn reports continuation of R sided sciatica with sitting only. No issues with bending over the weekend.    Patient reports tolerating previous visit well   Occupation: Retired but does some part time work from home for the [employer]  Prior Level of Function: Independent  Current Level of Function: Decreased ability to perform ADL and limited tolerance to standing and walking.     Pain:  Current  2/10  Location: right lumbar and intermittently into the right lower extremity       Pain Pattern: 3 PEP  Patients goals: Decrease pain and return to performing exercise at his gym    Objective      Baseline IM Testing Results:     Date of testing:  2024  ROM 0-48 deg   Max Peak Torque 85   Min Peak Torque 13    Flex/Ext Ratio 6.5   % below normative data 75%     Date of testin2024  ROM 0-60 deg   Max Peak Torque 115    Min Peak Torque 33    Flex/Ext Ratio 3.5   % below normative data 61   + 66% improvement since evaluation     Treatment     Iftikhar received the treatments listed below:      Iftikhar participated in neuromuscular re-education activities to improve balance, coordination, proprioception, motor control and/or posture for 20 minutes. This included participation on the G-CON Machine.    MEDX:         2024    12:50 PM   [facility]   Visit Number 11   VAS Pain Rating 0   Treadmill Time (in min.) 3 min   Speed 2.5 mph   Lumbar Flexion 60   Lumbar Extension 0   Lumbar Peak Torque 115 ft. lbs.   Min Torque 33   Test Percent Below Normative Data 61 %   Test Percent Gain in Strength from Initial  66 %     Sidelying ER clams green theraband, no change in feet, started to report onset of R PSIS pain when performing R side   Bridging x 20 repetitions green hip abduction theraband, no change in feet tingling   Prone alternating arm and leg 10 repetitions each side     Not Performed:  Sidelying open books x 15 repetitions each side (feels good)  + bird/dog 2 x 5 repetitions each side   + prone alternating arm and leg 2 x 5 repetitions each side       Iftikhar participated in therapeutic exercises to develop strength, endurance, ROM, flexibility, posture, and core stabilization for 15 minutes including:     Treadmill x 3 minutes, 2.5 mph   Peripheral muscle strengthening which included one set of 15-20 repetitions at a slow and controlled 10-13 second per rep pace focused on strengthening supporting musculature in order to improve body mechanics and functional mobility. Patient and therapist focused on proper form during treatment to ensure optimal strengthening of each targeted muscle group.  Machines utilized included:  To be added  next visit:Torso rotation, Chest Press, Rowing, and Leg Press    Iftikhar participated in dynamic functional therapeutic activities to improve functional performance and simulate household and community activities for 25 minutes. The following activities were included:    DKTC 15 repetitions, no sciatica, onset of B feet tingling   Prone press ups 1 x 10 repetitions, no change in feet   Prone press up 1 x 10 with sag, no change in feet  Prone press up therapist overpressure 10 repetitions, no changes in feet   Mobilization overpressure Physical Therapist - feels good   Standing repeated movement Right Side Glide on wall x 10 repetitions, abolishment of R sided low back pain, no change in foot symptoms    Performed second set before leaving due to pain following medx testing, abolishment, instructed to continue with these multiple times a day    NP:   Stand to sit 2 x 10 reps   side step red theraband 3 x 10 ft each way   Wall shift/ closing down R side ( R SG)     Iftikhar received manual therapy techniques for  minutes. The following activities were included:  Soft Tissue Mobs, PA glides (felt good)  Pt given cold pack for  minutes to low back  in z-lie .    Patient Education and Home Exercises     Home exercises include:    Prone or standing extensions, side glides on wall     Cardio program (V5): -  Lifting education (V11): -  Posture/Lumbar roll: recommended  Frie Magnet Discharge handout (date given): -  Equipment at home/gym membership: yes    Education provided:   - PT role and Plan of Care   - Home exercise program     Written Home Exercises Provided: yes.  Prior exercises were discharged  Exercises were reviewed and Iftikhar was able to demonstrate them prior to the end of the session.  Iftikhar demonstrated good  understanding of the education provided.     See EMR under Patient Instructions for exercises provided 4/5/2024.    Assessment     Continues to report R side sciatica with prolong sitting. He  reports slight improved sitting tolerance since evaluation but he continues to get it with sitting. He responded best with wall glides for abolishment of R sided low back pain. Prone press ups lead to better but not abolished. Attempted repeated flexion today for recovery of function reporting some pain in the back and onset of B feet numbness tingling which last the entire tx session without change. Medx retesting performed demonstrating 66% improvement since evaluation with 12 deg improvement in range of motion. He has met his range of motion and strength improvement goal since evaluation. He is doing well but continue to get sciatica and B feet tingling.     Pt prognosis is Good.     Patient is making good progress towards established goals.  Pt will continue to benefit from skilled outpatient physical therapy to address the deficits stated in the impairment chart, provide pt/family education and to maximize pt's level of independence in the home and community environment.     Anticipated Barriers for therapy: chronicity of symptoms   Pt's spiritual, cultural and educational needs considered and pt agreeable to plan of care and goals as stated below:     Goals:   Updated to reflect [facility] program protocol 04-  GOALS: Pt is in agreement with the following goals.    Short term goals:  6 weeks or 10 visits   - Pt will demonstrate increased lumbar MedX ROM by at least 3 degrees from the initial ROM value with improvements noted in functional ROM and ability to perform ADLs. Appropriate and Ongoing  - Pt will demonstrate increased MedX average isometric strength value by 25% from initial test resulting in improved ability to perform bending, lifting, and carrying activities safely, confidently. Appropriate and Ongoing  - Pt will report a reduction in worst pain score by 1-2 points for improved tolerance for sitting at his desk. Appropriate and Ongoing  - Pt able to perform HEP correctly with minimal  cueing or supervision from therapist to encourage independent management of symptoms. Appropriate and Ongoing    Long term goals: 10 weeks or 20 visits   - Pt will demonstrate increased lumbar MedX ROM by at least 6 degrees from initial ROM value, resulting in improved ability to perform functional forward bending while standing and sitting. Appropriate and Ongoing  - Pt will demonstrate increased MedX average isometric strength value by 40% from initial test resulting in improved ability to perform bending, lifting, and carrying activities safely and confidently. Appropriate and Ongoing  - Pt to demonstrate ability to independently control and reduce their pain through posture positioning and mechanical movements throughout a typical day. Appropriate and Ongoing  - Pt will demonstrate reduced pain and improved functional outcomes as reported on the FOTO by reaching an intake score of >/= 70% functional ability in order to demonstrate subjective improvement in patient's condition. . Appropriate and Ongoing  - Pt will demonstrate independence with the HEP at discharge. Appropriate and Ongoing  - Patient to report improved quality of life evidenced by capacity to complete IADL's without increased symptoms. (patient goal) Appropriate and Ongoing    Plan     Continue with established Plan of Care towards established PT goals.     Ambreen Amador, PT  5/7/2024

## 2024-05-09 ENCOUNTER — CLINICAL SUPPORT (OUTPATIENT)
Dept: REHABILITATION | Facility: HOSPITAL | Age: 84
End: 2024-05-09
Payer: MEDICARE

## 2024-05-09 DIAGNOSIS — R29.898 IMPAIRED FLEXIBILITY OF LOWER EXTREMITY: ICD-10-CM

## 2024-05-09 DIAGNOSIS — M54.41 CHRONIC RIGHT-SIDED LOW BACK PAIN WITH RIGHT-SIDED SCIATICA: Primary | ICD-10-CM

## 2024-05-09 DIAGNOSIS — G89.29 CHRONIC RIGHT-SIDED LOW BACK PAIN WITH RIGHT-SIDED SCIATICA: Primary | ICD-10-CM

## 2024-05-09 DIAGNOSIS — M53.86 DECREASED ROM OF INTERVERTEBRAL DISCS OF LUMBAR SPINE: ICD-10-CM

## 2024-05-09 DIAGNOSIS — R29.898 DECREASED STRENGTH OF LOWER EXTREMITY: ICD-10-CM

## 2024-05-09 PROCEDURE — 97112 NEUROMUSCULAR REEDUCATION: CPT | Mod: PN

## 2024-05-09 PROCEDURE — 97110 THERAPEUTIC EXERCISES: CPT | Mod: PN

## 2024-05-09 PROCEDURE — 97530 THERAPEUTIC ACTIVITIES: CPT | Mod: PN

## 2024-05-09 NOTE — PROGRESS NOTES
[facility] [place] [facility]  Physical Therapy Treatment Note        Name: Iftikhar Lynn  Clinic Number: [id number]    Therapy Diagnosis:   Encounter Diagnoses   Name Primary?    Chronic right-sided low back pain with right-sided sciatica Yes    Decreased strength of lower extremity     Decreased ROM of intervertebral discs of lumbar spine     Impaired flexibility of lower extremity      Physician: Zacarias Siegel MD    Visit Date: 2024    Physician Orders: PT Eval and Treat   Medical Diagnosis from Referral: Chronic right-sided low back pain with right-sided sciatica   Evaluation Date: 2024  Authorization Period Expiration: 3/20/2025  Plan of Care Expiration: 2024  Visit # / Visits author 10 / 20 for HB  ( / eval)   FOTO Due visit 5  FOTO Due visit 10     Precautions: Standard and history of skin cancer  Insurance: Payor: MEDICARE / Plan: MEDICARE PART A & B / Product Type: Government /     MedX Testing:MedX testing visit 2    PTA Visit #: 0/5     NEEDS FOTO !!    Time In: 853 AM  Time Out: 948AM  Total Billable Time 38 minutes (total: 55)   INSURANCE and OUTCOMES: Fee for Service with FOTO Outcomes /3    Precautions: standard    Pattern of pain determined: 1 PEP / 4 PEP    Subjective     Iftikhar Lynn reports continues to get sciatica with sitting.     Patient reports tolerating previous visit well   Occupation: Retired but does some part time work from home for the [employer]  Prior Level of Function: Independent  Current Level of Function: Decreased ability to perform ADL and limited tolerance to standing and walking.     Pain:  Current  2/10  Location: right lumbar and intermittently into the right lower extremity       Pain Pattern: 3 PEP  Patients goals: Decrease pain and return to performing exercise at his gym    Objective      Baseline IM Testing Results:     Date of testin2024  ROM 0-48 deg   Max Peak Torque 85   Min  Peak Torque 13    Flex/Ext Ratio 6.5   % below normative data 75%     Date of testin2024  ROM 0-60 deg   Max Peak Torque 115    Min Peak Torque 33    Flex/Ext Ratio 3.5   % below normative data 61   + 66% improvement since evaluation     Treatment     Iftikhar received the treatments listed below:      Physical Therapy technician assisted with treatment under direct supervision of treating therapist. Patient received 1:1 treatments for 22 minutes with Physical Therapist.     Iftikhar participated in neuromuscular re-education activities to improve balance, coordination, proprioception, motor control and/or posture for 25 minutes. This included participation on the [employer] Machine.    MEDX:         2024    12:50 PM   [facility]   Visit Number 11   VAS Pain Rating 0   Treadmill Time (in min.) 3 min   Speed 2.5 mph   Lumbar Flexion 60   Lumbar Extension 0   Lumbar Peak Torque 115 ft. lbs.   Min Torque 33   Test Percent Below Normative Data 61 %   Test Percent Gain in Strength from Initial  66 %     + fire hydrant red theraband 2 x 10   + SFMA rolling 10 repetitions each side ( onset of foot tingling and R PSIS discomfort when doing the R side)   Prone alternating arm and leg 2x 10 repetitions each side  Bird/dog 2 x 5 repetitions each side     NP  Bridging x 20 repetitions green hip abduction theraband, no change in feet tingling    Sidelying open books x 15 repetitions each side (feels good)    Iftikhar participated in therapeutic exercises to develop strength, endurance, ROM, flexibility, posture, and core stabilization for 20 minutes including:     Treadmill x 3 minutes, 2.5 mph   Hip flexor stretch 3 x 30 second     Peripheral muscle strengthening which included one set of 15-20 repetitions at a slow and controlled 10-13 second per rep pace focused on strengthening supporting musculature in order to improve body mechanics and functional mobility. Patient and therapist focused on proper form  during treatment to ensure optimal strengthening of each targeted muscle group.  Machines utilized included:Torso rotation, Chest Press, Rowing, and Leg Press    Iftikhar participated in dynamic functional therapeutic activities to improve functional performance and simulate household and community activities for 5 minutes. The following activities were included:    Prone press ups 2 x 10    NP:   Stand to sit 2 x 10 reps   side step red theraband 3 x 10 ft each way   Wall shift/ closing down R side ( R SG)     Iftikhar received manual therapy techniques for 5  minutes. The following activities were included:  Mobilizations lumbar spine L1-5 grade 3     Pt given cold pack for  minutes to low back  in z-lie .    Patient Education and Home Exercises     Home exercises include:    Prone or standing extensions, side glides on wall     Cardio program (V5): -  Lifting education (V11): -  Posture/Lumbar roll: recommended  Fridge Magnet Discharge handout (date given): -  Equipment at home/gym membership: yes    Education provided:   - PT role and Plan of Care   - Home exercise program     Written Home Exercises Provided: yes.  Prior exercises were discharged  Exercises were reviewed and Iftikhar was able to demonstrate them prior to the end of the session.  Iftikhar demonstrated good  understanding of the education provided.     See EMR under Patient Instructions for exercises provided 4/5/2024.    Assessment     Arrives without pain but continuation of sciatica symptoms with sitting. Physical Therapist educated patient to try another sitting surface with lumbar roll instead of the game chair to assess change in these symptoms. Minimal discomfort reported today. He did have some R PSIS discomfort with performing SFMA rolling with RLE but minimal and also onset of tingling in the feet with this. He continues to demonstrate limited lumbar extension range of motion and tightness of hip flexors. Improved tolerance on medx today  reporting 2/10 RPE compared to 4/10 RPE at last tx session. He is progressing well and tolerated progressions well today.     Pt prognosis is Good.     Patient is making good progress towards established goals.  Pt will continue to benefit from skilled outpatient physical therapy to address the deficits stated in the impairment chart, provide pt/family education and to maximize pt's level of independence in the home and community environment.     Anticipated Barriers for therapy: chronicity of symptoms   Pt's spiritual, cultural and educational needs considered and pt agreeable to plan of care and goals as stated below:     Goals:   Updated to reflect [facility] program protocol 04-  GOALS: Pt is in agreement with the following goals.    Short term goals:  6 weeks or 10 visits   - Pt will demonstrate increased lumbar MedX ROM by at least 3 degrees from the initial ROM value with improvements noted in functional ROM and ability to perform ADLs. Appropriate and Ongoing  - Pt will demonstrate increased MedX average isometric strength value by 25% from initial test resulting in improved ability to perform bending, lifting, and carrying activities safely, confidently. Appropriate and Ongoing  - Pt will report a reduction in worst pain score by 1-2 points for improved tolerance for sitting at his desk. Appropriate and Ongoing  - Pt able to perform HEP correctly with minimal cueing or supervision from therapist to encourage independent management of symptoms. Appropriate and Ongoing    Long term goals: 10 weeks or 20 visits   - Pt will demonstrate increased lumbar MedX ROM by at least 6 degrees from initial ROM value, resulting in improved ability to perform functional forward bending while standing and sitting. Appropriate and Ongoing  - Pt will demonstrate increased MedX average isometric strength value by 40% from initial test resulting in improved ability to perform bending, lifting, and carrying activities  safely and confidently. Appropriate and Ongoing  - Pt to demonstrate ability to independently control and reduce their pain through posture positioning and mechanical movements throughout a typical day. Appropriate and Ongoing  - Pt will demonstrate reduced pain and improved functional outcomes as reported on the FOTO by reaching an intake score of >/= 70% functional ability in order to demonstrate subjective improvement in patient's condition. . Appropriate and Ongoing  - Pt will demonstrate independence with the HEP at discharge. Appropriate and Ongoing  - Patient to report improved quality of life evidenced by capacity to complete IADL's without increased symptoms. (patient goal) Appropriate and Ongoing    Plan     Continue with established Plan of Care towards established PT goals.     Ambreen Amador, PT  5/9/2024

## 2024-05-09 NOTE — TELEPHONE ENCOUNTER
Scheduled appt for pt to be seen for vertigo off and on in the last week. Pt confirmed time and date of appt.    <<--- Click to launch

## 2024-05-14 ENCOUNTER — CLINICAL SUPPORT (OUTPATIENT)
Dept: REHABILITATION | Facility: HOSPITAL | Age: 84
End: 2024-05-14
Payer: MEDICARE

## 2024-05-14 DIAGNOSIS — M54.41 CHRONIC RIGHT-SIDED LOW BACK PAIN WITH RIGHT-SIDED SCIATICA: Primary | ICD-10-CM

## 2024-05-14 DIAGNOSIS — R29.898 DECREASED STRENGTH OF LOWER EXTREMITY: ICD-10-CM

## 2024-05-14 DIAGNOSIS — R29.898 IMPAIRED FLEXIBILITY OF LOWER EXTREMITY: ICD-10-CM

## 2024-05-14 DIAGNOSIS — M53.86 DECREASED ROM OF INTERVERTEBRAL DISCS OF LUMBAR SPINE: ICD-10-CM

## 2024-05-14 DIAGNOSIS — G89.29 CHRONIC RIGHT-SIDED LOW BACK PAIN WITH RIGHT-SIDED SCIATICA: Primary | ICD-10-CM

## 2024-05-14 PROCEDURE — 97110 THERAPEUTIC EXERCISES: CPT | Mod: PN,CQ

## 2024-05-14 PROCEDURE — 97112 NEUROMUSCULAR REEDUCATION: CPT | Mod: PN,CQ

## 2024-05-14 NOTE — PROGRESS NOTES
"        [facility]  Physical Therapy Treatment Note        Name: Iftikhar Lynn  Clinic Number: [id number]    Therapy Diagnosis:   Encounter Diagnoses   Name Primary?    Chronic right-sided low back pain with right-sided sciatica Yes    Decreased strength of lower extremity     Decreased ROM of intervertebral discs of lumbar spine     Impaired flexibility of lower extremity      Physician: Zacarias Siegel MD    Visit Date: 5/14/2024    Physician Orders: PT Eval and Treat   Medical Diagnosis from Referral: Chronic right-sided low back pain with right-sided sciatica   Evaluation Date: 4/2/2024  Authorization Period Expiration: 3/20/2025  Plan of Care Expiration: 5/17/2024  Visit # / Visits author 10 / 20 for HB  ( / eval)   FOTO Due visit 5  FOTO Due visit 10     Precautions: Standard and history of skin cancer  Insurance: Payor: MEDICARE / Plan: MEDICARE PART A & B / Product Type: Government /     MedX Testing:MedX testing visit 2    PTA Visit #: 1/5     Time In: 0900  Time Out:  0955  Total Billable Time  55 minutes   INSURANCE and OUTCOMES: Fee for Service with FOTO Outcomes 1/3    Precautions: standard    Pattern of pain determined: 1 PEP / 4 PEP    Subjective     Iftikhar Lynn reports feeling a little better today.  "I feel better today than when I started".  Pt stated he "feels a little buzz" on the Right side of his low back from sciatica.      Patient reports tolerating previous visit well   Occupation: Retired but does some part time work from home for the [employer]  Prior Level of Function: Independent  Current Level of Function: Decreased ability to perform ADL and limited tolerance to standing and walking.     Pain:  Current  1/10  Location: right lumbar and intermittently into the right lower extremity       Pain Pattern: 3 PEP  Patients goals: Decrease pain and return to performing exercise at his gym    Objective  "     Baseline IM Testing Results:     Date of testin2024  ROM 0-48 deg   Max Peak Torque 85   Min Peak Torque 13    Flex/Ext Ratio 6.5   % below normative data 75%     Date of testin2024  ROM 0-60 deg   Max Peak Torque 115    Min Peak Torque 33    Flex/Ext Ratio 3.5   % below normative data 61   + 66% improvement since evaluation     Treatment     Iftikhar received the treatments listed below:        Iftikhar participated in neuromuscular re-education activities to improve balance, coordination, proprioception, motor control and/or posture for 25 minutes. This included participation on the [employer] Machine.    MEDX:         2024     9:56 AM   [facility]   Visit Number 13   VAS Pain Rating 1   Treadmill Time (in min.) 3 min   Speed 3 mph   Extension in Lying 20   Lumbar Weight 58 lbs   Repetitions 20   Rating of Perceived Exertion 3      fire hydrant red theraband 2 x 10 reps (increase to green next treatment)  SFMA rolling x 5 repetitions each side  Prone alternating arm and leg 2 x 10 repetitions each side  Bird/dog 2 x 5 repetitions each side   Bridging x 20 repetitions green hip abduction theraband     Not performed:  Sidelying open books x 15 repetitions each side (feels good)    Iftikhar participated in therapeutic exercises to develop strength, endurance, ROM, flexibility, posture, and core stabilization for  25 minutes including:     Treadmill x 3 minutes, 3 mph   Hip flexor stretch 3 x 30 second (standing/knee on chair)    Peripheral muscle strengthening which included one set of 15-20 repetitions at a slow and controlled 10-13 second per rep pace focused on strengthening supporting musculature in order to improve body mechanics and functional mobility. Patient and therapist focused on proper form during treatment to ensure optimal strengthening of each targeted muscle group.  Machines utilized included:Torso rotation, Chest Press, Rowing, and Leg Press    Iftikhar participated in  dynamic functional therapeutic activities to improve functional performance and simulate household and community activities for 5 minutes. The following activities were included:    Prone press ups 2 x 10 reps     Not Performed:   Stand to sit 2 x 10 reps   side step red theraband 3 x 10 ft each way   Wall shift/ closing down R side ( R SG)     Iftikhar received manual therapy techniques for  minutes. The following activities were included:  Mobilizations lumbar spine L1-5 grade 3     Pt given cold pack for  minutes to low back  in z-lie .    Patient Education and Home Exercises     Home exercises include:    Prone or standing extensions, side glides on wall     Cardio program (V5): -  Lifting education (V11): -  Posture/Lumbar roll: recommended  Fridge Magnet Discharge handout (date given): -  Equipment at home/gym membership: yes    Education provided:   - PT role and Plan of Care   - Home exercise program     Written Home Exercises Provided: yes.  Prior exercises were discharged  Exercises were reviewed and Iftikhar was able to demonstrate them prior to the end of the session.  Iftikhar demonstrated good  understanding of the education provided.     See EMR under Patient Instructions for exercises provided 4/5/2024.    Assessment     Patient presents to PT reporting feeling better.  He rated his pain as a 1/10 today to his Right side (sciatica).  He did well with exercises but needs verbal cuing for technique, reps with exercises.  He has trouble remembering the technique with some exercises.      Pt prognosis is Good.     Patient is making good progress towards established goals.  Pt will continue to benefit from skilled outpatient physical therapy to address the deficits stated in the impairment chart, provide pt/family education and to maximize pt's level of independence in the home and community environment.     Anticipated Barriers for therapy: chronicity of symptoms   Pt's spiritual, cultural and educational  needs considered and pt agreeable to plan of care and goals as stated below:     Goals:   Updated to reflect [facility] program protocol 04-  GOALS: Pt is in agreement with the following goals.    Short term goals:  6 weeks or 10 visits   - Pt will demonstrate increased lumbar MedX ROM by at least 3 degrees from the initial ROM value with improvements noted in functional ROM and ability to perform ADLs. Appropriate and Ongoing  - Pt will demonstrate increased MedX average isometric strength value by 25% from initial test resulting in improved ability to perform bending, lifting, and carrying activities safely, confidently. Appropriate and Ongoing  - Pt will report a reduction in worst pain score by 1-2 points for improved tolerance for sitting at his desk. Appropriate and Ongoing  - Pt able to perform HEP correctly with minimal cueing or supervision from therapist to encourage independent management of symptoms. Appropriate and Ongoing    Long term goals: 10 weeks or 20 visits   - Pt will demonstrate increased lumbar MedX ROM by at least 6 degrees from initial ROM value, resulting in improved ability to perform functional forward bending while standing and sitting. Appropriate and Ongoing  - Pt will demonstrate increased MedX average isometric strength value by 40% from initial test resulting in improved ability to perform bending, lifting, and carrying activities safely and confidently. Appropriate and Ongoing  - Pt to demonstrate ability to independently control and reduce their pain through posture positioning and mechanical movements throughout a typical day. Appropriate and Ongoing  - Pt will demonstrate reduced pain and improved functional outcomes as reported on the FOTO by reaching an intake score of >/= 70% functional ability in order to demonstrate subjective improvement in patient's condition. . Appropriate and Ongoing  - Pt will demonstrate independence with the HEP at discharge. Appropriate and  Ongoing  - Patient to report improved quality of life evidenced by capacity to complete IADL's without increased symptoms. (patient goal) Appropriate and Ongoing    Plan     Continue with established Plan of Care towards established PT goals.     Poonam Escoto, PTA  5/14/2024

## 2024-05-15 NOTE — PROGRESS NOTES
"        [facility]  Physical Therapy Treatment Note        Name: Iftikhar Lynn  Clinic Number: [id number]    Therapy Diagnosis:   Encounter Diagnoses   Name Primary?    Chronic right-sided low back pain with right-sided sciatica Yes    Decreased strength of lower extremity     Decreased ROM of intervertebral discs of lumbar spine     Impaired flexibility of lower extremity        Physician: Zacarias Siegel MD    Visit Date: 2024    Physician Orders: PT Eval and Treat   Medical Diagnosis from Referral: Chronic right-sided low back pain with right-sided sciatica   Evaluation Date: 2024  Authorization Period Expiration: 3/20/2025  Plan of Care Expiration: 2024  Visit # / Visits author  /  for HB  (POC  / eval)   FOTO Due visit 5  FOTO Due visit 10     Precautions: Standard and history of skin cancer  Insurance: Payor: MEDICARE / Plan: MEDICARE PART A & B / Product Type: Government /     MedX Testing:MedX testing visit 2    PTA Visit #:      Time In: 0855  Time Out:  1005  Total Billable Time  60 minutes (+10 minutes cold pack)  INSURANCE and OUTCOMES: Fee for Service with FOTO Outcomes /3    Precautions: standard    Pattern of pain determined: 1 PEP / 4 PEP    Subjective     Iftikhar Lynn reports "yesterday my sciatica was really bothering me but it's better today"    Patient reports tolerating previous visit well   Occupation: Retired but does some part time work from home for the [employer]  Prior Level of Function: Independent  Current Level of Function: Decreased ability to perform ADL and limited tolerance to standing and walking.     Pain:  Current  1/10  Location: right lumbar and intermittently into the right lower extremity       Pain Pattern: 3 PEP  Patients goals: Decrease pain and return to performing exercise at his gym    Objective      Baseline IM Testing Results:     Date of testin2024  ROM 0-48 deg   Max " Peak Torque 85   Min Peak Torque 13    Flex/Ext Ratio 6.5   % below normative data 75%     Date of testin2024  ROM 0-60 deg   Max Peak Torque 115    Min Peak Torque 33    Flex/Ext Ratio 3.5   % below normative data 61   + 66% improvement since evaluation     Treatment     Iftikhar received the treatments listed below:        Iftikhar participated in neuromuscular re-education activities to improve balance, coordination, proprioception, motor control and/or posture for 25 minutes. This included participation on the Dibsie Machine.    MEDX:         2024    10:19 AM   [facility]   Visit Number 14   VAS Pain Rating 1   Treadmill Time (in min.) 5 min   Speed 3 mph   Extension in Lying 30   Lumbar Weight 61 lbs   Repetitions 20   Rating of Perceived Exertion 3   Ice - Z Lie (in min.) 10         fire hydrant green theraband 2 x 10 reps (increase to green next treatment)  SFMA rolling x 5 repetitions each side  Prone alternating arm and leg 2 x 10 repetitions each side  Bird/dog x 10  repetitions each side   Bridging x 20 repetitions green hip abduction theraband     Sidelying open books x 15 repetitions each side (feels good)  Sidelying external rotation Green Theraband x 20 reps     Iftikhar participated in therapeutic exercises to develop strength, endurance, ROM, flexibility, posture, and core stabilization for  17 minutes including:     Treadmill x 4 minutes, 3 mph   Hip flexor stretch 3 x 30 second (standing/knee on chair)    Peripheral muscle strengthening which included one set of 15-20 repetitions at a slow and controlled 10-13 second per rep pace focused on strengthening supporting musculature in order to improve body mechanics and functional mobility. Patient and therapist focused on proper form during treatment to ensure optimal strengthening of each targeted muscle group.  Machines utilized included:Torso rotation, Chest Press, Rowing, and Leg Press    Iftikhar participated in dynamic  functional therapeutic activities to improve functional performance and simulate household and community activities for 10 minutes. The following activities were included:    Prone press ups 2 x 10 reps   Prone press ups with over pressure x 10 reps     Not Performed:   Stand to sit 2 x 10 reps   side step red theraband 3 x 10 ft each way   Wall shift/ closing down R side ( R SG)     Iftikhar received manual therapy techniques for 8 minutes. The following activities were included:  Soft Tissue Mobs  PA glides  Mobilizations lumbar spine L1-5 grade 2     Pt given cold pack for  minutes to low back  in z-lie .    Patient Education and Home Exercises     Home exercises include:    Prone or standing extensions, side glides on wall     Cardio program (V5): -  Lifting education (V11): -  Posture/Lumbar roll: recommended  [employer] Magnet Discharge handout (date given): -  Equipment at home/gym membership: yes    Education provided:   - PT role and Plan of Care   - Home exercise program     Written Home Exercises Provided: yes.  Prior exercises were discharged  Exercises were reviewed and Iftikhar was able to demonstrate them prior to the end of the session.  Iftikhar demonstrated good  understanding of the education provided.     See EMR under Patient Instructions for exercises provided 4/5/2024.    Assessment     Patient presents to PT reporting increased pain in his Right hip after last treatment and yesterday.  Patient stated today he is feeling better.  Patient continues to need verbal cuing with technique for exercises.  He is progressing well.  Cold pack following exercises per patients request.  Advised patient to use ice after activities at home vs. Heat.      Pt prognosis is Good.     Patient is making good progress towards established goals.  Pt will continue to benefit from skilled outpatient physical therapy to address the deficits stated in the impairment chart, provide pt/family education and to maximize pt's  level of independence in the home and community environment.     Anticipated Barriers for therapy: chronicity of symptoms   Pt's spiritual, cultural and educational needs considered and pt agreeable to plan of care and goals as stated below:     Goals:   Updated to reflect [facility] program protocol 04-  GOALS: Pt is in agreement with the following goals.    Short term goals:  6 weeks or 10 visits   - Pt will demonstrate increased lumbar MedX ROM by at least 3 degrees from the initial ROM value with improvements noted in functional ROM and ability to perform ADLs. Appropriate and Ongoing  - Pt will demonstrate increased MedX average isometric strength value by 25% from initial test resulting in improved ability to perform bending, lifting, and carrying activities safely, confidently. Appropriate and Ongoing  - Pt will report a reduction in worst pain score by 1-2 points for improved tolerance for sitting at his desk. Appropriate and Ongoing  - Pt able to perform HEP correctly with minimal cueing or supervision from therapist to encourage independent management of symptoms. Appropriate and Ongoing    Long term goals: 10 weeks or 20 visits   - Pt will demonstrate increased lumbar MedX ROM by at least 6 degrees from initial ROM value, resulting in improved ability to perform functional forward bending while standing and sitting. Appropriate and Ongoing  - Pt will demonstrate increased MedX average isometric strength value by 40% from initial test resulting in improved ability to perform bending, lifting, and carrying activities safely and confidently. Appropriate and Ongoing  - Pt to demonstrate ability to independently control and reduce their pain through posture positioning and mechanical movements throughout a typical day. Appropriate and Ongoing  - Pt will demonstrate reduced pain and improved functional outcomes as reported on the FOTO by reaching an intake score of >/= 70% functional ability in order  to demonstrate subjective improvement in patient's condition. . Appropriate and Ongoing  - Pt will demonstrate independence with the HEP at discharge. Appropriate and Ongoing  - Patient to report improved quality of life evidenced by capacity to complete IADL's without increased symptoms. (patient goal) Appropriate and Ongoing    Plan     Continue with established Plan of Care towards established PT goals.     Poonam Escoto, PTA  5/16/2024

## 2024-05-16 ENCOUNTER — CLINICAL SUPPORT (OUTPATIENT)
Dept: REHABILITATION | Facility: HOSPITAL | Age: 84
End: 2024-05-16
Payer: MEDICARE

## 2024-05-16 DIAGNOSIS — M54.41 CHRONIC RIGHT-SIDED LOW BACK PAIN WITH RIGHT-SIDED SCIATICA: Primary | ICD-10-CM

## 2024-05-16 DIAGNOSIS — R29.898 DECREASED STRENGTH OF LOWER EXTREMITY: ICD-10-CM

## 2024-05-16 DIAGNOSIS — M53.86 DECREASED ROM OF INTERVERTEBRAL DISCS OF LUMBAR SPINE: ICD-10-CM

## 2024-05-16 DIAGNOSIS — R29.898 IMPAIRED FLEXIBILITY OF LOWER EXTREMITY: ICD-10-CM

## 2024-05-16 DIAGNOSIS — G89.29 CHRONIC RIGHT-SIDED LOW BACK PAIN WITH RIGHT-SIDED SCIATICA: Primary | ICD-10-CM

## 2024-05-16 PROCEDURE — 97110 THERAPEUTIC EXERCISES: CPT | Mod: PN,CQ

## 2024-05-16 PROCEDURE — 97140 MANUAL THERAPY 1/> REGIONS: CPT | Mod: PN,CQ

## 2024-05-16 PROCEDURE — 97530 THERAPEUTIC ACTIVITIES: CPT | Mod: PN,CQ

## 2024-05-16 PROCEDURE — 97112 NEUROMUSCULAR REEDUCATION: CPT | Mod: PN,CQ

## 2024-05-17 ENCOUNTER — PATIENT MESSAGE (OUTPATIENT)
Dept: ADMINISTRATIVE | Facility: OTHER | Age: 84
End: 2024-05-17
Payer: MEDICARE

## 2024-05-17 ENCOUNTER — PATIENT MESSAGE (OUTPATIENT)
Dept: FAMILY MEDICINE | Facility: CLINIC | Age: 84
End: 2024-05-17
Payer: MEDICARE

## 2024-05-17 DIAGNOSIS — E78.2 MIXED HYPERLIPIDEMIA: Primary | ICD-10-CM

## 2024-05-21 ENCOUNTER — CLINICAL SUPPORT (OUTPATIENT)
Dept: REHABILITATION | Facility: HOSPITAL | Age: 84
End: 2024-05-21
Payer: MEDICARE

## 2024-05-21 DIAGNOSIS — G89.29 CHRONIC RIGHT-SIDED LOW BACK PAIN WITH RIGHT-SIDED SCIATICA: Primary | ICD-10-CM

## 2024-05-21 DIAGNOSIS — R29.898 IMPAIRED FLEXIBILITY OF LOWER EXTREMITY: ICD-10-CM

## 2024-05-21 DIAGNOSIS — R29.898 DECREASED STRENGTH OF LOWER EXTREMITY: ICD-10-CM

## 2024-05-21 DIAGNOSIS — M53.86 DECREASED ROM OF INTERVERTEBRAL DISCS OF LUMBAR SPINE: ICD-10-CM

## 2024-05-21 DIAGNOSIS — M54.41 CHRONIC RIGHT-SIDED LOW BACK PAIN WITH RIGHT-SIDED SCIATICA: Primary | ICD-10-CM

## 2024-05-21 PROCEDURE — 97112 NEUROMUSCULAR REEDUCATION: CPT | Mod: PN

## 2024-05-21 PROCEDURE — 97530 THERAPEUTIC ACTIVITIES: CPT | Mod: PN

## 2024-05-21 PROCEDURE — 97110 THERAPEUTIC EXERCISES: CPT | Mod: PN

## 2024-05-21 NOTE — PROGRESS NOTES
[facility]  Physical Therapy Treatment Note        Name: Iftikhar Lynn  Clinic Number: [id number]    Therapy Diagnosis:   Encounter Diagnoses   Name Primary?    Chronic right-sided low back pain with right-sided sciatica Yes    Decreased strength of lower extremity     Decreased ROM of intervertebral discs of lumbar spine     Impaired flexibility of lower extremity        Physician: Zacarias Siegel MD    Visit Date: 5/21/2024    Physician Orders: PT Eval and Treat   Medical Diagnosis from Referral: Chronic right-sided low back pain with right-sided sciatica   Evaluation Date: 4/2/2024  Authorization Period Expiration: 3/20/2025  Plan of Care Expiration: 5/17/2024  Visit # / Visits author  14 / 20 for HB  (15/20 HB)   FOTO Due visit 5: NP  FOTO Due visit 10: done      Precautions: Standard and history of skin cancer  Insurance: Payor: MEDICARE / Plan: MEDICARE PART A & B / Product Type: Government /     MedX Testing:MedX testing visit 2    PTA Visit #: 0/5     Time In: 900 AM  Time Out:  1020 AM  Total Billable Time  60 minutes (65 total)  INSURANCE and OUTCOMES: Fee for Service with FOTO Outcomes 2/3    Precautions: standard    Pattern of pain determined: 1 PEP / 4 PEP    Subjective     Iftikhar Lynn reports he has been taking naproxen over the weekend once a day. He repots improvement over the weekend. He went for a 4 mile walk yesterday and Sunday. He didn't get any sciatica these few times.     Patient reports tolerating previous visit well   Occupation: Retired but does some part time work from home for the [email or web address]  Prior Level of Function: Independent  Current Level of Function: Decreased ability to perform ADL and limited tolerance to standing and walking.     Pain:Current  1/10  Location: right lumbar and intermittently into the right lower extremity       Pain Pattern: 3 PEP  Patients goals: Decrease pain and return to  performing exercise at his gym    Objective      Baseline IM Testing Results:     Date of testin2024  ROM 0-48 deg   Max Peak Torque 85   Min Peak Torque 13    Flex/Ext Ratio 6.5   % below normative data 75%     Date of testin2024  ROM 0-60 deg   Max Peak Torque 115    Min Peak Torque 33    Flex/Ext Ratio 3.5   % below normative data 61   + 66% improvement since evaluation     FOTO: 60 initial , 63 on visit 10    Treatment     Iftikhar received the treatments listed below:      Iftikhar participated in neuromuscular re-education activities to improve balance, coordination, proprioception, motor control and/or posture for 20 minutes. This included participation on the [employer].        2024     9:51 AM   [facility]   Visit Number 15   VAS Pain Rating 0   Treadmill Time (in min.) 3 min   Speed 2.5 mph   Lumbar Weight 65 lbs   Repetitions 20   Rating of Perceived Exertion 3     Bridging 20 repetitions   Bird/dog 10 repetitions each side    NP:   fire hydrant green theraband 2 x 10 reps (increase to green next treatment)  SFMA rolling x 5 repetitions each side  Prone alternating arm and leg 2 x 10 repetitions each side  Sidelying open books x 15 repetitions each side (feels good)  Sidelying external rotation Green Theraband x 20 reps     Iftikhar participated in therapeutic exercises to develop strength, endurance, ROM, flexibility, posture, and core stabilization for 20 minutes including:     Treadmill x 4 minutes, 3 mph   Hip flexor stretch 3 x 30 second (standing/knee on chair)- NP    Peripheral muscle strengthening which included one set of 15-20 repetitions at a slow and controlled 10-13 second per rep pace focused on strengthening supporting musculature in order to improve body mechanics and functional mobility. Patient and therapist focused on proper form during treatment to ensure optimal strengthening of each targeted muscle group.  Machines utilized included:Torso rotation,  Chest Press, Rowing, and Leg Press    Iftikhar participated in dynamic functional therapeutic activities to improve functional performance and simulate household and community activities for 25 minutes. The following activities were included:    Prone press ups 2 x 10 reps   Prone press ups with self over pressure/ sag x 10 reps   Education on proper position of lumbar roll     Recovery of function:   Repeated supine flexion 10 repetitions no increase stiffness with extension   Repeated seated flexion 10 repetitions, slight onset of RLE tingling/electric, no increased stiffness  Returned to prone press up with sag     Not Performed:   Stand to sit 2 x 10 reps   side step red theraband 3 x 10 ft each way   Wall shift/ closing down R side ( R SG)     Iftikhar received manual therapy techniques for 0 minutes. The following activities were included:  Soft Tissue Mobs  PA glides  Mobilizations lumbar spine L1-5 grade 2     Pt given cold pack for  minutes to low back  in z-lie .    Patient Education and Home Exercises     Home exercises include:    Prone or standing extensions, side glides on wall     Cardio program (V5): -  Lifting education (V11): -  Posture/Lumbar roll: recommended  [facility] (date given): -  Equipment at home/gym membership: yes    Education provided:   - PT role and Plan of Care   - Home exercise program     Written Home Exercises Provided: yes.  Prior exercises were discharged  Exercises were reviewed and Iftikhar was able to demonstrate them prior to the end of the session.  Iftikhar demonstrated good  understanding of the education provided.     See EMR under Patient Instructions for exercises provided 4/5/2024.    Assessment     Continues to arrive with reports of continued sciatica with sitting no longer than 5 minutes. He has been taking pain medication the past few days with improvement in sciatica. Physical Therapist review press ups with patient and I think he may not be  getting to his end range affecting improvement in his sciatica. Also review proper postioning and use of lumbar roll with sitting in which he should be extending over the roll. He verbalized and demonstrated good understanding. We will see if this leads to better improvements and change in his sciatica with sitting.     Pt prognosis is Good.     Patient is making good progress towards established goals.  Pt will continue to benefit from skilled outpatient physical therapy to address the deficits stated in the impairment chart, provide pt/family education and to maximize pt's level of independence in the home and community environment.     Anticipated Barriers for therapy: chronicity of symptoms   Pt's spiritual, cultural and educational needs considered and pt agreeable to plan of care and goals as stated below:     Goals:     Short term goals:  6 weeks or 10 visits   - Pt will demonstrate increased lumbar MedX ROM by at least 3 degrees from the initial ROM value with improvements noted in functional ROM and ability to perform ADLs. MET 5/7/2024  - Pt will demonstrate increased MedX average isometric strength value by 25% from initial test resulting in improved ability to perform bending, lifting, and carrying activities safely, confidently.  MET 5/7/2024  - Pt will report a reduction in worst pain score by 1-2 points for improved tolerance for sitting at his desk. Appropriate and Ongoing  - Pt able to perform HEP correctly with minimal cueing or supervision from therapist to encourage independent management of symptoms.  MET 5/7/2024    Long term goals: 10 weeks or 20 visits   - Pt will demonstrate increased lumbar MedX ROM by at least 6 degrees from initial ROM value, resulting in improved ability to perform functional forward bending while standing and sitting.  MET 5/7/2024  - Pt will demonstrate increased MedX average isometric strength value by 40% from initial test resulting in improved ability to perform  bending, lifting, and carrying activities safely and confidently.  MET 5/7/2024  - Pt to demonstrate ability to independently control and reduce their pain through posture positioning and mechanical movements throughout a typical day.  MET 5/7/2024  - Pt will demonstrate reduced pain and improved functional outcomes as reported on the FOTO by reaching an intake score of >/= 70% functional ability in order to demonstrate subjective improvement in patient's condition. . Appropriate and Ongoing  - Pt will demonstrate independence with the HEP at discharge. Appropriate and Ongoing  - Patient to report improved quality of life evidenced by capacity to complete IADL's without increased symptoms. (patient goal) Appropriate and Ongoing    Plan     Continue with established Plan of Care towards established PT goals.     Ambreen Amador, PT  5/21/2024

## 2024-05-23 ENCOUNTER — CLINICAL SUPPORT (OUTPATIENT)
Dept: REHABILITATION | Facility: HOSPITAL | Age: 84
End: 2024-05-23
Payer: MEDICARE

## 2024-05-23 DIAGNOSIS — G89.29 CHRONIC RIGHT-SIDED LOW BACK PAIN WITH RIGHT-SIDED SCIATICA: Primary | ICD-10-CM

## 2024-05-23 DIAGNOSIS — R29.898 IMPAIRED FLEXIBILITY OF LOWER EXTREMITY: ICD-10-CM

## 2024-05-23 DIAGNOSIS — R29.898 DECREASED STRENGTH OF LOWER EXTREMITY: ICD-10-CM

## 2024-05-23 DIAGNOSIS — M54.41 CHRONIC RIGHT-SIDED LOW BACK PAIN WITH RIGHT-SIDED SCIATICA: Primary | ICD-10-CM

## 2024-05-23 DIAGNOSIS — M53.86 DECREASED ROM OF INTERVERTEBRAL DISCS OF LUMBAR SPINE: ICD-10-CM

## 2024-05-23 PROCEDURE — 97112 NEUROMUSCULAR REEDUCATION: CPT | Mod: PN

## 2024-05-23 PROCEDURE — 97110 THERAPEUTIC EXERCISES: CPT | Mod: PN

## 2024-05-23 PROCEDURE — 97530 THERAPEUTIC ACTIVITIES: CPT | Mod: PN

## 2024-05-23 NOTE — PROGRESS NOTES
[facility]  Physical Therapy Treatment Note        Name: Iftikhar Lynn  Clinic Number: [id number]    Therapy Diagnosis:   Encounter Diagnoses   Name Primary?    Chronic right-sided low back pain with right-sided sciatica Yes    Decreased strength of lower extremity     Decreased ROM of intervertebral discs of lumbar spine     Impaired flexibility of lower extremity        Physician: Zacarias Siegel MD    Visit Date: 5/23/2024    Physician Orders: PT Eval and Treat   Medical Diagnosis from Referral: Chronic right-sided low back pain with right-sided sciatica   Evaluation Date: 4/2/2024  Authorization Period Expiration: 3/20/2025  Plan of Care Expiration: 5/17/2024  Visit # / Visits author  15 / 20 for HB  (16/20 HB)   FOTO Due visit 5: NP  FOTO Due visit 10: done      Precautions: Standard and history of skin cancer  Insurance: Payor: MEDICARE / Plan: MEDICARE PART A & B / Product Type: Government /     MedX Testing:MedX testing visit 2    PTA Visit #: 0/5     Time In: 902 AM  Time Out:  1012 AM  Total Billable Time  60 minutes  INSURANCE and OUTCOMES: Fee for Service with FOTO Outcomes 2/3    Precautions: standard    Pattern of pain determined: 1 PEP    Subjective     Iftikhar Lynn reports he has held off on medication over the past few days per therapist request. He doesn't feel like he needed it. He has been paying to how he sits and has been focusing more reaching end range with extensions.     Patient reports tolerating previous visit well   Occupation: Retired but does some part time work from home for the [employer]  Prior Level of Function: Independent  Current Level of Function: Decreased ability to perform ADL and limited tolerance to standing and walking.     Pain:Current  1/10  Location: right lumbar and intermittently into the right lower extremity       Pain Pattern: 3 PEP  Patients goals: Decrease pain and return to  performing exercise at his gym    Objective      Baseline IM Testing Results:     Date of testin2024  ROM 0-48 deg   Max Peak Torque 85   Min Peak Torque 13    Flex/Ext Ratio 6.5   % below normative data 75%     Date of testin2024  ROM 0-60 deg   Max Peak Torque 115    Min Peak Torque 33    Flex/Ext Ratio 3.5   % below normative data 61   + 66% improvement since evaluation     FOTO: 60 initial , 63 on visit 10    Treatment     Iftikhar received the treatments listed below:      Iftikhar participated in neuromuscular re-education activities to improve balance, coordination, proprioception, motor control and/or posture for 17 minutes.     This included participation on the TÃ[employer] Em [employer] Machine.        2024     9:51 AM   [facility]   Visit Number 15   VAS Pain Rating 0   Treadmill Time (in min.) 3 min   Speed 2.5 mph   Lumbar Weight 65 lbs   Repetitions 20   Rating of Perceived Exertion 3     Fire hydrant green theraband 2 x 10 each side   Bird/dog 10 repetitions each side  Prone alternating arm and leg 10 repetitions each side    Iftikhar participated in therapeutic exercises to develop strength, endurance, ROM, flexibility, posture, and core stabilization for 20 minutes including:     Treadmill x 4 minutes, 3 mph   Hip flexor stretch 3 x 30 second (standing/knee on chair)- NP    Peripheral muscle strengthening which included one set of 15-20 repetitions at a slow and controlled 10-13 second per rep pace focused on strengthening supporting musculature in order to improve body mechanics and functional mobility. Patient and therapist focused on proper form during treatment to ensure optimal strengthening of each targeted muscle group.  Machines utilized included:Torso rotation, Chest Press, Rowing, and Leg Press    Iftikhar participated in dynamic functional therapeutic activities to improve functional performance and simulate household and community activities for 23 minutes. The following  activities were included:    Prone press ups 2 x 10 reps with sag throughout tx session     Recovery of function:   Repeated supine flexion 10 repetitions no increase stiffness with extension     Standing extension following repeated supine flexion and lifting education     Stand to sit/ squat 10 lbs 20 repetitions , tactile cues for proper weight shift into the heels    Lifting mechanics:   Hip hinge to stool with 10 lb DB 20 repetitions    Floor to waist lift 10 repetitions    Golfers lift /single leg deadlift 10 repetitions each side     Iftikhar received manual therapy techniques for 0 minutes. The following activities were included:    Soft Tissue Mobs  PA glides  Mobilizations lumbar spine L1-5 grade 2     Pt given cold pack for  minutes to low back  in z-lie .    Patient Education and Home Exercises     Home exercises include:    Prone or standing extensions, side glides on wall     Cardio program (V5): -  Lifting education (V11): -  Posture/Lumbar roll: recommended  Fridge Magnet Discharge handout (date given): -  Equipment at home/gym membership: yes    Education provided:   - PT role and Plan of Care   - Home exercise program     Written Home Exercises Provided: yes.  Prior exercises were discharged  Exercises were reviewed and Iftikhar was able to demonstrate them prior to the end of the session.  Iftikhar demonstrated good  understanding of the education provided.     See EMR under Patient Instructions for exercises provided 4/5/2024.    Assessment     Patient arrives today with some improvement since paying more attention to how he sits and reaching end range with extension. He has also been able to lay off medication in which he feels like he didn't really need it over the past few days. He tolerated all exercises well without complaints of pain throughout tx session. He tolerated 10% increase on medx well with slight increase in RPE to 4/10. He is making good progress. He does continue to get some hip /  sciatica pain so educated him on possible dose pack or injection may help in the future if the sciatica continues. He reported good understanding.     Pt prognosis is Good.     Patient is making good progress towards established goals.  Pt will continue to benefit from skilled outpatient physical therapy to address the deficits stated in the impairment chart, provide pt/family education and to maximize pt's level of independence in the home and community environment.     Anticipated Barriers for therapy: chronicity of symptoms   Pt's spiritual, cultural and educational needs considered and pt agreeable to plan of care and goals as stated below:     Goals:     Short term goals:  6 weeks or 10 visits   - Pt will demonstrate increased lumbar MedX ROM by at least 3 degrees from the initial ROM value with improvements noted in functional ROM and ability to perform ADLs. MET 5/7/2024  - Pt will demonstrate increased MedX average isometric strength value by 25% from initial test resulting in improved ability to perform bending, lifting, and carrying activities safely, confidently.  MET 5/7/2024  - Pt will report a reduction in worst pain score by 1-2 points for improved tolerance for sitting at his desk. Appropriate and Ongoing  - Pt able to perform HEP correctly with minimal cueing or supervision from therapist to encourage independent management of symptoms.  MET 5/7/2024    Long term goals: 10 weeks or 20 visits   - Pt will demonstrate increased lumbar MedX ROM by at least 6 degrees from initial ROM value, resulting in improved ability to perform functional forward bending while standing and sitting.  MET 5/7/2024  - Pt will demonstrate increased MedX average isometric strength value by 40% from initial test resulting in improved ability to perform bending, lifting, and carrying activities safely and confidently.  MET 5/7/2024  - Pt to demonstrate ability to independently control and reduce their pain through posture  positioning and mechanical movements throughout a typical day.  MET 5/7/2024  - Pt will demonstrate reduced pain and improved functional outcomes as reported on the FOTO by reaching an intake score of >/= 70% functional ability in order to demonstrate subjective improvement in patient's condition. . Appropriate and Ongoing  - Pt will demonstrate independence with the HEP at discharge. Appropriate and Ongoing  - Patient to report improved quality of life evidenced by capacity to complete IADL's without increased symptoms. (patient goal) Appropriate and Ongoing    Plan     Continue with established Plan of Care towards established PT goals.     Ambreen Amador, PT  5/23/2024

## 2024-05-24 ENCOUNTER — DOCUMENTATION ONLY (OUTPATIENT)
Dept: REHABILITATION | Facility: HOSPITAL | Age: 84
End: 2024-05-24
Payer: MEDICARE

## 2024-05-24 ENCOUNTER — LAB VISIT (OUTPATIENT)
Dept: LAB | Facility: HOSPITAL | Age: 84
End: 2024-05-24
Attending: FAMILY MEDICINE
Payer: MEDICARE

## 2024-05-24 DIAGNOSIS — E78.2 MIXED HYPERLIPIDEMIA: ICD-10-CM

## 2024-05-24 LAB
ALT SERPL W/O P-5'-P-CCNC: 25 U/L (ref 10–44)
AST SERPL-CCNC: 25 U/L (ref 10–40)
CHOLEST SERPL-MCNC: 120 MG/DL (ref 120–199)
CHOLEST/HDLC SERPL: 2.7 {RATIO} (ref 2–5)
HDLC SERPL-MCNC: 45 MG/DL (ref 40–75)
HDLC SERPL: 37.5 % (ref 20–50)
LDLC SERPL CALC-MCNC: 63.8 MG/DL (ref 63–159)
NONHDLC SERPL-MCNC: 75 MG/DL
TRIGL SERPL-MCNC: 56 MG/DL (ref 30–150)

## 2024-05-24 PROCEDURE — 84450 TRANSFERASE (AST) (SGOT): CPT | Performed by: FAMILY MEDICINE

## 2024-05-24 PROCEDURE — 84460 ALANINE AMINO (ALT) (SGPT): CPT | Performed by: FAMILY MEDICINE

## 2024-05-24 PROCEDURE — 36415 COLL VENOUS BLD VENIPUNCTURE: CPT | Performed by: FAMILY MEDICINE

## 2024-05-24 PROCEDURE — 80061 LIPID PANEL: CPT | Performed by: FAMILY MEDICINE

## 2024-05-24 NOTE — PROGRESS NOTES
PT/PTA met face to face to discuss pt's treatment plan and progress towards established goals. Pt will be seen by a physical therapist minimally every 6th visit or every 30 days.      Poonam Escoto PTA

## 2024-05-27 ENCOUNTER — OFFICE VISIT (OUTPATIENT)
Dept: CARDIOLOGY | Facility: CLINIC | Age: 84
End: 2024-05-27
Payer: MEDICARE

## 2024-05-27 VITALS
HEIGHT: 72 IN | BODY MASS INDEX: 21.68 KG/M2 | HEART RATE: 68 BPM | DIASTOLIC BLOOD PRESSURE: 71 MMHG | SYSTOLIC BLOOD PRESSURE: 125 MMHG | WEIGHT: 160.06 LBS

## 2024-05-27 DIAGNOSIS — I10 PRIMARY HYPERTENSION: ICD-10-CM

## 2024-05-27 DIAGNOSIS — Z91.89 CARDIOVASCULAR EVENT RISK: ICD-10-CM

## 2024-05-27 DIAGNOSIS — I70.0 AORTIC ATHEROSCLEROSIS: ICD-10-CM

## 2024-05-27 DIAGNOSIS — I77.819 AORTIC ECTASIA, UNSPECIFIED SITE: Chronic | ICD-10-CM

## 2024-05-27 DIAGNOSIS — E78.2 MIXED HYPERLIPIDEMIA: ICD-10-CM

## 2024-05-27 DIAGNOSIS — F10.10 EXCESSIVE DRINKING OF ALCOHOL: Primary | ICD-10-CM

## 2024-05-27 DIAGNOSIS — J47.9 BRONCHIECTASIS WITHOUT COMPLICATION: ICD-10-CM

## 2024-05-27 DIAGNOSIS — Z85.820 HX OF MALIGNANT SKIN MELANOMA: ICD-10-CM

## 2024-05-27 PROCEDURE — 99213 OFFICE O/P EST LOW 20 MIN: CPT | Mod: PBBFAC,PO | Performed by: INTERNAL MEDICINE

## 2024-05-27 PROCEDURE — 99214 OFFICE O/P EST MOD 30 MIN: CPT | Mod: S$PBB,,, | Performed by: INTERNAL MEDICINE

## 2024-05-27 PROCEDURE — 99999 PR PBB SHADOW E&M-EST. PATIENT-LVL III: CPT | Mod: PBBFAC,,, | Performed by: INTERNAL MEDICINE

## 2024-05-27 NOTE — PROGRESS NOTES
Subjective:    Patient ID:  Iftikhar Lynn is a 84 y.o. male patient here for evaluation Follow-up      History of Present Illness:  Cardiology hypertension, dyslipidemia.  Noninvasive cardiac assessment 05/2020 3-.  Patient has had significant weight loss, intentional, monitored caloric intake.  Overall doing well.  Occasional palpitations, no sustained arrhythmia.  No angina.  No dyspnea.             Review of patient's allergies indicates:  No Known Allergies    Past Medical History:   Diagnosis Date    Cancer     basal cell ca rt arm / melanoma on back    GERD (gastroesophageal reflux disease)     Hyperlipidemia     Hypertension     Melanoma      Past Surgical History:   Procedure Laterality Date    BACK SURGERY      L 4 L5    COLONOSCOPY N/A 12/20/2017    Procedure: COLONOSCOPY;  Surgeon: Filipe Ramirez MD;  Location: [facility] ENDO;  Service: Endoscopy;  Laterality: N/A;    COLONOSCOPY N/A 09/27/2021    Procedure: COLONOSCOPY;  Surgeon: Filipe Ramirez MD;  Location: [facility] ENDO;  Service: Endoscopy;  Laterality: N/A;    EYE SURGERY  2010    cataract    KNEE ARTHROSCOPY W/ MENISCECTOMY Left 01/17/2019    Procedure: ARTHROSCOPY, KNEE, WITH MENISCECTOMY;  Surgeon: Ethan Díaz MD;  Location: [facility] OR;  Service: Orthopedics;  Laterality: Left;    SKIN CANCER EXCISION      multiple sites, derm dr martinez, basal cell ca    SPINE SURGERY  1990    L4L5 disc    VASECTOMY       Social History     Tobacco Use    Smoking status: Former     Types: Cigars    Smokeless tobacco: Never   Substance Use Topics    Alcohol use: Yes     Alcohol/week: 28.0 standard drinks of alcohol     Types: 28 Glasses of wine per week     Comment: daily wine    Drug use: No        Review of Systems:    As noted in HPI in addition      REVIEW OF SYSTEMS  Review of Systems   Constitutional: Negative for decreased appetite, diaphoresis, night sweats, weight gain and weight loss.   HENT:  Negative for nosebleeds and odynophagia.    Eyes:   Negative for double vision and photophobia.   Cardiovascular:  Negative for chest pain, claudication, cyanosis, dyspnea on exertion, irregular heartbeat, leg swelling, near-syncope, orthopnea, palpitations, paroxysmal nocturnal dyspnea and syncope.   Respiratory:  Negative for cough, hemoptysis, shortness of breath and wheezing.    Hematologic/Lymphatic: Negative for adenopathy.   Skin:  Negative for flushing, skin cancer and suspicious lesions.   Musculoskeletal:  Negative for gout, myalgias and neck pain.   Gastrointestinal:  Negative for abdominal pain, heartburn, hematemesis and hematochezia.   Genitourinary:  Negative for bladder incontinence, hesitancy and nocturia.   Neurological:  Negative for focal weakness, headaches, light-headedness and paresthesias.   Psychiatric/Behavioral:  Negative for memory loss and substance abuse.               Objective:        Vitals:    05/27/24 1131   BP: 125/71   Pulse: 68       Lab Results   Component Value Date    WBC 9.17 07/22/2022    HGB 14.9 07/22/2022    HCT 42.8 07/22/2022     07/22/2022    CHOL 120 05/24/2024    TRIG 56 05/24/2024    HDL 45 05/24/2024    ALT 25 05/24/2024    AST 25 05/24/2024     02/26/2024    K 4.2 02/26/2024     02/26/2024    CREATININE 0.8 02/26/2024    BUN 16 02/26/2024    CO2 25 02/26/2024    TSH 3.649 08/05/2022    PSA 3.2 08/02/2023    HGBA1C 5.0 10/10/2018        ECHOCARDIOGRAM RESULTS  Results for orders placed in visit on 05/15/23    Echo    Interpretation Summary  · The left ventricle is normal in size with normal systolic function.  · The estimated ejection fraction is 55%.  · Normal left ventricular diastolic function.  · Normal right ventricular size with normal right ventricular systolic function.  · Mild mitral regurgitation.  · Mild tricuspid regurgitation.  · Normal central venous pressure (3 mmHg).  · The estimated PA systolic pressure is 30 mmHg.    No results found for this or any previous  visit.          CURRENT/PREVIOUS VISIT EKG  Results for orders placed or performed in visit on 05/29/23   IN OFFICE EKG 12-LEAD (to [place])    Collection Time: 05/29/23  1:01 PM    Narrative    Test Reason : Z00.00    Vent. Rate : 106 BPM     Atrial Rate : 106 BPM     P-R Int : 236 ms          QRS Dur : 088 ms      QT Int : 334 ms       P-R-T Axes : 031 033 040 degrees     QTc Int : 443 ms    Sinus tachycardia with 1st degree A-V block with frequent Premature  ventricular complexes and Fusion complexes  Otherwise normal ECG    Confirmed by Mark Herrera MD ([id number]) on 6/1/2023 7:25:06 PM    Referred By: DONALD TONG           Confirmed By:Mark Herrera MD     No valid procedures specified.   Results for orders placed during the hospital encounter of 05/15/23    Nuclear Stress - Cardiology Interpreted    Interpretation Summary    Normal myocardial perfusion scan. There is no evidence of myocardial ischemia or infarction.    The gated perfusion images showed an ejection fraction of 61% at rest.    There is normal wall motion at rest.    The ECG portion of the study is negative for ischemia.    The patient reported no chest pain during the stress test.    During stress, rare PVCs are noted.    There are no prior studies for comparison.    No valid procedures specified.    PHYSICAL EXAM  GENERAL: well built, well nourished, well-developed in no apparent distress alert and oriented.   HEENT: Normocephalic. Pupils normal and conjunctivae normal.  Mucous membranes normal, no cyanosis or icterus, trachea central,no pallor or icterus is noted..   NECK: No JVD. No bruit..   THYROID: Thyroid not enlarged. No nodules present..   CARDIAC:  Normal S1-S2.  No murmur rub click or gallop.  PMI nondisplaced.  CHEST ANATOMY: normal.   LUNGS: Clear to auscultation. No wheezing or rhonchi..   ABDOMEN: Soft no masses or organomegaly.  No abdomen pulsations or bruits.  Normal bowel sounds. No pulsations and no masses felt, No guarding or rebound.    URINARY: No lira catheter   EXTREMITIES: No cyanosis, clubbing or edema noted at this time., no calf tenderness bilaterally.   PERIPHERAL VASCULAR SYSTEM: Good palpable distal pulses.  2+ femoral, popliteal and pedal pulses.  No bruits    CENTRAL NERVOUS SYSTEM: No focal motor or sensory deficits noted.   SKIN: Skin without lesions, moist, well perfused.   MUSCLE STRENGTH & TONE: No noteable weakness, atrophy or abnormal movement    I HAVE REVIEWED :    The vital signs, nurses notes, and all the pertinent radiology and labs.         Current Outpatient Medications   Medication Instructions    aspirin (ECOTRIN) 81 mg, Oral, Daily    ibuprofen (IBU) 800 mg, Oral, Every 8 hours PRN    metoprolol succinate (TOPROL-XL) 25 mg, Oral, 2 times daily    multivitamin capsule 1 capsule, Oral, Daily    nystatin-triamcinolone (MYCOLOG II) cream Topical (Top), 2 times daily    omeprazole (PRILOSEC OTC) 20 mg, Oral, Every Mon, Fri,      pramoxine-hydrocortisone (PROCTOCREAM-HC) 1-1 % rectal cream Rectal, 2 times daily    simvastatin (ZOCOR) 40 mg, Oral, Nightly    valsartan (DIOVAN) 320 mg, Oral, Daily          Assessment:   Hypertension, dyslipidemia.  Negative noninvasive cardiac assessment 05/2023.    Benign ventricular ectopy stable.  5.7% PVC burden by last Holter monitor.  Doing well with low-dose beta-blockade, Toprol-XL 25 b.i.d..            Plan:   Meds reviewed and reconciled recommend no changes.  Continue aggressive risk factor modification.  6-8 months.          No follow-ups on file.

## 2024-05-28 ENCOUNTER — CLINICAL SUPPORT (OUTPATIENT)
Dept: REHABILITATION | Facility: HOSPITAL | Age: 84
End: 2024-05-28
Payer: MEDICARE

## 2024-05-28 DIAGNOSIS — M53.86 DECREASED ROM OF INTERVERTEBRAL DISCS OF LUMBAR SPINE: ICD-10-CM

## 2024-05-28 DIAGNOSIS — G89.29 CHRONIC RIGHT-SIDED LOW BACK PAIN WITH RIGHT-SIDED SCIATICA: Primary | ICD-10-CM

## 2024-05-28 DIAGNOSIS — M54.41 CHRONIC RIGHT-SIDED LOW BACK PAIN WITH RIGHT-SIDED SCIATICA: Primary | ICD-10-CM

## 2024-05-28 DIAGNOSIS — R29.898 IMPAIRED FLEXIBILITY OF LOWER EXTREMITY: ICD-10-CM

## 2024-05-28 DIAGNOSIS — R29.898 DECREASED STRENGTH OF LOWER EXTREMITY: ICD-10-CM

## 2024-05-28 PROCEDURE — 97530 THERAPEUTIC ACTIVITIES: CPT | Mod: KX,PN

## 2024-05-28 PROCEDURE — 97112 NEUROMUSCULAR REEDUCATION: CPT | Mod: KX,PN

## 2024-05-28 NOTE — PROGRESS NOTES
[facility]  Physical Therapy Treatment Note        Name: Iftikhar Lynn  Clinic Number: [id number]    Therapy Diagnosis:   Encounter Diagnoses   Name Primary?    Chronic right-sided low back pain with right-sided sciatica Yes    Decreased strength of lower extremity     Decreased ROM of intervertebral discs of lumbar spine     Impaired flexibility of lower extremity        Physician: Zacarias Siegel MD    Visit Date: 5/28/2024    Physician Orders: PT Eval and Treat   Medical Diagnosis from Referral: Chronic right-sided low back pain with right-sided sciatica   Evaluation Date: 4/2/2024  Authorization Period Expiration: 3/20/2025  Plan of Care Expiration: 5/17/2024  Visit # / Visits author  16 / 20 for HB  (17 / 20 HB)   FOTO Due visit 5: NP  FOTO Due visit 10: done      Precautions: Standard and history of skin cancer  Insurance: Payor: MEDICARE / Plan: MEDICARE PART A & B / Product Type: Government /     MedX Testing:MedX testing visit 2    PTA Visit #: 0/5     Time In: 900 AM  Time Out:  1010 AM  Total Billable Time: 60 minutes  INSURANCE and OUTCOMES: Fee for Service with FOTO Outcomes 2/3    Precautions: standard    Pattern of pain determined: 1 PEP    Subjective     Iftikhar Lynn reports nothing new. He said cardiologist who said oral steroids would be fine. Continues to get some R sided sciatica with sitting. He forgot his roll in the car today and felt it on the way to therapy.     Patient reports tolerating previous visit well   Occupation: Retired but does some part time work from home for the [employer]  Prior Level of Function: Independent  Current Level of Function: Decreased ability to perform ADL and limited tolerance to standing and walking.     Pain:Current 0 /10  Location: right lumbar and intermittently into the right lower extremity       Pain Pattern: 3 PEP  Patients goals: Decrease pain and return to performing  exercise at his gym    Objective      Baseline IM Testing Results:     Date of testin2024  ROM 0-48 deg   Max Peak Torque 85   Min Peak Torque 13    Flex/Ext Ratio 6.5   % below normative data 75%     Date of testin2024  ROM 0-60 deg   Max Peak Torque 115    Min Peak Torque 33    Flex/Ext Ratio 3.5   % below normative data 61   + 66% improvement since evaluation     FOTO: 60 initial , 63 on visit 10    Treatment     Iftikhar received the treatments listed below:      Physical Therapy technician assisted with treatment under direct supervision of treating therapist. Patient received 1:1 treatments for 38 minutes with Physical Therapist.     Iftikhar participated in neuromuscular re-education activities to improve balance, coordination, proprioception, motor control and/or posture for 20 minutes.     This included participation on the [employer] Machine.        2024    10:22 AM   [facility]   Visit Number 16   VAS Pain Rating 0   Treadmill Time (in min.) 3 min   Speed 3 mph   Lumbar Weight 70 lbs   Repetitions 20   Rating of Perceived Exertion 4     Fire hydrant green theraband 2 x 10 each side   Prone alternating arm and leg 10 repetitions each side  Bridging 10 repetitions central, 10 repetitions each LE march as well     Iftikhar participated in therapeutic exercises to develop strength, endurance, ROM, flexibility, posture, and core stabilization for 22 minutes including:    Peripheral muscle strengthening which included one set of 15-20 repetitions at a slow and controlled 10-13 second per rep pace focused on strengthening supporting musculature in order to improve body mechanics and functional mobility. Patient and therapist focused on proper form during treatment to ensure optimal strengthening of each targeted muscle group.  Machines utilized included:Torso rotation, Chest Press, Rowing, and Leg Press    Iftikhar participated in dynamic functional therapeutic activities to improve  functional performance and simulate household and community activities for 18 minutes. The following activities were included:    Prone press ups 20 reps with sag beginning of tx session     Recovery of function:   Supine flexion 10 repetitions, slight pain R side and some stiffness with standing extensions but minimal per pt reports     Repeated supine flexion 10 repetitions no increase stiffness with extension   Seated repeated flexion 10 repetitions, better with standing extension      Lifting mechanics:   Golfers lift /single leg deadlift 10 repetitions each side     Stand to sit/ squat 10 lbs 20 repetitions , tactile cues for proper weight shift into the heels- NP      Iftikhar received manual therapy techniques for 0 minutes. The following activities were included:    Soft Tissue Mobs  PA glides  Mobilizations lumbar spine L1-5 grade 2     Pt given cold pack for  minutes to low back  in z-lie .    Patient Education and Home Exercises     Home exercises include:    Prone or standing extensions, side glides on wall     Cardio program (V5): -  Lifting education (V11): -  Posture/Lumbar roll: recommended  Fridge Magnet Discharge handout (date given): -  Equipment at home/gym membership: yes    Education provided:   - PT role and Plan of Care   - Home exercise program     Written Home Exercises Provided: yes.  Prior exercises were discharged  Exercises were reviewed and Iftikhar was able to demonstrate them prior to the end of the session.  Iftikhar demonstrated good  understanding of the education provided.     See EMR under Patient Instructions for exercises provided 4/5/2024.    Assessment     Patient continues to arrive with reports of sciatica. He is compliant with HEP but continues to get these symptoms with sitting. Physical Therapist has educated patient on possible steroid oral or injection to improve these symptoms. He has verbalized good understanding and plans to return to referring physician for his  thoughts on this. He continues to tolerate tx session well. He does have some discomfort with prone alternating arm and leg but reported ease with increasing repetitions. There was no increase in stiffness repeated seated flexion and actually reported an ease in symptoms following seated flexion compared to following supine flexion. Overall he has made good progress and is approaching discharge.     Pt prognosis is Good.     Patient is making good progress towards established goals.  Pt will continue to benefit from skilled outpatient physical therapy to address the deficits stated in the impairment chart, provide pt/family education and to maximize pt's level of independence in the home and community environment.     Anticipated Barriers for therapy: chronicity of symptoms   Pt's spiritual, cultural and educational needs considered and pt agreeable to plan of care and goals as stated below:     Goals:     Short term goals:  6 weeks or 10 visits   - Pt will demonstrate increased lumbar MedX ROM by at least 3 degrees from the initial ROM value with improvements noted in functional ROM and ability to perform ADLs. MET 5/7/2024  - Pt will demonstrate increased MedX average isometric strength value by 25% from initial test resulting in improved ability to perform bending, lifting, and carrying activities safely, confidently.  MET 5/7/2024  - Pt will report a reduction in worst pain score by 1-2 points for improved tolerance for sitting at his desk. Appropriate and Ongoing  - Pt able to perform HEP correctly with minimal cueing or supervision from therapist to encourage independent management of symptoms.  MET 5/7/2024    Long term goals: 10 weeks or 20 visits   - Pt will demonstrate increased lumbar MedX ROM by at least 6 degrees from initial ROM value, resulting in improved ability to perform functional forward bending while standing and sitting.  MET 5/7/2024  - Pt will demonstrate increased MedX average isometric  strength value by 40% from initial test resulting in improved ability to perform bending, lifting, and carrying activities safely and confidently.  MET 5/7/2024  - Pt to demonstrate ability to independently control and reduce their pain through posture positioning and mechanical movements throughout a typical day.  MET 5/7/2024  - Pt will demonstrate reduced pain and improved functional outcomes as reported on the FOTO by reaching an intake score of >/= 70% functional ability in order to demonstrate subjective improvement in patient's condition. . Appropriate and Ongoing  - Pt will demonstrate independence with the HEP at discharge. Appropriate and Ongoing  - Patient to report improved quality of life evidenced by capacity to complete IADL's without increased symptoms. (patient goal) Appropriate and Ongoing    Plan     Continue with established Plan of Care towards established PT goals.     Ambreen Amador, PT  5/28/2024

## 2024-05-30 ENCOUNTER — CLINICAL SUPPORT (OUTPATIENT)
Dept: REHABILITATION | Facility: HOSPITAL | Age: 84
End: 2024-05-30
Payer: MEDICARE

## 2024-05-30 DIAGNOSIS — G89.29 CHRONIC RIGHT-SIDED LOW BACK PAIN WITH RIGHT-SIDED SCIATICA: Primary | ICD-10-CM

## 2024-05-30 DIAGNOSIS — R29.898 DECREASED STRENGTH OF LOWER EXTREMITY: ICD-10-CM

## 2024-05-30 DIAGNOSIS — M54.41 CHRONIC RIGHT-SIDED LOW BACK PAIN WITH RIGHT-SIDED SCIATICA: Primary | ICD-10-CM

## 2024-05-30 DIAGNOSIS — R29.898 IMPAIRED FLEXIBILITY OF LOWER EXTREMITY: ICD-10-CM

## 2024-05-30 DIAGNOSIS — M53.86 DECREASED ROM OF INTERVERTEBRAL DISCS OF LUMBAR SPINE: ICD-10-CM

## 2024-05-30 PROCEDURE — 97110 THERAPEUTIC EXERCISES: CPT | Mod: PN

## 2024-05-30 PROCEDURE — 97112 NEUROMUSCULAR REEDUCATION: CPT | Mod: PN

## 2024-05-30 PROCEDURE — 97530 THERAPEUTIC ACTIVITIES: CPT | Mod: PN

## 2024-05-30 NOTE — PROGRESS NOTES
[facility]  Physical Therapy Treatment Note      Name: Iftikhar Lynn  Clinic Number: [id number]    Therapy Diagnosis:   Encounter Diagnoses   Name Primary?    Chronic right-sided low back pain with right-sided sciatica Yes    Decreased strength of lower extremity     Decreased ROM of intervertebral discs of lumbar spine     Impaired flexibility of lower extremity      Physician: Zacarias Siegel MD    Visit Date: 5/30/2024    Physician Orders: PT Eval and Treat   Medical Diagnosis from Referral: Chronic right-sided low back pain with right-sided sciatica   Evaluation Date: 4/2/2024  Authorization Period Expiration: 3/20/2025  Plan of Care Expiration: 5/17/2024  Visit # / Visits author  17 / 20 for HB  (18 / 20 HB)   FOTO Due visit 5: NP  FOTO Due visit 10: done      Precautions: Standard and history of skin cancer  Insurance: Payor: MEDICARE / Plan: MEDICARE PART A & B / Product Type: Government /     MedX Testing:MedX testing visit 2    PTA Visit #: 0/5     Time In: 903 AM  Time Out: 1000 AM  Total Billable Time: 57 minutes  INSURANCE and OUTCOMES: Fee for Service with FOTO Outcomes 2/3    Precautions: standard    Pattern of pain determined: 1 PEP    Subjective     Iftikhar Lynn reports hasn't called the MD about injection or steroid yet. Continues to have decreased sitting tolerance. Walked two miles but after a mile started to have sciatica. Reports relief following a calf stretch on a tree during his walk.     Patient reports tolerating previous visit well   Occupation: Retired but does some part time work from home for the [employer]  Prior Level of Function: Independent  Current Level of Function: Decreased ability to perform ADL and limited tolerance to standing and walking.     Pain:Current 0 /10  Location: right lumbar and intermittently into the right lower extremity       Pain Pattern: 3 PEP  Patients goals: Decrease pain and  return to performing exercise at his gym    Objective      Baseline IM Testing Results:     Date of testin2024  ROM 0-48 deg   Max Peak Torque 85   Min Peak Torque 13    Flex/Ext Ratio 6.5   % below normative data 75%     Date of testin2024  ROM 0-60 deg   Max Peak Torque 115    Min Peak Torque 33    Flex/Ext Ratio 3.5   % below normative data 61   + 66% improvement since evaluation     FOTO: 60 initial , 63 on visit 10    Treatment     Iftikhar received the treatments listed below:      Iftikhar participated in neuromuscular re-education activities to improve balance, coordination, proprioception, motor control and/or posture for 22 minutes.     This included participation on the [employer] Machine.        2024     9:51 AM   [facility]   Visit Number 18   VAS Pain Rating 0   Treadmill Time (in min.) 3 min   Speed 2.5 mph   Lumbar Weight 75 lbs   Repetitions 20   Rating of Perceived Exertion 4     Fire hydrant green theraband 2 x 10 each side   Prone alternating arm and leg 10 repetitions each side  Bridging 10 repetitions central, 10 repetitions each LE march as well   + plank on knees 5 x 20 second  + side plank clams ER 3 x 5 repetitions     Iftikhar participated in therapeutic exercises to develop strength, endurance, ROM, flexibility, posture, and core stabilization for 25 minutes including:    Treadmill x 3 minutes gathering subjective 2.5 mph   Peripheral muscle strengthening which included one set of 15-20 repetitions at a slow and controlled 10-13 second per rep pace focused on strengthening supporting musculature in order to improve body mechanics and functional mobility. Patient and therapist focused on proper form during treatment to ensure optimal strengthening of each targeted muscle group.  Machines utilized included:Torso rotation, Chest Press, Rowing, and Leg Press    Iftikhar participated in dynamic functional therapeutic activities to improve functional performance and  simulate household and community activities for 10 minutes. The following activities were included:    Recovery of function:   Repeated supine flexion 10 repetitions, slight pain midline, stiffness   Seated repeated flexion 10 repetitions, better with standing extension      Lifting mechanics:   Golfers lift /single leg deadlift 10 repetitions each side     NP:    Stand to sit/ squat 10 lbs 20 repetitions , tactile cues for proper weight shift into the heels- NP      Iftikhar received manual therapy techniques for 0 minutes. The following activities were included:    Soft Tissue Mobs  PA glides  Mobilizations lumbar spine L1-5 grade 2     Pt given cold pack for  minutes to low back  in z-lie .    Patient Education and Home Exercises     Home exercises include:    Prone or standing extensions, side glides on wall     Cardio program (V5): -  Lifting education (V11): -  Posture/Lumbar roll: recommended  Fridge Magnet Discharge handout (date given): -  Equipment at home/gym membership: yes    Education provided:   - PT role and Plan of Care   - Home exercise program     Written Home Exercises Provided: yes.  Prior exercises were discharged  Exercises were reviewed and Iftikhar was able to demonstrate them prior to the end of the session.  Iftikhar demonstrated good  understanding of the education provided.     See EMR under Patient Instructions for exercises provided 4/5/2024.    Assessment     Patient continues to have sciatica mostly with sitting but also reports onset after walking one mile. He continues to respond to repeated extensions. He tolerate recovery of function well with slight stiffness and midline discomfort when checking extension following flexion but no onset of sciatica. Educated patient to perform recovery of function twice a day. He verbalized good understanding. I still think he may benefit from steroid injection or dose pack to assess change in his sciatica symptoms. Even though he is able to manage  the symptoms independently when symptoms occur, there has been minimal change in frequency and sitting tolerance.     Pt prognosis is Good.     Patient is making good progress towards established goals.  Pt will continue to benefit from skilled outpatient physical therapy to address the deficits stated in the impairment chart, provide pt/family education and to maximize pt's level of independence in the home and community environment.     Anticipated Barriers for therapy: chronicity of symptoms   Pt's spiritual, cultural and educational needs considered and pt agreeable to plan of care and goals as stated below:     Goals:     Short term goals:  6 weeks or 10 visits   - Pt will demonstrate increased lumbar MedX ROM by at least 3 degrees from the initial ROM value with improvements noted in functional ROM and ability to perform ADLs. MET 5/7/2024  - Pt will demonstrate increased MedX average isometric strength value by 25% from initial test resulting in improved ability to perform bending, lifting, and carrying activities safely, confidently.  MET 5/7/2024  - Pt will report a reduction in worst pain score by 1-2 points for improved tolerance for sitting at his desk. Appropriate and Ongoing  - Pt able to perform HEP correctly with minimal cueing or supervision from therapist to encourage independent management of symptoms.  MET 5/7/2024    Long term goals: 10 weeks or 20 visits   - Pt will demonstrate increased lumbar MedX ROM by at least 6 degrees from initial ROM value, resulting in improved ability to perform functional forward bending while standing and sitting.  MET 5/7/2024  - Pt will demonstrate increased MedX average isometric strength value by 40% from initial test resulting in improved ability to perform bending, lifting, and carrying activities safely and confidently.  MET 5/7/2024  - Pt to demonstrate ability to independently control and reduce their pain through posture positioning and mechanical  movements throughout a typical day.  MET 5/7/2024  - Pt will demonstrate reduced pain and improved functional outcomes as reported on the FOTO by reaching an intake score of >/= 70% functional ability in order to demonstrate subjective improvement in patient's condition. . Appropriate and Ongoing  - Pt will demonstrate independence with the HEP at discharge. Appropriate and Ongoing  - Patient to report improved quality of life evidenced by capacity to complete IADL's without increased symptoms. (patient goal) Appropriate and Ongoing    Plan     Continue with established Plan of Care towards established PT goals.     Ambreen Amador, PT  5/30/2024

## 2024-05-30 NOTE — PROGRESS NOTES
[facility]  Physical Therapy Treatment Note      Name: Iftikhar Lynn  Clinic Number: [id number]    Therapy Diagnosis:   Encounter Diagnoses   Name Primary?    Chronic right-sided low back pain with right-sided sciatica Yes    Decreased strength of lower extremity     Decreased ROM of intervertebral discs of lumbar spine     Impaired flexibility of lower extremity        Physician: Zacarias Siegel MD    Visit Date: 2024    Physician Orders: PT Eval and Treat   Medical Diagnosis from Referral: Chronic right-sided low back pain with right-sided sciatica   Evaluation Date: 2024  Authorization Period Expiration: 3/20/2025  Plan of Care Expiration: 2024  Visit # / Visits author   for HB  ( HB)   FOTO Due visit 5: NP  FOTO Due visit 10: done      Precautions: Standard and history of skin cancer  Insurance: Payor: MEDICARE / Plan: MEDICARE PART A & B / Product Type: Government /     MedX Testing:MedX testing visit 2    PTA Visit #: 0/5     Time In: 903 AM  Time Out: ***  Total Billable Time: 60 minutes  INSURANCE and OUTCOMES: Fee for Service with FOTO Outcomes 2/3    Precautions: standard    Pattern of pain determined: 1 PEP    Subjective     Iftikhar Lynn reports hasn't called the MD about injection or steroid yet. Continues to have decreased sitting tolerance.     Patient reports tolerating previous visit well   Occupation: Retired but does some part time work from home for the [employer]  Prior Level of Function: Independent  Current Level of Function: Decreased ability to perform ADL and limited tolerance to standing and walking.     Pain:Current 0 /10  Location: right lumbar and intermittently into the right lower extremity       Pain Pattern: 3 PEP  Patients goals: Decrease pain and return to performing exercise at his gym    Objective      Baseline IM Testing Results:     Date of testin2024  ROM 0-48  deg   Max Peak Torque 85   Min Peak Torque 13    Flex/Ext Ratio 6.5   % below normative data 75%     Date of testin2024  ROM 0-60 deg   Max Peak Torque 115    Min Peak Torque 33    Flex/Ext Ratio 3.5   % below normative data 61   + 66% improvement since evaluation     FOTO: 60 initial , 63 on visit 10    Treatment     Iftikhar received the treatments listed below:      Iftikhar participated in neuromuscular re-education activities to improve balance, coordination, proprioception, motor control and/or posture for 20 minutes.     This included participation on the [employer].        2024    10:22 AM   [facility]   Visit Number 16   VAS Pain Rating 0   Treadmill Time (in min.) 3 min   Speed 3 mph   Lumbar Weight 70 lbs   Repetitions 20   Rating of Perceived Exertion 4     Fire hydrant green theraband 2 x 10 each side   Prone alternating arm and leg 10 repetitions each side  Bridging 10 repetitions central, 10 repetitions each LE march as well     Iftikhar participated in therapeutic exercises to develop strength, endurance, ROM, flexibility, posture, and core stabilization for 22 minutes including:    Peripheral muscle strengthening which included one set of 15-20 repetitions at a slow and controlled 10-13 second per rep pace focused on strengthening supporting musculature in order to improve body mechanics and functional mobility. Patient and therapist focused on proper form during treatment to ensure optimal strengthening of each targeted muscle group.  Machines utilized included:Torso rotation, Chest Press, Rowing, and Leg Press    Iftikhar participated in dynamic functional therapeutic activities to improve functional performance and simulate household and community activities for 18 minutes. The following activities were included:    Prone press ups 20 reps with sag beginning of tx session     Recovery of function:   Supine flexion 10 repetitions, slight pain R side and some stiffness  with standing extensions but minimal per pt reports     Repeated supine flexion 10 repetitions no increase stiffness with extension   Seated repeated flexion 10 repetitions, better with standing extension      Lifting mechanics:   Golfers lift /single leg deadlift 10 repetitions each side     Stand to sit/ squat 10 lbs 20 repetitions , tactile cues for proper weight shift into the heels- NP      Iftikhar received manual therapy techniques for 0 minutes. The following activities were included:    Soft Tissue Mobs  PA glides  Mobilizations lumbar spine L1-5 grade 2     Pt given cold pack for  minutes to low back  in z-lie .    Patient Education and Home Exercises     Home exercises include:    Prone or standing extensions, side glides on wall     Cardio program (V5): -  Lifting education (V11): -  Posture/Lumbar roll: recommended  Fridge Magnet Discharge handout (date given): -  Equipment at home/gym membership: yes    Education provided:   - PT role and Plan of Care   - Home exercise program     Written Home Exercises Provided: yes.  Prior exercises were discharged  Exercises were reviewed and Iftikhar was able to demonstrate them prior to the end of the session.  Iftikhar demonstrated good  understanding of the education provided.     See EMR under Patient Instructions for exercises provided 4/5/2024.    Assessment     Patient continues to arrive with reports of sciatica. He is compliant with HEP but continues to get these symptoms with sitting. Physical Therapist has educated patient on possible steroid oral or injection to improve these symptoms. He has verbalized good understanding and plans to return to referring physician for his thoughts on this. He continues to tolerate tx session well. He does have some discomfort with prone alternating arm and leg but reported ease with increasing repetitions. There was no increase in stiffness repeated seated flexion and actually reported an ease in symptoms following seated  flexion compared to following supine flexion. Overall he has made good progress and is approaching discharge.     Pt prognosis is Good.     Patient is making good progress towards established goals.  Pt will continue to benefit from skilled outpatient physical therapy to address the deficits stated in the impairment chart, provide pt/family education and to maximize pt's level of independence in the home and community environment.     Anticipated Barriers for therapy: chronicity of symptoms   Pt's spiritual, cultural and educational needs considered and pt agreeable to plan of care and goals as stated below:     Goals:     Short term goals:  6 weeks or 10 visits   - Pt will demonstrate increased lumbar MedX ROM by at least 3 degrees from the initial ROM value with improvements noted in functional ROM and ability to perform ADLs. MET 5/7/2024  - Pt will demonstrate increased MedX average isometric strength value by 25% from initial test resulting in improved ability to perform bending, lifting, and carrying activities safely, confidently.  MET 5/7/2024  - Pt will report a reduction in worst pain score by 1-2 points for improved tolerance for sitting at his desk. Appropriate and Ongoing  - Pt able to perform HEP correctly with minimal cueing or supervision from therapist to encourage independent management of symptoms.  MET 5/7/2024    Long term goals: 10 weeks or 20 visits   - Pt will demonstrate increased lumbar MedX ROM by at least 6 degrees from initial ROM value, resulting in improved ability to perform functional forward bending while standing and sitting.  MET 5/7/2024  - Pt will demonstrate increased MedX average isometric strength value by 40% from initial test resulting in improved ability to perform bending, lifting, and carrying activities safely and confidently.  MET 5/7/2024  - Pt to demonstrate ability to independently control and reduce their pain through posture positioning and mechanical movements  throughout a typical day.  MET 5/7/2024  - Pt will demonstrate reduced pain and improved functional outcomes as reported on the FOTO by reaching an intake score of >/= 70% functional ability in order to demonstrate subjective improvement in patient's condition. . Appropriate and Ongoing  - Pt will demonstrate independence with the HEP at discharge. Appropriate and Ongoing  - Patient to report improved quality of life evidenced by capacity to complete IADL's without increased symptoms. (patient goal) Appropriate and Ongoing    Plan     Continue with established Plan of Care towards established PT goals.     Ambreen Amador, PT  5/30/2024

## 2024-06-04 ENCOUNTER — CLINICAL SUPPORT (OUTPATIENT)
Dept: REHABILITATION | Facility: HOSPITAL | Age: 84
End: 2024-06-04
Payer: MEDICARE

## 2024-06-04 DIAGNOSIS — R29.898 IMPAIRED FLEXIBILITY OF LOWER EXTREMITY: ICD-10-CM

## 2024-06-04 DIAGNOSIS — M53.86 DECREASED ROM OF INTERVERTEBRAL DISCS OF LUMBAR SPINE: ICD-10-CM

## 2024-06-04 DIAGNOSIS — M54.41 CHRONIC RIGHT-SIDED LOW BACK PAIN WITH RIGHT-SIDED SCIATICA: Primary | ICD-10-CM

## 2024-06-04 DIAGNOSIS — R29.898 DECREASED STRENGTH OF LOWER EXTREMITY: ICD-10-CM

## 2024-06-04 DIAGNOSIS — G89.29 CHRONIC RIGHT-SIDED LOW BACK PAIN WITH RIGHT-SIDED SCIATICA: Primary | ICD-10-CM

## 2024-06-04 PROCEDURE — 97530 THERAPEUTIC ACTIVITIES: CPT | Mod: PN,CQ

## 2024-06-04 PROCEDURE — 97112 NEUROMUSCULAR REEDUCATION: CPT | Mod: PN,CQ

## 2024-06-04 PROCEDURE — 97110 THERAPEUTIC EXERCISES: CPT | Mod: PN,CQ

## 2024-06-04 NOTE — PROGRESS NOTES
[facility] OUTPATIENT THERAPY AND WELLNESS - [facility]  Physical Therapy Treatment Note      Name: Iftikhar Lynn  Clinic Number: [id number]    Therapy Diagnosis:   Encounter Diagnoses   Name Primary?    Chronic right-sided low back pain with right-sided sciatica Yes    Decreased strength of lower extremity     Decreased ROM of intervertebral discs of lumbar spine     Impaired flexibility of lower extremity        Physician: Zacarias Siegel MD    Visit Date: 6/4/2024    Physician Orders: PT Eval and Treat   Medical Diagnosis from Referral: Chronic right-sided low back pain with right-sided sciatica   Evaluation Date: 4/2/2024  Authorization Period Expiration: 3/20/2025  Plan of Care Expiration: 5/17/2024  Visit # / Visits author  19 / 20 for HB    FOTO Due visit 5: NP  FOTO Due visit 10: done      Precautions: Standard and history of skin cancer  Insurance: Payor: MEDICARE / Plan: MEDICARE PART A & B / Product Type: Government /     MedX Testing:MedX testing visit 2    PTA Visit #: 1/5     Time In: 0853 AM  Time Out:  1003 AM  Total Billable Time:  53 minutes (+10 minutes cold pack)    INSURANCE and OUTCOMES: Fee for Service with FOTO Outcomes 2/3    Precautions: standard    Pattern of pain determined: 1 PEP    Subjective     Iftikhar Lynn reports his sciatica is bothering him this morning ut he walked a lot over the weekend and that helps it feel better.   Pt stated he was sitting at his desk for about 45 minutes and he got up to go walk and he couldn't because his back hurt too bad.  Pt stated he laid down on the heating pad for a little while and then he was able to get up and walk 4 miles.  Middle of back feels sore.    Patient reports tolerating previous visit well   Occupation: Retired but does some part time work from home for the [email or web address]  Prior Level of Function: Independent  Current Level of Function: Decreased ability to perform ADL and limited tolerance to standing and  walking.     Pain:Current 0 /10  Location: right lumbar and intermittently into the right lower extremity       Pain Pattern: 3 PEP  Patients goals: Decrease pain and return to performing exercise at his gym    Objective      Baseline IM Testing Results:     Date of testin2024  ROM 0-48 deg   Max Peak Torque 85   Min Peak Torque 13    Flex/Ext Ratio 6.5   % below normative data 75%     Date of testin2024  ROM 0-60 deg   Max Peak Torque 115    Min Peak Torque 33    Flex/Ext Ratio 3.5   % below normative data 61   + 66% improvement since evaluation     FOTO: 60 initial , 63 on visit 10    Treatment     Iftikhar received the treatments listed below:      Iftikhar participated in neuromuscular re-education activities to improve balance, coordination, proprioception, motor control and/or posture for 23 minutes.     This included participation on the [employer]        2024     9:50 AM   [facility]   Visit Number 19   VAS Pain Rating 0   Treadmill Time (in min.) 7 min   Speed 3 mph   Lumbar Weight 75 lbs   Repetitions 20   Rating of Perceived Exertion 2   Ice - Z Lie (in min.) 10        Fire hydrant green theraband 2 x 10 each side   Bird/dog x 10 reps   Bridging x 10 repetitions central, 10 repetitions each LE march as well   Open books x 10 reps each  + plank on knees 5 x 20 second  + side plank clams external rotation  3 x 5 repetitions     Not performed:  Prone alternating arm and leg 10 repetitions each side    Iftikhar participated in therapeutic exercises to develop strength, endurance, ROM, flexibility, posture, and core stabilization for 20 minutes including:    Treadmill x 7 minutes gathering subjective 3 mph   Peripheral muscle strengthening which included one set of 15-20 repetitions at a slow and controlled 10-13 second per rep pace focused on strengthening supporting musculature in order to improve body mechanics and functional mobility. Patient and therapist focused on  proper form during treatment to ensure optimal strengthening of each targeted muscle group.  Machines utilized included:Torso rotation, Chest Press, Rowing, and Leg Press    Iftikhar participated in dynamic functional therapeutic activities to improve functional performance and simulate household and community activities for 10 minutes. The following activities were included:    Recovery of function:   Repeated supine flexion x 10 repetitions, feels good  Seated repeated flexion x 10 repetitions    Lifting mechanics:   Golfers lift /single leg deadlift 10 repetitions each side     Not Performed:  Stand to sit/ squat 10 lbs 20 repetitions , tactile cues for proper weight shift into the heels- NP      Iftikhar received manual therapy techniques for 0 minutes. The following activities were included:    Soft Tissue Mobs  PA glides  Mobilizations lumbar spine L1-5 grade 2     Pt given cold pack for  minutes to low back  in z-lie .    Patient Education and Home Exercises     Home exercises include:    Prone or standing extensions, side glides on wall     Cardio program (V5): -  Lifting education (V11): -  Posture/Lumbar roll: recommended  Fridge Magnet Discharge handout (date given): -  Equipment at home/gym membership: yes    Education provided:   - PT role and Plan of Care   - Home exercise program     Written Home Exercises Provided: yes.  Prior exercises were discharged  Exercises were reviewed and Iftikhar was able to demonstrate them prior to the end of the session.  Iftikhar demonstrated good  understanding of the education provided.     See EMR under Patient Instructions for exercises provided 4/5/2024.    Assessment     Patient presents to PT reporting sciatica discomfort today.  Side planks and planks on knees were added today.  No complications or adverse effects.  Pt also given a copy of these exercises for his Home exercise program.    Pt prognosis is Good.     Patient is making good progress towards established  goals.  Pt will continue to benefit from skilled outpatient physical therapy to address the deficits stated in the impairment chart, provide pt/family education and to maximize pt's level of independence in the home and community environment.     Anticipated Barriers for therapy: chronicity of symptoms   Pt's spiritual, cultural and educational needs considered and pt agreeable to plan of care and goals as stated below:     Goals:     Short term goals:  6 weeks or 10 visits   - Pt will demonstrate increased lumbar MedX ROM by at least 3 degrees from the initial ROM value with improvements noted in functional ROM and ability to perform ADLs. MET 5/7/2024  - Pt will demonstrate increased MedX average isometric strength value by 25% from initial test resulting in improved ability to perform bending, lifting, and carrying activities safely, confidently.  MET 5/7/2024  - Pt will report a reduction in worst pain score by 1-2 points for improved tolerance for sitting at his desk. Appropriate and Ongoing  - Pt able to perform HEP correctly with minimal cueing or supervision from therapist to encourage independent management of symptoms.  MET 5/7/2024    Long term goals: 10 weeks or 20 visits   - Pt will demonstrate increased lumbar MedX ROM by at least 6 degrees from initial ROM value, resulting in improved ability to perform functional forward bending while standing and sitting.  MET 5/7/2024  - Pt will demonstrate increased MedX average isometric strength value by 40% from initial test resulting in improved ability to perform bending, lifting, and carrying activities safely and confidently.  MET 5/7/2024  - Pt to demonstrate ability to independently control and reduce their pain through posture positioning and mechanical movements throughout a typical day.  MET 5/7/2024  - Pt will demonstrate reduced pain and improved functional outcomes as reported on the FOTO by reaching an intake score of >/= 70% functional ability in  order to demonstrate subjective improvement in patient's condition. . Appropriate and Ongoing  - Pt will demonstrate independence with the HEP at discharge. Appropriate and Ongoing  - Patient to report improved quality of life evidenced by capacity to complete IADL's without increased symptoms. (patient goal) Appropriate and Ongoing    Plan     Continue with established Plan of Care towards established PT goals.     Poonam Escoto, PTA  6/4/2024

## 2024-06-06 ENCOUNTER — CLINICAL SUPPORT (OUTPATIENT)
Dept: REHABILITATION | Facility: HOSPITAL | Age: 84
End: 2024-06-06
Payer: MEDICARE

## 2024-06-06 DIAGNOSIS — G89.29 CHRONIC RIGHT-SIDED LOW BACK PAIN WITH RIGHT-SIDED SCIATICA: Primary | ICD-10-CM

## 2024-06-06 DIAGNOSIS — R29.898 DECREASED STRENGTH OF LOWER EXTREMITY: ICD-10-CM

## 2024-06-06 DIAGNOSIS — R29.898 IMPAIRED FLEXIBILITY OF LOWER EXTREMITY: ICD-10-CM

## 2024-06-06 DIAGNOSIS — M54.41 CHRONIC RIGHT-SIDED LOW BACK PAIN WITH RIGHT-SIDED SCIATICA: Primary | ICD-10-CM

## 2024-06-06 DIAGNOSIS — M53.86 DECREASED ROM OF INTERVERTEBRAL DISCS OF LUMBAR SPINE: ICD-10-CM

## 2024-06-06 PROCEDURE — 97530 THERAPEUTIC ACTIVITIES: CPT | Mod: PN

## 2024-06-06 PROCEDURE — 97112 NEUROMUSCULAR REEDUCATION: CPT | Mod: PN

## 2024-06-06 PROCEDURE — 97110 THERAPEUTIC EXERCISES: CPT | Mod: PN

## 2024-06-06 NOTE — PATIENT INSTRUCTIONS
"HEALTHY BACK TOOLS        KEEP YOUR SPINE FEELING FINE   HEALTHY HABITS   Do your "GO TO" stretches 2/day   Get a good night's REST   Watch your POSTURE in sitting/standing Drink PLENTY of water   Use a lumbar roll Eat LOTS of fruits & vegetables   GET UP often (walk and/or stretch) Manage your STRESS   Make your workplace IDEAL FOR YOU  Don't smoke   Lift correctly EXERCISE   Practice positive self talk-talk to yourself kindly like you would your best friend                         WHAT TO DO WHEN SYMPTOMS FLARE UP  Back and neck pain may occasionally flare up.  If you experience a flare   up, remember your tools. Be encouraged, by remembering that flare-ups will   usually pass.   My Tools:    ~Use your "Go To" Stretches/Positions   ~Keep Moving-pain usually gets better if you move  ~Z lie (with or without ice)  10 min several times a day until symptoms reduce  ~Slowly resume normal activities   ~Practice Deep Breathing and Relaxation techniques                                                 MY EXERCISE PLAN  GO TO STRETCHES  2/day (like brushing your teeth) STRENGTHENING  2-3 times/week CARDIO PROGRAM   min/week   Prone press ups  Bridging  Walking    Standing extensions  Bird/dog      Fire hydrants     Open books      Prone alternating       Exercise Machine Guide    Back Extension  Instructions    Setup:  1) Select an appropriate weight.  2) Adjust the movement arm to a comfortable starting position.  3) Position your feet on the foot rests.  4) Rest your lower back against the back pad.  Exercise Action:  -Cross your arms in front of your chest.  -Extend back in a controlled motion while maintaining a neutral spine.  -Pause at full contraction.  -Slowly return to the start position.   Training Tips:  -Keep your lower back against the back pad.  -Avoid overextending.  -Beginners should start with a reduced range of motion.    Torso Rotation  Instructions    Setup:  1) Select an appropriate weight.  2) Sit with " your back against the pads.  3) Select your starting position (left or right).  4) Place your elbows on the outside of the roller pads and grasp both handles.  5) Gently  the support pad with your knees.  Exercise Action:  -While bracing your upper body with your arms and hands, rotate in the desired direction using a slow, controlled motion.   -Pause at full contraction.  -Slowly return to the start position.  -Repeat the exercises in the other starting position.  Training Tips:  -Use a lighter weight and higher repetitions for this exercise.  -Maintain a loose  on the handles while training.   -Maintain a neutral spine and engage your abdominal muscles prior to each rotation.   Chest Press  Instructions    Setup:  1) Select an appropriate weight.  2) Adjust the seat so the handles are aligned with your mid-chest.  3) Press the foot assist to pull the handles forward into a comfortable starting position.   4) Grasp both handles and slowly release the foot assist. Place feet firmly on the floor.  Exercise Action:  -Extend your arms in a controlled motion.  -Slowly return to the start position.  -Use the foot assistant to return the handles to the rest position.  Training Tips:  -Avoid locking you elbows.  -To vary your training routine, try both hand .   Seated Row  Instructions    Setup:  1) Select an appropriate weight.  2) Adjust the seat so the chest pad is slightly below shoulder level.  Exercise Action:  -Grasp both handles.   -Bend your elbows slightly prior to starting the movement.  -Pull the handles toward you in a controlled motion.  -Slowly return to the start position, maintaining a slight bend at the elbow between each repetition.  Training Tips:  -Keep your head in a neutral position and your chest firmly against the chest pad.   -Avoid elevating your shoulders while performing the movement.   Triceps Extension  Instructions    Setup:  1) Select an appropriate weight.  2) Adjust the seat  height so your upper arms rest flat on the pad.  3) Align your elbows with the pivot.  Exercise Action:  -Grasp both handles.  -Extend your arms in a controlled motion.   -Pause at full extension.  -Slowly return to the start position.  Training Tips:  -Keep your upper arms flat on the pad.  -Maintain a slight bend in your elbow at the end of your range of motion.   Bicep Curl  Instructions    Setup:  1) Select an appropriate weight.  2) Adjust the seat height so your upper arms rest flat on the pad.  3) Align your elbows with the pivot.  Exercise Action:  -Grasp both handles.  -Bend your elbows slightly prior to starting the movement.  -Curl your arms up in a controlled motion.  -Slowly return to the start position, maintaining a slight bend at the elbow between each repetition.  Training Tips:  -Keep your upper arms flat on the pad and maintain a neutral spine at all times.   -To maintain even pacing for each repetition, count to two in each direction as you move.   Leg Press  Instructions    Setup:  1) Select as appropriate weight.   2) Sit and place your feet on the footplate approximately shoulder-width apart with your lower leg perpendicular to the footplate.   3) Raise the seat handle and position the seat so your knees are at a 90° angle. Release the handle to lock your starting position prior to beginning the movement.  Exercise Action:  -Extend your legs in a controlled motion.  -Pause at full contraction.  -Slowly return to the start position.  Training Tips:  -Avoid locking your knees.   -Keep your back in contact with the pad at all times.   -Varying your foot position will change the training effect.   Leg Extension  Instructions    Setup:  1) Select an appropriate weight.  2) Align your knees with the pivot by adjusting the back pad.  3) Adjust the roller pad to a comfortable position atop your ankle.   4) Set movement arm to your desired start position.  Exercise Action:  -Grasp both  handles.  -Extend your legs in a controlled motion.   -Pause at full extension.  -Slowly return to start position.  Training Tips:  -Avoid locking knees at full extension.  -Maintain contact with the back pad throughout your range of motion.   Leg Curl  Instructions    Setup:  1) Adjust the movement arm to a lowered position for easy equipment entry.   2) Select an appropriate weight.  3) Align your knees with the pivot by adjusting the back pad.  4) Adjust the ankle pad to a comfortable position.   1) Set the movement arm to your desired start position.  5) Make sure the tibia pad is positioned below your knee cap.   Exercise Action:  -Grasp both handles.  -Curl your legs in a controlled motion.  -Pause at full contraction.  -Slowly return to the start position.   Training Tips:  -Avoid locking knee in the starting position.  -Maintain contact with the back pad throughout the movement.   Hip Abduction  Instructions    Setup:  1) Select an appropriate weight.  2) Use the adjustment handle to set the movement arms to slightly open position.  3) Sit down with your outer thighs resting against the thigh pads.  4) Set the movement arms to position your legs together.   Exercise Action:  -Grasp both handles.  -Press thighs outward in a controlled motion.  -Pause at full contraction.  -Slowly return to the start position.  Training Tips:  -You can ratchet the movement arms inward. Use the adjustment handles to move them outward.  -Avoid jerking motion throughout this exercise.  -Maintain contact with the back pad throughout the exercise.   Hip Adduction Instructions    Setup:  1) Select an appropriate weight.  2) Use the adjustment handle to close the movement arms.  3) Sit down with your inner thighs resting against the thigh pads.  4) Open the movement arms to your desired start position.   Exercise Action:  -Grasp both handles.  -Squeeze thighs inward in a controlled motion.   -Pause at full contraction.  -Slowly  return to the start position.  Training Tips:  -Select a starting position that feels comfortable for your hips, yet provides the greatest possible range of motion.  -You can ratchet the movement arms outward. Use the adjustment handle to move them inward.

## 2024-06-06 NOTE — PLAN OF CARE
Outpatient [facility] Program Discharge Summary     Name: Iftikhar Lynn  Clinic Number: [id number]    Therapy Diagnosis:   Encounter Diagnoses   Name Primary?    Chronic right-sided low back pain with right-sided sciatica Yes    Decreased strength of lower extremity     Decreased ROM of intervertebral discs of lumbar spine     Impaired flexibility of lower extremity      Physician: Zacarias Siegel MD      Physician Orders: PT Eval and Treat   Medical Diagnosis from Referral: Chronic right-sided low back pain with right-sided sciatica   Evaluation Date: 2024      Date of Last visit: 2024  Total Visits Received: 20     Pattern of pain determined: 1 PEP    Subjective     Iftikhar Lynn reports he has been walking three miles without complaints of sciatica. The extensions continue to help with his sciatica but he continues to get the symptoms with sitting.      Patient reports tolerating previous visit well   Occupation: Retired but does some part time work from home for the [employer]  Prior Level of Function: Independent  Current Level of Function: Decreased ability to perform ADL and limited tolerance to standing and walking.     Pain:Current 0 /10  Location: right lumbar and intermittently into the right lower extremity        Pain Pattern: 3 PEP  Patients goals: Decrease pain and return to performing exercise at his gym        Objective      Date of testin2024  ROM 0-48 deg   Max Peak Torque 85   Min Peak Torque 13    Flex/Ext Ratio 6.5   % below normative data 75%      Date of testin2024  ROM 0-60 deg   Max Peak Torque 115    Min Peak Torque 33    Flex/Ext Ratio 3.5   % below normative data 61   + 66% improvement since evaluation      Date of testin2024  ROM 0-66 deg   Max Peak Torque 131   Min Peak Torque 28   Flex/Ext Ratio 4.7   % below normative data 58%   + 71% improvement since evaluation      FOTO: 60 initial , 63 on visit 10  FOTO visit 20:  60    Assessment    Goals:     Goals:     Short term goals:  6 weeks or 10 visits   - Pt will demonstrate increased lumbar MedX ROM by at least 3 degrees from the initial ROM value with improvements noted in functional ROM and ability to perform ADLs. MET 5/7/2024  - Pt will demonstrate increased MedX average isometric strength value by 25% from initial test resulting in improved ability to perform bending, lifting, and carrying activities safely, confidently.  MET 5/7/2024  - Pt will report a reduction in worst pain score by 1-2 points for improved tolerance for sitting at his desk. MET 6/6/2024  - Pt able to perform HEP correctly with minimal cueing or supervision from therapist to encourage independent management of symptoms.  MET 5/7/2024    Long term goals: 10 weeks or 20 visits   - Pt will demonstrate increased lumbar MedX ROM by at least 6 degrees from initial ROM value, resulting in improved ability to perform functional forward bending while standing and sitting.  MET 5/7/2024  - Pt will demonstrate increased MedX average isometric strength value by 40% from initial test resulting in improved ability to perform bending, lifting, and carrying activities safely and confidently.  MET 5/7/2024  - Pt to demonstrate ability to independently control and reduce their pain through posture positioning and mechanical movements throughout a typical day.  MET 5/7/2024  - Pt will demonstrate reduced pain and improved functional outcomes as reported on the FOTO by reaching an intake score of >/= 70% functional ability in order to demonstrate subjective improvement in patient's condition. . Not met   - Pt will demonstrate independence with the HEP at discharge. . MET 6/6/2024  - Patient to report improved quality of life evidenced by capacity to complete IADL's without increased symptoms. . MET 6/6/2024    Patient has attended 20 visits of the [email or web address] program for aerobic work, med ex isometric testing with dynamic  strengthening on med ex equipment for spine, whole body strengthening on med ex equipment, HEP, education.  Patient demonstrates improvement in ability to reduce symptoms, improved posture, improved ROM and improved strength.   Reviewed HEP, and importance of maintaining a healthy spine through continued stretching and performance of HEP, good posture, good ergonomics, good body mech with ADL, leisure, and work.  Discharge handout with HEP given, and discussed aspects of exercise program, cardio, strengthening, and stretching.    Patient expressed understanding information given.      -Improved posture,   with using lumbar roll  -Improved 18 ROM,  initially 48 on med ex test and currently 66  -Improved strength on lumbar med ex IM test with 71% average improvement noted and reduced pain noted by patient    Discharge reason: Patient has reached the maximum rehab potential for the present time    Plan   This patient is discharged from Physical Therapy    Ambreen Amador, PT

## 2024-06-06 NOTE — PROGRESS NOTES
[facility] - [facility]  Physical Therapy Treatment Note      Name: Iftikhar Lynn  Clinic Number: [id number]    Therapy Diagnosis:   Encounter Diagnoses   Name Primary?    Chronic right-sided low back pain with right-sided sciatica Yes    Decreased strength of lower extremity     Decreased ROM of intervertebral discs of lumbar spine     Impaired flexibility of lower extremity        Physician: Zacarias Siegel MD    Visit Date: 6/6/2024    Physician Orders: PT Eval and Treat   Medical Diagnosis from Referral: Chronic right-sided low back pain with right-sided sciatica   Evaluation Date: 4/2/2024  Authorization Period Expiration: 3/20/2025  Plan of Care Expiration: 5/17/2024  Visit # / Visits author  20 / 20 for HB    FOTO Due visit 5: NP  FOTO Due visit 10: done      Precautions: Standard and history of skin cancer  Insurance: Payor: MEDICARE / Plan: MEDICARE PART A & B / Product Type: Government /     MedX Testing:MedX testing visit 2    PTA Visit #: 0/5     Time In: 908 AM  Time Out:  1001 AM  Total Billable Time:  53 minutes (+10 minutes cold pack)    INSURANCE and OUTCOMES: Fee for Service with FOTO Outcomes 2/3    Precautions: standard    Pattern of pain determined: 1 PEP    Subjective     Iftikhar Lynn reports he has been walking three miles without complaints of sciatica. The extensions continue to help with his sciatica but he continues to get the symptoms with sitting.     Patient reports tolerating previous visit well   Occupation: Retired but does some part time work from home for the [employer]  Prior Level of Function: Independent  Current Level of Function: Decreased ability to perform ADL and limited tolerance to standing and walking.     Pain:Current 0 /10  Location: right lumbar and intermittently into the right lower extremity       Pain Pattern: 3 PEP  Patients goals: Decrease pain and return to performing exercise at his  gym    Objective      Baseline IM Testing Results:     Date of testin2024  ROM 0-48 deg   Max Peak Torque 85   Min Peak Torque 13    Flex/Ext Ratio 6.5   % below normative data 75%     Date of testin2024  ROM 0-60 deg   Max Peak Torque 115    Min Peak Torque 33    Flex/Ext Ratio 3.5   % below normative data 61   + 66% improvement since evaluation     Date of testin2024  ROM 0-66 deg   Max Peak Torque 131   Min Peak Torque 28   Flex/Ext Ratio 4.7   % below normative data 58%   + 71% improvement since evaluation     FOTO: 60 initial , 63 on visit 10  FOTO visit 20: 60    Treatment     Iftikhar received the treatments listed below:      Iftikhar participated in neuromuscular re-education activities to improve balance, coordination, proprioception, motor control and/or posture for 20 minutes.     This included participation on the [employer]        2024     9:58 AM   [facility]   Visit Number 20   VAS Pain Rating 0   Lumbar Flexion 66   Lumbar Extension 0   Lumbar Peak Torque 131 ft. lbs.   Min Torque 28   Test Percent Below Normative Data 58 %   Test Percent Gain in Strength from Initial  71 %     NP:  Fire hydrant green theraband 2 x 10 each side   Bird/dog x 10 reps   Bridging x 10 repetitions central, 10 repetitions each LE march as well   Open books x 10 reps each  + plank on knees 5 x 20 second  + side plank clams external rotation  3 x 5 repetitions     Not performed:  Prone alternating arm and leg 10 repetitions each side    Iftikhar participated in therapeutic exercises to develop strength, endurance, ROM, flexibility, posture, and core stabilization for 26 minutes including:    Treadmill x 3 minutes gathering subjective 3 mph , onset of some RLE tingling   Peripheral muscle strengthening which included one set of 15-20 repetitions at a slow and controlled 10-13 second per rep pace focused on strengthening supporting musculature in order to improve body mechanics and  functional mobility. Patient and therapist focused on proper form during treatment to ensure optimal strengthening of each targeted muscle group.  Machines utilized included:Torso rotation, Chest Press, Rowing, and Leg Press    Iftikhar participated in dynamic functional therapeutic activities to improve functional performance and simulate household and community activities for 7 minutes. The following activities were included:    Educated on discharge paperwork, medx testing results, expectations, follow up with MD for possible injection or dose pack for relief of sciatica    Patient Education and Home Exercises     Home exercises include:    Prone or standing extensions, side glides on wall     Cardio program (V5): -  Lifting education (V11): -  Posture/Lumbar roll: recommended  [employer] Discharge handout (date given): - 6/6/2024  Equipment at home/gym membership: yes    Education provided:   - PT role and Plan of Care   - Home exercise program     Written Home Exercises Provided: yes.  Prior exercises were discharged  Exercises were reviewed and Iftikhar was able to demonstrate them prior to the end of the session.  Iftikhar demonstrated good  understanding of the education provided.     See EMR under Patient Instructions for exercises provided 4/5/2024.    Assessment     Tx session focused on preparation for discharge today. Discharge paper work given and reviewed as well as medx testing performed today.     Plan     See discharge note.     Ambreen Amdaor, PT  6/6/2024

## 2024-06-29 DIAGNOSIS — I49.8 OTHER CARDIAC ARRHYTHMIA: ICD-10-CM

## 2024-07-04 RX ORDER — METOPROLOL SUCCINATE 25 MG/1
25 TABLET, EXTENDED RELEASE ORAL 2 TIMES DAILY
Qty: 180 TABLET | Refills: 3 | Status: SHIPPED | OUTPATIENT
Start: 2024-07-04

## 2024-07-29 ENCOUNTER — TELEPHONE (OUTPATIENT)
Dept: FAMILY MEDICINE | Facility: CLINIC | Age: 84
End: 2024-07-29
Payer: MEDICARE

## 2024-07-29 DIAGNOSIS — I10 PRIMARY HYPERTENSION: Primary | ICD-10-CM

## 2024-07-29 DIAGNOSIS — E78.2 MIXED HYPERLIPIDEMIA: ICD-10-CM

## 2024-07-29 NOTE — TELEPHONE ENCOUNTER
Patient has an appointment on 8/5/2024   He is requesting lab orders per to the appointment   Labs pended      ----- Message from Cheryl Ojeda sent at 7/29/2024  2:29 PM CDT -----  Type: Needs Medical Advice  Who Called:  pt  Best Call Back Number: [phone]  Additional Information: pt is calling in regards to needing his lab orders put in so that he can have the done before his upcoming appt next week. Please call the pt when the orders are in. Please call back and advise. Thanks!

## 2024-07-30 NOTE — TELEPHONE ENCOUNTER
Spoke to patient.   He is requesting his Lipids to be ordered   He states he has been drinking some alcohol and he wants to check his Triglycerides

## 2024-07-30 NOTE — TELEPHONE ENCOUNTER
He really does not need a lipid profile. His TG were 56 last time. TG are going to change day to day based on what you eat or drink. If he really wants a lipid profile I will do it but it is a waste of resources and will not provide any more information than we already know: Alcohol increases TG and not drinking will lower them.

## 2024-08-02 ENCOUNTER — LAB VISIT (OUTPATIENT)
Dept: LAB | Facility: HOSPITAL | Age: 84
End: 2024-08-02
Attending: FAMILY MEDICINE
Payer: MEDICARE

## 2024-08-02 DIAGNOSIS — I10 PRIMARY HYPERTENSION: ICD-10-CM

## 2024-08-02 DIAGNOSIS — E78.2 MIXED HYPERLIPIDEMIA: ICD-10-CM

## 2024-08-02 LAB
ALBUMIN SERPL BCP-MCNC: 4.5 G/DL (ref 3.5–5.2)
ALP SERPL-CCNC: 63 U/L (ref 55–135)
ALT SERPL W/O P-5'-P-CCNC: 17 U/L (ref 10–44)
ANION GAP SERPL CALC-SCNC: 7 MMOL/L (ref 8–16)
AST SERPL-CCNC: 20 U/L (ref 10–40)
BILIRUB SERPL-MCNC: 0.7 MG/DL (ref 0.1–1)
BUN SERPL-MCNC: 14 MG/DL (ref 8–23)
CALCIUM SERPL-MCNC: 9.3 MG/DL (ref 8.7–10.5)
CHLORIDE SERPL-SCNC: 108 MMOL/L (ref 95–110)
CHOLEST SERPL-MCNC: 131 MG/DL (ref 120–199)
CHOLEST/HDLC SERPL: 2 {RATIO} (ref 2–5)
CO2 SERPL-SCNC: 24 MMOL/L (ref 23–29)
CREAT SERPL-MCNC: 0.7 MG/DL (ref 0.5–1.4)
EST. GFR  (NO RACE VARIABLE): >60 ML/MIN/1.73 M^2
GLUCOSE SERPL-MCNC: 118 MG/DL (ref 70–110)
HDLC SERPL-MCNC: 66 MG/DL (ref 40–75)
HDLC SERPL: 50.4 % (ref 20–50)
LDLC SERPL CALC-MCNC: 48.8 MG/DL (ref 63–159)
NONHDLC SERPL-MCNC: 65 MG/DL
POTASSIUM SERPL-SCNC: 4.1 MMOL/L (ref 3.5–5.1)
PROT SERPL-MCNC: 6.6 G/DL (ref 6–8.4)
SODIUM SERPL-SCNC: 139 MMOL/L (ref 136–145)
TRIGL SERPL-MCNC: 81 MG/DL (ref 30–150)

## 2024-08-02 PROCEDURE — 36415 COLL VENOUS BLD VENIPUNCTURE: CPT | Performed by: FAMILY MEDICINE

## 2024-08-02 PROCEDURE — 80061 LIPID PANEL: CPT | Performed by: FAMILY MEDICINE

## 2024-08-02 PROCEDURE — 80053 COMPREHEN METABOLIC PANEL: CPT | Performed by: FAMILY MEDICINE

## 2024-08-05 ENCOUNTER — TELEPHONE (OUTPATIENT)
Dept: FAMILY MEDICINE | Facility: CLINIC | Age: 84
End: 2024-08-05
Payer: MEDICARE

## 2024-08-05 ENCOUNTER — OFFICE VISIT (OUTPATIENT)
Dept: FAMILY MEDICINE | Facility: CLINIC | Age: 84
End: 2024-08-05
Payer: MEDICARE

## 2024-08-05 VITALS
HEART RATE: 69 BPM | OXYGEN SATURATION: 96 % | WEIGHT: 163.25 LBS | TEMPERATURE: 98 F | DIASTOLIC BLOOD PRESSURE: 68 MMHG | BODY MASS INDEX: 22.11 KG/M2 | HEIGHT: 72 IN | SYSTOLIC BLOOD PRESSURE: 132 MMHG

## 2024-08-05 DIAGNOSIS — I10 PRIMARY HYPERTENSION: Primary | ICD-10-CM

## 2024-08-05 DIAGNOSIS — I72.3 ILIAC ARTERY ANEURYSM, LEFT: Chronic | ICD-10-CM

## 2024-08-05 DIAGNOSIS — M54.31 SCIATICA OF RIGHT SIDE: ICD-10-CM

## 2024-08-05 DIAGNOSIS — Z85.820 HX OF MALIGNANT SKIN MELANOMA: ICD-10-CM

## 2024-08-05 DIAGNOSIS — E78.2 MIXED HYPERLIPIDEMIA: ICD-10-CM

## 2024-08-05 DIAGNOSIS — K21.00 GASTROESOPHAGEAL REFLUX DISEASE WITH ESOPHAGITIS WITHOUT HEMORRHAGE: ICD-10-CM

## 2024-08-05 PROCEDURE — 99213 OFFICE O/P EST LOW 20 MIN: CPT | Mod: PBBFAC,PN | Performed by: FAMILY MEDICINE

## 2024-08-05 PROCEDURE — 99214 OFFICE O/P EST MOD 30 MIN: CPT | Mod: S$PBB,,, | Performed by: FAMILY MEDICINE

## 2024-08-05 PROCEDURE — 99999 PR PBB SHADOW E&M-EST. PATIENT-LVL III: CPT | Mod: PBBFAC,,, | Performed by: FAMILY MEDICINE

## 2024-08-05 RX ORDER — PREDNISONE 10 MG/1
TABLET ORAL
Qty: 15 TABLET | Refills: 0 | Status: SHIPPED | OUTPATIENT
Start: 2024-08-05

## 2024-08-13 ENCOUNTER — HOSPITAL ENCOUNTER (OUTPATIENT)
Dept: RADIOLOGY | Facility: HOSPITAL | Age: 84
Discharge: HOME | End: 2024-08-13
Attending: FAMILY MEDICINE
Payer: MEDICARE

## 2024-08-13 DIAGNOSIS — I72.3 ILIAC ARTERY ANEURYSM, LEFT: Chronic | ICD-10-CM

## 2024-08-13 DIAGNOSIS — M25.562 LEFT KNEE PAIN, UNSPECIFIED CHRONICITY: Primary | ICD-10-CM

## 2024-08-13 PROCEDURE — 76775 US EXAM ABDO BACK WALL LIM: CPT | Mod: TC

## 2024-08-13 PROCEDURE — 76775 US EXAM ABDO BACK WALL LIM: CPT | Mod: 26,,, | Performed by: RADIOLOGY

## 2024-08-15 ENCOUNTER — OFFICE VISIT (OUTPATIENT)
Dept: SPINE | Facility: CLINIC | Age: 84
End: 2024-08-15
Payer: MEDICARE

## 2024-08-15 ENCOUNTER — OFFICE VISIT (OUTPATIENT)
Dept: ORTHOPEDICS | Facility: CLINIC | Age: 84
End: 2024-08-15
Payer: MEDICARE

## 2024-08-15 ENCOUNTER — HOSPITAL ENCOUNTER (OUTPATIENT)
Dept: RADIOLOGY | Facility: HOSPITAL | Age: 84
Discharge: HOME | End: 2024-08-15
Attending: ORTHOPAEDIC SURGERY
Payer: MEDICARE

## 2024-08-15 VITALS — HEIGHT: 72 IN | WEIGHT: 163.13 LBS | BODY MASS INDEX: 22.09 KG/M2 | RESPIRATION RATE: 18 BRPM

## 2024-08-15 VITALS — HEIGHT: 72 IN | BODY MASS INDEX: 22.09 KG/M2 | WEIGHT: 163.13 LBS

## 2024-08-15 DIAGNOSIS — M54.41 CHRONIC RIGHT-SIDED LOW BACK PAIN WITH RIGHT-SIDED SCIATICA: Primary | ICD-10-CM

## 2024-08-15 DIAGNOSIS — M25.562 LEFT KNEE PAIN, UNSPECIFIED CHRONICITY: ICD-10-CM

## 2024-08-15 DIAGNOSIS — G89.29 CHRONIC RIGHT-SIDED LOW BACK PAIN WITH RIGHT-SIDED SCIATICA: Primary | ICD-10-CM

## 2024-08-15 DIAGNOSIS — M54.9 DORSALGIA, UNSPECIFIED: ICD-10-CM

## 2024-08-15 DIAGNOSIS — M17.10 ARTHRITIS OF KNEE: Primary | ICD-10-CM

## 2024-08-15 PROCEDURE — 73564 X-RAY EXAM KNEE 4 OR MORE: CPT | Mod: 26,LT,, | Performed by: RADIOLOGY

## 2024-08-15 PROCEDURE — 99999 PR PBB SHADOW E&M-EST. PATIENT-LVL III: CPT | Mod: PBBFAC,,, | Performed by: PHYSICAL MEDICINE & REHABILITATION

## 2024-08-15 PROCEDURE — 73562 X-RAY EXAM OF KNEE 3: CPT | Mod: 26,59,RT, | Performed by: RADIOLOGY

## 2024-08-15 PROCEDURE — 99212 OFFICE O/P EST SF 10 MIN: CPT | Mod: PBBFAC,25,PO | Performed by: ORTHOPAEDIC SURGERY

## 2024-08-15 PROCEDURE — 73564 X-RAY EXAM KNEE 4 OR MORE: CPT | Mod: TC,PO,LT

## 2024-08-15 PROCEDURE — 99999PBSHW PR PBB SHADOW TECHNICAL ONLY FILED TO HB: Mod: JZ,PBBFAC,,

## 2024-08-15 PROCEDURE — 99213 OFFICE O/P EST LOW 20 MIN: CPT | Mod: PBBFAC,25,27,PN | Performed by: PHYSICAL MEDICINE & REHABILITATION

## 2024-08-15 PROCEDURE — 99999 PR PBB SHADOW E&M-EST. PATIENT-LVL II: CPT | Mod: PBBFAC,,, | Performed by: ORTHOPAEDIC SURGERY

## 2024-08-15 NOTE — PROGRESS NOTES
SUBJECTIVE:    Patient ID: Iftikhar Lynn is a 84 y.o. male.    Chief Complaint: Low-back Pain    He presents today with ongoing complaints of low back pain at the lumbosacral junction that radiates down the right leg toward the lateral ankle.  He went to physical therapy and is very pleased with the program with regards to strength but is not satisfied with the quality of life due to ongoing back and leg pain.  He also describes pins and needles type of sensation of plantar portion of both feet.  Feels like he is walking on rocks.  He recently completed a course of steroids through primary care office without much benefit.  His current pain level is 4/10 but at times as high as 7/10 and interferes with quality of life in terms of activities of daily living recreation and social activities.  What he really wants to be able to do is go back to walking 4 miles a day which he can not do at this time.  I note that he has intentionally loss 35 lb.  He looks very well today          Past Medical History:   Diagnosis Date    Cancer     basal cell ca rt arm / melanoma on back    GERD (gastroesophageal reflux disease)     Hyperlipidemia     Hypertension     Melanoma      Social History     Socioeconomic History    Marital status:    Tobacco Use    Smoking status: Former     Types: Cigars    Smokeless tobacco: Never   Substance and Sexual Activity    Alcohol use: Yes     Alcohol/week: 28.0 standard drinks of alcohol     Types: 28 Glasses of wine per week     Comment: daily wine    Drug use: No    Sexual activity: Yes     Partners: Female     Birth control/protection: None     Social Determinants of Health     Financial Resource Strain: Low Risk  (11/14/2023)    Overall Financial Resource Strain (CARDIA)     Difficulty of Paying Living Expenses: Not hard at all   Food Insecurity: No Food Insecurity (2/25/2024)    Hunger Vital Sign     Worried About Running Out of Food in the Last Year: Never true     Ran Out of  Food in the Last Year: Never true   Transportation Needs: No Transportation Needs (2/25/2024)    PRAPARE - Transportation     Lack of Transportation (Medical): No     Lack of Transportation (Non-Medical): No   Physical Activity: Sufficiently Active (2/25/2024)    Exercise Vital Sign     Days of Exercise per Week: 6 days     Minutes of Exercise per Session: 150+ min   Stress: No Stress Concern Present (2/25/2024)    [employer] [place] [employer] - Occupational Stress Questionnaire     Feeling of Stress : Only a little   Housing Stability: Low Risk  (2/25/2024)    Housing Stability Vital Sign     Unable to Pay for Housing in the Last Year: No     Number of Places Lived in the Last Year: 1     Unstable Housing in the Last Year: No     Past Surgical History:   Procedure Laterality Date    BACK SURGERY      L 4 L5    COLONOSCOPY N/A 12/20/2017    Procedure: COLONOSCOPY;  Surgeon: Filipe Ramirez MD;  Location: [facility] ENDO;  Service: Endoscopy;  Laterality: N/A;    COLONOSCOPY N/A 09/27/2021    Procedure: COLONOSCOPY;  Surgeon: Filipe Ramirez MD;  Location: [facility];  Service: Endoscopy;  Laterality: N/A;    EYE SURGERY  2010    cataract    KNEE ARTHROSCOPY W/ MENISCECTOMY Left 01/17/2019    Procedure: ARTHROSCOPY, KNEE, WITH MENISCECTOMY;  Surgeon: Ethan Díaz MD;  Location: [facility] OR;  Service: Orthopedics;  Laterality: Left;    SKIN CANCER EXCISION      multiple sites, derm dr martinez, basal cell ca    SPINE SURGERY  1990    L4L5 disc    VASECTOMY       Family History   Problem Relation Name Age of Onset    Cancer Mother saqib lynn         non hogkins lumphoma    Heart disease Father Iftikhar Lynn     Cancer Brother fermin lynn         melanoma, arm and leg    Clotting disorder Neg Hx      Anesthesia problems Neg Hx       Vitals:    08/15/24 1317   Weight: 74 kg (163 lb 2.3 oz)   Height: 6' (1.829 m)       Review of Systems   Constitutional:  Negative for chills, diaphoresis, fatigue, fever and  unexpected weight change.   HENT:  Negative for trouble swallowing.    Eyes:  Negative for visual disturbance.   Respiratory:  Negative for shortness of breath.    Cardiovascular:  Negative for chest pain.   Gastrointestinal:  Negative for abdominal pain, constipation, diarrhea, nausea and vomiting.   Genitourinary:  Negative for difficulty urinating.   Musculoskeletal:  Negative for arthralgias, back pain, gait problem, joint swelling, myalgias, neck pain and neck stiffness.   Neurological:  Negative for dizziness, speech difficulty, weakness, light-headedness, numbness and headaches.          Objective:      Physical Exam  Neurological:      Mental Status: He is alert and oriented to person, place, and time.             Assessment:       1. 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 Dorsalgia, unspecified           Plan:     Improved but still not satisfied with his quality of life following physical therapy.  As previously discussed I am recommending an MRI of the lumbar spine with and without contrast in preparation for epidural steroid injections.  We talked about adding gabapentin which he defers at this time.  We may consider EMG and nerve conduction studies as well depending on what the MRI shows.  Follow up with me after the scan      Chronic right-sided low back pain with right-sided sciatica    Dorsalgia, unspecified  -     MRI Lumbar Spine W WO Cont; Future; Expected date: 08/15/2024

## 2024-08-15 NOTE — PROCEDURES
Large Joint Aspiration/Injection: L knee joint    Date/Time: 8/15/2024 8:30 AM    Performed by: Keith Sandoval MD  Authorized by: Keith Sandoval MD    Consent Done?:  Yes (Verbal)  Indications:  Pain  Site marked: the procedure site was marked    Timeout: prior to procedure the correct patient, procedure, and site was verified      Details:  Needle Size:  18 G  Approach:  Anterolateral  Location:  Knee  Site:  L knee joint  Medications:  60 mg hyaluronate sodium, stabilized (DUROLANE) 60 mg/3 mL  Patient tolerance:  Patient tolerated the procedure well with no immediate complications

## 2024-08-15 NOTE — PROGRESS NOTES
Past Medical History:   Diagnosis Date    Cancer     basal cell ca rt arm / melanoma on back    GERD (gastroesophageal reflux disease)     Hyperlipidemia     Hypertension     Melanoma        Past Surgical History:   Procedure Laterality Date    BACK SURGERY      L 4 L5    COLONOSCOPY N/A 12/20/2017    Procedure: COLONOSCOPY;  Surgeon: Filipe Ramirez MD;  Location: [facility] ENDO;  Service: Endoscopy;  Laterality: N/A;    COLONOSCOPY N/A 09/27/2021    Procedure: COLONOSCOPY;  Surgeon: Filipe Ramirez MD;  Location: [facility] ENDO;  Service: Endoscopy;  Laterality: N/A;    EYE SURGERY  2010    cataract    KNEE ARTHROSCOPY W/ MENISCECTOMY Left 01/17/2019    Procedure: ARTHROSCOPY, KNEE, WITH MENISCECTOMY;  Surgeon: Ethan Díaz MD;  Location: [facility] OR;  Service: Orthopedics;  Laterality: Left;    SKIN CANCER EXCISION      multiple sites, derm dr martinez, basal cell ca    SPINE SURGERY  1990    L4L5 disc    VASECTOMY         Current Outpatient Medications   Medication Sig    aspirin (ECOTRIN) 81 MG EC tablet Take 81 mg by mouth once daily.    ibuprofen (IBU) 800 MG tablet Take 1 tablet (800 mg total) by mouth every 8 (eight) hours as needed for Pain.    metoprolol succinate (TOPROL-XL) 25 MG 24 hr tablet TAKE 1 TABLET BY MOUTH TWICE A DAY    multivitamin capsule Take 1 capsule by mouth once daily.    nystatin-triamcinolone (MYCOLOG II) cream Apply topically 2 (two) times daily.    pramoxine-hydrocortisone (PROCTOCREAM-HC) 1-1 % rectal cream Place rectally 2 (two) times daily.    predniSONE (DELTASONE) 10 MG tablet Take 2 tablets daily x 5 days then 1 tablet daily x 5 days    simvastatin (ZOCOR) 40 MG tablet Take 1 tablet (40 mg total) by mouth every evening.    valsartan (DIOVAN) 320 MG tablet Take 1 tablet (320 mg total) by mouth once daily.     No current facility-administered medications for this visit.       Review of patient's allergies indicates:  No Known Allergies    Family History   Problem Relation Name Age of  Onset    Cancer Mother saqib moncada         non hogkins lumphoma    Heart disease Father Iftikhar Moncada     Cancer Brother fermin moncada         melanoma, arm and leg    Clotting disorder Neg Hx      Anesthesia problems Neg Hx         Social History     Socioeconomic History    Marital status:    Tobacco Use    Smoking status: Former     Types: Cigars    Smokeless tobacco: Never   Substance and Sexual Activity    Alcohol use: Yes     Alcohol/week: 28.0 standard drinks of alcohol     Types: 28 Glasses of wine per week     Comment: daily wine    Drug use: No    Sexual activity: Yes     Partners: Female     Birth control/protection: None     Social Determinants of Health     Financial Resource Strain: Low Risk  (11/14/2023)    Overall Financial Resource Strain (CARDIA)     Difficulty of Paying Living Expenses: Not hard at all   Food Insecurity: No Food Insecurity (2/25/2024)    Hunger Vital Sign     Worried About Running Out of Food in the Last Year: Never true     Ran Out of Food in the Last Year: Never true   Transportation Needs: No Transportation Needs (2/25/2024)    PRAPARE - Transportation     Lack of Transportation (Medical): No     Lack of Transportation (Non-Medical): No   Physical Activity: Sufficiently Active (2/25/2024)    Exercise Vital Sign     Days of Exercise per Week: 6 days     Minutes of Exercise per Session: 150+ min   Stress: No Stress Concern Present (2/25/2024)    [employer] [place] [employer] - Occupational Stress Questionnaire     Feeling of Stress : Only a little   Housing Stability: Low Risk  (2/25/2024)    Housing Stability Vital Sign     Unable to Pay for Housing in the Last Year: No     Number of Places Lived in the Last Year: 1     Unstable Housing in the Last Year: No       Chief Complaint: No chief complaint on file.      History of present illness:  84-year-old male seen for chronic left knee pain.  Patient comes in after his left knee gave out on him.  Patient was  walking in the kitchen in the left knee felt like it wanted to buckle.  Had to use a cane that day.  Has gotten back to normal since then.  Patient has a history of previous meniscectomy in his knee.  Has had injections in the past but nothing in the last 5 years.        Review of Systems:    Constitution: Negative for chills, fever, and sweats.  Negative for unexplained weight loss.    HENT:  Negative for headaches and blurry vision.    Cardiovascular:Negative for chest pain or irregular heart beat. Negative for hypertension.    Respiratory:  Negative for cough and shortness of breath.    Gastrointestinal: Negative for abdominal pain, heartburn, melena, nausea, and vomitting.    Genitourinary:  Negative bladder incontinence and dysuria.    Musculoskeletal:  See HPI    Neurological: Negative for numbness.    Psychiatric/Behavioral: Negative for depression.  The patient is not nervous/anxious.      Endocrine: Negative for polyuria    Hematologic/Lymphatic: Negative for bleeding problem.  Does not bruise/bleed easily.    Skin: Negative for poor would healing and rash      Physical Examination:    Vital Signs:  There were no vitals filed for this visit.    There is no height or weight on file to calculate BMI.    This a well-developed, well nourished patient in no acute distress.  They are alert and oriented and cooperative to examination.  Pt. walks without an antalgic gait.      Examination of the left knee shows no rashes or erythema. There are no masses ecchymosis or effusion. Patient has full range of motion from 0-130°. Patient is nontender to palpation over lateral joint line and mildly tender to palpation over the medial joint line. Patient has a - Lachman exam, - anterior drawer exam, and - posterior drawer exam. - Apley exam. Knee is stable to varus and valgus stress. 5 out of 5 motor strength. Palpable distal pulses. Intact light touch sensation. Negative Patellofemoral crepitus      X-rays:  X-rays of the  left knee are ordered and review which show moderate to severe medial joint space narrowing of both knees.  Left is worse than the right.         Assessment:  Bilateral varus knee osteoarthritis.  Left greater than right    Plan:  I reviewed the x-rays in the findings with him today.  We discussed treatment options.  I think he would be a good candidate for hyaluronic acid.  Patient just recently tried prednisone which did not really help.  Injected his left knee with Durolane today.  Continue to use the brace that he has.  Follow up as needed.  Patient does not want surgery.            All previous pertinent notes including ER visits, physical therapy visits, other orthopedic visits as well as other care for the same musculoskeletal problem were reviewed.  All pertinent lab values and previous imaging was reviewed pertinent to the current visit.    This note was created using [employer] voice recognition software that occasionally misinterpreted phrases or words.    Consult note is delivered via Epic messaging service.

## 2024-08-23 ENCOUNTER — HOSPITAL ENCOUNTER (OUTPATIENT)
Dept: RADIOLOGY | Facility: HOSPITAL | Age: 84
Discharge: HOME | End: 2024-08-23
Attending: PHYSICAL MEDICINE & REHABILITATION
Payer: MEDICARE

## 2024-08-23 DIAGNOSIS — M54.9 DORSALGIA, UNSPECIFIED: ICD-10-CM

## 2024-08-23 PROCEDURE — A9585 GADOBUTROL INJECTION: HCPCS | Mod: PO | Performed by: PHYSICAL MEDICINE & REHABILITATION

## 2024-08-23 PROCEDURE — 72158 MRI LUMBAR SPINE W/O & W/DYE: CPT | Mod: 26,,, | Performed by: RADIOLOGY

## 2024-08-23 PROCEDURE — 72158 MRI LUMBAR SPINE W/O & W/DYE: CPT | Mod: TC,PO

## 2024-08-23 PROCEDURE — 25500020 PHARM REV CODE 255: Mod: PO | Performed by: PHYSICAL MEDICINE & REHABILITATION

## 2024-08-23 RX ORDER — GADOBUTROL 604.72 MG/ML
7.5 INJECTION INTRAVENOUS
Status: COMPLETED | OUTPATIENT
Start: 2024-08-23 | End: 2024-08-23

## 2024-08-23 RX ADMIN — GADOBUTROL 7.5 ML: 604.72 INJECTION INTRAVENOUS at 01:08

## 2024-08-28 ENCOUNTER — OFFICE VISIT (OUTPATIENT)
Dept: SPINE | Facility: CLINIC | Age: 84
End: 2024-08-28
Payer: MEDICARE

## 2024-08-28 VITALS — BODY MASS INDEX: 22.09 KG/M2 | WEIGHT: 163.13 LBS | HEIGHT: 72 IN

## 2024-08-28 DIAGNOSIS — N28.1 RENAL CYST: ICD-10-CM

## 2024-08-28 DIAGNOSIS — M54.41 CHRONIC RIGHT-SIDED LOW BACK PAIN WITH RIGHT-SIDED SCIATICA: Primary | ICD-10-CM

## 2024-08-28 DIAGNOSIS — G89.29 CHRONIC RIGHT-SIDED LOW BACK PAIN WITH RIGHT-SIDED SCIATICA: Primary | ICD-10-CM

## 2024-08-28 PROCEDURE — 99213 OFFICE O/P EST LOW 20 MIN: CPT | Mod: S$PBB,,, | Performed by: PHYSICAL MEDICINE & REHABILITATION

## 2024-08-28 PROCEDURE — 99213 OFFICE O/P EST LOW 20 MIN: CPT | Mod: PBBFAC,PN | Performed by: PHYSICAL MEDICINE & REHABILITATION

## 2024-08-28 PROCEDURE — 99999 PR PBB SHADOW E&M-EST. PATIENT-LVL III: CPT | Mod: PBBFAC,,, | Performed by: PHYSICAL MEDICINE & REHABILITATION

## 2024-08-28 NOTE — PROGRESS NOTES
SUBJECTIVE:    Patient ID: Iftikhar Lynn is a 84 y.o. male.    Chief Complaint: Follow-up    He is here to review his lumbar MRI done 08/23/2024 to evaluate his complaint of  low back pain at the lumbosacral junction that radiates down the right leg toward the lateral ankle.      The MRI is summarized below:      FINDINGS:  There is rightward curvature of the lumbar spine.  The vertebral bodies are appropriately maintained in height.  Vertebral alignment is satisfactory.     Degenerative loss of disc signal occurs throughout the lumbar spine.  There is multilevel disc space narrowing, most prominently at the L3-4 through the L5-S1 levels. There are associated degenerative marrow signal changes within the adjacent endplates.  There are several scattered lesions within the vertebral bodies likely representing hemangiomas/atypical hemangiomas.     At L1-L2, shallow broad-based bulging of the disc margin results in diffuse mass effect upon the thecal sac, mildly encroaching the central spinal canal. The foramina are not significantly narrowed.     At L2-3, broad-based bulging of the disc margin and posterior osteophytic ridging or asymmetric towards the left of midline. There are mild facet degenerative changes. This results and moderate central spinal canal narrowing and mild degrees of foraminal encroachment.     At L3-4, chronic broad-based disc bulging and posteriorly directed marginal osteophyte formation combines with bilateral facet arthropathy/ligamentum flavum hypertrophy to result in severe central spinal canal stenosis and moderate foraminal narrowing.     At L4-5, broad-based protrusion of the disc margin and occurs towards the right of midline superimposed on shallow disc bulging and marginal osteophyte formation. Asymmetric mass effect is observed upon the thecal sac which abuts the origin of the right L5 root. There is narrowing of the right lateral recess.  Correlate with corresponding  radiculopathy.  Facet degenerative changes contribute to mild-moderate central spinal canal narrowing.  There is moderately severe right foraminal and mild-moderate left foraminal stenosis.     At L5-S1, shallow broad-based disc bulging and posteriorly directed marginal osteophyte formation is observed. There is a superimposed protrusion of the disc margin within the far left lateral canal. Asymmetric mass effect upon the thecal sac abuts and slightly deforms the origin of the left S1 root. Correlate with corresponding radiculopathy. Facet degenerative changes contribute to mild central canal narrowing. There are mild-to-moderate degrees of bilateral foraminal encroachment.     The conus terminates appropriately and demonstrates normal signal. The paraspinal soft tissues appear unremarkable.     Following contrast administration, there is mild enhancement along the rightward aspect of the thecal sac at the L4-5 level suggestive of postoperative scarring. No pathologic intradural enhancement demonstrated.     There is an indeterminate mass arising exophytically from the lower pole the left kidney. Targeted renal ultrasound is recommended for further characterization.     Impression:     Prominent multilevel degenerative changes associated with mild rightward curvature of the spine contributing to central spinal canal and foraminal narrowing as above.  The most significant central canal stenosis occurs at L3-4.     Broad-based protrusion of the disc margin towards the right of midline at L4-5, potentially symptomatic in right L5 distribution.  Additionally, protrusion of the disc margin in the far left lateral canal at L5-S1 could be symptomatic in left S1 distribution. Correlate clinically.     Indeterminate mass arising from the lower pole of the left kidney.  Targeted ultrasound is recommended for further evaluation.     This report was flagged in Epic as abnormal.        Clinically he is getting better.  He has  been doing some walking in finds that his symptoms significantly improve with with walking.  He has no new or progressive problems.  Pain level is 6/10.  He actually had an ultrasound in February that showed a cyst on the left kidney.  Primary care recommended follow up imaging in six-month so he is due for that anyway            Past Medical History:   Diagnosis Date    Cancer     basal cell ca rt arm / melanoma on back    GERD (gastroesophageal reflux disease)     Hyperlipidemia     Hypertension     Melanoma      Social History     Socioeconomic History    Marital status:    Tobacco Use    Smoking status: Former     Types: Cigars    Smokeless tobacco: Never   Substance and Sexual Activity    Alcohol use: Yes     Alcohol/week: 28.0 standard drinks of alcohol     Types: 28 Glasses of wine per week     Comment: daily wine    Drug use: No    Sexual activity: Yes     Partners: Female     Birth control/protection: None     Social Determinants of Health     Financial Resource Strain: Low Risk  (11/14/2023)    Overall Financial Resource Strain (CARDIA)     Difficulty of Paying Living Expenses: Not hard at all   Food Insecurity: No Food Insecurity (2/25/2024)    Hunger Vital Sign     Worried About Running Out of Food in the Last Year: Never true     Ran Out of Food in the Last Year: Never true   Transportation Needs: No Transportation Needs (2/25/2024)    PRAPARE - Transportation     Lack of Transportation (Medical): No     Lack of Transportation (Non-Medical): No   Physical Activity: Sufficiently Active (2/25/2024)    Exercise Vital Sign     Days of Exercise per Week: 6 days     Minutes of Exercise per Session: 150+ min   Stress: No Stress Concern Present (2/25/2024)    [employer] [employer] - Occupational Stress Questionnaire     Feeling of Stress : Only a little   Housing Stability: Low Risk  (2/25/2024)    Housing Stability Vital Sign     Unable to Pay for Housing in the Last Year: No      Number of Places Lived in the Last Year: 1     Unstable Housing in the Last Year: No     Past Surgical History:   Procedure Laterality Date    BACK SURGERY      L 4 L5    COLONOSCOPY N/A 12/20/2017    Procedure: COLONOSCOPY;  Surgeon: Filipe Ramirez MD;  Location: [facility] ENDO;  Service: Endoscopy;  Laterality: N/A;    COLONOSCOPY N/A 09/27/2021    Procedure: COLONOSCOPY;  Surgeon: Filipe Ramirez MD;  Location: [facility] ENDO;  Service: Endoscopy;  Laterality: N/A;    EYE SURGERY  2010    cataract    KNEE ARTHROSCOPY W/ MENISCECTOMY Left 01/17/2019    Procedure: ARTHROSCOPY, KNEE, WITH MENISCECTOMY;  Surgeon: Ethan Díaz MD;  Location: [facility] OR;  Service: Orthopedics;  Laterality: Left;    SKIN CANCER EXCISION      multiple sites, derm dr martinez, basal cell ca    SPINE SURGERY  1990    L4L5 disc    VASECTOMY       Family History   Problem Relation Name Age of Onset    Cancer Mother saqib lynn         non hogkins lumphoma    Heart disease Father Iftikhar Lynn     Cancer Brother fermin lynn         melanoma, arm and leg    Clotting disorder Neg Hx      Anesthesia problems Neg Hx       Vitals:    08/28/24 1058   Weight: 74 kg (163 lb 2.3 oz)   Height: 6' (1.829 m)       Review of Systems   Constitutional:  Negative for chills, diaphoresis, fatigue, fever and unexpected weight change.   HENT:  Negative for trouble swallowing.    Eyes:  Negative for visual disturbance.   Respiratory:  Negative for shortness of breath.    Cardiovascular:  Negative for chest pain.   Gastrointestinal:  Negative for abdominal pain, constipation, diarrhea, nausea and vomiting.   Genitourinary:  Negative for difficulty urinating.   Musculoskeletal:  Negative for arthralgias, back pain, gait problem, joint swelling, myalgias, neck pain and neck stiffness.   Neurological:  Negative for dizziness, speech difficulty, weakness, light-headedness, numbness and headaches.          Objective:      Physical Exam  Neurological:      Mental  Status: He is alert and oriented to person, place, and time.             Assessment:       1. 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 Renal cyst           Plan:     I reassured him there are no worrisome findings on his MRI.  Clinically he is satisfied with his quality of life in his not interested in epidural injections at this time.  If his symptoms return and interfere with his quality of life he could consider interlaminar injections at L5-S1.  Get the renal ultrasound and I defer to primary care additional follow up of that issue.  He can follow up here as an      Chronic right-sided low back pain with right-sided sciatica    Renal cyst  -     US Retroperitoneal Complete; Future; Expected date: 08/28/2024

## 2024-08-29 ENCOUNTER — HOSPITAL ENCOUNTER (OUTPATIENT)
Dept: RADIOLOGY | Facility: HOSPITAL | Age: 84
Discharge: HOME | End: 2024-08-29
Attending: PHYSICAL MEDICINE & REHABILITATION
Payer: MEDICARE

## 2024-08-29 ENCOUNTER — TELEPHONE (OUTPATIENT)
Dept: SPINE | Facility: CLINIC | Age: 84
End: 2024-08-29
Payer: MEDICARE

## 2024-08-29 DIAGNOSIS — N28.1 RENAL CYST: ICD-10-CM

## 2024-08-29 PROCEDURE — 76770 US EXAM ABDO BACK WALL COMP: CPT | Mod: TC

## 2024-08-29 PROCEDURE — 76770 US EXAM ABDO BACK WALL COMP: CPT | Mod: 26,,, | Performed by: RADIOLOGY

## 2024-08-29 NOTE — TELEPHONE ENCOUNTER
I personally called the patient to discuss his ultrasound results.  Left voice message for him to call the office back.  I will try again tomorrow

## 2024-08-30 ENCOUNTER — TELEPHONE (OUTPATIENT)
Dept: SPINE | Facility: CLINIC | Age: 84
End: 2024-08-30
Payer: MEDICARE

## 2024-08-30 ENCOUNTER — TELEPHONE (OUTPATIENT)
Dept: UROLOGY | Facility: CLINIC | Age: 84
End: 2024-08-30
Payer: MEDICARE

## 2024-08-30 NOTE — TELEPHONE ENCOUNTER
----- Message from Ange Sky LPN sent at 8/30/2024 10:36 AM CDT -----  Referral placed to Dr. Anna.  ----- Message -----  From: Zacarias Siegel MD  Sent: 8/30/2024  10:30 AM CDT  To: Ange Sky LPN    Urology referral placed

## 2024-08-30 NOTE — TELEPHONE ENCOUNTER
I personally spoke to the patient this morning to inform him of the abnormality on his kidney.  We will refer him to urology.  He understands

## 2024-09-05 ENCOUNTER — OFFICE VISIT (OUTPATIENT)
Dept: UROLOGY | Facility: CLINIC | Age: 84
End: 2024-09-05
Payer: MEDICARE

## 2024-09-05 VITALS — WEIGHT: 163.13 LBS | HEIGHT: 72 IN | BODY MASS INDEX: 22.09 KG/M2

## 2024-09-05 DIAGNOSIS — N28.89 LEFT RENAL MASS: Primary | ICD-10-CM

## 2024-09-05 DIAGNOSIS — N28.89 OTHER SPECIFIED DISORDERS OF KIDNEY AND URETER: ICD-10-CM

## 2024-09-05 PROCEDURE — 99999 PR PBB SHADOW E&M-EST. PATIENT-LVL III: CPT | Mod: PBBFAC,,, | Performed by: STUDENT IN AN ORGANIZED HEALTH CARE EDUCATION/TRAINING PROGRAM

## 2024-09-05 PROCEDURE — 99215 OFFICE O/P EST HI 40 MIN: CPT | Mod: S$PBB,,, | Performed by: STUDENT IN AN ORGANIZED HEALTH CARE EDUCATION/TRAINING PROGRAM

## 2024-09-05 PROCEDURE — 99213 OFFICE O/P EST LOW 20 MIN: CPT | Mod: PBBFAC,PO | Performed by: STUDENT IN AN ORGANIZED HEALTH CARE EDUCATION/TRAINING PROGRAM

## 2024-09-05 PROCEDURE — G2211 COMPLEX E/M VISIT ADD ON: HCPCS | Mod: S$PBB,,, | Performed by: STUDENT IN AN ORGANIZED HEALTH CARE EDUCATION/TRAINING PROGRAM

## 2024-09-05 NOTE — PROGRESS NOTES
[facility] Note    PCP: Chris Portillo Jr., MD    Chief Complaint: renal mass    SUBJECTIVE:       History of Present Illness:  Iftikhar Lynn is a 84 y.o. male who presents to clinic for renal mass. He is Established  to our clinic.     Patient recently underwent lumbar MRI for back pain which incidentally noted a lesion on the left kidney.   Retroperitoneal US 8/29/24 shows concern for a 3.2 cm left lower pole solid mass.     Former smoker, quit 60 years ago.     No gross hematuria or dysuria.     No prior abdominal surgeries.     PSA 8/2/23: 3.2    12/8/21  Previous patient of Dr. Anna, last seen in Feb 2017 for groin pain. He has since been diagnosed with a hernia, however he has not gotten this fixed yet.    He presents today for elevated PSA at 4.3 in August. Previously in July 2020 was 3.2.    Has chronic bilateral testicular pain and lower pelvic pain. He also has issues with hemorrhoids. No constipation but does occasionally have diarrhea.    No issues with ED. No issues with voiding. Nocturia x 1. No hematuria or dysuria.     UA today: negative for blood, leuks, nitrite   PVR today: 0 cc    Last urine culture: no documented UTIs    Lab Results   Component Value Date    CREATININE 0.7 08/02/2024     PSA, Screen (ng/mL)   Date Value   08/02/2023 3.2     Family  hx: no malignancy   Hx of HTN, HLD, melanoma    Past medical, family, and social history reviewed as documented in chart with pertinent positive medical, family, and social history detailed in HPI.    Review of patient's allergies indicates:  No Known Allergies    Past Medical History:   Diagnosis Date    Cancer     basal cell ca rt arm / melanoma on back    GERD (gastroesophageal reflux disease)     Hyperlipidemia     Hypertension     Melanoma      Past Surgical History:   Procedure Laterality Date    BACK SURGERY      L 4 L5    COLONOSCOPY N/A 12/20/2017    Procedure: COLONOSCOPY;  Surgeon: Filipe Ramirez MD;   Location: [facility] ENDO;  Service: Endoscopy;  Laterality: N/A;    COLONOSCOPY N/A 09/27/2021    Procedure: COLONOSCOPY;  Surgeon: Filipe Ramirez MD;  Location: [facility] ENDO;  Service: Endoscopy;  Laterality: N/A;    EYE SURGERY  2010    cataract    KNEE ARTHROSCOPY W/ MENISCECTOMY Left 01/17/2019    Procedure: ARTHROSCOPY, KNEE, WITH MENISCECTOMY;  Surgeon: Ethan Díaz MD;  Location: [facility] OR;  Service: Orthopedics;  Laterality: Left;    SKIN CANCER EXCISION      multiple sites, derm dr martinez, basal cell ca    SPINE SURGERY  1990    L4L5 disc    VASECTOMY       Family History   Problem Relation Name Age of Onset    Cancer Mother saqib lynn         non hogkins lumphoma    Heart disease Father Iftikhar Lynn     Cancer Brother fermin lynn         melanoma, arm and leg    Clotting disorder Neg Hx      Anesthesia problems Neg Hx       Social History     Tobacco Use    Smoking status: Former     Types: Cigars    Smokeless tobacco: Never   Substance Use Topics    Alcohol use: Yes     Alcohol/week: 28.0 standard drinks of alcohol     Types: 28 Glasses of wine per week     Comment: daily wine    Drug use: No        Review of Systems    OBJECTIVE:     Anticoagulation:  Aspirin 81 mg     Estimated body mass index is 22.13 kg/m² as calculated from the following:    Height as of this encounter: 6' (1.829 m).    Weight as of this encounter: 74 kg (163 lb 2.3 oz).    Vital Signs (Most Recent)       Physical Exam  Constitutional:       General: He is not in acute distress.     Appearance: Normal appearance. He is not ill-appearing.   HENT:      Head: Normocephalic and atraumatic.   Eyes:      General: No scleral icterus.  Pulmonary:      Effort: Pulmonary effort is normal. No respiratory distress.   Skin:     Coloration: Skin is not jaundiced.   Neurological:      General: No focal deficit present.      Mental Status: He is alert and oriented to person, place, and time.   Psychiatric:         Mood and Affect: Mood normal.          Behavior: Behavior normal.         Thought Content: Thought content normal.       BMP  Lab Results   Component Value Date     08/02/2024    K 4.1 08/02/2024     08/02/2024    CO2 24 08/02/2024    BUN 14 08/02/2024    CREATININE 0.7 08/02/2024    CALCIUM 9.3 08/02/2024    ANIONGAP 7 (L) 08/02/2024    ESTGFRAFRICA >60 07/22/2022    EGFRNONAA >60 07/22/2022       Lab Results   Component Value Date    WBC 9.17 07/22/2022    HGB 14.9 07/22/2022    HCT 42.8 07/22/2022    MCV 95 07/22/2022     07/22/2022       Imaging:  CT abd/pelvis 2/27/17:   There is mild bilateral perinephric stranding most likely chronic.  There is 8mm low density mass immediately the right kidney for example coronal series 601 image 61 most likely a cyst but not fully characterized on a noncontrast study and again could be further evaluated followup ultrasound.  There is no hydronephrosis.  There is mild prominence of segment of the right ureter.  There multiple pelvic calcifications most likely vascular although one does lie in the expected region of the distal right ureter and distal ureteral stones difficult to exclude.  It is more likely a phlebolith  The prostate gland is enlarged and there is diffuse bladder wall thickening.     ASSESSMENT     1. Left renal mass    2. Other specified disorders of kidney and ureter      PLAN:     - Will schedule for MRI abdomen to better characterize the left sided renal mass  - Long talk about renal mass and it's management.  Reviewed images.Discussed options including active surveillance, biopsy, minimally invasive techniques including HFRA, cryo. Discussed open and laparascopic surgical approaches. Discussed partial and radical nephrectomy. Discussed surgery preparation, surgery, recuperation, recovery, exercise restrictions. Discussed risks, benefits, and complications. Answered questions and addressed their concerns.  - Will call him with results of the MRI and can decide on  Partially impaired: cannot see medication labels or newsprint, but can see obstacles in path, and the surrounding layout; can count fingers at arm's length/Wears Reading Glasses treatment plan pending findings       Toya Bermudez MD     Letter to Zacarias Siegel MD       Visit today included increased complexity associated with the care of the episodic problem renal mass addressed and managing the longitudinal care of the patient due to the serious and/or complex managed problem(s).

## 2024-09-06 DIAGNOSIS — I10 ESSENTIAL HYPERTENSION: ICD-10-CM

## 2024-09-06 RX ORDER — VALSARTAN 320 MG/1
320 TABLET ORAL
Qty: 90 TABLET | Refills: 3 | Status: SHIPPED | OUTPATIENT
Start: 2024-09-06

## 2024-09-06 NOTE — TELEPHONE ENCOUNTER
No care due was identified.  [facility] Embedded Care Due Messages. Reference number: [id number].   9/06/2024 9:46:15 AM CDT

## 2024-09-06 NOTE — TELEPHONE ENCOUNTER
Refill Decision Note   Iftikhar Lynn  is requesting a refill authorization.  Brief Assessment and Rationale for Refill:  Approve     Medication Therapy Plan:       Medication Reconciliation Completed: No   Comments:     No Care Gaps recommended.     Note composed:2:38 PM 09/06/2024

## 2024-09-13 ENCOUNTER — HOSPITAL ENCOUNTER (OUTPATIENT)
Dept: RADIOLOGY | Facility: HOSPITAL | Age: 84
Discharge: HOME | End: 2024-09-13
Attending: STUDENT IN AN ORGANIZED HEALTH CARE EDUCATION/TRAINING PROGRAM
Payer: MEDICARE

## 2024-09-13 DIAGNOSIS — N28.89 OTHER SPECIFIED DISORDERS OF KIDNEY AND URETER: ICD-10-CM

## 2024-09-13 PROCEDURE — 74183 MRI ABD W/O CNTR FLWD CNTR: CPT | Mod: 26,,, | Performed by: RADIOLOGY

## 2024-09-13 PROCEDURE — A9585 GADOBUTROL INJECTION: HCPCS

## 2024-09-13 PROCEDURE — 25500020 PHARM REV CODE 255

## 2024-09-13 PROCEDURE — 74183 MRI ABD W/O CNTR FLWD CNTR: CPT | Mod: TC

## 2024-09-13 RX ORDER — GADOBUTROL 604.72 MG/ML
INJECTION INTRAVENOUS
Status: COMPLETED
Start: 2024-09-13 | End: 2024-09-13

## 2024-09-13 RX ADMIN — GADOBUTROL 7 ML: 604.72 INJECTION INTRAVENOUS at 01:09

## 2024-09-16 ENCOUNTER — PATIENT MESSAGE (OUTPATIENT)
Dept: UROLOGY | Facility: CLINIC | Age: 84
End: 2024-09-16
Payer: MEDICARE

## 2024-09-16 ENCOUNTER — TELEPHONE (OUTPATIENT)
Dept: UROLOGY | Facility: CLINIC | Age: 84
End: 2024-09-16
Payer: MEDICARE

## 2024-09-16 DIAGNOSIS — N28.89 LEFT RENAL MASS: Primary | ICD-10-CM

## 2024-09-16 NOTE — TELEPHONE ENCOUNTER
Spoke with patient, procedure date given of 10/24/24, good with patient. PAT scheduled 2 weeks prior. Informed clearances being faxed to his PCP and cardiologist, patient verbally understood.

## 2024-09-16 NOTE — PROGRESS NOTES
Procedure Order to Urology [[id number]]    Electronically signed by: Toya Bermudez MD on 09/16/24 1329 Status: Active   Ordering user: Toya Bermudez MD 09/16/24 1329 Authorized by: Toya Bermudez MD   Ordering mode: Standard   Frequency:  09/16/24 -     Diagnoses  Left renal mass [N28.89]   Questionnaire    Question Answer   Procedure Robotic Partial Nephrectomy Comment - 10/24, left   Facility Name: [place]   Op surgery admit, please order CLAUDIA and SCD, CBC, BMP, PT/INR,Type and screen,U/A and culture  EKG, chest X ray, Start IV, NPO,General anesthesia,Ancef 2 grams ( alternative for PCN allergy is Cipro 400 mg IV ) . (Will stay overnight in comments) High risk VTE with CLAUDIA and SCD and reason for no pharmacologic VTE as risk of bleeding cpt- 64117

## 2024-09-16 NOTE — TELEPHONE ENCOUNTER
Scheduling for left partial nephrectomy on 10/24. Please schedule PAT 2 weeks prior and send clearance to his PCP and cardiologist.

## 2024-09-17 RX ORDER — CEFAZOLIN SODIUM 2 G/50ML
2 SOLUTION INTRAVENOUS
OUTPATIENT
Start: 2024-09-17

## 2024-09-19 ENCOUNTER — TELEPHONE (OUTPATIENT)
Dept: CARDIOLOGY | Facility: CLINIC | Age: 84
End: 2024-09-19
Payer: MEDICARE

## 2024-09-19 ENCOUNTER — TELEPHONE (OUTPATIENT)
Dept: UROLOGY | Facility: CLINIC | Age: 84
End: 2024-09-19
Payer: MEDICARE

## 2024-09-19 NOTE — TELEPHONE ENCOUNTER
Spoke with patient, states on his chart there was mention of lab work to be done. Informed that is the lab work that is needed to be done at the pre-admit testing, patient verbally understood.

## 2024-09-19 NOTE — TELEPHONE ENCOUNTER
Pt needing CV risk and medication instructions for L partial nephrectomy. Pt taking ASA.    Fax: [phone]

## 2024-09-19 NOTE — TELEPHONE ENCOUNTER
----- Message from Arianna Keen sent at 9/19/2024 11:55 AM CDT -----  Regarding: Concerns  Name of Who is Calling:Iftikhar           What is the request in detail:Pt is requesting a all back because he has some concerns in his chart and he is confused and would like a call back.            Can the clinic reply by [facility]:Yes           What Number to Call Back if not in [facility]: [phone]

## 2024-09-20 NOTE — PROGRESS NOTES
Health  Consult Note    Name: Iftikhar Lynn  [facility] Number: [id number]  Physician: Zacarias Siegel MD  Past Medical History:   Diagnosis Date    Cancer     basal cell ca rt arm / melanoma on back    GERD (gastroesophageal reflux disease)     Hyperlipidemia     Hypertension     Melanoma      Time In: 10:00a  Time Out: 11:00a    Health  Agreement signed:     Coaching performed:     Subjective:   Patient reports with the following...     Vision:      Values:     Strengths:      Challenges:      Support:      Hobbies:      Objective:  Iftikhar was instructed       INITIAL date: 4/30/2024  One a scale of 1-10, with 10 being 100% happy, how would you rate your happiness in each of the wellness areas below?    Happiness:         1     2     3     4     5     6     7     8     9     10    Initial Date: DC Date: +/- Total Change   Exercise/Movement      Physical Health      Stress Level      Nutrition      Sleep      Play      Body Image      Relationships      Energy/Vitality        Assessment:     Plan:  Patient goals for next consult include     Health : Cesar Antony  4/30/2024

## 2024-09-25 ENCOUNTER — OFFICE VISIT (OUTPATIENT)
Dept: FAMILY MEDICINE | Facility: CLINIC | Age: 84
End: 2024-09-25
Payer: MEDICARE

## 2024-09-25 VITALS
OXYGEN SATURATION: 96 % | BODY MASS INDEX: 22.25 KG/M2 | SYSTOLIC BLOOD PRESSURE: 142 MMHG | DIASTOLIC BLOOD PRESSURE: 82 MMHG | HEART RATE: 106 BPM | HEIGHT: 72 IN | WEIGHT: 164.25 LBS | RESPIRATION RATE: 16 BRPM

## 2024-09-25 DIAGNOSIS — Z01.818 PREOP EXAMINATION: Primary | ICD-10-CM

## 2024-09-25 PROCEDURE — 99999 PR PBB SHADOW E&M-EST. PATIENT-LVL IV: CPT | Mod: PBBFAC,,, | Performed by: FAMILY MEDICINE

## 2024-09-25 PROCEDURE — 99214 OFFICE O/P EST MOD 30 MIN: CPT | Mod: PBBFAC,PO | Performed by: FAMILY MEDICINE

## 2024-09-25 PROCEDURE — 99213 OFFICE O/P EST LOW 20 MIN: CPT | Mod: S$PBB,,, | Performed by: FAMILY MEDICINE

## 2024-09-25 NOTE — PROGRESS NOTES
Subjective:       Patient ID: Iftikhar Lynn is a 84 y.o. male.    Chief Complaint: Pre-op Exam    Patient presents today for Pre-op clearance for Left Partial Nephrectomy with Dr. GANGA Bermudez on 10/24/2024.   Patient reports no prior anesthesia problems, no current/recent sign of symptom of infection and no recent Cardio-Pulmonary or Neuro symptoms.   Cleared by Cardiology, Dr Appiah - Hx of Sinus Tach and PVC's; on meds.      PAT ordered per Dr Bone; due for 10/10/2024        Review of Systems   Constitutional:  Negative for fever.   Respiratory:  Negative for shortness of breath.    Cardiovascular:  Positive for palpitations (occasional; chronic). Negative for chest pain.   Gastrointestinal:  Negative for abdominal pain and nausea.   Skin:  Negative for rash.   Neurological:  Positive for numbness (b/l feet more often than not; hx Sciatica).   All other systems reviewed and are negative.      Objective:      Physical Exam  Vitals reviewed.   Constitutional:       General: He is not in acute distress.     Appearance: He is well-developed.   HENT:      Right Ear: Tympanic membrane normal.      Left Ear: Tympanic membrane normal.      Mouth/Throat:      Mouth: Mucous membranes are moist.      Pharynx: Oropharynx is clear. No posterior oropharyngeal erythema.   Eyes:      Conjunctiva/sclera: Conjunctivae normal.      Pupils: Pupils are equal, round, and reactive to light.   Cardiovascular:      Rate and Rhythm: Normal rate and regular rhythm. Frequent Extrasystoles are present.     Heart sounds: No murmur heard.  Pulmonary:      Effort: Pulmonary effort is normal.      Breath sounds: Normal breath sounds.   Abdominal:      Palpations: Abdomen is soft.      Tenderness: There is no abdominal tenderness.   Musculoskeletal:         General: Normal range of motion.      Cervical back: Neck supple.      Right lower leg: No edema.      Left lower leg: No edema.   Lymphadenopathy:      Cervical: No cervical  adenopathy.   Skin:     General: Skin is warm and dry.   Neurological:      Mental Status: He is alert.      Comments: Interactive         Assessment:       1. Preop examination        Plan:       Preop examination      Patient is at low risk for beatriz-operative Cardiac event.  He is medically cleared pending any unexpected results on PAT set for 10/10/24    Patient Instructions   For 5 days prior to surgery, do not take Aspirin or Ibuprofen (or Aleve).    Contact me or your PCP if any worsening or for any new concerns as we discussed.  Any symptoms or signs of illness.

## 2024-09-25 NOTE — PATIENT INSTRUCTIONS
For 5 days prior to surgery, do not take Aspirin or Ibuprofen (or Aleve).    Contact me or your PCP if any worsening or for any new concerns as we discussed.  Any symptoms or signs of illness.

## 2024-10-10 ENCOUNTER — HOSPITAL ENCOUNTER (OUTPATIENT)
Dept: PREADMISSION TESTING | Facility: HOSPITAL | Age: 84
Discharge: HOME | End: 2024-10-10
Attending: STUDENT IN AN ORGANIZED HEALTH CARE EDUCATION/TRAINING PROGRAM
Payer: MEDICARE

## 2024-10-10 ENCOUNTER — HOSPITAL ENCOUNTER (OUTPATIENT)
Dept: RADIOLOGY | Facility: HOSPITAL | Age: 84
Discharge: HOME | End: 2024-10-10
Attending: STUDENT IN AN ORGANIZED HEALTH CARE EDUCATION/TRAINING PROGRAM
Payer: MEDICARE

## 2024-10-10 DIAGNOSIS — N28.89 LEFT RENAL MASS: ICD-10-CM

## 2024-10-10 DIAGNOSIS — Z01.810 PREOP CARDIOVASCULAR EXAM: ICD-10-CM

## 2024-10-10 LAB
OHS QRS DURATION: 90 MS
OHS QTC CALCULATION: 431 MS

## 2024-10-10 PROCEDURE — 71046 X-RAY EXAM CHEST 2 VIEWS: CPT | Mod: TC

## 2024-10-10 PROCEDURE — 93005 ELECTROCARDIOGRAM TRACING: CPT

## 2024-10-10 PROCEDURE — 93010 ELECTROCARDIOGRAM REPORT: CPT | Mod: ,,, | Performed by: GENERAL PRACTICE

## 2024-10-10 PROCEDURE — 71046 X-RAY EXAM CHEST 2 VIEWS: CPT | Mod: 26,,, | Performed by: RADIOLOGY

## 2024-10-10 RX ORDER — MAGNESIUM 200 MG
TABLET ORAL ONCE
COMMUNITY

## 2024-10-10 RX ORDER — PSYLLIUM SEED
PACKET (EA) ORAL
COMMUNITY

## 2024-10-10 NOTE — DISCHARGE INSTRUCTIONS
To confirm, Your doctor has instructed you that surgery is scheduled for: 10/24/24 with DR. MARIN    Please report to [facility], Registration the morning of surgery. You must check-in and receive a wristband before going to your procedure.  [address] [place] [address] [place] [address]    Pre-Op will call the afternoon prior to surgery between 1:00 and 6:00 PM with the final arrival time.  Phone number: [phone]    PLEASE NOTE:  The surgery schedule has many variables which may affect the time of your surgery case.  Family members should be available if your surgery time changes.  Plan to be here the day of your procedure between 4-6 hours.    MEDICATIONS:  TAKE ONLY THESE MEDICATIONS WITH A SMALL SIP OF WATER THE MORNING OF YOUR PROCEDURE:  SEE LIST      DO NOT TAKE THESE MEDICATIONS 5-7 DAYS PRIOR to your procedure or per your surgeon's request:   ASPIRIN, ALEVE, ADVIL, IBUPROFEN, FISH OIL VITAMIN E, HERBALS  (May take Tylenol)    ONLY if you are prescribed any types of blood thinners such as:  Aspirin, Coumadin, Plavix, Pradaxa, Xarelto, Aggrenox, Effient, Eliquis, Savasya, Brilinta, or any other, ask your surgeon whether you should stop taking them and how long before surgery you should stop.  You may also need to verify with the prescribing physician if it is ok to stop your medication.      INSTRUCTIONS IMPORTANT!!  Do not eat or drink anything between midnight and the time of your procedure- this includes gum, mints, and candy.  Do not smoke or drink alcoholic beverages 24 hours prior to your procedure.  Shower the night before AND the morning of your procedure with a Chlorhexidine wash such as Hibiclens or Dial antibacterial soap from the neck down.  Do not get it on your face or in your eyes.  You may use your own shampoo and face wash. This helps your skin to be as bacteria free as possible.    If you wear contact lenses, dentures, hearing aids or glasses, bring a container to put  them in during surgery and give to a family member for safe keeping.  Please leave all jewelry, piercing's and valuables at home. You must remove your false eyelashes prior to surgery.    DO NOT remove hair from the surgery site.  Do not shave the incision site unless you are given specific instructions to do so.    ONLY if you have been diagnosed with sleep apnea please bring your C-PAP machine.  ONLY if you wear home oxygen please bring your portable oxygen tank the day of your procedure.  ONLY if you have a history of OPEN HEART SURGERY you will need a clearance from your Cardiologist per Anesthesia.      ONLY for patients requiring bowel prep, written instructions will be given by your doctor's office.  ONLY if you have a neuro stimulator, please bring the controller with you the morning of surgery  ONLY if a type and screen test is needed before surgery, please return:  If your doctor has scheduled you for an overnight stay, bring a small overnight bag with any personal items you need.  Make arrangements in advance for transportation home by a responsible adult. You can not go home in an uber or a cab per hospital policy.  It is not safe to drive a vehicle during the 24 hours after anesthesia.          All  facilities and properties are tobacco free.  Smoking is NOT allowed.   If you have any questions about these instructions, call Pre-Op Admit  Nursing at [phone] or the Pre-Op Day Surgery Unit at [phone].

## 2024-10-21 ENCOUNTER — HOSPITAL ENCOUNTER (OUTPATIENT)
Dept: RADIOLOGY | Facility: HOSPITAL | Age: 84
Discharge: HOME | End: 2024-10-21
Attending: PHYSICAL MEDICINE & REHABILITATION
Payer: MEDICARE

## 2024-10-21 DIAGNOSIS — M54.9 ACUTE RIGHT-SIDED BACK PAIN, UNSPECIFIED BACK LOCATION: ICD-10-CM

## 2024-10-21 PROCEDURE — 72080 X-RAY EXAM THORACOLMB 2/> VW: CPT | Mod: TC

## 2024-10-21 PROCEDURE — 72080 X-RAY EXAM THORACOLMB 2/> VW: CPT | Mod: 26,,, | Performed by: RADIOLOGY

## 2024-10-22 ENCOUNTER — OFFICE VISIT (OUTPATIENT)
Dept: SPINE | Facility: CLINIC | Age: 84
End: 2024-10-22
Payer: MEDICARE

## 2024-10-22 VITALS — WEIGHT: 164.25 LBS | BODY MASS INDEX: 22.25 KG/M2 | HEIGHT: 72 IN

## 2024-10-22 DIAGNOSIS — S22.080A COMPRESSION FRACTURE OF T12 VERTEBRA, INITIAL ENCOUNTER: Primary | ICD-10-CM

## 2024-10-22 PROCEDURE — 99213 OFFICE O/P EST LOW 20 MIN: CPT | Mod: S$PBB,,, | Performed by: PHYSICAL MEDICINE & REHABILITATION

## 2024-10-22 PROCEDURE — 99999 PR PBB SHADOW E&M-EST. PATIENT-LVL III: CPT | Mod: PBBFAC,,, | Performed by: PHYSICAL MEDICINE & REHABILITATION

## 2024-10-22 PROCEDURE — 99213 OFFICE O/P EST LOW 20 MIN: CPT | Mod: PBBFAC,PN | Performed by: PHYSICAL MEDICINE & REHABILITATION

## 2024-10-22 NOTE — PROGRESS NOTES
SUBJECTIVE:    Patient ID: Iftikhar Lynn is a 84 y.o. male.    Chief Complaint: Follow-up    He presents today with complaints of persistent familiar low back pain at the lumbosacral junction.  Couple of weeks ago his feet got tangled up while he was in the kitchen and he fell backwards onto his lower back.  Initially he had severe lower back pain at the lumbosacral junction but now his pain level has returned to its baseline.  He is not having any pain at the thoracolumbar junction.  No change in his chronic radicular leg symptoms.  Bowel and bladder remained intact.  He is feeling better overall pain level is 4/10 which he can tolerate.  I sent him for x-rays of the thoracolumbar spine which were done 10/21/2024 and show a mild compression fracture and T12.  He is scheduled to have partial left nephrectomy in 2 days.          Past Medical History:   Diagnosis Date    Cancer     basal cell ca rt arm / melanoma on back    GERD (gastroesophageal reflux disease)     Hyperlipidemia     Hypertension     Melanoma      Social History     Socioeconomic History    Marital status:    Tobacco Use    Smoking status: Former     Types: Cigars    Smokeless tobacco: Never   Substance and Sexual Activity    Alcohol use: Yes     Alcohol/week: 28.0 standard drinks of alcohol     Types: 28 Glasses of wine per week     Comment: daily wine    Drug use: No    Sexual activity: Yes     Partners: Female     Birth control/protection: None     Social Drivers of Health     Financial Resource Strain: Low Risk  (11/14/2023)    Overall Financial Resource Strain (CARDIA)     Difficulty of Paying Living Expenses: Not hard at all   Food Insecurity: No Food Insecurity (2/25/2024)    Hunger Vital Sign     Worried About Running Out of Food in the Last Year: Never true     Ran Out of Food in the Last Year: Never true   Transportation Needs: No Transportation Needs (2/25/2024)    PRAPARE - Transportation     Lack of Transportation  (Medical): No     Lack of Transportation (Non-Medical): No   Physical Activity: Sufficiently Active (2/25/2024)    Exercise Vital Sign     Days of Exercise per Week: 6 days     Minutes of Exercise per Session: 150+ min   Stress: No Stress Concern Present (2/25/2024)    [employer] - Occupational Stress Questionnaire     Feeling of Stress : Only a little   Housing Stability: Low Risk  (2/25/2024)    Housing Stability Vital Sign     Unable to Pay for Housing in the Last Year: No     Number of Places Lived in the Last Year: 1     Unstable Housing in the Last Year: No     Past Surgical History:   Procedure Laterality Date    BACK SURGERY      L 4 L5    COLONOSCOPY N/A 12/20/2017    Procedure: COLONOSCOPY;  Surgeon: Filipe Ramirez MD;  Location: [facility] ENDO;  Service: Endoscopy;  Laterality: N/A;    COLONOSCOPY N/A 09/27/2021    Procedure: COLONOSCOPY;  Surgeon: Filipe Ramirez MD;  Location: [facility] ENDO;  Service: Endoscopy;  Laterality: N/A;    EYE SURGERY  2010    cataract    KNEE ARTHROSCOPY W/ MENISCECTOMY Left 01/17/2019    Procedure: ARTHROSCOPY, KNEE, WITH MENISCECTOMY;  Surgeon: Ethan Díaz MD;  Location: [facility] OR;  Service: Orthopedics;  Laterality: Left;    SKIN CANCER EXCISION      multiple sites, derm dr martinez, basal cell ca    SPINE SURGERY  1990    L4L5 disc    VASECTOMY       Family History   Problem Relation Name Age of Onset    Cancer Mother saqib lynn         non hogkins lumphoma    Heart disease Father Iftikhar Lynn     Cancer Brother fermin lynn         melanoma, arm and leg    Clotting disorder Neg Hx      Anesthesia problems Neg Hx       Vitals:    10/22/24 0912   Weight: 74.5 kg (164 lb 3.9 oz)   Height: 6' (1.829 m)       Review of Systems   Constitutional:  Negative for chills, diaphoresis, fatigue, fever and unexpected weight change.   HENT:  Negative for trouble swallowing.    Eyes:  Negative for visual disturbance.   Respiratory:  Negative for  shortness of breath.    Cardiovascular:  Negative for chest pain.   Gastrointestinal:  Negative for abdominal pain, constipation, diarrhea, nausea and vomiting.   Genitourinary:  Negative for difficulty urinating.   Musculoskeletal:  Negative for arthralgias, back pain, gait problem, joint swelling, myalgias, neck pain and neck stiffness.   Neurological:  Negative for dizziness, speech difficulty, weakness, light-headedness, numbness and headaches.          Objective:      Physical Exam  Neurological:      Mental Status: He is alert and oriented to person, place, and time.      Comments: He is awake and in no acute distress  Mild tenderness to palpation lumbar paraspinous musculature at the lumbosacral junction with no palpable masses  No pain on palpation or percussion over the thoracic or upper lumbar region.  Reflexes- +1-+2 reflexes at the following:   C5-Biceps   C6-Brachioradialis   C7-Triceps   L3/4-Patellar   S1-Achilles   Strength testing- 5/5 strength in the following muscle groups:  C5-Elbow flexion  C6-Wrist extension  C7-Elbow extension  C8-Finger flexion  T1-Finger abduction  L2-Hip flexion  L3-Knee extension  L4-Ankle dorsiflexion  L5-Great toe extension  S1-Ankle plantar flexion                    Assessment:       1. Compression fracture of T12 vertebra, initial encounter           Plan:     He remains neurologically intact.  He says that his pain symptoms have not worsened since the fall.  He is not having any pain at the thoracolumbar junction.  I do not think the mild fracture needs to be addressed acutely.  He should proceed with surgery as planned.  He can follow up with me if he develops pain at the thoracolumbar junction and we would do additional imaging.  Otherwise he can follow up here as needed      Compression fracture of T12 vertebra, initial encounter

## 2024-10-23 ENCOUNTER — ANESTHESIA EVENT (OUTPATIENT)
Dept: SURGERY | Facility: HOSPITAL | Age: 84
End: 2024-10-23
Payer: MEDICARE

## 2024-10-24 ENCOUNTER — ANESTHESIA (OUTPATIENT)
Dept: SURGERY | Facility: HOSPITAL | Age: 84
End: 2024-10-24
Payer: MEDICARE

## 2024-10-24 ENCOUNTER — HOSPITAL ENCOUNTER (OUTPATIENT)
Facility: HOSPITAL | Age: 84
LOS: 1 days | Discharge: HOME | End: 2024-10-25
Attending: STUDENT IN AN ORGANIZED HEALTH CARE EDUCATION/TRAINING PROGRAM | Admitting: STUDENT IN AN ORGANIZED HEALTH CARE EDUCATION/TRAINING PROGRAM
Payer: MEDICARE

## 2024-10-24 DIAGNOSIS — N28.89 LEFT RENAL MASS: Primary | ICD-10-CM

## 2024-10-24 DIAGNOSIS — N28.89 RENAL MASS: ICD-10-CM

## 2024-10-24 LAB
ANION GAP SERPL CALC-SCNC: 12 MMOL/L (ref 8–16)
BASOPHILS # BLD AUTO: 0.04 K/UL (ref 0–0.2)
BASOPHILS NFR BLD: 0.4 % (ref 0–1.9)
BUN SERPL-MCNC: 14 MG/DL (ref 8–23)
CALCIUM SERPL-MCNC: 8.9 MG/DL (ref 8.7–10.5)
CHLORIDE SERPL-SCNC: 106 MMOL/L (ref 95–110)
CO2 SERPL-SCNC: 23 MMOL/L (ref 23–29)
CREAT SERPL-MCNC: 0.8 MG/DL (ref 0.5–1.4)
DIFFERENTIAL METHOD BLD: ABNORMAL
EOSINOPHIL # BLD AUTO: 0 K/UL (ref 0–0.5)
EOSINOPHIL NFR BLD: 0.2 % (ref 0–8)
ERYTHROCYTE [DISTWIDTH] IN BLOOD BY AUTOMATED COUNT: 13.2 % (ref 11.5–14.5)
EST. GFR  (NO RACE VARIABLE): >60 ML/MIN/1.73 M^2
GLUCOSE SERPL-MCNC: 169 MG/DL (ref 70–110)
HCT VFR BLD AUTO: 39.5 % (ref 40–54)
HGB BLD-MCNC: 12.9 G/DL (ref 14–18)
IMM GRANULOCYTES # BLD AUTO: 0.06 K/UL (ref 0–0.04)
IMM GRANULOCYTES NFR BLD AUTO: 0.6 % (ref 0–0.5)
LYMPHOCYTES # BLD AUTO: 0.6 K/UL (ref 1–4.8)
LYMPHOCYTES NFR BLD: 6.3 % (ref 18–48)
MCH RBC QN AUTO: 32.7 PG (ref 27–31)
MCHC RBC AUTO-ENTMCNC: 32.7 G/DL (ref 32–36)
MCV RBC AUTO: 100 FL (ref 82–98)
MONOCYTES # BLD AUTO: 0.2 K/UL (ref 0.3–1)
MONOCYTES NFR BLD: 1.7 % (ref 4–15)
NEUTROPHILS # BLD AUTO: 9.2 K/UL (ref 1.8–7.7)
NEUTROPHILS NFR BLD: 90.8 % (ref 38–73)
NRBC BLD-RTO: 0 /100 WBC
PLATELET # BLD AUTO: 260 K/UL (ref 150–450)
PMV BLD AUTO: 10.1 FL (ref 9.2–12.9)
POTASSIUM SERPL-SCNC: 4 MMOL/L (ref 3.5–5.1)
RBC # BLD AUTO: 3.95 M/UL (ref 4.6–6.2)
SODIUM SERPL-SCNC: 141 MMOL/L (ref 136–145)
WBC # BLD AUTO: 10.17 K/UL (ref 3.9–12.7)

## 2024-10-24 PROCEDURE — 36000712 HC OR TIME LEV V 1ST 15 MIN: Performed by: STUDENT IN AN ORGANIZED HEALTH CARE EDUCATION/TRAINING PROGRAM

## 2024-10-24 PROCEDURE — 99900035 HC TECH TIME PER 15 MIN (STAT)

## 2024-10-24 PROCEDURE — 85025 COMPLETE CBC W/AUTO DIFF WBC: CPT | Performed by: STUDENT IN AN ORGANIZED HEALTH CARE EDUCATION/TRAINING PROGRAM

## 2024-10-24 PROCEDURE — 50543 LAPARO PARTIAL NEPHRECTOMY: CPT | Mod: LT,,, | Performed by: STUDENT IN AN ORGANIZED HEALTH CARE EDUCATION/TRAINING PROGRAM

## 2024-10-24 PROCEDURE — 25000003 PHARM REV CODE 250: Performed by: NURSE ANESTHETIST, CERTIFIED REGISTERED

## 2024-10-24 PROCEDURE — 25000003 PHARM REV CODE 250: Performed by: ANESTHESIOLOGY

## 2024-10-24 PROCEDURE — 94799 UNLISTED PULMONARY SVC/PX: CPT

## 2024-10-24 PROCEDURE — 63600175 PHARM REV CODE 636 W HCPCS: Mod: JZ,JG | Performed by: STUDENT IN AN ORGANIZED HEALTH CARE EDUCATION/TRAINING PROGRAM

## 2024-10-24 PROCEDURE — 27201423 OPTIME MED/SURG SUP & DEVICES STERILE SUPPLY: Performed by: STUDENT IN AN ORGANIZED HEALTH CARE EDUCATION/TRAINING PROGRAM

## 2024-10-24 PROCEDURE — 36415 COLL VENOUS BLD VENIPUNCTURE: CPT | Performed by: STUDENT IN AN ORGANIZED HEALTH CARE EDUCATION/TRAINING PROGRAM

## 2024-10-24 PROCEDURE — 63600175 PHARM REV CODE 636 W HCPCS: Performed by: STUDENT IN AN ORGANIZED HEALTH CARE EDUCATION/TRAINING PROGRAM

## 2024-10-24 PROCEDURE — 71000039 HC RECOVERY, EACH ADD'L HOUR: Performed by: STUDENT IN AN ORGANIZED HEALTH CARE EDUCATION/TRAINING PROGRAM

## 2024-10-24 PROCEDURE — 37000009 HC ANESTHESIA EA ADD 15 MINS: Performed by: STUDENT IN AN ORGANIZED HEALTH CARE EDUCATION/TRAINING PROGRAM

## 2024-10-24 PROCEDURE — 80048 BASIC METABOLIC PNL TOTAL CA: CPT | Performed by: STUDENT IN AN ORGANIZED HEALTH CARE EDUCATION/TRAINING PROGRAM

## 2024-10-24 PROCEDURE — 25000003 PHARM REV CODE 250: Performed by: STUDENT IN AN ORGANIZED HEALTH CARE EDUCATION/TRAINING PROGRAM

## 2024-10-24 PROCEDURE — 63600175 PHARM REV CODE 636 W HCPCS: Performed by: NURSE ANESTHETIST, CERTIFIED REGISTERED

## 2024-10-24 PROCEDURE — C1729 CATH, DRAINAGE: HCPCS | Performed by: STUDENT IN AN ORGANIZED HEALTH CARE EDUCATION/TRAINING PROGRAM

## 2024-10-24 PROCEDURE — 63600175 PHARM REV CODE 636 W HCPCS: Performed by: ANESTHESIOLOGY

## 2024-10-24 PROCEDURE — 94761 N-INVAS EAR/PLS OXIMETRY MLT: CPT

## 2024-10-24 PROCEDURE — 88307 TISSUE EXAM BY PATHOLOGIST: CPT | Mod: TC | Performed by: PATHOLOGY

## 2024-10-24 PROCEDURE — 71000033 HC RECOVERY, INTIAL HOUR: Performed by: STUDENT IN AN ORGANIZED HEALTH CARE EDUCATION/TRAINING PROGRAM

## 2024-10-24 PROCEDURE — 36000713 HC OR TIME LEV V EA ADD 15 MIN: Performed by: STUDENT IN AN ORGANIZED HEALTH CARE EDUCATION/TRAINING PROGRAM

## 2024-10-24 PROCEDURE — 76998 US GUIDE INTRAOP: CPT | Mod: 26,,, | Performed by: STUDENT IN AN ORGANIZED HEALTH CARE EDUCATION/TRAINING PROGRAM

## 2024-10-24 PROCEDURE — 37000008 HC ANESTHESIA 1ST 15 MINUTES: Performed by: STUDENT IN AN ORGANIZED HEALTH CARE EDUCATION/TRAINING PROGRAM

## 2024-10-24 PROCEDURE — 27000221 HC OXYGEN, UP TO 24 HOURS

## 2024-10-24 RX ORDER — VALSARTAN 80 MG/1
320 TABLET ORAL DAILY
Status: DISCONTINUED | OUTPATIENT
Start: 2024-10-25 | End: 2024-10-25 | Stop reason: HOSPADM

## 2024-10-24 RX ORDER — METOPROLOL SUCCINATE 25 MG/1
25 TABLET, EXTENDED RELEASE ORAL 2 TIMES DAILY
Status: DISCONTINUED | OUTPATIENT
Start: 2024-10-25 | End: 2024-10-24

## 2024-10-24 RX ORDER — ACETAMINOPHEN 500 MG
1000 TABLET ORAL EVERY 8 HOURS
Status: DISCONTINUED | OUTPATIENT
Start: 2024-10-24 | End: 2024-10-25 | Stop reason: HOSPADM

## 2024-10-24 RX ORDER — ONDANSETRON 4 MG/1
8 TABLET, ORALLY DISINTEGRATING ORAL EVERY 6 HOURS PRN
Status: DISCONTINUED | OUTPATIENT
Start: 2024-10-24 | End: 2024-10-25 | Stop reason: HOSPADM

## 2024-10-24 RX ORDER — SODIUM CHLORIDE 9 MG/ML
INJECTION, SOLUTION INTRAVENOUS CONTINUOUS
Status: DISCONTINUED | OUTPATIENT
Start: 2024-10-24 | End: 2024-10-25

## 2024-10-24 RX ORDER — ACETAMINOPHEN 10 MG/ML
INJECTION, SOLUTION INTRAVENOUS
Status: DISCONTINUED | OUTPATIENT
Start: 2024-10-24 | End: 2024-10-24

## 2024-10-24 RX ORDER — DEXAMETHASONE SODIUM PHOSPHATE 4 MG/ML
INJECTION, SOLUTION INTRA-ARTICULAR; INTRALESIONAL; INTRAMUSCULAR; INTRAVENOUS; SOFT TISSUE
Status: DISCONTINUED | OUTPATIENT
Start: 2024-10-24 | End: 2024-10-24

## 2024-10-24 RX ORDER — ONDANSETRON HYDROCHLORIDE 2 MG/ML
INJECTION, SOLUTION INTRAMUSCULAR; INTRAVENOUS
Status: DISCONTINUED | OUTPATIENT
Start: 2024-10-24 | End: 2024-10-24

## 2024-10-24 RX ORDER — PANTOPRAZOLE SODIUM 40 MG/1
40 TABLET, DELAYED RELEASE ORAL DAILY
Status: DISCONTINUED | OUTPATIENT
Start: 2024-10-24 | End: 2024-10-25 | Stop reason: HOSPADM

## 2024-10-24 RX ORDER — VALSARTAN 80 MG/1
320 TABLET ORAL DAILY
Status: DISCONTINUED | OUTPATIENT
Start: 2024-10-25 | End: 2024-10-24

## 2024-10-24 RX ORDER — FENTANYL CITRATE 50 UG/ML
INJECTION, SOLUTION INTRAMUSCULAR; INTRAVENOUS
Status: DISCONTINUED | OUTPATIENT
Start: 2024-10-24 | End: 2024-10-24

## 2024-10-24 RX ORDER — METOPROLOL SUCCINATE 25 MG/1
25 TABLET, EXTENDED RELEASE ORAL 2 TIMES DAILY
Status: DISCONTINUED | OUTPATIENT
Start: 2024-10-24 | End: 2024-10-25 | Stop reason: HOSPADM

## 2024-10-24 RX ORDER — CEFAZOLIN 2 G/1
2 INJECTION, POWDER, FOR SOLUTION INTRAMUSCULAR; INTRAVENOUS
Status: COMPLETED | OUTPATIENT
Start: 2024-10-24 | End: 2024-10-24

## 2024-10-24 RX ORDER — FENTANYL CITRATE 50 UG/ML
25 INJECTION, SOLUTION INTRAMUSCULAR; INTRAVENOUS EVERY 5 MIN PRN
Status: DISCONTINUED | OUTPATIENT
Start: 2024-10-24 | End: 2024-10-24 | Stop reason: HOSPADM

## 2024-10-24 RX ORDER — OXYCODONE HYDROCHLORIDE 5 MG/1
5 TABLET ORAL ONCE AS NEEDED
Status: DISCONTINUED | OUTPATIENT
Start: 2024-10-24 | End: 2024-10-24 | Stop reason: HOSPADM

## 2024-10-24 RX ORDER — PROPOFOL 10 MG/ML
VIAL (ML) INTRAVENOUS
Status: DISCONTINUED | OUTPATIENT
Start: 2024-10-24 | End: 2024-10-24

## 2024-10-24 RX ORDER — ROCURONIUM BROMIDE 10 MG/ML
INJECTION, SOLUTION INTRAVENOUS
Status: DISCONTINUED | OUTPATIENT
Start: 2024-10-24 | End: 2024-10-24

## 2024-10-24 RX ORDER — POLYETHYLENE GLYCOL 3350 17 G/17G
17 POWDER, FOR SOLUTION ORAL DAILY
Status: DISCONTINUED | OUTPATIENT
Start: 2024-10-24 | End: 2024-10-25 | Stop reason: HOSPADM

## 2024-10-24 RX ORDER — LIDOCAINE HYDROCHLORIDE 10 MG/ML
1 INJECTION, SOLUTION EPIDURAL; INFILTRATION; INTRACAUDAL; PERINEURAL ONCE
Status: DISCONTINUED | OUTPATIENT
Start: 2024-10-24 | End: 2024-10-24

## 2024-10-24 RX ORDER — OXYCODONE HYDROCHLORIDE 5 MG/1
10 TABLET ORAL ONCE
Status: COMPLETED | OUTPATIENT
Start: 2024-10-24 | End: 2024-10-24

## 2024-10-24 RX ORDER — ONDANSETRON HYDROCHLORIDE 2 MG/ML
4 INJECTION, SOLUTION INTRAVENOUS ONCE AS NEEDED
Status: DISCONTINUED | OUTPATIENT
Start: 2024-10-24 | End: 2024-10-24 | Stop reason: HOSPADM

## 2024-10-24 RX ORDER — OXYCODONE HYDROCHLORIDE 5 MG/1
10 TABLET ORAL EVERY 4 HOURS PRN
Status: DISCONTINUED | OUTPATIENT
Start: 2024-10-24 | End: 2024-10-25 | Stop reason: HOSPADM

## 2024-10-24 RX ORDER — BUPIVACAINE HYDROCHLORIDE 5 MG/ML
INJECTION, SOLUTION EPIDURAL; INTRACAUDAL
Status: DISCONTINUED | OUTPATIENT
Start: 2024-10-24 | End: 2024-10-24

## 2024-10-24 RX ORDER — SODIUM CHLORIDE 0.9 % (FLUSH) 0.9 %
10 SYRINGE (ML) INJECTION
Status: DISCONTINUED | OUTPATIENT
Start: 2024-10-24 | End: 2024-10-25 | Stop reason: HOSPADM

## 2024-10-24 RX ORDER — SUCCINYLCHOLINE CHLORIDE 20 MG/ML
INJECTION INTRAMUSCULAR; INTRAVENOUS
Status: DISCONTINUED | OUTPATIENT
Start: 2024-10-24 | End: 2024-10-24

## 2024-10-24 RX ORDER — ASPIRIN 81 MG/1
81 TABLET ORAL DAILY
Status: DISCONTINUED | OUTPATIENT
Start: 2024-10-25 | End: 2024-10-25 | Stop reason: HOSPADM

## 2024-10-24 RX ORDER — EPHEDRINE SULFATE 50 MG/ML
INJECTION, SOLUTION INTRAVENOUS
Status: DISCONTINUED | OUTPATIENT
Start: 2024-10-24 | End: 2024-10-24

## 2024-10-24 RX ORDER — OXYCODONE HYDROCHLORIDE 5 MG/1
5 TABLET ORAL EVERY 4 HOURS PRN
Status: DISCONTINUED | OUTPATIENT
Start: 2024-10-24 | End: 2024-10-25 | Stop reason: HOSPADM

## 2024-10-24 RX ORDER — LIDOCAINE HYDROCHLORIDE 20 MG/ML
INJECTION INTRAVENOUS
Status: DISCONTINUED | OUTPATIENT
Start: 2024-10-24 | End: 2024-10-24

## 2024-10-24 RX ADMIN — FENTANYL CITRATE 50 MCG: 50 INJECTION, SOLUTION INTRAMUSCULAR; INTRAVENOUS at 10:10

## 2024-10-24 RX ADMIN — OXYCODONE HYDROCHLORIDE 10 MG: 10 TABLET ORAL at 10:10

## 2024-10-24 RX ADMIN — EPHEDRINE SULFATE 5 MG: 50 INJECTION, SOLUTION INTRAMUSCULAR; INTRAVENOUS; SUBCUTANEOUS at 09:10

## 2024-10-24 RX ADMIN — FENTANYL CITRATE 25 MCG: 50 INJECTION, SOLUTION INTRAMUSCULAR; INTRAVENOUS at 09:10

## 2024-10-24 RX ADMIN — EPHEDRINE SULFATE 5 MG: 50 INJECTION, SOLUTION INTRAMUSCULAR; INTRAVENOUS; SUBCUTANEOUS at 08:10

## 2024-10-24 RX ADMIN — ACETAMINOPHEN 1000 MG: 10 INJECTION INTRAVENOUS at 07:10

## 2024-10-24 RX ADMIN — SODIUM CHLORIDE, SODIUM GLUCONATE, SODIUM ACETATE, POTASSIUM CHLORIDE AND MAGNESIUM CHLORIDE: 526; 502; 368; 37; 30 INJECTION, SOLUTION INTRAVENOUS at 06:10

## 2024-10-24 RX ADMIN — ROCURONIUM BROMIDE 45 MG: 10 INJECTION, SOLUTION INTRAVENOUS at 07:10

## 2024-10-24 RX ADMIN — SUCCINYLCHOLINE CHLORIDE 160 MG: 20 INJECTION, SOLUTION INTRAMUSCULAR; INTRAVENOUS at 07:10

## 2024-10-24 RX ADMIN — ONDANSETRON 8 MG: 4 TABLET, ORALLY DISINTEGRATING ORAL at 05:10

## 2024-10-24 RX ADMIN — ONDANSETRON 4 MG: 2 INJECTION INTRAMUSCULAR; INTRAVENOUS at 07:10

## 2024-10-24 RX ADMIN — ACETAMINOPHEN 1000 MG: 500 TABLET ORAL at 10:10

## 2024-10-24 RX ADMIN — FENTANYL CITRATE 75 MCG: 50 INJECTION, SOLUTION INTRAMUSCULAR; INTRAVENOUS at 07:10

## 2024-10-24 RX ADMIN — ONDANSETRON 4 MG: 2 INJECTION INTRAMUSCULAR; INTRAVENOUS at 09:10

## 2024-10-24 RX ADMIN — LIDOCAINE HYDROCHLORIDE 100 MG: 20 INJECTION, SOLUTION INTRAVENOUS at 07:10

## 2024-10-24 RX ADMIN — ONDANSETRON 8 MG: 4 TABLET, ORALLY DISINTEGRATING ORAL at 12:10

## 2024-10-24 RX ADMIN — SODIUM CHLORIDE: 9 INJECTION, SOLUTION INTRAVENOUS at 12:10

## 2024-10-24 RX ADMIN — DEXAMETHASONE SODIUM PHOSPHATE 4 MG: 4 INJECTION, SOLUTION INTRA-ARTICULAR; INTRALESIONAL; INTRAMUSCULAR; INTRAVENOUS; SOFT TISSUE at 07:10

## 2024-10-24 RX ADMIN — EPHEDRINE SULFATE 5 MG: 50 INJECTION, SOLUTION INTRAMUSCULAR; INTRAVENOUS; SUBCUTANEOUS at 07:10

## 2024-10-24 RX ADMIN — METOPROLOL SUCCINATE 25 MG: 25 TABLET, EXTENDED RELEASE ORAL at 08:10

## 2024-10-24 RX ADMIN — FENTANYL CITRATE 25 MCG: 50 INJECTION INTRAMUSCULAR; INTRAVENOUS at 11:10

## 2024-10-24 RX ADMIN — SUGAMMADEX 200 MG: 100 INJECTION, SOLUTION INTRAVENOUS at 10:10

## 2024-10-24 RX ADMIN — ACETAMINOPHEN 1000 MG: 500 TABLET ORAL at 12:10

## 2024-10-24 RX ADMIN — ROCURONIUM BROMIDE 5 MG: 10 INJECTION, SOLUTION INTRAVENOUS at 07:10

## 2024-10-24 RX ADMIN — OXYCODONE 10 MG: 5 TABLET ORAL at 01:10

## 2024-10-24 RX ADMIN — SODIUM CHLORIDE, SODIUM GLUCONATE, SODIUM ACETATE, POTASSIUM CHLORIDE AND MAGNESIUM CHLORIDE: 526; 502; 368; 37; 30 INJECTION, SOLUTION INTRAVENOUS at 08:10

## 2024-10-24 RX ADMIN — ROCURONIUM BROMIDE 20 MG: 10 INJECTION, SOLUTION INTRAVENOUS at 07:10

## 2024-10-24 RX ADMIN — FENTANYL CITRATE 25 MCG: 50 INJECTION INTRAMUSCULAR; INTRAVENOUS at 10:10

## 2024-10-24 RX ADMIN — FENTANYL CITRATE 25 MCG: 50 INJECTION, SOLUTION INTRAMUSCULAR; INTRAVENOUS at 08:10

## 2024-10-24 RX ADMIN — PANTOPRAZOLE SODIUM 40 MG: 40 TABLET, DELAYED RELEASE ORAL at 10:10

## 2024-10-24 RX ADMIN — CEFAZOLIN 2 G: 2 INJECTION, POWDER, FOR SOLUTION INTRAMUSCULAR; INTRAVENOUS at 07:10

## 2024-10-24 RX ADMIN — FENTANYL CITRATE 25 MCG: 50 INJECTION, SOLUTION INTRAMUSCULAR; INTRAVENOUS at 06:10

## 2024-10-24 RX ADMIN — ROCURONIUM BROMIDE 10 MG: 10 INJECTION, SOLUTION INTRAVENOUS at 08:10

## 2024-10-24 RX ADMIN — PROPOFOL 175 MG: 10 INJECTION, EMULSION INTRAVENOUS at 07:10

## 2024-10-24 NOTE — ANESTHESIA PREPROCEDURE EVALUATION
10/24/2024  Iftikhar Lynn is a 84 y.o., male.      Pre-op Assessment    I have reviewed the Patient Summary Reports.     I have reviewed the Nursing Notes. I have reviewed the NPO Status.   I have reviewed the Medications.     Review of Systems  Anesthesia Hx:  No problems with previous Anesthesia                Social:  Former Smoker       Hematology/Oncology:                        --  Cancer in past history (melanoma):                     Cardiovascular:     Hypertension, well controlled           hyperlipidemia                               Pulmonary:    Asthma asymptomatic and mild                   Renal/:  Renal/ Normal                 Hepatic/GI:     GERD, well controlled                Neurological:  Neurology Normal                                      Endocrine:  Endocrine Normal                Physical Exam  General: Well nourished, Cooperative, Alert and Oriented    Airway:  Mallampati: II   Mouth Opening: Normal  TM Distance: Normal  Neck ROM: Normal ROM    Anesthesia Plan  Type of Anesthesia, risks & benefits discussed:    Anesthesia Type: Gen ETT, Gen Supraglottic Airway, Gen Natural Airway, MAC  Intra-op Monitoring Plan: Standard ASA Monitors  Post Op Pain Control Plan: multimodal analgesia  Induction:  IV  Airway Plan: Direct, Video and Fiberoptic, Post-Induction  Informed Consent: Informed consent signed with the Patient and all parties understand the risks and agree with anesthesia plan.  All questions answered.   ASA Score: 3  Anesthesia Plan Notes: Advanced age    Ready For Surgery From Anesthesia Perspective.   .

## 2024-10-24 NOTE — ANESTHESIA PROCEDURE NOTES
Intubation    Date/Time: 10/24/2024 7:11 AM    Performed by: Richard Alfonso CRNA  Authorized by: Bertin Lazaro MD    Intubation:     Induction:  Intravenous    Intubated:  Postinduction    Mask Ventilation:  Easy mask    Attempts:  1    Attempted By:  Student    Method of Intubation:  Video laryngoscopy    Blade:  Silverman 3    Laryngeal View Grade: Grade IIA - cords partially seen      Difficult Airway Encountered?: No      Complications:  None    Airway Device:  Oral endotracheal tube    Airway Device Size:  7.5    Style/Cuff Inflation:  Cuffed (inflated to minimal occlusive pressure)    Inflation Amount (mL):  5    Tube secured:  24    Secured at:  The lips    Placement Verified By:  Capnometry    Complicating Factors:  None    Findings Post-Intubation:  BS equal bilateral and atraumatic/condition of teeth unchanged

## 2024-10-24 NOTE — ANESTHESIA POSTPROCEDURE EVALUATION
Anesthesia Post Evaluation    Patient: Iftikhar Lynn    Procedure(s) Performed: Procedure(s) (LRB):  ROBOTIC NEPHRECTOMY, PARTIAL (Left)    Final Anesthesia Type: general      Patient location during evaluation: PACU  Patient participation: Yes- Able to Participate  Level of consciousness: awake and alert and oriented  Post-procedure vital signs: reviewed and stable  Pain management: adequate  Airway patency: patent    PONV status at discharge: No PONV  Anesthetic complications: no      Cardiovascular status: blood pressure returned to baseline and stable  Respiratory status: unassisted and spontaneous ventilation  Hydration status: euvolemic  Follow-up not needed.              Vitals Value Taken Time   /78 10/24/24 1132   Temp 36.3 °C (97.3 °F) 10/24/24 1132   Pulse 70 10/24/24 1132   Resp 17 10/24/24 1322   SpO2 97 % 10/24/24 1132         Event Time   Out of Recovery 11:25:00         Pain/Alanna Score: Pain Rating Prior to Med Admin: 6 (10/24/2024  1:22 PM)  Pain Rating Post Med Admin: 5 (10/24/2024 11:15 AM)  Alanna Score: 10 (10/24/2024 11:15 AM)

## 2024-10-24 NOTE — TRANSFER OF CARE
Anesthesia Transfer of Care Note    Patient: Iftikhar Lynn    Procedure(s) Performed: Procedure(s) (LRB):  ROBOTIC NEPHRECTOMY, PARTIAL (Left)    Patient location: PACU    Anesthesia Type: general    Transport from OR: Transported from OR on 6-10 L/min O2 by face mask with adequate spontaneous ventilation    Post pain: adequate analgesia    Post assessment: no apparent anesthetic complications and tolerated procedure well    Post vital signs: stable    Level of consciousness: sedated    Nausea/Vomiting: no nausea/vomiting    Complications: none    Transfer of care protocol was followed      Last vitals: Visit Vitals  BP (!) 151/84 (BP Location: Right arm, Patient Position: Lying)   Pulse 70   Temp 36.7 °C (98.1 °F) (Skin)   Resp 18   Ht 6' (1.829 m)   Wt 74.4 kg (164 lb)   SpO2 98%   BMI 22.24 kg/m²

## 2024-10-25 LAB
ANION GAP SERPL CALC-SCNC: 11 MMOL/L (ref 8–16)
BASOPHILS # BLD AUTO: 0.03 K/UL (ref 0–0.2)
BASOPHILS NFR BLD: 0.4 % (ref 0–1.9)
BODY FLUID SOURCE, CREATININE: NORMAL
BUN SERPL-MCNC: 10 MG/DL (ref 8–23)
CALCIUM SERPL-MCNC: 8.7 MG/DL (ref 8.7–10.5)
CHLORIDE SERPL-SCNC: 102 MMOL/L (ref 95–110)
CO2 SERPL-SCNC: 23 MMOL/L (ref 23–29)
CREAT FLD-MCNC: 0.7 MG/DL
CREAT SERPL-MCNC: 0.8 MG/DL (ref 0.5–1.4)
DIFFERENTIAL METHOD BLD: ABNORMAL
EOSINOPHIL # BLD AUTO: 0.1 K/UL (ref 0–0.5)
EOSINOPHIL NFR BLD: 1.5 % (ref 0–8)
ERYTHROCYTE [DISTWIDTH] IN BLOOD BY AUTOMATED COUNT: 13.1 % (ref 11.5–14.5)
EST. GFR  (NO RACE VARIABLE): >60 ML/MIN/1.73 M^2
GLUCOSE SERPL-MCNC: 106 MG/DL (ref 70–110)
HCT VFR BLD AUTO: 36.9 % (ref 40–54)
HGB BLD-MCNC: 12 G/DL (ref 14–18)
IMM GRANULOCYTES # BLD AUTO: 0.02 K/UL (ref 0–0.04)
IMM GRANULOCYTES NFR BLD AUTO: 0.2 % (ref 0–0.5)
LYMPHOCYTES # BLD AUTO: 1.4 K/UL (ref 1–4.8)
LYMPHOCYTES NFR BLD: 17.3 % (ref 18–48)
MCH RBC QN AUTO: 32.4 PG (ref 27–31)
MCHC RBC AUTO-ENTMCNC: 32.5 G/DL (ref 32–36)
MCV RBC AUTO: 100 FL (ref 82–98)
MONOCYTES # BLD AUTO: 0.8 K/UL (ref 0.3–1)
MONOCYTES NFR BLD: 10.4 % (ref 4–15)
NEUTROPHILS # BLD AUTO: 5.7 K/UL (ref 1.8–7.7)
NEUTROPHILS NFR BLD: 70.2 % (ref 38–73)
NRBC BLD-RTO: 0 /100 WBC
PLATELET # BLD AUTO: 258 K/UL (ref 150–450)
PMV BLD AUTO: 10.5 FL (ref 9.2–12.9)
POTASSIUM SERPL-SCNC: 4 MMOL/L (ref 3.5–5.1)
RBC # BLD AUTO: 3.7 M/UL (ref 4.6–6.2)
SODIUM SERPL-SCNC: 136 MMOL/L (ref 136–145)
WBC # BLD AUTO: 8.05 K/UL (ref 3.9–12.7)

## 2024-10-25 PROCEDURE — 25000003 PHARM REV CODE 250: Performed by: STUDENT IN AN ORGANIZED HEALTH CARE EDUCATION/TRAINING PROGRAM

## 2024-10-25 PROCEDURE — 94761 N-INVAS EAR/PLS OXIMETRY MLT: CPT

## 2024-10-25 PROCEDURE — 94760 N-INVAS EAR/PLS OXIMETRY 1: CPT

## 2024-10-25 PROCEDURE — 94799 UNLISTED PULMONARY SVC/PX: CPT

## 2024-10-25 PROCEDURE — 85025 COMPLETE CBC W/AUTO DIFF WBC: CPT | Performed by: STUDENT IN AN ORGANIZED HEALTH CARE EDUCATION/TRAINING PROGRAM

## 2024-10-25 PROCEDURE — 80048 BASIC METABOLIC PNL TOTAL CA: CPT | Performed by: STUDENT IN AN ORGANIZED HEALTH CARE EDUCATION/TRAINING PROGRAM

## 2024-10-25 PROCEDURE — 82570 ASSAY OF URINE CREATININE: CPT | Performed by: STUDENT IN AN ORGANIZED HEALTH CARE EDUCATION/TRAINING PROGRAM

## 2024-10-25 PROCEDURE — 36415 COLL VENOUS BLD VENIPUNCTURE: CPT | Performed by: STUDENT IN AN ORGANIZED HEALTH CARE EDUCATION/TRAINING PROGRAM

## 2024-10-25 RX ORDER — POLYETHYLENE GLYCOL 3350 17 G/17G
17 POWDER, FOR SOLUTION ORAL DAILY
Qty: 14 EACH | Refills: 0 | Status: SHIPPED | OUTPATIENT
Start: 2024-10-25 | End: 2024-11-08

## 2024-10-25 RX ORDER — OXYCODONE AND ACETAMINOPHEN 5; 325 MG/1; MG/1
1 TABLET ORAL EVERY 4 HOURS PRN
Qty: 20 TABLET | Refills: 0 | Status: SHIPPED | OUTPATIENT
Start: 2024-10-25

## 2024-10-25 RX ADMIN — VALSARTAN 320 MG: 80 TABLET, FILM COATED ORAL at 06:10

## 2024-10-25 RX ADMIN — ACETAMINOPHEN 1000 MG: 500 TABLET ORAL at 06:10

## 2024-10-25 RX ADMIN — ASPIRIN 81 MG: 81 TABLET, COATED ORAL at 09:10

## 2024-10-25 RX ADMIN — PANTOPRAZOLE SODIUM 40 MG: 40 TABLET, DELAYED RELEASE ORAL at 09:10

## 2024-10-25 RX ADMIN — OXYCODONE HYDROCHLORIDE 10 MG: 10 TABLET ORAL at 09:10

## 2024-10-25 RX ADMIN — METOPROLOL SUCCINATE 25 MG: 25 TABLET, EXTENDED RELEASE ORAL at 09:10

## 2024-10-25 RX ADMIN — SODIUM CHLORIDE: 9 INJECTION, SOLUTION INTRAVENOUS at 01:10

## 2024-10-25 RX ADMIN — OXYCODONE HYDROCHLORIDE 10 MG: 10 TABLET ORAL at 04:10

## 2024-10-28 VITALS
HEIGHT: 72 IN | OXYGEN SATURATION: 98 % | BODY MASS INDEX: 22.21 KG/M2 | WEIGHT: 164 LBS | TEMPERATURE: 98 F | HEART RATE: 72 BPM | RESPIRATION RATE: 17 BRPM | SYSTOLIC BLOOD PRESSURE: 116 MMHG | DIASTOLIC BLOOD PRESSURE: 62 MMHG

## 2024-11-06 NOTE — PROGRESS NOTES
[facility] Note    PCP: Chris Portillo Jr., MD    Chief Complaint: RCC    SUBJECTIVE:       History of Present Illness:  Iftikhar Lynn is a 84 y.o. male who presents to clinic for RCC. He is Established  to our clinic.     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   No hematuria.   Having regular BMs.   Appetite is good.   Ambulating well.     9/5/24  Patient recently underwent lumbar MRI for back pain which incidentally noted a lesion on the left kidney.   Retroperitoneal US 8/29/24 shows concern for a 3.2 cm left lower pole solid mass.     Former smoker, quit 60 years ago.     No gross hematuria or dysuria.     No prior abdominal surgeries.     PSA 8/2/23: 3.2    12/8/21  Previous patient of Dr. Anna, last seen in Feb 2017 for groin pain. He has since been diagnosed with a hernia, however he has not gotten this fixed yet.    He presents today for elevated PSA at 4.3 in August. Previously in July 2020 was 3.2.    Has chronic bilateral testicular pain and lower pelvic pain. He also has issues with hemorrhoids. No constipation but does occasionally have diarrhea.    No issues with ED. No issues with voiding. Nocturia x 1. No hematuria or dysuria.     UA today: negative for blood, leuks, nitrite   PVR today: 0 cc    Last urine culture: no documented UTIs    Lab Results   Component Value Date    CREATININE 0.8 10/25/2024     PSA, Screen (ng/mL)   Date Value   08/02/2023 3.2     Family  hx: no malignancy   Hx of HTN, HLD, melanoma    Past medical, family, and social history reviewed as documented in chart with pertinent positive medical, family, and social history detailed in HPI.    Review of patient's allergies indicates:  No Known Allergies    Past Medical History:   Diagnosis Date    Cancer     basal cell ca rt arm / melanoma on back    GERD (gastroesophageal reflux disease)     Hyperlipidemia     Hypertension     Melanoma       Past Surgical History:   Procedure Laterality Date    BACK SURGERY      L 4 L5    COLONOSCOPY N/A 12/20/2017    Procedure: COLONOSCOPY;  Surgeon: Filipe Ramirez MD;  Location: [facility] ENDO;  Service: Endoscopy;  Laterality: N/A;    COLONOSCOPY N/A 09/27/2021    Procedure: COLONOSCOPY;  Surgeon: Filipe Ramirez MD;  Location: [facility] ENDO;  Service: Endoscopy;  Laterality: N/A;    EYE SURGERY  2010    cataract    KNEE ARTHROSCOPY W/ MENISCECTOMY Left 01/17/2019    Procedure: ARTHROSCOPY, KNEE, WITH MENISCECTOMY;  Surgeon: Ethan Díaz MD;  Location: [facility] OR;  Service: Orthopedics;  Laterality: Left;    ROBOT-ASSISTED LAPAROSCOPIC PARTIAL NEPHRECTOMY Left 10/24/2024    Procedure: ROBOTIC NEPHRECTOMY, PARTIAL;  Surgeon: Toya Bermudez MD;  Location: [facility] OR;  Service: Urology;  Laterality: Left;  Gila conf#[id number]    SKIN CANCER EXCISION      multiple sites, derm dr martinez, basal cell ca    SPINE SURGERY  1990    L4L5 disc    VASECTOMY       Family History   Problem Relation Name Age of Onset    Cancer Mother saqib lynn         non hogkins lumphoma    Heart disease Father Iftikhar Lynn     Cancer Brother fermin lynn         melanoma, arm and leg    Clotting disorder Neg Hx      Anesthesia problems Neg Hx       Social History     Tobacco Use    Smoking status: Former     Types: Cigars    Smokeless tobacco: Never   Substance Use Topics    Alcohol use: Yes     Alcohol/week: 28.0 standard drinks of alcohol     Types: 28 Glasses of wine per week     Comment: daily wine    Drug use: No        Review of Systems    OBJECTIVE:     Anticoagulation:  Aspirin 81 mg     Estimated body mass index is 22.24 kg/m² as calculated from the following:    Height as of 10/24/24: 6' (1.829 m).    Weight as of 10/24/24: 74.4 kg (164 lb).    Vital Signs (Most Recent)       Physical Exam  Constitutional:       General: He is not in acute distress.     Appearance: Normal appearance. He is not ill-appearing.    HENT:      Head: Normocephalic and atraumatic.   Eyes:      General: No scleral icterus.  Pulmonary:      Effort: Pulmonary effort is normal. No respiratory distress.   Abdominal:      General: There is no distension.      Palpations: Abdomen is soft.      Tenderness: There is no abdominal tenderness.      Comments: Incisions well healed   Skin:     Coloration: Skin is not jaundiced.   Neurological:      General: No focal deficit present.      Mental Status: He is alert and oriented to person, place, and time.   Psychiatric:         Mood and Affect: Mood normal.         Behavior: Behavior normal.         Thought Content: Thought content normal.       BMP  Lab Results   Component Value Date     10/25/2024    K 4.0 10/25/2024     10/25/2024    CO2 23 10/25/2024    BUN 10 10/25/2024    CREATININE 0.8 10/25/2024    CALCIUM 8.7 10/25/2024    ANIONGAP 11 10/25/2024    ESTGFRAFRICA >60 07/22/2022    EGFRNONAA >60 07/22/2022       Lab Results   Component Value Date    WBC 8.05 10/25/2024    HGB 12.0 (L) 10/25/2024    HCT 36.9 (L) 10/25/2024     (H) 10/25/2024     10/25/2024       Imaging:  CT abd/pelvis 2/27/17:   There is mild bilateral perinephric stranding most likely chronic.  There is 8mm low density mass immediately the right kidney for example coronal series 601 image 61 most likely a cyst but not fully characterized on a noncontrast study and again could be further evaluated followup ultrasound.  There is no hydronephrosis.  There is mild prominence of segment of the right ureter.  There multiple pelvic calcifications most likely vascular although one does lie in the expected region of the distal right ureter and distal ureteral stones difficult to exclude.  It is more likely a phlebolith  The prostate gland is enlarged and there is diffuse bladder wall thickening.     ASSESSMENT     1. Renal cell carcinoma of left kidney    2. Left renal mass    3. Non-recurrent unilateral inguinal  hernia without obstruction or gangrene      PLAN:     Intermediate Risk RCC (Oct 2024)    - Follow up in 6 months with CT and CXR  - He would like a referral to general surgery for his known right inguinal hernia that causes him some discomfort.   - Follow up with me in 6 months.     Toya Bermudez MD       Visit today included increased complexity associated with the care of the episodic problem renal mass addressed and managing the longitudinal care of the patient due to the serious and/or complex managed problem(s).

## 2024-11-07 ENCOUNTER — OFFICE VISIT (OUTPATIENT)
Dept: UROLOGY | Facility: CLINIC | Age: 84
End: 2024-11-07
Payer: MEDICARE

## 2024-11-07 DIAGNOSIS — N28.89 LEFT RENAL MASS: ICD-10-CM

## 2024-11-07 DIAGNOSIS — C64.2 RENAL CELL CARCINOMA OF LEFT KIDNEY: Primary | ICD-10-CM

## 2024-11-07 DIAGNOSIS — K40.90 NON-RECURRENT UNILATERAL INGUINAL HERNIA WITHOUT OBSTRUCTION OR GANGRENE: ICD-10-CM

## 2024-11-07 PROCEDURE — 99999 PR PBB SHADOW E&M-EST. PATIENT-LVL II: CPT | Mod: PBBFAC,,, | Performed by: STUDENT IN AN ORGANIZED HEALTH CARE EDUCATION/TRAINING PROGRAM

## 2024-11-07 PROCEDURE — 99212 OFFICE O/P EST SF 10 MIN: CPT | Mod: PBBFAC,PO | Performed by: STUDENT IN AN ORGANIZED HEALTH CARE EDUCATION/TRAINING PROGRAM

## 2024-11-07 PROCEDURE — 99024 POSTOP FOLLOW-UP VISIT: CPT | Mod: POP,,, | Performed by: STUDENT IN AN ORGANIZED HEALTH CARE EDUCATION/TRAINING PROGRAM

## 2024-11-19 ENCOUNTER — OFFICE VISIT (OUTPATIENT)
Dept: SURGERY | Facility: CLINIC | Age: 84
End: 2024-11-19
Payer: MEDICARE

## 2024-11-19 VITALS — HEART RATE: 74 BPM | TEMPERATURE: 98 F | SYSTOLIC BLOOD PRESSURE: 148 MMHG | DIASTOLIC BLOOD PRESSURE: 78 MMHG

## 2024-11-19 DIAGNOSIS — K40.90 NON-RECURRENT UNILATERAL INGUINAL HERNIA WITHOUT OBSTRUCTION OR GANGRENE: Primary | ICD-10-CM

## 2024-11-19 PROCEDURE — 99203 OFFICE O/P NEW LOW 30 MIN: CPT | Mod: S$PBB,,, | Performed by: SURGERY

## 2024-11-19 PROCEDURE — 99999 PR PBB SHADOW E&M-EST. PATIENT-LVL III: CPT | Mod: PBBFAC,,, | Performed by: SURGERY

## 2024-11-19 PROCEDURE — 99213 OFFICE O/P EST LOW 20 MIN: CPT | Mod: PBBFAC,PN | Performed by: SURGERY

## 2024-11-19 NOTE — PROGRESS NOTES
Subjective:       Patient ID: Iftikhar Lynn is a 84 y.o. male.    Chief Complaint: Hernia      HPI 84-year-old male presents for evaluation of a possible right inguinal hernia.  He said he was diagnosed with this years ago but never addressed it.  It hurts him intermittently.  If it hurts him he lies down and this resolves the pain.  He has never seen a distinct bulge.  He denies any nausea or vomiting.  Approximately 7 years ago he had a CT scan which showed a small fat containing inguinal hernia.  One month ago he had a robotic left partial nephrectomy and is recovering from that quite well.    Past Medical History:   Diagnosis Date    Cancer     basal cell ca rt arm / melanoma on back    GERD (gastroesophageal reflux disease)     Hyperlipidemia     Hypertension     Melanoma      Past Surgical History:   Procedure Laterality Date    BACK SURGERY      L 4 L5    COLONOSCOPY N/A 12/20/2017    Procedure: COLONOSCOPY;  Surgeon: Filipe Ramirez MD;  Location: [facility];  Service: Endoscopy;  Laterality: N/A;    COLONOSCOPY N/A 09/27/2021    Procedure: COLONOSCOPY;  Surgeon: Filipe Ramirez MD;  Location: [facility];  Service: Endoscopy;  Laterality: N/A;    EYE SURGERY  2010    cataract    KNEE ARTHROSCOPY W/ MENISCECTOMY Left 01/17/2019    Procedure: ARTHROSCOPY, KNEE, WITH MENISCECTOMY;  Surgeon: Ethan Díaz MD;  Location: [facility];  Service: Orthopedics;  Laterality: Left;    ROBOT-ASSISTED LAPAROSCOPIC PARTIAL NEPHRECTOMY Left 10/24/2024    Procedure: ROBOTIC NEPHRECTOMY, PARTIAL;  Surgeon: Toya Bermudez MD;  Location: [facility] OR;  Service: Urology;  Laterality: Left;  Gila conf#[id number]    SKIN CANCER EXCISION      multiple sites, derm dr martinez, basal cell ca    SPINE SURGERY  1990    L4L5 disc    VASECTOMY           Current Outpatient Medications:     aspirin (ECOTRIN) 81 MG EC tablet, Take 81 mg by mouth once daily., Disp: , Rfl:     Bacillus coagulans (PROBIOTIC, B.  COAGULANS, ORAL), Take by mouth., Disp: , Rfl:     hydrocortisone 2.5 % cream, Apply sparingly to ears BID as needed for itching, Disp: 20 g, Rfl: 0    ibuprofen (IBU) 800 MG tablet, Take 1 tablet (800 mg total) by mouth every 8 (eight) hours as needed for Pain., Disp: 30 tablet, Rfl: 1    magnesium 200 mg Tab, Take by mouth once., Disp: , Rfl:     metoprolol succinate (TOPROL-XL) 25 MG 24 hr tablet, TAKE 1 TABLET BY MOUTH TWICE A DAY, Disp: 180 tablet, Rfl: 3    multivitamin capsule, Take 1 capsule by mouth once daily., Disp: , Rfl:     naproxen sodium (ALEVE ORAL), Take by mouth., Disp: , Rfl:     nystatin-triamcinolone (MYCOLOG II) cream, Apply topically 2 (two) times daily., Disp: 60 g, Rfl: 11    psyllium seed, sugar, (METAMUCIL, SUGAR,) Powd, Take by mouth., Disp: , Rfl:     simvastatin (ZOCOR) 40 MG tablet, Take 1 tablet (40 mg total) by mouth every evening., Disp: 90 tablet, Rfl: 3    valsartan (DIOVAN) 320 MG tablet, TAKE 1 TABLET ONCE DAILY, Disp: 90 tablet, Rfl: 3    oxyCODONE-acetaminophen (PERCOCET) 5-325 mg per tablet, Take 1 tablet by mouth every 4 (four) hours as needed for Pain. (Patient not taking: Reported on 11/19/2024), Disp: 20 tablet, Rfl: 0    pramoxine-hydrocortisone (PROCTOCREAM-HC) 1-1 % rectal cream, Place rectally 2 (two) times daily. (Patient not taking: Reported on 11/19/2024), Disp: 28.4 g, Rfl: 11    Review of patient's allergies indicates:  No Known Allergies    Family History   Problem Relation Name Age of Onset    Cancer Mother saqib moncada         non hogkins lumphoma    Heart disease Father Iftikhar Moncada     Cancer Brother fermin moncada         melanoma, arm and leg    Clotting disorder Neg Hx      Anesthesia problems Neg Hx       Social History     Socioeconomic History    Marital status:    Tobacco Use    Smoking status: Former     Types: Cigars    Smokeless tobacco: Never   Substance and Sexual Activity    Alcohol use: Yes     Alcohol/week: 28.0 standard drinks of  alcohol     Types: 28 Glasses of wine per week     Comment: daily wine    Drug use: No    Sexual activity: Yes     Partners: Female     Birth control/protection: None     Social Drivers of Health     Financial Resource Strain: Low Risk  (10/24/2024)    Overall Financial Resource Strain (CARDIA)     Difficulty of Paying Living Expenses: Not hard at all   Food Insecurity: No Food Insecurity (10/24/2024)    Hunger Vital Sign     Worried About Running Out of Food in the Last Year: Never true     Ran Out of Food in the Last Year: Never true   Transportation Needs: No Transportation Needs (10/24/2024)    TRANSPORTATION NEEDS     Transportation : No   Physical Activity: Sufficiently Active (2/25/2024)    Exercise Vital Sign     Days of Exercise per Week: 6 days     Minutes of Exercise per Session: 150+ min   Stress: No Stress Concern Present (10/24/2024)    [employer] - Occupational Stress Questionnaire     Feeling of Stress : Not at all   Housing Stability: Low Risk  (10/24/2024)    Housing Stability Vital Sign     Unable to Pay for Housing in the Last Year: No     Homeless in the Last Year: No       Review of Systems   Respiratory: Negative.     Cardiovascular: Negative.    Gastrointestinal: Negative.      Objective:     Physical Exam  Constitutional:       General: He is not in acute distress.     Appearance: He is well-developed.   HENT:      Head: Normocephalic and atraumatic.   Eyes:      General: No scleral icterus.  Neck:      Thyroid: No thyromegaly.   Cardiovascular:      Rate and Rhythm: Normal rate and regular rhythm.      Heart sounds: Normal heart sounds. No murmur heard.  Pulmonary:      Effort: Pulmonary effort is normal. No respiratory distress.      Breath sounds: Normal breath sounds. No wheezing or rales.   Abdominal:      General: Bowel sounds are normal. There is no distension.      Palpations: Abdomen is soft.      Tenderness: There is no abdominal tenderness.       Hernia: No hernia is present.      Comments: The incisions from his robotic partial nephrectomy are healing well  Clinically is difficult to identify an inguinal hernia.  There is a perhaps a small inguinal hernia or cord lipoma.  CT scan does show a fat containing inguinal canal which could be a cord lipoma versus small hernia.  There is no pain currently.   Musculoskeletal:         General: No tenderness. Normal range of motion.      Cervical back: Normal range of motion and neck supple.   Lymphadenopathy:      Cervical: No cervical adenopathy.   Skin:     General: Skin is warm and dry.      Findings: No erythema or rash.   Neurological:      Mental Status: He is alert and oriented to person, place, and time.   Psychiatric:         Behavior: Behavior normal.         Judgment: Judgment normal.       Assessment:     Encounter Diagnosis   Name Primary?    Non-recurrent unilateral inguinal hernia without obstruction or gangrene        Plan:      Possible small right inguinal hernia which is longstanding.  Difficult to identify clinically.  We will observe for now.  Once he recovers completely from his recent robotic partial nephrectomy, we will repeat a CT scan to make sure that his hernia has not worsened.

## 2025-01-06 ENCOUNTER — OFFICE VISIT (OUTPATIENT)
Dept: CARDIOLOGY | Facility: CLINIC | Age: 85
End: 2025-01-06
Payer: MEDICARE

## 2025-01-06 VITALS
SYSTOLIC BLOOD PRESSURE: 154 MMHG | HEIGHT: 72 IN | BODY MASS INDEX: 22.81 KG/M2 | DIASTOLIC BLOOD PRESSURE: 82 MMHG | HEART RATE: 69 BPM | WEIGHT: 168.44 LBS

## 2025-01-06 DIAGNOSIS — Z85.820 HX OF MALIGNANT SKIN MELANOMA: ICD-10-CM

## 2025-01-06 DIAGNOSIS — I77.819 AORTIC ECTASIA, UNSPECIFIED SITE: Chronic | ICD-10-CM

## 2025-01-06 DIAGNOSIS — Z91.89 CARDIOVASCULAR EVENT RISK: ICD-10-CM

## 2025-01-06 DIAGNOSIS — I72.3 ILIAC ARTERY ANEURYSM, LEFT: ICD-10-CM

## 2025-01-06 DIAGNOSIS — I10 PRIMARY HYPERTENSION: ICD-10-CM

## 2025-01-06 DIAGNOSIS — E78.2 MIXED HYPERLIPIDEMIA: ICD-10-CM

## 2025-01-06 DIAGNOSIS — J98.4 CALCIFIED GRANULOMA OF LUNG: Primary | ICD-10-CM

## 2025-01-06 DIAGNOSIS — I70.0 AORTIC ATHEROSCLEROSIS: ICD-10-CM

## 2025-01-06 PROCEDURE — 99213 OFFICE O/P EST LOW 20 MIN: CPT | Mod: PBBFAC,PO | Performed by: INTERNAL MEDICINE

## 2025-01-06 PROCEDURE — 99214 OFFICE O/P EST MOD 30 MIN: CPT | Mod: S$PBB,,, | Performed by: INTERNAL MEDICINE

## 2025-01-06 PROCEDURE — 99999 PR PBB SHADOW E&M-EST. PATIENT-LVL III: CPT | Mod: PBBFAC,,, | Performed by: INTERNAL MEDICINE

## 2025-01-06 NOTE — PROGRESS NOTES
Subjective:    Patient ID:  Iftikhar Lynn is a 84 y.o. male patient here for evaluation Follow-up (8 month)      History of Present Illness:  Follow-up visit.  Hypertension, dyslipidemia.  Risk factors well controlled.  Previous negative noninvasive cardiac assessment 05/2023 via stress and echo.  Remains asymptomatic.  No angina dyspnea, palpitations.  No PND orthopnea.     Previous abnormal Holter monitor with 5.7% non complex ventricular ectopy.        Review of patient's allergies indicates:  No Known Allergies    Past Medical History:   Diagnosis Date    Cancer     basal cell ca rt arm / melanoma on back    GERD (gastroesophageal reflux disease)     Hyperlipidemia     Hypertension     Melanoma      Past Surgical History:   Procedure Laterality Date    BACK SURGERY      L 4 L5    COLONOSCOPY N/A 12/20/2017    Procedure: COLONOSCOPY;  Surgeon: Filipe Ramirez MD;  Location: [facility] ENDO;  Service: Endoscopy;  Laterality: N/A;    COLONOSCOPY N/A 09/27/2021    Procedure: COLONOSCOPY;  Surgeon: Filipe Ramirez MD;  Location: [facility] ENDO;  Service: Endoscopy;  Laterality: N/A;    EYE SURGERY  2010    cataract    KNEE ARTHROSCOPY W/ MENISCECTOMY Left 01/17/2019    Procedure: ARTHROSCOPY, KNEE, WITH MENISCECTOMY;  Surgeon: Ethan Díaz MD;  Location: [facility] OR;  Service: Orthopedics;  Laterality: Left;    ROBOT-ASSISTED LAPAROSCOPIC PARTIAL NEPHRECTOMY Left 10/24/2024    Procedure: ROBOTIC NEPHRECTOMY, PARTIAL;  Surgeon: Toya Bermudez MD;  Location: [facility] OR;  Service: Urology;  Laterality: Left;  [id number]    SKIN CANCER EXCISION      multiple sites, derm dr martinez, basal cell ca    SPINE SURGERY  1990    L4L5 disc    VASECTOMY       Social History     Tobacco Use    Smoking status: Former     Types: Cigars    Smokeless tobacco: Never   Substance Use Topics    Alcohol use: Yes     Alcohol/week: 28.0 standard drinks of alcohol     Types: 28 Glasses of wine per week     Comment:  daily wine    Drug use: No        Review of Systems:    As noted in HPI in addition      REVIEW OF SYSTEMS  Review of Systems   Constitutional: Negative for decreased appetite, diaphoresis, night sweats, weight gain and weight loss.   HENT:  Negative for nosebleeds and odynophagia.    Eyes:  Negative for double vision and photophobia.   Cardiovascular:  Negative for chest pain, claudication, cyanosis, dyspnea on exertion, irregular heartbeat, leg swelling, near-syncope, orthopnea, palpitations, paroxysmal nocturnal dyspnea and syncope.   Respiratory:  Negative for cough, hemoptysis, shortness of breath and wheezing.    Hematologic/Lymphatic: Negative for adenopathy.   Skin:  Negative for flushing, skin cancer and suspicious lesions.   Musculoskeletal:  Negative for gout, myalgias and neck pain.   Gastrointestinal:  Negative for abdominal pain, heartburn, hematemesis and hematochezia.   Genitourinary:  Negative for bladder incontinence, hesitancy and nocturia.   Neurological:  Negative for focal weakness, headaches, light-headedness and paresthesias.   Psychiatric/Behavioral:  Negative for memory loss and substance abuse.               Objective:        Vitals:    01/06/25 1032   BP: (!) 154/82   Pulse: 69       Lab Results   Component Value Date    WBC 8.05 10/25/2024    HGB 12.0 (L) 10/25/2024    HCT 36.9 (L) 10/25/2024     10/25/2024    CHOL 131 08/02/2024    TRIG 81 08/02/2024    HDL 66 08/02/2024    ALT 17 08/02/2024    AST 20 08/02/2024     10/25/2024    K 4.0 10/25/2024     10/25/2024    CREATININE 0.8 10/25/2024    BUN 10 10/25/2024    CO2 23 10/25/2024    TSH 3.649 08/05/2022    PSA 3.2 08/02/2023    INR 1.0 10/10/2024    HGBA1C 5.0 10/10/2018        ECHOCARDIOGRAM RESULTS  Results for orders placed in visit on 05/15/23    Echo    Interpretation Summary  · The left ventricle is normal in size with normal systolic function.  · The estimated ejection fraction is 55%.  · Normal left  ventricular diastolic function.  · Normal right ventricular size with normal right ventricular systolic function.  · Mild mitral regurgitation.  · Mild tricuspid regurgitation.  · Normal central venous pressure (3 mmHg).  · The estimated PA systolic pressure is 30 mmHg.    No results found for this or any previous visit.          CURRENT/PREVIOUS VISIT EKG  Results for orders placed or performed during the hospital encounter of 10/10/24   EKG 12-lead    Collection Time: 10/10/24  1:06 PM   Result Value Ref Range    QRS Duration 90 ms    [facility] QTC Calculation 431 ms    Narrative    Test Reason : Z01.818    Vent. Rate : 067 BPM     Atrial Rate : 088 BPM     P-R Int : 252 ms          QRS Dur : 090 ms      QT Int : 408 ms       P-R-T Axes : 027 058 050 degrees     QTc Int : 431 ms    Sinus rhythm with 1st degree A-V block with occasional Premature  ventricular complexes  Otherwise normal ECG  When compared with ECG of 29-MAY-2023 13:01,  Fusion complexes are no longer Present  Vent. rate has decreased BY  39 BPM  Confirmed by Yuridia GOOD, Jean Paul DENNY ([id number]) on 10/10/2024 6:14:50 PM    Referred By:             Confirmed By:Jean Paul Shi MD     No valid procedures specified.   Results for orders placed during the hospital encounter of 05/15/23    Nuclear Stress - Cardiology Interpreted    Interpretation Summary    Normal myocardial perfusion scan. There is no evidence of myocardial ischemia or infarction.    The gated perfusion images showed an ejection fraction of 61% at rest.    There is normal wall motion at rest.    The ECG portion of the study is negative for ischemia.    The patient reported no chest pain during the stress test.    During stress, rare PVCs are noted.    There are no prior studies for comparison.    No valid procedures specified.    PHYSICAL EXAM  GENERAL: well built, well nourished, well-developed in no apparent distress alert and oriented.   HEENT: Normocephalic. Pupils normal and conjunctivae normal.   Mucous membranes normal, no cyanosis or icterus, trachea central,no pallor or icterus is noted..   NECK: No JVD. No bruit..   THYROID: Thyroid not enlarged. No nodules present..   CARDIAC:  Normal S1-S2.  1/6 crescendo systolic murmur left sternal border.    LUNGS: Clear to auscultation. No wheezing or rhonchi..   ABDOMEN: Soft no masses or organomegaly.  No abdomen pulsations or bruits.  Normal bowel sounds. No pulsations and no masses felt, No guarding or rebound.   URINARY: No lira catheter   EXTREMITIES: No cyanosis, clubbing or edema noted at this time., no calf tenderness bilaterally.   PERIPHERAL VASCULAR SYSTEM: Good palpable distal pulses.  2+ femoral, popliteal and pedal pulses.  No bruits    CENTRAL NERVOUS SYSTEM: No focal motor or sensory deficits noted.   SKIN: Skin without lesions, moist, well perfused.   MUSCLE STRENGTH & TONE: No noteable weakness, atrophy or abnormal movement    I HAVE REVIEWED :    The vital signs, nurses notes, and all the pertinent radiology and labs.         Current Outpatient Medications   Medication Instructions    aspirin (ECOTRIN) 81 mg, Daily    Bacillus coagulans (PROBIOTIC, B. COAGULANS, ORAL) Take by mouth.    hydrocortisone 2.5 % cream Apply sparingly to ears BID as needed for itching    ibuprofen (IBU) 800 mg, Oral, Every 8 hours PRN    magnesium 200 mg Tab Once    metoprolol succinate (TOPROL-XL) 25 mg, Oral, 2 times daily    multivitamin capsule 1 capsule, Daily    naproxen sodium (ALEVE ORAL) Take by mouth.    nystatin-triamcinolone (MYCOLOG II) cream Topical (Top), 2 times daily    oxyCODONE-acetaminophen (PERCOCET) 5-325 mg per tablet 1 tablet, Oral, Every 4 hours PRN    pramoxine-hydrocortisone (PROCTOCREAM-HC) 1-1 % rectal cream Rectal, 2 times daily    psyllium seed, sugar, (METAMUCIL, SUGAR,) Powd Take by mouth.    simvastatin (ZOCOR) 40 mg, Oral, Nightly    valsartan (DIOVAN) 320 mg, Oral          Assessment:   Hypertension  Dyslipidemia    History of  benign ventricular ectopy, 5.7% PVC burden Holter monitor.  Stable with current medications.  Noninvasive cardiac assessment negative for ischemic structural heart issues 2023.         Plan:   Continue to monitor blood pressures at home, mildly elevated today.  Update echo for EF.          No follow-ups on file.

## 2025-01-09 ENCOUNTER — HOSPITAL ENCOUNTER (OUTPATIENT)
Dept: CARDIOLOGY | Facility: HOSPITAL | Age: 85
Discharge: HOME | End: 2025-01-09
Attending: INTERNAL MEDICINE
Payer: MEDICARE

## 2025-01-09 VITALS — HEIGHT: 72 IN | BODY MASS INDEX: 22.75 KG/M2 | WEIGHT: 168 LBS

## 2025-01-09 DIAGNOSIS — I10 PRIMARY HYPERTENSION: ICD-10-CM

## 2025-01-09 DIAGNOSIS — E78.2 MIXED HYPERLIPIDEMIA: ICD-10-CM

## 2025-01-09 DIAGNOSIS — I77.819 AORTIC ECTASIA, UNSPECIFIED SITE: Chronic | ICD-10-CM

## 2025-01-09 LAB
ASCENDING AORTA: 3.69 CM
AV INDEX (PROSTH): 0.45
AV MEAN GRADIENT: 9.5 MMHG
AV PEAK GRADIENT: 16 MMHG
AV REGURGITATION PRESSURE HALF TIME: 554.55 MS
AV VALVE AREA BY VELOCITY RATIO: 1.9 CM²
AV VALVE AREA: 1.9 CM²
AV VELOCITY RATIO: 0.45
BSA FOR ECHO PROCEDURE: 1.97 M2
CV ECHO LV RWT: 0.4 CM
DOP CALC AO PEAK VEL: 2 M/S
DOP CALC AO VTI: 45.8 CM
DOP CALC LVOT AREA: 4.2 CM2
DOP CALC LVOT DIAMETER: 2.3 CM
DOP CALC LVOT PEAK VEL: 0.9 M/S
DOP CALC LVOT STROKE VOLUME: 86.4 CM3
DOP CALCLVOT PEAK VEL VTI: 20.8 CM
E WAVE DECELERATION TIME: 279.44 MSEC
E/A RATIO: 0.7
E/E' RATIO: 8.46 M/S
ECHO LV POSTERIOR WALL: 1.1 CM (ref 0.6–1.1)
FRACTIONAL SHORTENING: 29.1 % (ref 28–44)
INTERVENTRICULAR SEPTUM: 1.1 CM (ref 0.6–1.1)
IVRT: 125.59 MSEC
LEFT ATRIUM AREA SYSTOLIC (APICAL 2 CHAMBER): 17.56 CM2
LEFT ATRIUM AREA SYSTOLIC (APICAL 4 CHAMBER): 18.26 CM2
LEFT ATRIUM SIZE: 3.95 CM
LEFT ATRIUM VOLUME INDEX MOD: 26.3 ML/M2
LEFT ATRIUM VOLUME MOD: 52.07 ML
LEFT INTERNAL DIMENSION IN SYSTOLE: 3.9 CM (ref 2.1–4)
LEFT VENTRICLE DIASTOLIC VOLUME INDEX: 75.82 ML/M2
LEFT VENTRICLE DIASTOLIC VOLUME: 150.12 ML
LEFT VENTRICLE END SYSTOLIC VOLUME APICAL 2 CHAMBER: 52.02 ML
LEFT VENTRICLE END SYSTOLIC VOLUME APICAL 4 CHAMBER: 46.71 ML
LEFT VENTRICLE MASS INDEX: 122.2 G/M2
LEFT VENTRICLE SYSTOLIC VOLUME INDEX: 32.6 ML/M2
LEFT VENTRICLE SYSTOLIC VOLUME: 64.64 ML
LEFT VENTRICULAR INTERNAL DIMENSION IN DIASTOLE: 5.5 CM (ref 3.5–6)
LEFT VENTRICULAR MASS: 242 G
LV LATERAL E/E' RATIO: 7.86 M/S
LV SEPTAL E/E' RATIO: 9.17 M/S
LVED V (TEICH): 150.12 ML
LVES V (TEICH): 64.64 ML
LVOT MG: 1.64 MMHG
LVOT MV: 0.59 CM/S
MV PEAK A VEL: 0.79 M/S
MV PEAK E VEL: 0.55 M/S
MV STENOSIS PRESSURE HALF TIME: 81.04 MS
MV VALVE AREA P 1/2 METHOD: 2.71 CM2
OHS CV RV/LV RATIO: 0.67 CM
PISA AR MAX VEL: 4.83 M/S
PISA TR MAX VEL: 2.38 M/S
PULM VEIN S/D RATIO: 1.81
PV PEAK D VEL: 0.31 M/S
PV PEAK S VEL: 0.56 M/S
RA PRESSURE ESTIMATED: 3 MMHG
RA VOL SYS: 32.03 ML
RIGHT ATRIAL AREA: 13.4 CM2
RIGHT ATRIUM VOLUME AREA LENGTH APICAL 4 CHAMBER: 29.96 ML
RIGHT VENTRICLE DIASTOLIC BASEL DIMENSION: 3.7 CM
RIGHT VENTRICLE DIASTOLIC LENGTH: 7.5 CM
RIGHT VENTRICLE DIASTOLIC MID DIMENSION: 2.5 CM
RIGHT VENTRICULAR END-DIASTOLIC DIMENSION: 3.67 CM
RIGHT VENTRICULAR LENGTH IN DIASTOLE (APICAL 4-CHAMBER VIEW): 7.54 CM
RV MID DIAMA: 2.45 CM
RV TB RVSP: 5 MMHG
RV TISSUE DOPPLER FREE WALL SYSTOLIC VELOCITY 1 (APICAL 4 CHAMBER VIEW): 23.18 CM/S
SINUS: 3.52 CM
STJ: 3.4 CM
TDI LATERAL: 0.07 M/S
TDI SEPTAL: 0.06 M/S
TDI: 0.07 M/S
TR MAX PG: 23 MMHG
TRICUSPID ANNULAR PLANE SYSTOLIC EXCURSION: 2.09 CM
TV REST PULMONARY ARTERY PRESSURE: 26 MMHG
Z-SCORE OF LEFT VENTRICULAR DIMENSION IN END DIASTOLE: -0.39
Z-SCORE OF LEFT VENTRICULAR DIMENSION IN END SYSTOLE: 0.83

## 2025-01-09 PROCEDURE — 93306 TTE W/DOPPLER COMPLETE: CPT | Mod: 26,,, | Performed by: INTERNAL MEDICINE

## 2025-01-09 PROCEDURE — 93306 TTE W/DOPPLER COMPLETE: CPT | Mod: PO

## 2025-02-21 ENCOUNTER — OFFICE VISIT (OUTPATIENT)
Dept: FAMILY MEDICINE | Facility: CLINIC | Age: 85
End: 2025-02-21
Payer: MEDICARE

## 2025-02-21 ENCOUNTER — OFFICE VISIT (OUTPATIENT)
Dept: ORTHOPEDICS | Facility: CLINIC | Age: 85
End: 2025-02-21
Payer: MEDICARE

## 2025-02-21 ENCOUNTER — HOSPITAL ENCOUNTER (OUTPATIENT)
Dept: RADIOLOGY | Facility: HOSPITAL | Age: 85
Discharge: HOME | End: 2025-02-21
Payer: MEDICARE

## 2025-02-21 VITALS
BODY MASS INDEX: 22.87 KG/M2 | SYSTOLIC BLOOD PRESSURE: 140 MMHG | WEIGHT: 168.88 LBS | DIASTOLIC BLOOD PRESSURE: 70 MMHG | TEMPERATURE: 98 F | OXYGEN SATURATION: 98 % | HEART RATE: 65 BPM | RESPIRATION RATE: 12 BRPM | HEIGHT: 72 IN

## 2025-02-21 VITALS — HEIGHT: 72 IN | WEIGHT: 168.88 LBS | BODY MASS INDEX: 22.87 KG/M2

## 2025-02-21 DIAGNOSIS — L03.114 CELLULITIS OF LEFT ELBOW: ICD-10-CM

## 2025-02-21 DIAGNOSIS — M25.422 ELBOW SWELLING, LEFT: ICD-10-CM

## 2025-02-21 DIAGNOSIS — M70.22 OLECRANON BURSITIS OF LEFT ELBOW: Primary | ICD-10-CM

## 2025-02-21 DIAGNOSIS — M25.422 ELBOW SWELLING, LEFT: Primary | ICD-10-CM

## 2025-02-21 PROCEDURE — 73080 X-RAY EXAM OF ELBOW: CPT | Mod: 26,LT,, | Performed by: RADIOLOGY

## 2025-02-21 PROCEDURE — 73080 X-RAY EXAM OF ELBOW: CPT | Mod: TC,LT

## 2025-02-21 PROCEDURE — 99215 OFFICE O/P EST HI 40 MIN: CPT | Mod: PBBFAC,PO

## 2025-02-21 PROCEDURE — 99213 OFFICE O/P EST LOW 20 MIN: CPT | Mod: PBBFAC,27,PO | Performed by: FAMILY MEDICINE

## 2025-02-21 RX ORDER — SULFAMETHOXAZOLE AND TRIMETHOPRIM 800; 160 MG/1; MG/1
1 TABLET ORAL 2 TIMES DAILY
Qty: 10 TABLET | Refills: 0 | Status: SHIPPED | OUTPATIENT
Start: 2025-02-21

## 2025-02-21 NOTE — PROGRESS NOTES
Subjective     Patient ID: Iftikhar Lynn is a 85 y.o. male.    Chief Complaint: Pain and Swelling of the Left Elbow (Pt here for L) elbow swelling and pain. Pt states he noticed abscess yesterday, and denies hitting elbow on anything. )    85-year-old man here today with complaints of left elbow swelling.  He 1st noticed this yesterday afternoon when he saw swelling in his elbow with some diffuse redness and warmth over the area of the olecranon bursa.  He can not recall any trauma to the area.  He does state that he frequently bags knees and elbows on furniture and other objects and does not realize it.  Was seen at his PCP's office earlier today.  They were concerned for possible abscess in the area of the olecranon bursa.        Review of Systems   Constitutional: Negative for chills and decreased appetite.   HENT:  Negative for congestion and sore throat.    Eyes:  Negative for blurred vision.   Cardiovascular:  Negative for chest pain, dyspnea on exertion and palpitations.   Respiratory:  Negative for cough and shortness of breath.    Skin:  Negative for rash.   Neurological:  Negative for difficulty with concentration, disturbances in coordination and headaches.   Psychiatric/Behavioral:  Negative for altered mental status, depression, hallucinations, memory loss and suicidal ideas.           Objective     General    Nursing note and vitals reviewed.  Constitutional: He is oriented to person, place, and time. He appears well-developed and well-nourished.   HENT:   Nose: Nose normal.   Eyes: EOM are normal. Pupils are equal, round, and reactive to light.   Neck: Neck supple.   Cardiovascular:  Normal rate.            Pulmonary/Chest: Effort normal.   Abdominal: Soft.   Neurological: He is alert and oriented to person, place, and time. He has normal reflexes.   Psychiatric: He has a normal mood and affect. His behavior is normal. Judgment and thought content normal.         Right Elbow Exam   Right elbow  exam is normal.      Left Elbow Exam     Inspection   Effusion: present    Pain   The patient exhibits pain of the olecranon    Swelling   The patient is swollen on the olecranon    Range of Motion   The patient has normal left elbow ROM.    Tests   Varus: negative  Valgus: negative  Tinel's sign (cubital tunnel): negative  Tennis Elbow: negative  Golfer's Elbow: negative        Muscle Strength   Left Upper Extremity  Elbow Pronation:  5/5   Elbow Supination:  5/5   Elbow Extension: 5/5  Elbow Flexion: 5/5    Vascular Exam       Capillary Refill  Left Hand: normal capillary refill        Physical Exam  Vitals and nursing note reviewed.   Constitutional:       Appearance: He is well-developed and well-nourished.   HENT:      Nose: Nose normal.   Eyes:      Extraocular Movements: EOM normal.      Pupils: Pupils are equal, round, and reactive to light.   Cardiovascular:      Rate and Rhythm: Normal rate.   Pulmonary:      Effort: Pulmonary effort is normal.   Abdominal:      Palpations: Abdomen is soft.   Musculoskeletal:      Left elbow: Effusion present.      Left hand: Normal capillary refill.      Cervical back: Normal range of motion and neck supple.   Neurological:      Mental Status: He is alert and oriented to person, place, and time.      Deep Tendon Reflexes: Reflexes are normal and symmetric.   Psychiatric:         Mood and Affect: Mood and affect normal.         Behavior: Behavior normal.         Thought Content: Thought content normal.         Judgment: Judgment normal.     X-ray images ordered obtained interpreted by me.  They show soft tissue swelling in the area of the olecranon bursa consistent with that of a bursitis.  There was well-preserved joint spacing in the elbow joint itself.  No acute fractures present.          Assessment and Plan     Encounter Diagnoses   Name Primary?    Elbow swelling, left     Olecranon bursitis of left elbow Yes    Cellulitis of left elbow          Iftikhar was seen  today for pain and swelling.    Diagnoses and all orders for this visit:    Olecranon bursitis of left elbow    Elbow swelling, left  -     Ambulatory referral/consult to Orthopedics    Cellulitis of left elbow    Other orders  -     sulfamethoxazole-trimethoprim 800-160mg (BACTRIM DS) 800-160 mg Tab; Take 1 tablet by mouth 2 (two) times daily.    Aspirated his olecranon bursa today as a diagnostic and therapeutic procedure.  Fluid that was aspirated was not purulent in nature.  Was serous with some trace amounts of blood and they are consistent with a traumatic olecranon bursitis.  There is area of redness and warmth over the skin of his elbow which is consistent with a cellulitis.  I am going to place him on 10 days of Bactrim.  Will have him follow up here in 10 days for recheck of his elbow.  Stressed that if he shows any additional signs of infection worsening her purulent discharge from the elbow he should seek care immediately.      Intermediate Joint Aspiration/Injection: L olecranon bursa    Date/Time: 2/21/2025 1:00 PM    Performed by: Wu Hobbs MD  Authorized by: Wu Hobbs MD    Consent Done?:  Yes (Verbal)  Indications:  Diagnostic evaluation, joint swelling and pain  Site marked: The procedure site was marked    Timeout: Prior to procedure the correct patient, procedure, and site was verified      Location:  Elbow  Site:  L olecranon bursa  Prep: Patient was prepped and draped in usual sterile fashion    Ultrasonic Guidance for needle placement: No  Needle size:  18 G  Approach:  Dorsal  Aspirate amount (ml):  19  Aspirate:  Serous, blood-tinged and clear  Patient tolerance:  Patient tolerated the procedure well with no immediate complications

## 2025-02-21 NOTE — PROGRESS NOTES
Subjective:       Patient ID:  [id number]     Chief Complaint: Elbow Pain    Mr Lynn is a 86 y/o male presenting to the clinic due to an abscess on his left elbow he noticed last night. The patient denies any changes in size since then. He denies any trauma to the area, but states he will randomly bump it on things. He reports achy pain when he hits his elbow on something, but denies tenderness to palpation. He reports some shooting pain which occurred last night. He was also slightly feverish, but states his temperature was 98.6 F. He denies any previous surgery on his elbow. He denies taking any medication.   No other concern today.       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 03/15/2022     Anything over 3 cm needs to be sent for repair      Aortic ectasia, unspecified site 03/15/2022    Hx of malignant skin melanoma 03/09/2022    Hx of colonic polyps 12/20/2017    Aortic atherosclerosis     Body mass index (BMI) 24.0-24.9, adult 04/11/2016     IMO Regulatory Update 10/1/2020      Unilateral inguinal hernia without obstruction or gangrene 04/11/2016    Cardiovascular event risk, ASCVD 10-year risk 30.7% 04/11/2016    Excessive drinking of alcohol 04/11/2016    HTN (hypertension) 03/04/2012    Hyperlipidemia 03/04/2012    GERD (gastroesophageal reflux disease) 03/04/2012      Review of patient's allergies indicates:  No Known Allergies    Current Medications[1]    Lab Results   Component Value Date    WBC 8.05 10/25/2024    HGB 12.0 (L)  10/25/2024    HCT 36.9 (L) 10/25/2024     10/25/2024    CHOL 131 08/02/2024    TRIG 81 08/02/2024    HDL 66 08/02/2024    ALT 17 08/02/2024    AST 20 08/02/2024     10/25/2024    K 4.0 10/25/2024     10/25/2024    CREATININE 0.8 10/25/2024    BUN 10 10/25/2024    CO2 23 10/25/2024    TSH 3.649 08/05/2022    PSA 3.2 08/02/2023    INR 1.0 10/10/2024    HGBA1C 5.0 10/10/2018       Review of Systems   Constitutional:  Negative for chills, fatigue and fever.   Respiratory:  Negative for cough, chest tightness and wheezing.    Cardiovascular:  Negative for chest pain and palpitations.   Musculoskeletal:  Positive for arthralgias (pain with trauma of the left elbow).   Neurological:  Negative for dizziness, syncope and weakness.       Objective:      Physical Exam  Constitutional:       Appearance: Normal appearance. He is normal weight.   Cardiovascular:      Rate and Rhythm: Normal rate and regular rhythm.      Pulses: Normal pulses.      Heart sounds: Normal heart sounds.   Pulmonary:      Effort: Pulmonary effort is normal.      Breath sounds: Normal breath sounds.   Musculoskeletal:      Right elbow: Normal.      Left elbow: Swelling present. Tenderness present.        Arms:    Neurological:      Mental Status: He is alert.         Assessment:       1. Elbow swelling, left        Plan:       Diagnoses and all orders for this visit:    Elbow swelling, left  Advised patient that it is likely bursitis and he probably needs it drained. Got him an appointment to see Ortho today at 1 PM. Will also have him do get an xray prior to his appointment.   -     Ambulatory referral/consult to Orthopedics; Future  -     X-Ray Elbow Complete Left; Future           Patient evaluated with PA-S. I agree with physical exam findings and documentation of PA-S.      Future Appointments       Date Provider Specialty Appt Notes    2/21/2025 Wu Hobbs MD Orthopedics [id number] (Authorized)  Dept: [facility] ([place])  Dx:  Elbow swelling, left [M25.422]    5/12/2025 Toya Bermudez MD Urology w/ CT & x ray prior    9/3/2025 Tarik Mena MD Family Medicine  care dr keating  annual               Portions of this note were dictated using voice recognition software and may contain dictation related errors in spelling / grammar / syntax not discovered on text review.     Linda Nichole PA-C          [1]   Current Outpatient Medications:     aspirin (ECOTRIN) 81 MG EC tablet, Take 81 mg by mouth once daily., Disp: , Rfl:     Bacillus coagulans (PROBIOTIC, B. COAGULANS, ORAL), Take by mouth., Disp: , Rfl:     hydrocortisone 2.5 % cream, Apply sparingly to ears BID as needed for itching, Disp: 20 g, Rfl: 0    ibuprofen (IBU) 800 MG tablet, Take 1 tablet (800 mg total) by mouth every 8 (eight) hours as needed for Pain., Disp: 30 tablet, Rfl: 1    magnesium 200 mg Tab, Take by mouth once., Disp: , Rfl:     metoprolol succinate (TOPROL-XL) 25 MG 24 hr tablet, TAKE 1 TABLET BY MOUTH TWICE A DAY, Disp: 180 tablet, Rfl: 3    multivitamin capsule, Take 1 capsule by mouth once daily., Disp: , Rfl:     naproxen sodium (ALEVE ORAL), Take by mouth., Disp: , Rfl:     nystatin-triamcinolone (MYCOLOG II) cream, Apply topically 2 (two) times daily., Disp: 60 g, Rfl: 11    oxyCODONE-acetaminophen (PERCOCET) 5-325 mg per tablet, Take 1 tablet by mouth every 4 (four) hours as needed for Pain., Disp: 20 tablet, Rfl: 0    pramoxine-hydrocortisone (PROCTOCREAM-HC) 1-1 % rectal cream, Place rectally 2 (two) times daily., Disp: 28.4 g, Rfl: 11    psyllium seed, sugar, (METAMUCIL, SUGAR,) Powd, Take by mouth., Disp: , Rfl:     simvastatin (ZOCOR) 40 MG tablet, Take 1 tablet (40 mg total) by mouth every evening., Disp: 90 tablet, Rfl: 3    valsartan (DIOVAN) 320 MG tablet, TAKE 1 TABLET ONCE DAILY, Disp: 90 tablet, Rfl: 3

## 2025-02-24 ENCOUNTER — PATIENT MESSAGE (OUTPATIENT)
Dept: ORTHOPEDICS | Facility: CLINIC | Age: 85
End: 2025-02-24
Payer: MEDICARE

## 2025-02-27 DIAGNOSIS — E78.2 MIXED HYPERLIPIDEMIA: ICD-10-CM

## 2025-02-27 RX ORDER — SULFAMETHOXAZOLE AND TRIMETHOPRIM 800; 160 MG/1; MG/1
1 TABLET ORAL 2 TIMES DAILY
Qty: 10 TABLET | Refills: 0 | Status: SHIPPED | OUTPATIENT
Start: 2025-02-27

## 2025-02-27 NOTE — TELEPHONE ENCOUNTER
----- Message from Holley sent at 2/27/2025  2:57 PM CST -----  Contact: Nando from [facility]  Type:  Pharmacy Calling to Clarify an RXName of Caller:  [email or web address] [place] [facility] [address] Portal to Registered [facility] [address]: [phone] Fax: [phone] Name:    simvastatin (ZOCOR) 40 MG tablet What do they need to clarify?:  New prescription for refillsBest Call Back Number:  [phone]  Ref # [id number] Information:   States they need a new prescription for refill request from patient - please call - thank yo

## 2025-02-28 RX ORDER — SIMVASTATIN 40 MG/1
40 TABLET, FILM COATED ORAL NIGHTLY
Qty: 90 TABLET | Refills: 0 | Status: SHIPPED | OUTPATIENT
Start: 2025-02-28 | End: 2025-05-29

## 2025-03-03 ENCOUNTER — OFFICE VISIT (OUTPATIENT)
Dept: ORTHOPEDICS | Facility: CLINIC | Age: 85
End: 2025-03-03
Payer: MEDICARE

## 2025-03-03 VITALS — HEIGHT: 72 IN | WEIGHT: 168.88 LBS | BODY MASS INDEX: 22.87 KG/M2

## 2025-03-03 DIAGNOSIS — L03.114 CELLULITIS OF LEFT ELBOW: ICD-10-CM

## 2025-03-03 DIAGNOSIS — M70.22 OLECRANON BURSITIS OF LEFT ELBOW: ICD-10-CM

## 2025-03-03 DIAGNOSIS — M25.422 ELBOW SWELLING, LEFT: Primary | ICD-10-CM

## 2025-03-03 PROCEDURE — 99214 OFFICE O/P EST MOD 30 MIN: CPT | Mod: 25,S$PBB,, | Performed by: FAMILY MEDICINE

## 2025-03-03 PROCEDURE — 99999 PR PBB SHADOW E&M-EST. PATIENT-LVL III: CPT | Mod: PBBFAC,,, | Performed by: FAMILY MEDICINE

## 2025-03-03 PROCEDURE — 20605 DRAIN/INJ JOINT/BURSA W/O US: CPT | Mod: PBBFAC,PO,LT | Performed by: FAMILY MEDICINE

## 2025-03-03 PROCEDURE — 99213 OFFICE O/P EST LOW 20 MIN: CPT | Mod: PBBFAC,PO | Performed by: FAMILY MEDICINE

## 2025-03-03 NOTE — PROGRESS NOTES
Subjective     Patient ID: Iftikhar Lynn is a 85 y.o. male.    Chief Complaint: Pain and Swelling of the Left Elbow (Injection 2/21/2025)    Patient returns today for follow up of left elbow swelling secondary to an olecranon bursitis with an overlying cellulitis.  Complete attending days of Bactrim.  Has less erythema around the area of his elbow.  Swelling did return but is not overly painful.  He noticed a scab like structure over the top of his olecranon area that he states yesterday burst under his shirt in the lots of fluid came out however he did not see what color or consistency the fluid was.  Reports no fevers or chills and no other systemic symptoms.    Pain  Pertinent negatives include no chest pain, chills, congestion, coughing, headaches, rash or sore throat.   Swelling  Pertinent negatives include no chest pain, chills, congestion, coughing, headaches, rash or sore throat.       Review of Systems   Constitutional: Negative for chills and decreased appetite.   HENT:  Negative for congestion and sore throat.    Eyes:  Negative for blurred vision.   Cardiovascular:  Negative for chest pain, dyspnea on exertion and palpitations.   Respiratory:  Negative for cough and shortness of breath.    Skin:  Negative for rash.   Neurological:  Negative for difficulty with concentration, disturbances in coordination and headaches.   Psychiatric/Behavioral:  Negative for altered mental status, depression, hallucinations, memory loss and suicidal ideas.           Objective     General    Nursing note and vitals reviewed.  Constitutional: He is oriented to person, place, and time. He appears well-developed and well-nourished.   HENT:   Nose: Nose normal.   Eyes: EOM are normal. Pupils are equal, round, and reactive to light.   Neck: Neck supple.   Cardiovascular:  Normal rate.            Pulmonary/Chest: Effort normal.   Abdominal: Soft.   Neurological: He is alert and oriented to person, place, and time. He has  normal reflexes.   Psychiatric: He has a normal mood and affect. His behavior is normal. Judgment and thought content normal.         Right Elbow Exam   Right elbow exam is normal.      Left Elbow Exam     Inspection   Effusion: present    Pain   The patient exhibits pain of the olecranon    Swelling   The patient is swollen on the olecranon    Range of Motion   The patient has normal left elbow ROM.    Tests   Varus: negative  Valgus: negative  Tinel's sign (cubital tunnel): negative  Tennis Elbow: negative  Golfer's Elbow: negative        Muscle Strength   Left Upper Extremity  Elbow Pronation:  5/5   Elbow Supination:  5/5   Elbow Extension: 5/5  Elbow Flexion: 5/5    Vascular Exam       Capillary Refill  Left Hand: normal capillary refill        Physical Exam  Vitals and nursing note reviewed.   Constitutional:       Appearance: He is well-developed and well-nourished.   HENT:      Nose: Nose normal.   Eyes:      Extraocular Movements: EOM normal.      Pupils: Pupils are equal, round, and reactive to light.   Cardiovascular:      Rate and Rhythm: Normal rate.   Pulmonary:      Effort: Pulmonary effort is normal.   Abdominal:      Palpations: Abdomen is soft.   Musculoskeletal:      Left elbow: Effusion present.      Left hand: Normal capillary refill.      Cervical back: Neck supple.   Neurological:      Mental Status: He is alert and oriented to person, place, and time.      Deep Tendon Reflexes: Reflexes are normal and symmetric.   Psychiatric:         Mood and Affect: Mood and affect normal.         Behavior: Behavior normal.         Thought Content: Thought content normal.         Judgment: Judgment normal.           Assessment and Plan     Encounter Diagnoses   Name Primary?    Elbow swelling, left Yes    Olecranon bursitis of left elbow     Cellulitis of left elbow          Iftikhar was seen today for pain and swelling.    Diagnoses and all orders for this visit:    Elbow swelling, left    Olecranon  bursitis of left elbow    Cellulitis of left elbow    We aspirated his olecranon bursa that reviewed serous fluid.  There was no evidence of any additional infection.  Applied a pressure dressing advised him to follow up here as needed should elbow swelling returned.    Intermediate Joint Aspiration/Injection: L olecranon bursa    Date/Time: 3/3/2025 1:40 PM    Performed by: Wu Hobbs MD  Authorized by: Wu Hobbs MD    Consent Done?:  Yes (Verbal)  Indications:  Diagnostic evaluation and joint swelling  Site marked: The procedure site was marked    Timeout: Prior to procedure the correct patient, procedure, and site was verified      Location:  Elbow  Site:  L olecranon bursa  Prep: Patient was prepped and draped in usual sterile fashion    Ultrasonic Guidance for needle placement: No  Needle size:  18 G  Approach:  Dorsal  Aspirate amount (ml):  11  Aspirate:  Serous  Patient tolerance:  Patient tolerated the procedure well with no immediate complications

## 2025-04-08 ENCOUNTER — HOSPITAL ENCOUNTER (EMERGENCY)
Facility: HOSPITAL | Age: 85
Discharge: HOME | End: 2025-04-08
Attending: EMERGENCY MEDICINE
Payer: MEDICARE

## 2025-04-08 VITALS
BODY MASS INDEX: 22.35 KG/M2 | HEIGHT: 72 IN | WEIGHT: 165 LBS | SYSTOLIC BLOOD PRESSURE: 156 MMHG | RESPIRATION RATE: 16 BRPM | DIASTOLIC BLOOD PRESSURE: 82 MMHG | TEMPERATURE: 99 F | HEART RATE: 66 BPM | OXYGEN SATURATION: 98 %

## 2025-04-08 DIAGNOSIS — I10 HYPERTENSION: ICD-10-CM

## 2025-04-08 LAB
ABSOLUTE EOSINOPHIL (SMH): 0.52 K/UL
ABSOLUTE MONOCYTE (SMH): 0.56 K/UL (ref 0.3–1)
ABSOLUTE NEUTROPHIL COUNT (SMH): 2.9 K/UL (ref 1.8–7.7)
ALBUMIN SERPL-MCNC: 4.5 G/DL (ref 3.5–5.2)
ALP SERPL-CCNC: 75 UNIT/L (ref 55–135)
ALT SERPL-CCNC: 12 UNIT/L (ref 10–44)
ANION GAP (SMH): 9 MMOL/L (ref 8–16)
AST SERPL-CCNC: 17 UNIT/L (ref 10–40)
BASOPHILS # BLD AUTO: 0.07 K/UL
BASOPHILS NFR BLD AUTO: 1.1 %
BILIRUB SERPL-MCNC: 1.2 MG/DL (ref 0.1–1)
BILIRUB UR QL STRIP.AUTO: NEGATIVE
BNP SERPL-MCNC: 63 PG/ML
BUN SERPL-MCNC: 15 MG/DL (ref 8–23)
CALCIUM SERPL-MCNC: 9.5 MG/DL (ref 8.7–10.5)
CHLORIDE SERPL-SCNC: 106 MMOL/L (ref 95–110)
CLARITY UR: CLEAR
CO2 SERPL-SCNC: 24 MMOL/L (ref 23–29)
COLOR UR AUTO: YELLOW
CREAT SERPL-MCNC: 0.7 MG/DL (ref 0.5–1.4)
ERYTHROCYTE [DISTWIDTH] IN BLOOD BY AUTOMATED COUNT: 14.2 % (ref 11.5–14.5)
GFR SERPLBLD CREATININE-BSD FMLA CKD-EPI: >60 ML/MIN/1.73/M2
GLUCOSE SERPL-MCNC: 97 MG/DL (ref 70–110)
GLUCOSE UR QL STRIP: NEGATIVE
HCT VFR BLD AUTO: 41.3 % (ref 40–54)
HGB BLD-MCNC: 13.6 GM/DL (ref 14–18)
HGB UR QL STRIP: NEGATIVE
IMM GRANULOCYTES # BLD AUTO: 0.02 K/UL (ref 0–0.04)
IMM GRANULOCYTES NFR BLD AUTO: 0.3 % (ref 0–0.5)
KETONES UR QL STRIP: NEGATIVE
LEUKOCYTE ESTERASE UR QL STRIP: NEGATIVE
LYMPHOCYTES # BLD AUTO: 2.13 K/UL (ref 1–4.8)
MCH RBC QN AUTO: 32.2 PG (ref 27–31)
MCHC RBC AUTO-ENTMCNC: 32.9 G/DL (ref 32–36)
MCV RBC AUTO: 98 FL (ref 82–98)
NITRITE UR QL STRIP: NEGATIVE
NUCLEATED RBC (/100WBC) (SMH): 0 /100 WBC
PH UR STRIP: 7 [PH]
PLATELET # BLD AUTO: 244 K/UL (ref 150–450)
PMV BLD AUTO: 10.6 FL (ref 9.2–12.9)
POTASSIUM SERPL-SCNC: 3.5 MMOL/L (ref 3.5–5.1)
PROT SERPL-MCNC: 6.8 GM/DL (ref 6–8.4)
PROT UR QL STRIP: NEGATIVE
RBC # BLD AUTO: 4.23 M/UL (ref 4.6–6.2)
RELATIVE EOSINOPHIL (SMH): 8.5 % (ref 0–8)
RELATIVE LYMPHOCYTE (SMH): 34.6 % (ref 18–48)
RELATIVE MONOCYTE (SMH): 9.1 % (ref 4–15)
RELATIVE NEUTROPHIL (SMH): 46.4 % (ref 38–73)
SODIUM SERPL-SCNC: 139 MMOL/L (ref 136–145)
SP GR UR STRIP: 1.02
TROPONIN HIGH SENSITIVE (SMH): 4.8 PG/ML
UROBILINOGEN UR STRIP-ACNC: NEGATIVE EU/DL
WBC # BLD AUTO: 6.15 K/UL (ref 3.9–12.7)

## 2025-04-08 PROCEDURE — 86803 HEPATITIS C AB TEST: CPT | Performed by: EMERGENCY MEDICINE

## 2025-04-08 PROCEDURE — 80053 COMPREHEN METABOLIC PANEL: CPT | Performed by: NURSE PRACTITIONER

## 2025-04-08 PROCEDURE — 83880 ASSAY OF NATRIURETIC PEPTIDE: CPT | Performed by: EMERGENCY MEDICINE

## 2025-04-08 PROCEDURE — 99285 EMERGENCY DEPT VISIT HI MDM: CPT | Mod: 25

## 2025-04-08 PROCEDURE — 81003 URINALYSIS AUTO W/O SCOPE: CPT | Performed by: NURSE PRACTITIONER

## 2025-04-08 PROCEDURE — 93010 ELECTROCARDIOGRAM REPORT: CPT | Mod: ,,, | Performed by: INTERNAL MEDICINE

## 2025-04-08 PROCEDURE — 84484 ASSAY OF TROPONIN QUANT: CPT | Performed by: EMERGENCY MEDICINE

## 2025-04-08 PROCEDURE — 93005 ELECTROCARDIOGRAM TRACING: CPT | Performed by: INTERNAL MEDICINE

## 2025-04-08 PROCEDURE — 85025 COMPLETE CBC W/AUTO DIFF WBC: CPT | Performed by: NURSE PRACTITIONER

## 2025-04-08 PROCEDURE — 87389 HIV-1 AG W/HIV-1&-2 AB AG IA: CPT | Performed by: EMERGENCY MEDICINE

## 2025-04-08 NOTE — FIRST PROVIDER EVALUATION
Emergency Department TeleTriage Encounter Note      CHIEF COMPLAINT    Chief Complaint   Patient presents with    Hypertension     Patient states bp has been elevated x 2 days.          VITAL SIGNS   Initial Vitals [04/08/25 1752]   BP Pulse Resp Temp SpO2   (!) 178/94 73 15 98.2 °F (36.8 °C) 95 %      MAP       --            ALLERGIES    Review of patient's allergies indicates:  No Known Allergies    PROVIDER TRIAGE NOTE  Verbal consent for the teletriage evaluation was given by the patient at the start of the evaluation.  All efforts will be made to maintain patient's privacy during the evaluation.      This is a teletriage evaluation of a 85 y.o. male presenting to the ED with c/o elevated BP for 2 days; PMH HTN. Limited physical exam via telehealth: The patient is awake, alert, answering questions appropriately and is not in respiratory distress.  As the Teletriage provider, I performed an initial assessment and ordered appropriate labs and imaging studies, if any, to facilitate the patient's care once placed in the ED. Once a room is available, care and a full evaluation will be completed by an alternate ED provider.  Any additional orders and the final disposition will be determined by that provider.  All imaging and labs will not be followed-up by the Teletriage Team, including myself.      ORDERS  Labs Reviewed   HEPATITIS C ANTIBODY   HIV 1 / 2 ANTIBODY   CBC W/ AUTO DIFFERENTIAL    Narrative:     The following orders were created for panel order CBC auto differential.  Procedure                               Abnormality         Status                     ---------                               -----------         ------                     CBC with Differential[[id number]]                                                        Please view results for these tests on the individual orders.   COMPREHENSIVE METABOLIC PANEL   URINALYSIS, REFLEX TO URINE CULTURE   CBC WITH DIFFERENTIAL       ED Orders (720h  ago, onward)      Start Ordered     Status Ordering Provider    04/08/25 1759 04/08/25 1758  EKG 12-lead  Once         Ordered GAUTAM HAIR    04/08/25 1759 04/08/25 1758  CBC auto differential  STAT         Ordered GAUTAM HAIR    04/08/25 1759 04/08/25 1758  Comprehensive metabolic panel  STAT         Ordered GAUTAM HAIR    04/08/25 1759 04/08/25 1758  Urinalysis, Reflex to Urine Culture  STAT         Ordered GAUTAM HAIR    04/08/25 1759 04/08/25 1758  CBC with Differential  PROCEDURE ONCE         Ordered GAUTAM HAIR    04/08/25 1758 04/08/25 1758  Saline lock IV  Once         Ordered GAUTAM HAIR    04/08/25 1758 04/08/25 1758  Pulse Oximetry Continuous  Continuous         Ordered GAUTAM HAIR    04/08/25 1758 04/08/25 1758  Cardiac Monitoring - Adult  Continuous        Comments: Notify Physician If:    Ordered GAUTAM HAIR    04/08/25 1755 04/08/25 1754  Hepatitis C Antibody  STAT         Acknowledged ASIF BLANCA    04/08/25 1755 04/08/25 1754  HIV 1/2 Ag/Ab (4th Gen)  STAT         Acknowledged ASIF BLANCA              Virtual Visit Note: The provider triage portion of this emergency department evaluation and documentation was performed via [employer], a HIPAA-compliant telemedicine application, in concert with a tele-presenter in the room. A face to face patient evaluation with one of my colleagues will occur once the patient is placed in an emergency department room.      DISCLAIMER: This note was prepared with [employer] voice recognition transcription software. Garbled syntax, mangled pronouns, and other bizarre constructions may be attributed to that software system.

## 2025-04-09 LAB
HCV AB SERPL QL IA: NORMAL
HIV 1+2 AB+HIV1 P24 AG SERPL QL IA: NORMAL

## 2025-04-09 NOTE — DISCHARGE INSTRUCTIONS
Please read and follow discharge instructions and return precautions.  Rest, avoid any strenuous activity, over exertion or overheating.  Keep well hydrated.  Take your blood pressure 3 times daily at the same time, keep a log of these findings and bring this with you when you see your physician.  Important to see your primary care provider in the next 1-2 days.  It is possible your medications may need adjustment.  Return immediately if you develop new or worsening symptoms or if you have new problems or concerns.

## 2025-04-09 NOTE — ED PROVIDER NOTES
Encounter Date: 4/8/2025       History     Chief Complaint   Patient presents with    Hypertension     Patient states bp has been elevated x 2 days.        85-year-old male with a history of hypertension presents due to an elevated blood pressure at home.  Does not check his blood pressure daily.  Reports it has been probably a month since he checked it.  He decided to check his blood pressure yesterday and noted it was elevated.  Systolic a proximally 160.  He is taking blood pressure medicines as directed.  Does admit that he has been eating a lot of processed foods and has been ordering out frequently with high salt foods.  He has been doing this for several months now.  He checked his blood pressure today and noted an elevated systolic at 160.  States this concerned him so he checked his blood pressure 10 more times and this caused him to become anxious and his last blood pressure at home was 190 systolic.  He denies chest pain or shortness of breath.  Denies any headache or visual changes.  Denies weakness dizziness lightheadedness or palpitations.  Denies syncope or near-syncope.  He states that he feels well otherwise and denies any other problems or complaints.        Review of patient's allergies indicates:  No Known Allergies  Past Medical History:   Diagnosis Date    Cancer     basal cell ca rt arm / melanoma on back    GERD (gastroesophageal reflux disease)     Hyperlipidemia     Hypertension     Melanoma      Past Surgical History:   Procedure Laterality Date    BACK SURGERY      L 4 L5    COLONOSCOPY N/A 12/20/2017    Procedure: COLONOSCOPY;  Surgeon: Filipe Ramirez MD;  Location: [facility];  Service: Endoscopy;  Laterality: N/A;    COLONOSCOPY N/A 09/27/2021    Procedure: COLONOSCOPY;  Surgeon: Filipe Ramirez MD;  Location: [facility];  Service: Endoscopy;  Laterality: N/A;    EYE SURGERY  2010    cataract    KNEE ARTHROSCOPY W/ MENISCECTOMY Left 01/17/2019    Procedure: ARTHROSCOPY, KNEE, WITH  MENISCECTOMY;  Surgeon: Ethan Díaz MD;  Location: [facility] OR;  Service: Orthopedics;  Laterality: Left;    ROBOT-ASSISTED LAPAROSCOPIC PARTIAL NEPHRECTOMY Left 10/24/2024    Procedure: ROBOTIC NEPHRECTOMY, PARTIAL;  Surgeon: Toya Bermudez MD;  Location: [facility] OR;  Service: Urology;  Laterality: Left;  Gila conf#[id number]    SKIN CANCER EXCISION      multiple sites, derm dr martinez, basal cell ca    SPINE SURGERY  1990    L4L5 disc    VASECTOMY       Family History   Problem Relation Name Age of Onset    Cancer Mother saqib lynn         non hogkins lumphoma    Heart disease Father Iftikhar Lynn     Cancer Brother fermin lynn         melanoma, arm and leg    Clotting disorder Neg Hx      Anesthesia problems Neg Hx       Social History[1]  Review of Systems   Constitutional: Negative.  Negative for activity change, appetite change, chills, diaphoresis, fatigue, fever and unexpected weight change.   HENT: Negative.  Negative for congestion, dental problem, ear pain, nosebleeds, rhinorrhea, sinus pain and sore throat.    Eyes: Negative.  Negative for pain and visual disturbance.   Respiratory: Negative.  Negative for cough, shortness of breath and wheezing.    Cardiovascular: Negative.  Negative for chest pain, palpitations and leg swelling.   Gastrointestinal: Negative.  Negative for abdominal pain, blood in stool, constipation, diarrhea, nausea and vomiting.   Endocrine: Negative.    Genitourinary: Negative.  Negative for difficulty urinating, dysuria, flank pain, frequency, penile pain and urgency.   Musculoskeletal: Negative.  Negative for arthralgias, back pain, gait problem, joint swelling, myalgias, neck pain and neck stiffness.   Skin: Negative.  Negative for pallor and rash.   Neurological: Negative.  Negative for dizziness, syncope, facial asymmetry, speech difficulty, weakness, light-headedness, numbness and headaches.   Hematological: Negative.    Psychiatric/Behavioral:  Negative.  Negative for confusion.    All other systems reviewed and are negative.      Physical Exam     Initial Vitals [04/08/25 1752]   BP Pulse Resp Temp SpO2   (!) 178/94 73 15 98.2 °F (36.8 °C) 95 %      MAP       --         Physical Exam    Nursing note and vitals reviewed.  Constitutional: He is cooperative. He does not appear ill. No distress.   HENT:   Head: Normocephalic and atraumatic.   Nose: Nose normal. Mouth/Throat: Uvula is midline, oropharynx is clear and moist and mucous membranes are normal. No uvula swelling. No oropharyngeal exudate, posterior oropharyngeal edema or posterior oropharyngeal erythema.   Eyes: Conjunctivae, EOM and lids are normal. Pupils are equal, round, and reactive to light.   Neck: Trachea normal and phonation normal. Neck supple. No stridor present. No JVD present.   Normal range of motion.   Full passive range of motion without pain.     Cardiovascular:  Normal rate, regular rhythm, normal heart sounds, intact distal pulses and normal pulses.     Exam reveals no gallop, no distant heart sounds, no friction rub and no decreased pulses.       No murmur heard.  Pulmonary/Chest: Effort normal and breath sounds normal. No respiratory distress. He has no decreased breath sounds. He has no wheezes. He has no rhonchi. He has no rales.   Abdominal: Abdomen is soft. Bowel sounds are normal. He exhibits no distension, no pulsatile midline mass and no mass. There is no abdominal tenderness.   No right CVA tenderness.  No left CVA tenderness.   Musculoskeletal:         General: No tenderness or edema. Normal range of motion.      Right hand: Normal. Normal capillary refill. Normal pulse.      Left hand: Normal. Normal capillary refill. Normal pulse.      Cervical back: Normal, full passive range of motion without pain, normal range of motion and neck supple. No tenderness or bony tenderness. No pain with movement. Normal range of motion and normal range of motion. Normal.      Thoracic  back: Normal. No tenderness or bony tenderness. Normal range of motion.      Lumbar back: Normal. No tenderness or bony tenderness. Normal range of motion.      Right lower leg: No tenderness. No edema.      Left lower leg: No tenderness. No edema.      Right foot: Normal. Normal capillary refill. No swelling. Normal pulse.      Left foot: Normal. Normal capillary refill. No swelling. Normal pulse.      Comments: Pulses are 2+ throughout, cap refill is less than 2 sec throughout, no edema noted, extremities are nontender throughout with full range of motion     Neurological: He is alert and oriented to person, place, and time. He has normal strength. No cranial nerve deficit or sensory deficit. Coordination and gait normal.   No focal deficits   Skin: Skin is warm and dry. Capillary refill takes less than 2 seconds. No petechiae and no rash noted. No cyanosis or erythema. No pallor.   Psychiatric: He has a normal mood and affect. His speech is normal and behavior is normal.         ED Course   Procedures  Labs Reviewed   COMPREHENSIVE METABOLIC PANEL - Abnormal       Result Value    Sodium 139      Potassium 3.5      Chloride 106      CO2 24      Glucose 97      BUN 15      Creatinine 0.7      Calcium 9.5      Protein Total 6.8      Albumin 4.5      Bilirubin Total 1.2 (*)     ALP 75      AST 17      ALT 12      Anion Gap 9      eGFR >60     CBC WITH DIFFERENTIAL - Abnormal    WBC 6.15      RBC 4.23 (*)     Hgb 13.6 (*)     Hct 41.3      MCV 98      MCH 32.2 (*)     MCHC 32.9      RDW 14.2      Platelet Count 244      MPV 10.6      Nucleated RBC 0      Neut % 46.4      Lymph % 34.6      Mono % 9.1      Eos % 8.5 (*)     Basophil % 1.1      Imm Grans % 0.3      Neut # 2.9      Lymph # 2.13      Mono # 0.56      Eos # 0.52 (*)     Baso # 0.07      Imm Grans # 0.02     HEPATITIS C ANTIBODY - Normal    Hep C Ab Interp Non-Reactive     HIV 1 / 2 ANTIBODY - Normal    HIV 1/2 Ag/Ab Non-Reactive     URINALYSIS, REFLEX TO  URINE CULTURE - Normal    Color, UA Yellow      Appearance, UA Clear      Spec Grav UA 1.020      pH, UA 7.0      Protein, UA Negative      Glucose, UA Negative      Ketones, UA Negative      Blood, UA Negative      Bilirubin, UA Negative      Urobilinogen, UA Negative      Nitrites, UA Negative      Leukocyte Esterase, UA Negative     TROPONIN I HIGH SENSITIVITY - Normal    Troponin High Sensitive 4.8     B-TYPE NATRIURETIC PEPTIDE - Normal    BNP 63     CBC W/ AUTO DIFFERENTIAL    Narrative:     The following orders were created for panel order CBC auto differential.  Procedure                               Abnormality         Status                     ---------                               -----------         ------                     CBC with Differential[[id number]]       Abnormal            Final result                 Please view results for these tests on the individual orders.        ECG Results              EKG 12-lead (In process)        Collection Time Result Time QRS Duration OHS QTC Calculation    04/08/25 17:46:16 04/09/25 05:08:53 92 437                     In process by Interface, Lab In [facility] (04/09/25 05:08:56)                   Narrative:    Test Reason : I10,    Vent. Rate :  90 BPM     Atrial Rate :  90 BPM     P-R Int : 294 ms          QRS Dur :  92 ms      QT Int : 358 ms       P-R-T Axes :  31  41  22 degrees    QTcB Int : 437 ms    Sinus rhythm with 1st degree A-V block  Otherwise normal ECG  No previous ECGs available    Referred By: AAAREFERRAL SELF           Confirmed By:                                   Imaging Results              X-Ray Chest PA And Lateral (Final result)  Result time 04/08/25 21:57:22      Final result by Corby Lin MD (04/08/25 21:57:22)                   Impression:      No acute findings      Electronically signed by: Corby Lin  Date:    04/08/2025  Time:    21:57               Narrative:    EXAMINATION:  XR CHEST PA AND  LATERAL    CLINICAL HISTORY:  Essential (primary) hypertension    TECHNIQUE:  PA and lateral views of the chest were performed.    COMPARISON:  10/10/2024    FINDINGS:  Lungs are clear. No focal consolidation. No pleural effusion. No pneumothorax. Normal heart size.                                       Medications - No data to display  Medical Decision Making  Emergent evaluation of an 85-year-old male with concerns about his blood pressure.  He is asymptomatic.  Blood pressure has improved spontaneously.  Patient's diet has changed.  Have recommended that he take his blood pressure 3 times daily, keep a log of these findings and bring this with him when he sees his physician.  We have discussed the possibility that medications may need adjusting.  Have also discussed decreasing salt in his diet and having less take out meals which could be contributing to symptoms.  End-organ workup is normal.  He is hemodynamically stable and neurologically normal.  Symptomatic supportive care discussed, return precautions discussed in detail and strict importance of close outpatient evaluation with his primary care provider discussed.    Amount and/or Complexity of Data Reviewed  Labs: ordered.  Radiology: ordered.                                      Clinical Impression:  Final diagnoses:  [I10] Hypertension          ED Disposition Condition    Discharge Stable          ED Prescriptions    None       Follow-up Information       Follow up With Specialties Details Why Contact Info    Tarik Mena MD Family Medicine Schedule an appointment as soon as possible for a visit in 1 day  [address] [facility] [place] [id number] [phone]                   [1]   Social History  Tobacco Use    Smoking status: Former     Types: Cigars    Smokeless tobacco: Never   Substance Use Topics    Alcohol use: Yes     Alcohol/week: 28.0 standard drinks of alcohol     Types: 28 Glasses of wine per week     Comment: daily wine    Drug use: No         Florence Reich MD  04/09/25 [id number]

## 2025-04-10 ENCOUNTER — OFFICE VISIT (OUTPATIENT)
Dept: FAMILY MEDICINE | Facility: CLINIC | Age: 85
End: 2025-04-10
Payer: MEDICARE

## 2025-04-10 VITALS
OXYGEN SATURATION: 99 % | HEIGHT: 72 IN | BODY MASS INDEX: 22.27 KG/M2 | TEMPERATURE: 98 F | SYSTOLIC BLOOD PRESSURE: 160 MMHG | HEART RATE: 66 BPM | DIASTOLIC BLOOD PRESSURE: 74 MMHG | WEIGHT: 164.44 LBS

## 2025-04-10 DIAGNOSIS — I10 PRIMARY HYPERTENSION: Primary | ICD-10-CM

## 2025-04-10 PROCEDURE — 99214 OFFICE O/P EST MOD 30 MIN: CPT | Mod: S$PBB,,, | Performed by: NURSE PRACTITIONER

## 2025-04-10 PROCEDURE — 99214 OFFICE O/P EST MOD 30 MIN: CPT | Mod: PBBFAC,PO | Performed by: NURSE PRACTITIONER

## 2025-04-10 PROCEDURE — 99999 PR PBB SHADOW E&M-EST. PATIENT-LVL IV: CPT | Mod: PBBFAC,,, | Performed by: NURSE PRACTITIONER

## 2025-04-10 RX ORDER — AMLODIPINE AND VALSARTAN 5; 320 MG/1; MG/1
1 TABLET ORAL DAILY
Qty: 90 TABLET | Refills: 1 | Status: SHIPPED | OUTPATIENT
Start: 2025-04-10

## 2025-04-10 NOTE — PROGRESS NOTES
Subjective:       Patient ID: Iftikhar Lynn is a 85 y.o. male.    Chief Complaint: Hypertension     HPI   86 y/o male patient with medical problems listed below presents for elevated blood pressure. Patient was seen in ED on 4/8/2025 for elevated BP and here for follow up.   The patient states that he takes his blood pressure medications daily as prescribed Toprol XL 25 mg twice daily and Valsartan 325 mg daily. He reports that his home blood pressure has been fluctuating. He mentions that he was provided with dietary instructions regarding high blood pressure in the ED. He expresses concern about his wifes mental health, which he feels may have caused him stress.    Problem List[1]     Review of patient's allergies indicates:  No Known Allergies    Past Surgical History:   Procedure Laterality Date    BACK SURGERY      L 4 L5    COLONOSCOPY N/A 12/20/2017    Procedure: COLONOSCOPY;  Surgeon: Filipe Ramirez MD;  Location: [facility];  Service: Endoscopy;  Laterality: N/A;    COLONOSCOPY N/A 09/27/2021    Procedure: COLONOSCOPY;  Surgeon: Filipe Ramirez MD;  Location: [facility];  Service: Endoscopy;  Laterality: N/A;    EYE SURGERY  2010    cataract    KNEE ARTHROSCOPY W/ MENISCECTOMY Left 01/17/2019    Procedure: ARTHROSCOPY, KNEE, WITH MENISCECTOMY;  Surgeon: Ethan Díaz MD;  Location: [facility];  Service: Orthopedics;  Laterality: Left;    ROBOT-ASSISTED LAPAROSCOPIC PARTIAL NEPHRECTOMY Left 10/24/2024    Procedure: ROBOTIC NEPHRECTOMY, PARTIAL;  Surgeon: Toya Bermudez MD;  Location: [facility];  Service: Urology;  Laterality: Left;  [employer] conf#[id number]    SKIN CANCER EXCISION      multiple sites, derm dr martinez, basal cell ca    SPINE SURGERY  1990    L4L5 disc    VASECTOMY        Current Medications[2]    Review of Systems   Constitutional:  Negative for chills and fever.   Respiratory:  Negative for cough and shortness of breath.    Cardiovascular:  Negative for chest pain  and palpitations.   Gastrointestinal:  Negative for abdominal pain.   Neurological:  Negative for dizziness and headaches.       Objective:   BP (!) 160/74   Pulse 66   Temp 98.1 °F (36.7 °C) (Oral)   Ht 6' (1.829 m)   Wt 74.6 kg (164 lb 7.4 oz)   SpO2 99%   BMI 22.31 kg/m²         Physical Exam  Vitals reviewed.   Constitutional:       General: He is not in acute distress.     Appearance: Normal appearance.   Cardiovascular:      Rate and Rhythm: Normal rate and regular rhythm.      Pulses: Normal pulses.      Heart sounds: Normal heart sounds.   Pulmonary:      Effort: Pulmonary effort is normal.      Breath sounds: Normal breath sounds.   Chest:      Chest wall: No deformity, swelling or edema.   Abdominal:      General: Abdomen is flat. Bowel sounds are normal.      Palpations: Abdomen is soft.   Musculoskeletal:      Cervical back: Normal range of motion.   Neurological:      Mental Status: He is oriented to person, place, and time.         Assessment:       1. Primary hypertension        Plan:   1. Primary hypertension (Primary)  Uncontrolled   - Manually repeated BP was 160/74   - Continue with metoprolol and start with   - amlodipine-valsartan (EXFORGE) 5-320 mg per tablet; Take 1 tablet by mouth once daily.  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 COAGULANS, ORAL), Take by mouth., Disp: , Rfl:     hydrocortisone 2.5 % cream, Apply sparingly to ears BID as needed for itching, Disp: 20 g, Rfl: 0    ibuprofen (IBU) 800 MG tablet, Take 1 tablet (800 mg total) by mouth every 8 (eight) hours as needed for Pain., Disp: 30 tablet, Rfl: 1    magnesium 200 mg Tab, Take by mouth once., Disp: , Rfl:     metoprolol succinate (TOPROL-XL) 25 MG 24 hr tablet, TAKE 1 TABLET BY MOUTH TWICE A DAY, Disp: 180 tablet, Rfl: 3    multivitamin capsule, Take 1 capsule by mouth once daily., Disp: , Rfl:     naproxen sodium (ALEVE ORAL), Take by mouth., Disp: , Rfl:     nystatin-triamcinolone (MYCOLOG II) cream, Apply topically 2 (two) times daily., Disp: 60 g, Rfl: 11    oxyCODONE-acetaminophen (PERCOCET) 5-325 mg per tablet, Take 1 tablet by mouth every 4 (four) hours as needed for Pain., Disp: 20 tablet, Rfl: 0    pramoxine-hydrocortisone (PROCTOCREAM-HC) 1-1 % rectal cream, Place rectally 2 (two) times daily., Disp: 28.4 g, Rfl: 11    psyllium seed, sugar, (METAMUCIL, SUGAR,) Powd, Take by mouth., Disp: , Rfl:     simvastatin (ZOCOR) 40 MG tablet, Take 1 tablet (40 mg total) by mouth every evening., Disp: 90 tablet, Rfl: 0    amlodipine-valsartan (EXFORGE) 5-320 mg per tablet, Take 1 tablet by mouth once daily., Disp: 90 tablet, Rfl: 1    sulfamethoxazole-trimethoprim 800-160mg (BACTRIM DS) 800-160 mg Tab, TAKE 1 TABLET BY MOUTH TWICE A DAY (Patient not taking: Reported on 4/10/2025), Disp: 10 tablet, Rfl: 0

## 2025-04-11 ENCOUNTER — TELEPHONE (OUTPATIENT)
Dept: CARDIOLOGY | Facility: CLINIC | Age: 85
End: 2025-04-11
Payer: MEDICARE

## 2025-04-11 NOTE — TELEPHONE ENCOUNTER
Please advise: PCP started pt on amlodipine-valsartan 5-320 yesterday and pt is calling to see if this is okay

## 2025-04-17 ENCOUNTER — OFFICE VISIT (OUTPATIENT)
Dept: FAMILY MEDICINE | Facility: CLINIC | Age: 85
End: 2025-04-17
Payer: MEDICARE

## 2025-04-17 VITALS
HEIGHT: 72 IN | DIASTOLIC BLOOD PRESSURE: 68 MMHG | SYSTOLIC BLOOD PRESSURE: 120 MMHG | HEART RATE: 81 BPM | OXYGEN SATURATION: 95 % | TEMPERATURE: 98 F | WEIGHT: 168.19 LBS | BODY MASS INDEX: 22.78 KG/M2

## 2025-04-17 DIAGNOSIS — E78.2 MIXED HYPERLIPIDEMIA: ICD-10-CM

## 2025-04-17 DIAGNOSIS — I10 PRIMARY HYPERTENSION: Primary | ICD-10-CM

## 2025-04-17 LAB
OHS QRS DURATION: 92 MS
OHS QTC CALCULATION: 437 MS

## 2025-04-17 PROCEDURE — 99999 PR PBB SHADOW E&M-EST. PATIENT-LVL IV: CPT | Mod: PBBFAC,,, | Performed by: NURSE PRACTITIONER

## 2025-04-17 PROCEDURE — 99213 OFFICE O/P EST LOW 20 MIN: CPT | Mod: S$PBB,,, | Performed by: NURSE PRACTITIONER

## 2025-04-17 PROCEDURE — 99214 OFFICE O/P EST MOD 30 MIN: CPT | Mod: PBBFAC,PO | Performed by: NURSE PRACTITIONER

## 2025-04-17 NOTE — PROGRESS NOTES
Subjective:       Patient ID: Iftikhar Lynn is a 85 y.o. male.    Chief Complaint: Follow-up     HPI   84 y/o male patient with medical problems listed below presents for follow up of HTN. Patient is on hypertension digital medicine program.     Problem List[1]     Review of patient's allergies indicates:  No Known Allergies    Past Surgical History:   Procedure Laterality Date    BACK SURGERY      L 4 L5    COLONOSCOPY N/A 12/20/2017    Procedure: COLONOSCOPY;  Surgeon: Filipe Ramirez MD;  Location: [facility];  Service: Endoscopy;  Laterality: N/A;    COLONOSCOPY N/A 09/27/2021    Procedure: COLONOSCOPY;  Surgeon: Filipe Ramirez MD;  Location: [facility] END[facility];  Service: Endoscopy;  Laterality: N/A;    EYE SURGERY  2010    cataract    KNEE ARTHROSCOPY W/ MENISCECTOMY Left 01/17/2019    Procedure: ARTHROSCOPY, KNEE, WITH MENISCECTOMY;  Surgeon: Ethan Díaz MD;  Location: [facility] OR;  Service: Orthopedics;  Laterality: Left;    ROBOT-ASSISTED LAPAROSCOPIC PARTIAL NEPHRECTOMY Left 10/24/2024    Procedure: ROBOTIC NEPHRECTOMY, PARTIAL;  Surgeon: Toya Bermudez MD;  Location: [facility] OR;  Service: Urology;  Laterality: Left;  [employer] conf#[id number]    SKIN CANCER EXCISION      multiple sites, derm dr martinez, basal cell ca    SPINE SURGERY  1990    L4L5 disc    VASECTOMY        Current Medications[2]    Review of Systems   Constitutional:  Negative for chills and fever.   Respiratory:  Negative for cough and shortness of breath.    Cardiovascular:  Negative for chest pain and palpitations.   Neurological:  Negative for dizziness and headaches.       Objective:   /68 (BP Location: Right arm, Patient Position: Sitting)   Pulse 81   Temp 98.3 °F (36.8 °C) (Oral)   Ht 6' (1.829 m)   Wt 76.3 kg (168 lb 3.4 oz)   SpO2 95%   BMI 22.81 kg/m²         Physical Exam  Vitals reviewed.   Constitutional:       General: He is not in acute distress.     Appearance: Normal appearance.    Cardiovascular:      Rate and Rhythm: Normal rate and regular rhythm.      Pulses: Normal pulses.      Heart sounds: Normal heart sounds.   Pulmonary:      Effort: Pulmonary effort is normal.      Breath sounds: Normal breath sounds.   Chest:      Chest wall: No deformity, swelling or edema.   Abdominal:      General: Abdomen is flat. Bowel sounds are normal.      Palpations: Abdomen is soft.   Neurological:      Mental Status: He is oriented to person, place, and time.         Assessment:       1. Primary hypertension    2. Mixed hyperlipidemia        Plan:       1. Primary hypertension (Primary)  - Controlled   - Continue with current regimen metoprolol and exforge    2. 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 COAGULANS, ORAL), Take by mouth., Disp: , Rfl:     hydrocortisone 2.5 % cream, Apply sparingly to ears BID as needed for itching, Disp: 20 g, Rfl: 0    ibuprofen (IBU) 800 MG tablet, Take 1 tablet (800 mg  total) by mouth every 8 (eight) hours as needed for Pain., Disp: 30 tablet, Rfl: 1    magnesium 200 mg Tab, Take by mouth once., Disp: , Rfl:     metoprolol succinate (TOPROL-XL) 25 MG 24 hr tablet, TAKE 1 TABLET BY MOUTH TWICE A DAY, Disp: 180 tablet, Rfl: 3    multivitamin capsule, Take 1 capsule by mouth once daily., Disp: , Rfl:     naproxen sodium (ALEVE ORAL), Take by mouth., Disp: , Rfl:     nystatin-triamcinolone (MYCOLOG II) cream, Apply topically 2 (two) times daily., Disp: 60 g, Rfl: 11    oxyCODONE-acetaminophen (PERCOCET) 5-325 mg per tablet, Take 1 tablet by mouth every 4 (four) hours as needed for Pain., Disp: 20 tablet, Rfl: 0    pramoxine-hydrocortisone (PROCTOCREAM-HC) 1-1 % rectal cream, Place rectally 2 (two) times daily., Disp: 28.4 g, Rfl: 11    psyllium seed, sugar, (METAMUCIL, SUGAR,) Powd, Take by mouth., Disp: , Rfl:     simvastatin (ZOCOR) 40 MG tablet, Take 1 tablet (40 mg total) by mouth every evening., Disp: 90 tablet, Rfl: 0    sulfamethoxazole-trimethoprim 800-160mg (BACTRIM DS) 800-160 mg Tab, TAKE 1 TABLET BY MOUTH TWICE A DAY (Patient not taking: Reported on 4/3/2025), Disp: 10 tablet, Rfl: 0

## 2025-05-07 ENCOUNTER — HOSPITAL ENCOUNTER (OUTPATIENT)
Dept: RADIOLOGY | Facility: HOSPITAL | Age: 85
Discharge: HOME | End: 2025-05-07
Attending: STUDENT IN AN ORGANIZED HEALTH CARE EDUCATION/TRAINING PROGRAM
Payer: MEDICARE

## 2025-05-07 DIAGNOSIS — N28.89 LEFT RENAL MASS: ICD-10-CM

## 2025-05-07 PROCEDURE — 25500020 PHARM REV CODE 255

## 2025-05-07 PROCEDURE — 74170 CT ABD WO CNTRST FLWD CNTRST: CPT | Mod: 26,,, | Performed by: RADIOLOGY

## 2025-05-07 PROCEDURE — 74170 CT ABD WO CNTRST FLWD CNTRST: CPT | Mod: TC

## 2025-05-07 PROCEDURE — 71045 X-RAY EXAM CHEST 1 VIEW: CPT | Mod: 26,,, | Performed by: RADIOLOGY

## 2025-05-07 PROCEDURE — 71045 X-RAY EXAM CHEST 1 VIEW: CPT | Mod: TC

## 2025-05-07 RX ADMIN — IOHEXOL 75 ML: 350 INJECTION, SOLUTION INTRAVENOUS at 12:05

## 2025-05-09 NOTE — PROGRESS NOTES
[facility] Note    PCP: Tarik Mena MD    Chief Complaint: RCC    SUBJECTIVE:       History of Present Illness:  Iftikhar Lynn is a 85 y.o. male who presents to clinic for RCC. He is Established  to our clinic.     S/P left partial nephrectomy on 10/24/24: 3.2 cm ccRCC, grade 3, margins negative    CT 5/7/25: Status post partial left nephrectomy. Mild nodularity within perinephric fat is likely postoperative in nature however attention on follow-up is recommended. No evidence for abdominal metastatic disease.     CXR 5/7/25: no metastatic disease    GFR 4/8/25: >60    No issues urinating.   No gross hematuria.    No complaints today.       11/7/24  No pain.   No hematuria.   Having regular BMs.   Appetite is good.   Ambulating well.     9/5/24  Patient recently underwent lumbar MRI for back pain which incidentally noted a lesion on the left kidney.   Retroperitoneal US 8/29/24 shows concern for a 3.2 cm left lower pole solid mass.     Former smoker, quit 60 years ago.     No gross hematuria or dysuria.     No prior abdominal surgeries.     PSA 8/2/23: 3.2    12/8/21  Previous patient of Dr. Anna, last seen in Feb 2017 for groin pain. He has since been diagnosed with a hernia, however he has not gotten this fixed yet.    He presents today for elevated PSA at 4.3 in August. Previously in July 2020 was 3.2.    Has chronic bilateral testicular pain and lower pelvic pain. He also has issues with hemorrhoids. No constipation but does occasionally have diarrhea.    No issues with ED. No issues with voiding. Nocturia x 1. No hematuria or dysuria.     UA today: negative for blood, leuks, nitrite   PVR today: 0 cc    Last urine culture: no documented UTIs    Lab Results   Component Value Date    CREATININE 0.7 04/08/2025     PSA, Screen (ng/mL)   Date Value   08/02/2023 3.2     Family  hx: no malignancy   Hx of HTN, HLD, melanoma    Past medical, family, and social history reviewed  as documented in chart with pertinent positive medical, family, and social history detailed in HPI.    Review of patient's allergies indicates:  No Known Allergies    Past Medical History:   Diagnosis Date    Cancer     basal cell ca rt arm / melanoma on back    GERD (gastroesophageal reflux disease)     Hyperlipidemia     Hypertension     Melanoma      Past Surgical History:   Procedure Laterality Date    BACK SURGERY      L 4 L5    COLONOSCOPY N/A 12/20/2017    Procedure: COLONOSCOPY;  Surgeon: Filipe Ramirez MD;  Location: [facility] ENDO;  Service: Endoscopy;  Laterality: N/A;    COLONOSCOPY N/A 09/27/2021    Procedure: COLONOSCOPY;  Surgeon: Filipe Ramirez MD;  Location: [facility] END[facility];  Service: Endoscopy;  Laterality: N/A;    EYE SURGERY  2010    cataract    KNEE ARTHROSCOPY W/ MENISCECTOMY Left 01/17/2019    Procedure: ARTHROSCOPY, KNEE, WITH MENISCECTOMY;  Surgeon: Ethan Díaz MD;  Location: [facility];  Service: Orthopedics;  Laterality: Left;    ROBOT-ASSISTED LAPAROSCOPIC PARTIAL NEPHRECTOMY Left 10/24/2024    Procedure: ROBOTIC NEPHRECTOMY, PARTIAL;  Surgeon: Toya Bermudez MD;  Location: [facility];  Service: Urology;  Laterality: Left;  [employer] conf#[id number]    SKIN CANCER EXCISION      multiple sites, derm dr martinez, basal cell ca    SPINE SURGERY  1990    L4L5 disc    VASECTOMY       Family History   Problem Relation Name Age of Onset    Cancer Mother saqib lynn         non hogkins lumphoma    Heart disease Father Iftikhar Lynn     Cancer Brother fermin lynn         melanoma, arm and leg    Clotting disorder Neg Hx      Anesthesia problems Neg Hx       Social History     Tobacco Use    Smoking status: Former     Types: Cigars    Smokeless tobacco: Never   Substance Use Topics    Alcohol use: Yes     Alcohol/week: 28.0 standard drinks of alcohol     Types: 28 Glasses of wine per week     Comment: daily wine    Drug use: No        Review of Systems    OBJECTIVE:      Anticoagulation:  Aspirin 81 mg     Estimated body mass index is 22.72 kg/m² as calculated from the following:    Height as of this encounter: 6' (1.829 m).    Weight as of this encounter: 76 kg (167 lb 8.8 oz).    Vital Signs (Most Recent)       Physical Exam  Constitutional:       General: He is not in acute distress.     Appearance: Normal appearance. He is not ill-appearing.   HENT:      Head: Normocephalic and atraumatic.   Eyes:      General: No scleral icterus.  Pulmonary:      Effort: Pulmonary effort is normal. No respiratory distress.   Abdominal:      General: There is no distension.   Skin:     Coloration: Skin is not jaundiced.   Neurological:      General: No focal deficit present.      Mental Status: He is alert and oriented to person, place, and time.   Psychiatric:         Mood and Affect: Mood normal.         Behavior: Behavior normal.         Thought Content: Thought content normal.       BMP  Lab Results   Component Value Date     04/08/2025    K 3.5 04/08/2025     04/08/2025    CO2 24 04/08/2025    BUN 15 04/08/2025    CREATININE 0.7 04/08/2025    CALCIUM 9.5 04/08/2025    ANIONGAP 9 04/08/2025    ESTGFRAFRICA >60 07/22/2022    EGFRNONAA >60 07/22/2022       Lab Results   Component Value Date    WBC 6.15 04/08/2025    HGB 13.6 (L) 04/08/2025    HCT 41.3 04/08/2025    MCV 98 04/08/2025     04/08/2025       Imaging:  CT abd/pelvis 2/27/17:   There is mild bilateral perinephric stranding most likely chronic.  There is 8mm low density mass immediately the right kidney for example coronal series 601 image 61 most likely a cyst but not fully characterized on a noncontrast study and again could be further evaluated followup ultrasound.  There is no hydronephrosis.  There is mild prominence of segment of the right ureter.  There multiple pelvic calcifications most likely vascular although one does lie in the expected region of the distal right ureter and distal ureteral stones  difficult to exclude.  It is more likely a phlebolith  The prostate gland is enlarged and there is diffuse bladder wall thickening.     ASSESSMENT     1. Renal cell carcinoma of left kidney      PLAN:     Intermediate Risk RCC (Oct 2024)    - Imaging reviewed with patient. No signs of recurrence, likely scar tissue present near resection bed.   - Follow up in 6 months with CT and CXR  - BMP in 6 months   - Follow up for results     Toya Bermudez MD       Visit today included increased complexity associated with the care of the episodic problem renal mass addressed and managing the longitudinal care of the patient due to the serious and/or complex managed problem(s).

## 2025-05-12 ENCOUNTER — PATIENT MESSAGE (OUTPATIENT)
Dept: ADMINISTRATIVE | Facility: OTHER | Age: 85
End: 2025-05-12
Payer: MEDICARE

## 2025-05-12 ENCOUNTER — OFFICE VISIT (OUTPATIENT)
Dept: UROLOGY | Facility: CLINIC | Age: 85
End: 2025-05-12
Payer: MEDICARE

## 2025-05-12 VITALS — WEIGHT: 167.56 LBS | HEIGHT: 72 IN | BODY MASS INDEX: 22.69 KG/M2

## 2025-05-12 DIAGNOSIS — C64.2 RENAL CELL CARCINOMA OF LEFT KIDNEY: Primary | ICD-10-CM

## 2025-05-12 PROCEDURE — 99213 OFFICE O/P EST LOW 20 MIN: CPT | Mod: PBBFAC,PO | Performed by: STUDENT IN AN ORGANIZED HEALTH CARE EDUCATION/TRAINING PROGRAM

## 2025-05-12 PROCEDURE — 99999 PR PBB SHADOW E&M-EST. PATIENT-LVL III: CPT | Mod: PBBFAC,,, | Performed by: STUDENT IN AN ORGANIZED HEALTH CARE EDUCATION/TRAINING PROGRAM

## 2025-05-12 PROCEDURE — G2211 COMPLEX E/M VISIT ADD ON: HCPCS | Mod: S$PBB,,, | Performed by: STUDENT IN AN ORGANIZED HEALTH CARE EDUCATION/TRAINING PROGRAM

## 2025-05-12 PROCEDURE — 99214 OFFICE O/P EST MOD 30 MIN: CPT | Mod: S$PBB,,, | Performed by: STUDENT IN AN ORGANIZED HEALTH CARE EDUCATION/TRAINING PROGRAM

## 2025-05-26 DIAGNOSIS — Z00.00 ENCOUNTER FOR MEDICARE ANNUAL WELLNESS EXAM: ICD-10-CM

## 2025-06-16 DIAGNOSIS — E78.2 MIXED HYPERLIPIDEMIA: ICD-10-CM

## 2025-06-16 RX ORDER — SIMVASTATIN 40 MG/1
40 TABLET, FILM COATED ORAL NIGHTLY
Qty: 90 TABLET | Refills: 3 | Status: SHIPPED | OUTPATIENT
Start: 2025-06-16 | End: 2025-09-14

## 2025-06-16 NOTE — TELEPHONE ENCOUNTER
No care due was identified.  [facility] Embedded Care Due Messages. Reference number: [id number].   6/16/2025 1:12:44 PM CDT

## 2025-06-16 NOTE — TELEPHONE ENCOUNTER
Copied from CRM #[id number]. Topic: Medications - Pharmacy  >> Jun 16, 2025 11:31 AM Teodora wrote:  Type:  Pharmacy Calling to Clarify an RX    Name of Caller: grant   Pharmacy Name:[facility]  Prescription Name: simvastatin (ZOCOR) 40 MG tablet    What do they need to clarify?: to refill or not to refill  Best Call Back Number: [phone] press option 2 ref [id number]  Additional Information:

## 2025-06-18 ENCOUNTER — PATIENT MESSAGE (OUTPATIENT)
Dept: SPINE | Facility: CLINIC | Age: 85
End: 2025-06-18
Payer: MEDICARE

## 2025-06-19 DIAGNOSIS — G89.29 CHRONIC RIGHT-SIDED LOW BACK PAIN WITH RIGHT-SIDED SCIATICA: Primary | ICD-10-CM

## 2025-06-19 DIAGNOSIS — M54.41 CHRONIC RIGHT-SIDED LOW BACK PAIN WITH RIGHT-SIDED SCIATICA: Primary | ICD-10-CM

## 2025-06-24 ENCOUNTER — CLINICAL SUPPORT (OUTPATIENT)
Dept: REHABILITATION | Facility: HOSPITAL | Age: 85
End: 2025-06-24
Payer: MEDICARE

## 2025-06-24 DIAGNOSIS — G89.29 CHRONIC RIGHT-SIDED LOW BACK PAIN WITH RIGHT-SIDED SCIATICA: Primary | ICD-10-CM

## 2025-06-24 DIAGNOSIS — M54.41 CHRONIC RIGHT-SIDED LOW BACK PAIN WITH RIGHT-SIDED SCIATICA: Primary | ICD-10-CM

## 2025-06-24 DIAGNOSIS — R29.898 IMPAIRED FLEXIBILITY OF LOWER EXTREMITY: ICD-10-CM

## 2025-06-24 PROCEDURE — 97530 THERAPEUTIC ACTIVITIES: CPT | Mod: PO | Performed by: PHYSICAL THERAPIST

## 2025-06-24 PROCEDURE — 97161 PT EVAL LOW COMPLEX 20 MIN: CPT | Mod: PO | Performed by: PHYSICAL THERAPIST

## 2025-06-24 NOTE — PROGRESS NOTES
Outpatient Rehab    Physical Therapy Evaluation    Patient Name: Iftikhar Lynn  MRN: [id number]  YOB: 1940  Encounter Date: 6/24/2025    Therapy Diagnosis:   Encounter Diagnoses   Name Primary?    Chronic right-sided low back pain with right-sided sciatica Yes    Impaired flexibility of lower extremity      Physician: Zacarias Siegel MD    Physician Orders: Eval and Treat  Medical Diagnosis: Chronic right-sided low back pain with right-sided sciatica  Surgical Diagnosis: Not applicable for this Episode   Surgical Date: Not applicable for this Episode  Days Since Last Surgery: Not applicable for this Episode    Visit # / Visits Authorized:  1 / 1  Insurance Authorization Period: 6/19/2025 to 6/19/2026  Date of Evaluation: 6/24/2025  Plan of Care Certification: 6/24/2025 to 8/22/2025     Time In: 1000   Time Out: 1100  Total Time (in minutes): 60   Total Billable Time (in minutes):      Intake Outcome Measure for FOTO Survey    Therapist reviewed FOTO scores for Iftikhar Lynn on 6/24/2025.   FOTO report - see Media section or FOTO account episode details.     Intake Score: 53%    Precautions:     Standard      Subjective   History of Present Illness  Iftikhar is a 85 y.o. male who reports to physical therapy with a chief concern of Lumbar pain and right LE pain.     The patient reports a medical diagnosis of Chronic right-sided low back pain with right-sided sciatica. The patient has experienced this issue since 06/19/25.   Diagnostic tests related to this condition: X-ray.   X-Ray Details: Stable what appears to be a mild T12 compression fracture deformity compared to prior chest x-ray 10/10/2024 but new compared to prior MRI 08/23/2024 with clinical correlation needed for acuity of symptoms.  No other fractures identified.  Severe degenerative disc height loss from L3-L4 to L5-S1 and grade 1 retrolisthesis of L3 on L4 as well as broad rotatory dextrocurvature of the thoracolumbar  spine.  Endplate centered osteophytes.  No acute findings within the visualized chest or abdomen.    History of Present Condition/Illness: Thee patient reports a chronic history of lumbar and right lower extremity pain. He reports pain increases with sitting and decreases with walking. 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 Following surgery he continued to notice lumbar and lower extremity pain and also lower extremity weakness. He also reports issues with his balance.    Pain     Patient reports a current pain level of 2/10. Pain at best is reported as 0/10. Pain at worst is reported as 7/10.   Location: Lumbar and right LE  Clinical Progression (since onset): Stable  Pain Qualities: Other (Comment)  Other Pain Qualities: Shooting  Pain-Relieving Factors: Walking  Pain-Aggravating Factors: Sitting         Employment  Employment Status: Retired          Past Medical History/Physical Systems Review:   Iftikhar Lynn  has a past medical history of Cancer, GERD (gastroesophageal reflux disease), Hyperlipidemia, Hypertension, and Melanoma.    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    Iftikhar has a current medication list which includes the following prescription(s): amlodipine-valsartan, aspirin, bacillus coagulans, hydrocortisone, ibuprofen, magnesium, metoprolol succinate, multivitamin, naproxen sodium, nystatin-triamcinolone, oxycodone-acetaminophen, pramoxine-hydrocortisone, metamucil (sugar), simvastatin, and sulfamethoxazole-trimethoprim 800-160mg.    Review of patient's allergies indicates:  No  Known Allergies     Objective   Posture  Patient presents with a Forward head position. Flat lumbar spine and Increased thoracic kyphosis is observed.   Shoulders are Rounded. Bilateral scapulae are: Protracted              Lumbar Range of Motion   Active (deg) Passive (deg) Pain   Flexion 16       Extension 6   Yes   Right Lateral Flexion 6       Right Rotation         Left Lateral Flexion 8       Left Rotation                             Hip Strength - Planes of Motion   Right Strength Right Pain Left Strength Left  Pain   Flexion (L2) 5   5     Extension 4   4     ABduction 4   4     ADduction 5   5     Internal Rotation 5   5     External Rotation 4   4         Knee Strength   Right Strength Right Pain Left Strength Left  Pain   Flexion (S2) 5   5     Prone Flexion           Extension (L3) 5   5            Ankle/Foot Strength - Planes of Motion   Right Strength Right Pain Left Strength Left  Pain   Dorsiflexion (L4) 3+   5     Plantar Flexion (S1) 5   5     Inversion 5   5     Eversion 5   5     Great Toe Flexion           Great Toe Extension (L5)           Lesser Toes Flexion           Lesser Toes Extension                  Hip Special Tests  90/90 Hamstring Test  Positive: Right and Left  Right 90/90 Hamstring Test Hip Flexion: 90  Left 90/90 Hamstring Test Hip Flexion: 90  Right 90/90 Hamstring Test Knee Extension: 42  Left 90/90 Hamstring Test Knee Extension: 48       Knee Special Tests                 Gait Analysis  Base of Support: Wide  Walking Speed: Decreased                   Treatment:     THERAPEUTIC ACTIVITIES to improve dynamic and functional performance for 30 minutes including :.    Seated Hamstring stretch 3 x 30 sec B  Seated piriformis stretch 3 x 30 sec B  Seated Gastroc-soleus stretch 3 x 30 sec B  Seated dorsiflexion 3 x 10  Ankle dorsiflexion with Green theraband 3 x 10  Toe yoga 3 x 10  Arch lifts 3 x 10  Bridging with Green theraband 3 x 10  Bridging with ball 3 x 10      Time Entry(in  minutes):  PT Evaluation (Low) Time Entry: 30  Therapeutic Activity Time Entry: 30    Assessment & Plan   Assessment  Iftikhar presents with a condition of Low complexity.   Presentation of Symptoms: Stable       Functional Limitations: Activity tolerance, Functional mobility  Impairments: Activity intolerance, Impaired physical strength, Pain with functional activity  Personal Factors Affecting Prognosis: Physical limitations, Pain    Patient Goal for Therapy (PT): Return to previous level of activity  Prognosis: Good  Assessment Details: The patient is a 85 year old male with complaints of lumbar and lower extremity pain.. He presents with: decreased lumbar range of motion, decreased lower extremity flexibility ,decreased lower extremity strength & lumbar pain. He will benefit from skilled PT to increase lower extremity strength, improve lower extremity flexibility & return to previous level of activity.     Plan  From a physical therapy perspective, the patient would benefit from: Skilled Rehab Services    Planned therapy interventions include: Neuromuscular re-education, Therapeutic activities, Therapeutic exercise, and Manual therapy.            Visit Frequency: 2 times Per Week for 6 Weeks.       This plan was discussed with Patient.   Discussion participants: Agreed Upon Plan of Care  Plan details: Lumbar range of motion exercises, core and lumbar strengthening, lower extremity strengthening, lower extremity flexibility           The patient's spiritual, cultural, and educational needs were considered, and the patient is agreeable to the plan of care and goals.     Education  Education was done with Patient. The patient's learning style includes Demonstration, Listening, and Pictures/video. The patient Demonstrates understanding and Verbalizes understanding.                 Goals:   Active       A. STG       The patient will begin a written home exercise program        Start:  06/24/25    Expected End:   07/15/25            Decrease pain at worst to 4/10        Start:  06/24/25    Expected End:  07/15/25            Increase hamstring flexibility to 60 degrees        Start:  06/24/25    Expected End:  07/15/25            Increase right ankle dorsiflexion to 4/5       Start:  06/24/25    Expected End:  07/15/25            Increase the patients FOTO score to 57        Start:  06/24/25    Expected End:  07/15/25               B. LTG        The patient will be independent with a home exercise program        Start:  06/24/25    Expected End:  08/22/25            Decrease pain at worst to 2/10        Start:  06/24/25    Expected End:  08/22/25            Increase hamstring flexibility to 70 degrees        Start:  06/24/25    Expected End:  08/22/25            Increase right ankle dorsiflexion to 4+/5       Start:  06/24/25    Expected End:  08/22/25            The patient will be able to ascend/descend 20 step/stairs without onset of symptoms.        Start:  06/24/25    Expected End:  08/22/25                Tommy Doshi, MS, PT, ATC

## 2025-07-05 DIAGNOSIS — I49.8 OTHER CARDIAC ARRHYTHMIA: ICD-10-CM

## 2025-07-07 RX ORDER — METOPROLOL SUCCINATE 25 MG/1
25 TABLET, EXTENDED RELEASE ORAL 2 TIMES DAILY
Qty: 180 TABLET | Refills: 3 | Status: SHIPPED | OUTPATIENT
Start: 2025-07-07

## 2025-07-08 ENCOUNTER — CLINICAL SUPPORT (OUTPATIENT)
Dept: REHABILITATION | Facility: HOSPITAL | Age: 85
End: 2025-07-08
Attending: PHYSICAL MEDICINE & REHABILITATION
Payer: MEDICARE

## 2025-07-08 DIAGNOSIS — M54.41 CHRONIC RIGHT-SIDED LOW BACK PAIN WITH RIGHT-SIDED SCIATICA: Primary | ICD-10-CM

## 2025-07-08 DIAGNOSIS — R29.898 DECREASED STRENGTH OF LOWER EXTREMITY: ICD-10-CM

## 2025-07-08 DIAGNOSIS — G89.29 CHRONIC RIGHT-SIDED LOW BACK PAIN WITH RIGHT-SIDED SCIATICA: Primary | ICD-10-CM

## 2025-07-08 DIAGNOSIS — R29.898 IMPAIRED FLEXIBILITY OF LOWER EXTREMITY: ICD-10-CM

## 2025-07-08 PROCEDURE — 97112 NEUROMUSCULAR REEDUCATION: CPT | Mod: PO

## 2025-07-08 PROCEDURE — 97140 MANUAL THERAPY 1/> REGIONS: CPT | Mod: PO

## 2025-07-08 NOTE — PROGRESS NOTES
Outpatient Rehab    Physical Therapy Visit    Patient Name: Iftikhar Lynn  MRN: [id number]  YOB: 1940  Encounter Date: 7/8/2025    Therapy Diagnosis:   Encounter Diagnoses   Name Primary?    Chronic right-sided low back pain with right-sided sciatica Yes    Decreased strength of lower extremity     Impaired flexibility of lower extremity      Physician: Zacarias Siegel MD    Physician Orders: Eval and Treat  Medical Diagnosis: Chronic right-sided low back pain with right-sided sciatica  Surgical Diagnosis: Not applicable for this Episode   Surgical Date: Not applicable for this Episode  Days Since Last Surgery: Not applicable for this Episode    Visit # / Visits Authorized:  1 / 15  Insurance Authorization Period: 6/20/2025 to 12/31/2025  Date of Evaluation: 6/24/2025  Plan of Care Certification: 6/24/2025 to 8/22/2025      PT/PTA:     Number of PTA visits since last PT visit:   Time In: 0900   Time Out: 1000  Total Time (in minutes): 60   Total Billable Time (in minutes):      FOTO:  Intake Score:  %  Survey Score 2:  %  Survey Score 3:  %    Precautions:     Standard      Subjective   Patient reports that he has continued to have LBP, worsened with prolonged sitting, and stance.  He notes that he no longer completes his lumbar extension exer per HB HEP.   He reports that he was on a trip and had difficulty raising his suitcases into the overhead bins 2' decreased upper body strength.   He continues to use a lumbar roll for support while sitting at his desk, he does not interrupt his sitting frequently.   He notes that he continues to drag his R foot and catches his toes w walking..  Pain reported as 0/10.      Objective            Treatment:  Manual Therapy  MT 1: prone, graded progression of PA mobs to lower thoracic -> lumbar spine, grade 1-3. sets of 10 reps, to increase accessory mobility to lower spinal segments  MT 2: Prone gentle rocking on transverse processes, lower spinal  "segments, grade 2-3, sets of 10 reps, to increase accessory mobility  Balance/Neuromuscular Re-Education  NMR 1: EIS w hinging @ irritable segment on EOM,  4 x 10  NMR 2: EIP 2 x 10, 2 x 10 w lock-breathe-sag.  NMR 3: prone hip extensions w 2# ankle weight, 2 x 10 ea, tapping for glutes mm facilitation  NMR 4: prone glute max raises, 2 x 10, tapping for glutes mm facilitation  NMR 5: mantx as above  NMR 6: 1/2 bridging, left and right, 2 x 10 reps  f/b bridging 2 x 10, 3 sec hold  NMR 7: repeat EIS x 10.   Recheck R dorsiflexion - improved strength noted.  Other Activities  Activity 1: Patient was educated  on role of posture with spinal pain.  Patient was demonstrated in use of lumbar roll with sitting and cervical roll in pillowcase for spinal support with sleep.  Patient stated understanding.  Activity 2: Updated HEP - patient to to hold any exer that includes bring his knee to his chest  Activity 3: Reviewed gym program - written slip given w recommendations to complete leg press, chest press, tripceps press and rowing.  Avoid crunch machine.  Patient to use sufficient weight that he could complete 20 reps comfortable.  He states understanding.  Activity 4: Z-lie x 5 mins w CP to LB.  Activity 5: prone - taping to LB in "X" with kinesiotape to increased postural awareness of slouching.  Skin prep used, patient instructed to remove in 24-48 hours, sooner if needed.   He stated understanding.    Time Entry(in minutes):  Manual Therapy Time Entry: 15  Neuromuscular Re-Education Time Entry: 40    Assessment & Plan   Assessment: Patient with good response to initial interventions.  With repeated lumbar extension, his right dorsiflexion strength increased.  He will benefit from continued extension based therapeutic exercise, focus on postural awareness and correction for local symptoms management and progression of hip and dorsal strengthening.   Evaluation/Treatment Tolerance: Patient tolerated treatment well    The " patient will continue to benefit from skilled outpatient physical therapy in order to address the deficits listed in the problem list on the initial evaluation, provide patient and family education, and maximize the patients level of independence in the home and community environments.     The patient's spiritual, cultural, and educational needs were considered, and the patient is agreeable to the plan of care and goals.           Plan: continue to progress extension based therex, postural education and correction, hip and dorsal strengthening, progress HEP    Goals:   Active       A. STG       The patient will begin a written home exercise program  (Met)       Start:  06/24/25    Expected End:  07/15/25    Resolved:  07/08/25         Decrease pain at worst to 4/10  (Progressing)       Start:  06/24/25    Expected End:  07/15/25            Increase hamstring flexibility to 60 degrees  (Progressing)       Start:  06/24/25    Expected End:  07/15/25            Increase right ankle dorsiflexion to 4/5 (Met)       Start:  06/24/25    Expected End:  07/15/25    Resolved:  07/08/25         Increase the patients FOTO score to 57  (Progressing)       Start:  06/24/25    Expected End:  07/15/25               B. LTG        The patient will be independent with a home exercise program  (Progressing)       Start:  06/24/25    Expected End:  08/22/25            Decrease pain at worst to 2/10  (Progressing)       Start:  06/24/25    Expected End:  08/22/25            Increase hamstring flexibility to 70 degrees  (Progressing)       Start:  06/24/25    Expected End:  08/22/25            Increase right ankle dorsiflexion to 4+/5 (Progressing)       Start:  06/24/25    Expected End:  08/22/25            The patient will be able to ascend/descend 20 step/stairs without onset of symptoms.  (Progressing)       Start:  06/24/25    Expected End:  08/22/25                CAROL MaciasT

## 2025-07-09 NOTE — PROGRESS NOTES
Outpatient Rehab    Physical Therapy Visit    Patient Name: Iftikhar Lynn  MRN: [id number]  YOB: 1940  Encounter Date: 7/10/2025    Therapy Diagnosis:   Encounter Diagnoses   Name Primary?    Chronic right-sided low back pain with right-sided sciatica Yes    Decreased strength of lower extremity     Impaired flexibility of lower extremity        Physician: Zacarias Siegel MD    Physician Orders: Eval and Treat  Medical Diagnosis: Chronic right-sided low back pain with right-sided sciatica  Surgical Diagnosis: Not applicable for this Episode   Surgical Date: Not applicable for this Episode  Days Since Last Surgery: Not applicable for this Episode    Visit # / Visits Authorized:  2 / 15  Insurance Authorization Period: 6/20/2025 to 12/31/2025  Date of Evaluation: 6/24/2025  Plan of Care Certification: 6/24/2025 to 8/22/2025      PT/PTA: PTA   Number of PTA visits since last PT visit:1    Time In: 1002   Time Out: 1104  Total Time (in minutes): 62   Total Billable Time (in minutes): 60    FOTO:  Intake Score: 53%  Survey Score 2:  %  Survey Score 3:  %    Precautions:     Standard      Subjective   R LBP 2/10 with B feet numbness. Good response to last session. Was surprised to feel ok the next day after eval. Did a gym routine using all instructions given with good.  Pain reported as 2/10.      Objective            Treatment:  Balance/Neuromuscular Re-Education  NMR 1: EIS w hinging @ irritable segment on EOM,  4 x 10  NMR 2: EIP 2 x 10, 2 x 10 w lock-breathe-sag.  NMR 3: prone hip extensions w 2# ankle weight, 2 x 10 ea, tapping for glutes mm facilitation  NMR 4: prone glute max raises, 2 x 10, tapping for glutes mm facilitation  NMR 6: 1/2 bridging, left and right, 2 x 10 reps  f/b bridging 3 x 10, 3 sec hold  NMR 7: repeat EIS x 10.   Recheck R dorsiflexion - improved strength noted.  NMR 8: Side lying clams lime band 2 x 10,  NMR 9: Standing hip abduction 2 x 10 each  NMR 10: Seated DF  R with lime band 2 x 10  CHARGES BASED ON 1-1 TX:  Time Entry(in minutes):  Neuromuscular Re-Education Time Entry: 60    Assessment & Plan   Assessment: Good response to prior session. Manual cues for increased core an dglute activation with stabilization and strengthening with good correction. Improved pain after session with decreased bilateral foot tingling. Educated pt that he/she may feel soreness after session.    Will benefit from continued physical therapy intervention to progress toward goals set forth in plan of care to improve functional mobility and quality of life.   Evaluation/Treatment Tolerance: Patient tolerated treatment well    The patient will continue to benefit from skilled outpatient physical therapy in order to address the deficits listed in the problem list on the initial evaluation, provide patient and family education, and maximize the patients level of independence in the home and community environments.     The patient's spiritual, cultural, and educational needs were considered, and the patient is agreeable to the plan of care and goals.           Plan: continue to progress extension based therex, postural education and correction, hip and dorsal strengthening, progress HEP    Goals:   Active       A. STG       The patient will begin a written home exercise program  (Met)       Start:  06/24/25    Expected End:  07/15/25    Resolved:  07/08/25         Decrease pain at worst to 4/10  (Progressing)       Start:  06/24/25    Expected End:  07/15/25            Increase hamstring flexibility to 60 degrees  (Progressing)       Start:  06/24/25    Expected End:  07/15/25            Increase right ankle dorsiflexion to 4/5 (Met)       Start:  06/24/25    Expected End:  07/15/25    Resolved:  07/08/25         Increase the patients FOTO score to 57  (Progressing)       Start:  06/24/25    Expected End:  07/15/25               B. LTG        The patient will be independent with a home exercise  program  (Progressing)       Start:  06/24/25    Expected End:  08/22/25            Decrease pain at worst to 2/10  (Progressing)       Start:  06/24/25    Expected End:  08/22/25            Increase hamstring flexibility to 70 degrees  (Progressing)       Start:  06/24/25    Expected End:  08/22/25            Increase right ankle dorsiflexion to 4+/5 (Progressing)       Start:  06/24/25    Expected End:  08/22/25            The patient will be able to ascend/descend 20 step/stairs without onset of symptoms.  (Progressing)       Start:  06/24/25    Expected End:  08/22/25                  Jaqueline Gonzáles, PTA

## 2025-07-10 ENCOUNTER — CLINICAL SUPPORT (OUTPATIENT)
Dept: REHABILITATION | Facility: HOSPITAL | Age: 85
End: 2025-07-10
Payer: MEDICARE

## 2025-07-10 DIAGNOSIS — R29.898 IMPAIRED FLEXIBILITY OF LOWER EXTREMITY: ICD-10-CM

## 2025-07-10 DIAGNOSIS — G89.29 CHRONIC RIGHT-SIDED LOW BACK PAIN WITH RIGHT-SIDED SCIATICA: Primary | ICD-10-CM

## 2025-07-10 DIAGNOSIS — M54.41 CHRONIC RIGHT-SIDED LOW BACK PAIN WITH RIGHT-SIDED SCIATICA: Primary | ICD-10-CM

## 2025-07-10 DIAGNOSIS — R29.898 DECREASED STRENGTH OF LOWER EXTREMITY: ICD-10-CM

## 2025-07-10 PROCEDURE — 97112 NEUROMUSCULAR REEDUCATION: CPT | Mod: PO,CQ

## 2025-07-15 ENCOUNTER — CLINICAL SUPPORT (OUTPATIENT)
Dept: REHABILITATION | Facility: HOSPITAL | Age: 85
End: 2025-07-15
Attending: PHYSICAL MEDICINE & REHABILITATION
Payer: MEDICARE

## 2025-07-15 DIAGNOSIS — R29.898 IMPAIRED FLEXIBILITY OF LOWER EXTREMITY: ICD-10-CM

## 2025-07-15 DIAGNOSIS — M54.41 CHRONIC RIGHT-SIDED LOW BACK PAIN WITH RIGHT-SIDED SCIATICA: Primary | ICD-10-CM

## 2025-07-15 DIAGNOSIS — R29.898 DECREASED STRENGTH OF LOWER EXTREMITY: ICD-10-CM

## 2025-07-15 DIAGNOSIS — G89.29 CHRONIC RIGHT-SIDED LOW BACK PAIN WITH RIGHT-SIDED SCIATICA: Primary | ICD-10-CM

## 2025-07-15 PROCEDURE — 97112 NEUROMUSCULAR REEDUCATION: CPT | Mod: PO,CQ

## 2025-07-15 PROCEDURE — 97110 THERAPEUTIC EXERCISES: CPT | Mod: PO,CQ

## 2025-07-15 NOTE — PROGRESS NOTES
Outpatient Rehab    Physical Therapy Visit    Patient Name: Iftikhar Lynn  MRN: [id number]  YOB: 1940  Encounter Date: 7/15/2025    Therapy Diagnosis:   Encounter Diagnoses   Name Primary?    Chronic right-sided low back pain with right-sided sciatica Yes    Decreased strength of lower extremity     Impaired flexibility of lower extremity      Physician: Zacarias Siegel MD    Physician Orders: Eval and Treat  Medical Diagnosis: Chronic right-sided low back pain with right-sided sciatica  Surgical Diagnosis: Not applicable for this Episode   Surgical Date: Not applicable for this Episode  Days Since Last Surgery: Not applicable for this Episode    Visit # / Visits Authorized:  3 / 15  Insurance Authorization Period: 6/20/2025 to 12/31/2025  Date of Evaluation: 6/24/2025  Plan of Care Certification: 6/24/2025 to 8/22/2025      PT/PTA: PTA   Number of PTA visits since last PT visit:2  Time In: 1000   Time Out: 1100  Total Time (in minutes): 60   Total Billable Time (in minutes): 60    FOTO:  Intake Score: 53%  Survey Score 2: Not applicable for this Episode%  Survey Score 3: Not applicable for this Episode%    Precautions:  Additional Precautions and Protocol Details: Standard      Subjective   Pt reports some thoracic pain today [place]..  Pain reported as 3/10.      Objective            Treatment:  Therapeutic Exercise  TE 1: Nustep UE/LE 10 minutes  Balance/Neuromuscular Re-Education  NMR 1: EIS w hinging @ irritable segment on EOM,  4 x 10  NMR 2: EIP 2 x 10, 2 x 10 w lock-breathe-sag.  NMR 3: open books 15 x each side  NMR 4: prone glute max raises, 2 x 10, tapping for glutes mm facilitation  NMR 5: Resisted lateral walks followed by forward and backward monster walks BTB just above knee 4 laps the length of // bars  NMR 6: 1/2 bridging, left and right, 2 x 10 reps  f/b bridging 3 x 10, 3 sec hold  NMR 8: Side lying clams blue band 2 x 10,  NMR 9:  Sidelying hip abduction 2 x 10 each  NMR 10: Seated DF R with lime band 2 x 10    Time Entry(in minutes):  Neuromuscular Re-Education Time Entry: 50  Therapeutic Exercise Time Entry: 10    Assessment & Plan   Assessment: Patient was able to complete all recommended therapeutic exercises without incident today including several new or advanced exercises. Updated home exercise program was issued.        The patient will continue to benefit from skilled outpatient physical therapy in order to address the deficits listed in the problem list on the initial evaluation, provide patient and family education, and maximize the patients level of independence in the home and community environments.     The patient's spiritual, cultural, and educational needs were considered, and the patient is agreeable to the plan of care and goals.           Plan: continue to progress extension based therex, postural education and correction, hip and dorsal strengthening, progress HEP    Goals:   Active       A. STG       The patient will begin a written home exercise program  (Met)       Start:  06/24/25    Expected End:  07/15/25    Resolved:  07/08/25         Decrease pain at worst to 4/10  (Progressing)       Start:  06/24/25    Expected End:  07/15/25            Increase hamstring flexibility to 60 degrees  (Progressing)       Start:  06/24/25    Expected End:  07/15/25            Increase right ankle dorsiflexion to 4/5 (Met)       Start:  06/24/25    Expected End:  07/15/25    Resolved:  07/08/25         Increase the patients FOTO score to 57  (Progressing)       Start:  06/24/25    Expected End:  07/15/25               B. LTG        The patient will be independent with a home exercise program  (Progressing)       Start:  06/24/25    Expected End:  08/22/25            Decrease pain at worst to 2/10  (Progressing)       Start:  06/24/25    Expected End:  08/22/25            Increase hamstring flexibility to 70 degrees  (Progressing)        Start:  06/24/25    Expected End:  08/22/25            Increase right ankle dorsiflexion to 4+/5 (Progressing)       Start:  06/24/25    Expected End:  08/22/25            The patient will be able to ascend/descend 20 step/stairs without onset of symptoms.  (Progressing)       Start:  06/24/25    Expected End:  08/22/25                Ruchi Jackson, PTA

## 2025-07-15 NOTE — PROGRESS NOTES
***EIS w hinging @ irritable segment on EOM,  4 x 10  NMR 2: EIP 2 x 10, 2 x 10 w lock-breathe-sag.  NMR 3: prone hip extensions w 2# ankle weight, 2 x 10 ea, tapping for glutes mm facilitation  NMR 4: prone glute max raises, 2 x 10, tapping for glutes mm facilitation  NMR 6: 1/2 bridging, left and right, 2 x 10 reps  f/b bridging 3 x 10, 3 sec hold  NMR 7: repeat EIS x 10.   Recheck R dorsiflexion - improved strength noted.  NMR 8: Side lying clams lime band 2 x 10,  NMR 9: Standing hip abduction 2 x 10 each  NMR 10: Seated DF R with lime band 2 x 10***        Outpatient Rehab    Physical Therapy Visit    Patient Name: Iftikhar Lynn  MRN: [id number]  YOB: 1940  Encounter Date: 7/15/2025    Therapy Diagnosis:   No diagnosis found.***      Physician: Zacarias Siegel MD    Physician Orders: Eval and Treat  Medical Diagnosis: Chronic right-sided low back pain with right-sided sciatica  Surgical Diagnosis: Not applicable for this Episode   Surgical Date: Not applicable for this Episode  Days Since Last Surgery: Not applicable for this Episode    Visit # / Visits Authorized:  2 / 15  Insurance Authorization Period: 6/20/2025 to 12/31/2025  Date of Evaluation: 6/24/2025  Plan of Care Certification: 6/24/2025 to 8/22/2025      PT/PTA:     Number of PTA visits since last PT visit: ***    Time In:     Time Out:    Total Time (in minutes):     Total Billable Time (in minutes):      FOTO:  Intake Score:  %  Survey Score 2:  %  Survey Score 3:  %    Precautions:***       Subjective ***            Objective            Treatment:***     CHARGES BASED ON 1-1 TX:  Time Entry(in minutes):***       Assessment & Plan   Assessment:  ***       The patient will continue to benefit from skilled outpatient physical therapy in order to address the deficits listed in the problem list on the initial evaluation, provide patient and family education, and maximize the patients level of independence in the home and  community environments.     The patient's spiritual, cultural, and educational needs were considered, and the patient is agreeable to the plan of care and goals.           Plan:  ***    Goals: ***          Jaqueline Gonzáles PTA

## 2025-07-17 ENCOUNTER — CLINICAL SUPPORT (OUTPATIENT)
Dept: REHABILITATION | Facility: HOSPITAL | Age: 85
End: 2025-07-17
Attending: PHYSICAL MEDICINE & REHABILITATION
Payer: MEDICARE

## 2025-07-17 DIAGNOSIS — M54.41 CHRONIC RIGHT-SIDED LOW BACK PAIN WITH RIGHT-SIDED SCIATICA: Primary | ICD-10-CM

## 2025-07-17 DIAGNOSIS — R29.898 IMPAIRED FLEXIBILITY OF LOWER EXTREMITY: ICD-10-CM

## 2025-07-17 DIAGNOSIS — G89.29 CHRONIC RIGHT-SIDED LOW BACK PAIN WITH RIGHT-SIDED SCIATICA: Primary | ICD-10-CM

## 2025-07-17 DIAGNOSIS — R29.898 DECREASED STRENGTH OF LOWER EXTREMITY: ICD-10-CM

## 2025-07-17 PROCEDURE — 97110 THERAPEUTIC EXERCISES: CPT | Mod: PO,CQ

## 2025-07-17 PROCEDURE — 97112 NEUROMUSCULAR REEDUCATION: CPT | Mod: PO,CQ

## 2025-07-17 NOTE — PROGRESS NOTES
Outpatient Rehab    Physical Therapy Visit    Patient Name: Iftikhar Lynn  MRN: [id number]  YOB: 1940  Encounter Date: 7/17/2025    Therapy Diagnosis:   Encounter Diagnoses   Name Primary?    Chronic right-sided low back pain with right-sided sciatica Yes    Decreased strength of lower extremity     Impaired flexibility of lower extremity        Physician: Zacarias Siegel MD    Physician Orders: Eval and Treat  Medical Diagnosis: Chronic right-sided low back pain with right-sided sciatica  Surgical Diagnosis: Not applicable for this Episode   Surgical Date: Not applicable for this Episode  Days Since Last Surgery: Not applicable for this Episode    Visit # / Visits Authorized:  4 / 15  Insurance Authorization Period: 6/20/2025 to 12/31/2025  Date of Evaluation: 6/24/2025  Plan of Care Certification: 6/24/2025 to 8/22/2025      PT/PTA: PTA   Number of PTA visits since last PT visit:3    Time In: 1000   Time Out: 1132  Total Time (in minutes): 92   Total Billable Time (in minutes): 92    FOTO:  Intake Score: 53%  Survey Score 2:  % FOTO next session if possible  Survey Score 3:  %    Precautions:     Standard      Subjective   Significant stiffness this morning in posterior chain which improved after supine strengthening exercises. Always has some tingling in toes B LE's, R>L. Brought his homework packet with all HEP from the last 9 years, asking for clarification..  Pain reported as 0/10.      Objective            Treatment:  Therapeutic Exercise  TE 1: Nustep UE/LE 10 minutes-Instruction to maintain erect posture and only perform resistance that does not reproduce symptoms.  TE 2: Thorough review of HEP with progressions and updated with hand written noteations for improved ability to identify corrections, stretches, and strengthening/stabilization exercises and removed former exercises that pt was given 9 years ago. Pt is to only perform current HEP for now.  Balance/Neuromuscular  Re-Education  NMR 1: EIS w hinging @ irritable segment on EOM,  4 x 10  NMR 2: EIP 2 x 10, 2 x 10 w lock-breathe-sag.  NMR 3: open books 15 x each side  NMR 4: prone glute max raises, 2 x 10, tapping for glutes mm facilitation  NMR 5: Resisted lateral walks followed by forward and backward monster walks BTB just above knee 4 laps the length of // bars  NMR 6: 1/2 bridging, left and right, 2 x 10 reps  f/b bridging 3 x 10, 3 sec hold  NMR 7: repeat EIS x 10.   Recheck R dorsiflexion - improved strength noted.  NMR 8: Side lying clams purple band 2 x 10,  NMR 9: Sidelying hip abduction 2 x 10 each-stopped 2* difficulty maintaining glute med activation and preventing TFL compensation. Performed standing hip abduction 2 x 10 with cues for keeping foot slightly posterior.  NMR 10: Seated DF R with purple band 2 x 10  CHARGES BASED ON 1-1 TX:  Time Entry(in minutes):  Neuromuscular Re-Education Time Entry: 50  Therapeutic Exercise Time Entry: 42    Assessment & Plan   Assessment: Improved pain. Motivated and eager to progress, requiring extra time for edu re: safe progressions to prevent reproduction of symptoms. Extra time spent with home exercise program education as pt was also referencing HEPs from prior rounds of PT, going back 9 years, some of which are not currently appropriate for pt at this stage of his rehab. Pt had some difficulty with muscle isolation for side lying hip abduction and compensated with tensor fascia latae, so performed in standing with improved isolation. Bilateral foot symptoms wax and wane, but improve with EIS.  Will benefit from continued physical therapy intervention to progress toward goals set forth in plan of care to improve functional mobility and quality of life.   Evaluation/Treatment Tolerance: Patient tolerated treatment well    The patient will continue to benefit from skilled outpatient physical therapy in order to address the deficits listed in the problem list on the initial  evaluation, provide patient and family education, and maximize the patients level of independence in the home and community environments.     The patient's spiritual, cultural, and educational needs were considered, and the patient is agreeable to the plan of care and goals.           Plan: continue to progress extension based therex, postural education and correction, hip and dorsal strengthening, progress HEP    Goals:   Active       A. STG       The patient will begin a written home exercise program  (Met)       Start:  06/24/25    Expected End:  07/15/25    Resolved:  07/08/25         Decrease pain at worst to 4/10  (Progressing)       Start:  06/24/25    Expected End:  07/15/25            Increase hamstring flexibility to 60 degrees  (Progressing)       Start:  06/24/25    Expected End:  07/15/25            Increase right ankle dorsiflexion to 4/5 (Met)       Start:  06/24/25    Expected End:  07/15/25    Resolved:  07/08/25         Increase the patients FOTO score to 57  (Progressing)       Start:  06/24/25    Expected End:  07/15/25               B. LTG        The patient will be independent with a home exercise program  (Progressing)       Start:  06/24/25    Expected End:  08/22/25            Decrease pain at worst to 2/10  (Progressing)       Start:  06/24/25    Expected End:  08/22/25            Increase hamstring flexibility to 70 degrees  (Progressing)       Start:  06/24/25    Expected End:  08/22/25            Increase right ankle dorsiflexion to 4+/5 (Progressing)       Start:  06/24/25    Expected End:  08/22/25            The patient will be able to ascend/descend 20 step/stairs without onset of symptoms.  (Progressing)       Start:  06/24/25    Expected End:  08/22/25                  Jaqueline [place], PTA       FOTO QR code:

## 2025-07-21 NOTE — PROGRESS NOTES
Outpatient Rehab    Physical Therapy Visit    Patient Name: Iftikhar Lynn  MRN: [id number]  YOB: 1940  Encounter Date: 7/22/2025    Therapy Diagnosis:   Encounter Diagnoses   Name Primary?    Chronic right-sided low back pain with right-sided sciatica Yes    Decreased strength of lower extremity     Impaired flexibility of lower extremity          Physician: Zacarias Siegel MD    Physician Orders: Eval and Treat  Medical Diagnosis: Chronic right-sided low back pain with right-sided sciatica  Surgical Diagnosis: Not applicable for this Episode   Surgical Date: Not applicable for this Episode  Days Since Last Surgery: Not applicable for this Episode    Visit # / Visits Authorized:  5 / 15  Insurance Authorization Period: 6/20/2025 to 12/31/2025  Date of Evaluation: 6/24/2025  Plan of Care Certification: 6/24/2025 to 8/22/2025      PT/PTA: PTA   Number of PTA visits since last PT visit:4    Time In: 0702   Time Out: 0806  Total Time (in minutes): 64   Total Billable Time (in minutes): 56    FOTO:  Intake Score: 53%  Survey Score 2: 60% FOTO next session if possible  Survey Score 3:  %    Precautions:     Standard      Subjective   No pain. Continued light tingling in toes B feet. Noticed he has a little difficulty with R LE lifting and management of pedals in his corvette, no issues in his jeep as it is more upright sitting..  Pain reported as 0/10.      Objective            Treatment:  Therapeutic Exercise  TE 1: Nustep UE/LE L4 x 10 minutes-Instruction to maintain erect posture and only perform resistance that does not reproduce symptoms.  TE 2: Progressions added to HEP and reviewed with pt.  TE 3: supine piriformis stretch to opp shoulder 5 x 10s each  Balance/Neuromuscular Re-Education  NMR 1: EIS w hinging @ irritable segment on EOM,  4 x 10  NMR 2: EIP 2 x 10, 2 x 10 w lock-breathe-sag.  NMR 3: open books 15 x each side  NMR 4: qped bird dog x 10,  NMR 5: Resisted lateral walks  followed by (NP)forward and (NP)backward monster walks purple TB just above knee 2 laps across gym  NMR 6: 1/2 bridging, left and right, 2 x 10 reps  f/b bridging with purple band  3 x 10, 3 sec hold  NMR 8: Side lying clams purple band 2 x 10,  NMR 9: Supine core march  x 20  CHARGES BASED ON 1-1 TX:  Time Entry(in minutes):  Neuromuscular Re-Education Time Entry: 32  Therapeutic Exercise Time Entry: 24    Assessment & Plan   Assessment: Improved pain and able to progress with strengthening, so added supine marches to work on hip flexor strength with good response. Progressed home exercise program  with appropriate challenges  and reviewed with pt. Educated pt that he/she may feel soreness after session.    Will benefit from continued physical therapy intervention to progress toward goals set forth in plan of care to improve functional mobility and quality of life.        The patient will continue to benefit from skilled outpatient physical therapy in order to address the deficits listed in the problem list on the initial evaluation, provide patient and family education, and maximize the patients level of independence in the home and community environments.     The patient's spiritual, cultural, and educational needs were considered, and the patient is agreeable to the plan of care and goals.           Plan: continue to progress extension based therex, postural education and correction, hip and dorsal strengthening, progress HEP    Goals:   Active       A. STG       The patient will begin a written home exercise program  (Met)       Start:  06/24/25    Expected End:  07/15/25    Resolved:  07/08/25         Decrease pain at worst to 4/10  (Progressing)       Start:  06/24/25    Expected End:  07/15/25            Increase hamstring flexibility to 60 degrees  (Progressing)       Start:  06/24/25    Expected End:  07/15/25            Increase right ankle dorsiflexion to 4/5 (Met)       Start:  06/24/25    Expected  End:  07/15/25    Resolved:  07/08/25         Increase the patients FOTO score to 57  (Progressing)       Start:  06/24/25    Expected End:  07/15/25               B. LTG        The patient will be independent with a home exercise program  (Progressing)       Start:  06/24/25    Expected End:  08/22/25            Decrease pain at worst to 2/10  (Progressing)       Start:  06/24/25    Expected End:  08/22/25            Increase hamstring flexibility to 70 degrees  (Progressing)       Start:  06/24/25    Expected End:  08/22/25            Increase right ankle dorsiflexion to 4+/5 (Progressing)       Start:  06/24/25    Expected End:  08/22/25            The patient will be able to ascend/descend 20 step/stairs without onset of symptoms.  (Progressing)       Start:  06/24/25    Expected End:  08/22/25                    Jaqueline Eliecer, PTA       FOTO QR code:

## 2025-07-22 ENCOUNTER — CLINICAL SUPPORT (OUTPATIENT)
Dept: REHABILITATION | Facility: HOSPITAL | Age: 85
End: 2025-07-22
Attending: PHYSICAL MEDICINE & REHABILITATION
Payer: MEDICARE

## 2025-07-22 DIAGNOSIS — G89.29 CHRONIC RIGHT-SIDED LOW BACK PAIN WITH RIGHT-SIDED SCIATICA: Primary | ICD-10-CM

## 2025-07-22 DIAGNOSIS — R29.898 IMPAIRED FLEXIBILITY OF LOWER EXTREMITY: ICD-10-CM

## 2025-07-22 DIAGNOSIS — M54.41 CHRONIC RIGHT-SIDED LOW BACK PAIN WITH RIGHT-SIDED SCIATICA: Primary | ICD-10-CM

## 2025-07-22 DIAGNOSIS — R29.898 DECREASED STRENGTH OF LOWER EXTREMITY: ICD-10-CM

## 2025-07-22 PROCEDURE — 97112 NEUROMUSCULAR REEDUCATION: CPT | Mod: PO,CQ

## 2025-07-22 PROCEDURE — 97110 THERAPEUTIC EXERCISES: CPT | Mod: PO,CQ

## 2025-07-25 ENCOUNTER — CLINICAL SUPPORT (OUTPATIENT)
Dept: REHABILITATION | Facility: HOSPITAL | Age: 85
End: 2025-07-25
Attending: PHYSICAL MEDICINE & REHABILITATION
Payer: MEDICARE

## 2025-07-25 DIAGNOSIS — G89.29 CHRONIC RIGHT-SIDED LOW BACK PAIN WITH RIGHT-SIDED SCIATICA: Primary | ICD-10-CM

## 2025-07-25 DIAGNOSIS — R29.898 IMPAIRED FLEXIBILITY OF LOWER EXTREMITY: ICD-10-CM

## 2025-07-25 DIAGNOSIS — R29.898 DECREASED STRENGTH OF LOWER EXTREMITY: ICD-10-CM

## 2025-07-25 DIAGNOSIS — M54.41 CHRONIC RIGHT-SIDED LOW BACK PAIN WITH RIGHT-SIDED SCIATICA: Primary | ICD-10-CM

## 2025-07-25 PROCEDURE — 97110 THERAPEUTIC EXERCISES: CPT | Mod: PO

## 2025-07-25 PROCEDURE — 97112 NEUROMUSCULAR REEDUCATION: CPT | Mod: PO

## 2025-07-25 NOTE — PROGRESS NOTES
Outpatient Rehab    Physical Therapy Progress Note    Patient Name: Iftikhar Lynn  MRN: [id number]  YOB: 1940  Encounter Date: 7/25/2025    Therapy Diagnosis:   Encounter Diagnoses   Name Primary?    Chronic right-sided low back pain with right-sided sciatica Yes    Decreased strength of lower extremity     Impaired flexibility of lower extremity      Physician: Zacarias Siegel MD    Physician Orders: Eval and Treat  Medical Diagnosis: Chronic right-sided low back pain with right-sided sciatica  Surgical Diagnosis: Not applicable for this Episode   Surgical Date: Not applicable for this Episode  Days Since Last Surgery: Not applicable for this Episode    Visit # / Visits Authorized:  6 / 15  Insurance Authorization Period: 6/20/2025 to 12/31/2025  Date of Evaluation: 6/24/2025  Plan of Care Certification: 6/24/2025 to 8/22/2025      Time In: 0953   Time Out: 1053  Total Time (in minutes): 60   Total Billable Time (in minutes): 60     FOTO:  Intake Score (%): 53  Survey Score 2 (%): 60  Survey Score 3 (%): Not applicable for this Episode    Precautions:  Additional Precautions and Protocol Details: Standard    Subjective   Patient reports no pain. Patient reports improvement in lower extremity strength. He is following home exercise program when at gym. He uses the repeated extension exercises to modulate symptoms when the occur..  Pain reported as 0/10.      Objective            Hip Strength - Planes of Motion   Right Strength Right Pain Left Strength Left  Pain   Flexion (L2) 4   4         Knee Strength   Right Strength Right Pain Left Strength Left  Pain   Flexion (S2) 4   5     Extension (L3) 5   5            Ankle/Foot Strength - Planes of Motion   Right Strength Right Pain Left Strength Left  Pain   Dorsiflexion (L4) 3+   4               Treatment:  Therapeutic Exercise  TE 1: Nustep UE/LE L8 x 10 minutes-Instruction to maintain erect posture and only perform resistance that does not  reproduce symptoms.  TE 2: 3 x 10 bridging with abduction against purple theraband  TE 3: Resisted lateral walks followed by (NP)forward and (NP)backward monster walks purple TB just above knee 2 laps across gym  TE 4: 2 x 10 ea leg cable hip extension 3rd rung  TE 5: x 10 ea leg single leg cable RDL 15#  TE 6: x 20 static cable pull down with march 35#  TE 7: x 15 ea side lifts/chops 15#  Balance/Neuromuscular Re-Education  NMR 1: 2 x 10 EIS hips braced at edge of mat  NMR 2: x 20 LTR  NMR 3: x 15 ea side open books  NMR 4: 2 x 10 bird dogs    Time Entry(in minutes):  Neuromuscular Re-Education Time Entry: 30  Therapeutic Exercise Time Entry: 30    Assessment & Plan   Assessment: Patient demonstrated proper technique with repeated extension home exercise program movements. Patient able to tolerate all core and lower extremity strengthening exercise. Patient remains with strength deficits        The patient will continue to benefit from skilled outpatient physical therapy in order to address the deficits listed in the problem list on the initial evaluation, provide patient and family education, and maximize the patients level of independence in the home and community environments.     The patient's spiritual, cultural, and educational needs were considered, and the patient is agreeable to the plan of care and goals.           Plan: continue to progress extension based therex, postural education and correction, hip and dorsal strengthening, progress HEP    Goals:   Active       A. STG       The patient will begin a written home exercise program  (Met)       Start:  06/24/25    Expected End:  07/15/25    Resolved:  07/08/25         Decrease pain at worst to 4/10  (Progressing)       Start:  06/24/25    Expected End:  07/15/25            Increase hamstring flexibility to 60 degrees  (Progressing)       Start:  06/24/25    Expected End:  07/15/25            Increase right ankle dorsiflexion to 4/5 (Met)       Start:   06/24/25    Expected End:  07/15/25    Resolved:  07/08/25         Increase the patients FOTO score to 57  (Met)       Start:  06/24/25    Expected End:  07/15/25    Resolved:  07/25/25            B. LTG        The patient will be independent with a home exercise program  (Progressing)       Start:  06/24/25    Expected End:  08/22/25            Decrease pain at worst to 2/10  (Progressing)       Start:  06/24/25    Expected End:  08/22/25            Increase hamstring flexibility to 70 degrees  (Progressing)       Start:  06/24/25    Expected End:  08/22/25            Increase right ankle dorsiflexion to 4+/5 (Progressing)       Start:  06/24/25    Expected End:  08/22/25            The patient will be able to ascend/descend 20 step/stairs without onset of symptoms.  (Progressing)       Start:  06/24/25    Expected End:  08/22/25                Lowell Iraheta, SPT

## 2025-07-29 ENCOUNTER — CLINICAL SUPPORT (OUTPATIENT)
Dept: REHABILITATION | Facility: HOSPITAL | Age: 85
End: 2025-07-29
Payer: MEDICARE

## 2025-07-29 DIAGNOSIS — M54.41 CHRONIC RIGHT-SIDED LOW BACK PAIN WITH RIGHT-SIDED SCIATICA: Primary | ICD-10-CM

## 2025-07-29 DIAGNOSIS — G89.29 CHRONIC RIGHT-SIDED LOW BACK PAIN WITH RIGHT-SIDED SCIATICA: Primary | ICD-10-CM

## 2025-07-29 DIAGNOSIS — R29.898 DECREASED STRENGTH OF LOWER EXTREMITY: ICD-10-CM

## 2025-07-29 DIAGNOSIS — R29.898 IMPAIRED FLEXIBILITY OF LOWER EXTREMITY: ICD-10-CM

## 2025-07-29 PROCEDURE — 97112 NEUROMUSCULAR REEDUCATION: CPT | Mod: PO,CQ

## 2025-07-29 PROCEDURE — 97110 THERAPEUTIC EXERCISES: CPT | Mod: PO,CQ

## 2025-07-29 NOTE — PROGRESS NOTES
Outpatient Rehab    Physical Therapy Visit    Patient Name: Iftikhar Lynn  MRN: [id number]  YOB: 1940  Encounter Date: 7/29/2025    Therapy Diagnosis:   Encounter Diagnoses   Name Primary?    Chronic right-sided low back pain with right-sided sciatica Yes    Decreased strength of lower extremity     Impaired flexibility of lower extremity            Physician: Zacarias Siegel MD    Physician Orders: Eval and Treat  Medical Diagnosis: Chronic right-sided low back pain with right-sided sciatica  Surgical Diagnosis: Not applicable for this Episode   Surgical Date: Not applicable for this Episode  Days Since Last Surgery: Not applicable for this Episode    Visit # / Visits Authorized:  7 / 15  Insurance Authorization Period: 6/20/2025 to 12/31/2025  Date of Evaluation: 6/24/2025  Plan of Care Certification: 6/24/2025 to 8/22/2025      PT/PTA: PTA   Number of PTA visits since last PT visit:1    Time In: 1001   Time Out: 1102  Total Time (in minutes): 61   Total Billable Time (in minutes): 61    FOTO:  Intake Score:  %  Survey Score 2:  % FOTO next session if possible  Survey Score 3:  %    Precautions:     Standard      Subjective   4-5/10 R>L LBP after stopping activity of lifting and working on solar panels over the weekend. Did OLIVER and able to decrease to 2/10. Pain today is also 2/10..         Objective            Treatment:  Therapeutic Exercise  TE 1: Nustep UE/LE L8 x 10 minutes-Instruction to maintain erect posture and only perform resistance that does not reproduce symptoms.  TE 2: 3 x 10 bridging with abduction against purple theraband  TE 3: Resisted lateral walks followed by forward and backward monster walks purple TB just above knee 2 laps down hallway  Balance/Neuromuscular Re-Education  NMR 1: 2 x 10 EIS hips braced at edge of mat  NMR 4: 2 x 10 bird dogs 2# at ankles  NMR 5: SLR 2# 2 x 10,    Prone extension,   bent over glute maximal 2# x 10,   Standing hip abduction 2 x 10 2#,    RDL 2# x 15,    15 ea side lifts/chops 25#,  CHARGES BASED ON 1-1 TX:  Time Entry(in minutes):  Neuromuscular Re-Education Time Entry: 31  Therapeutic Exercise Time Entry: 30    Assessment & Plan   Assessment: Progressing well with strengthening with minimal manual cues for initial set up and muscle activation and control, but pain resolved after session. . Will benefit from continued physical therapy intervention to progress toward goals set forth in plan of care to improve functional mobility and quality of life.   Evaluation/Treatment Tolerance: Patient tolerated treatment well    The patient will continue to benefit from skilled outpatient physical therapy in order to address the deficits listed in the problem list on the initial evaluation, provide patient and family education, and maximize the patients level of independence in the home and community environments.     The patient's spiritual, cultural, and educational needs were considered, and the patient is agreeable to the plan of care and goals.           Plan: continue to progress extension based therex, postural education and correction, hip and dorsal strengthening, progress HEP    Goals:   Active       A. STG       The patient will begin a written home exercise program  (Met)       Start:  06/24/25    Expected End:  07/15/25    Resolved:  07/08/25         Decrease pain at worst to 4/10  (Progressing)       Start:  06/24/25    Expected End:  07/15/25            Increase hamstring flexibility to 60 degrees  (Progressing)       Start:  06/24/25    Expected End:  07/15/25            Increase right ankle dorsiflexion to 4/5 (Met)       Start:  06/24/25    Expected End:  07/15/25    Resolved:  07/08/25         Increase the patients FOTO score to 57  (Met)       Start:  06/24/25    Expected End:  07/15/25    Resolved:  07/25/25            B. LTG        The patient will be independent with a home exercise program  (Progressing)       Start:  06/24/25     Expected End:  08/22/25            Decrease pain at worst to 2/10  (Progressing)       Start:  06/24/25    Expected End:  08/22/25            Increase hamstring flexibility to 70 degrees  (Progressing)       Start:  06/24/25    Expected End:  08/22/25            Increase right ankle dorsiflexion to 4+/5 (Progressing)       Start:  06/24/25    Expected End:  08/22/25            The patient will be able to ascend/descend 20 step/stairs without onset of symptoms.  (Progressing)       Start:  06/24/25    Expected End:  08/22/25                      Jaqueline Acton, PTA       FOTO QR code:

## 2025-07-30 NOTE — PROGRESS NOTES
Outpatient Rehab    Physical Therapy Visit    Patient Name: Iftikhar Lynn  MRN: [id number]  YOB: 1940  Encounter Date: 7/31/2025    Therapy Diagnosis:   Encounter Diagnoses   Name Primary?    Chronic right-sided low back pain with right-sided sciatica Yes    Decreased strength of lower extremity     Impaired flexibility of lower extremity            Physician: Zacarias Siegel MD    Physician Orders: Eval and Treat  Medical Diagnosis: Chronic right-sided low back pain with right-sided sciatica  Surgical Diagnosis: Not applicable for this Episode   Surgical Date: Not applicable for this Episode  Days Since Last Surgery: Not applicable for this Episode    Visit # / Visits Authorized:  8 / 15  Insurance Authorization Period: 6/20/2025 to 12/31/2025  Date of Evaluation: 6/24/2025  Plan of Care Certification: 6/24/2025 to 8/22/2025      PT/PTA: PTA   Number of PTA visits since last PT visit:2    Time In: 1005   Time Out: 1100  Total Time (in minutes): 55   Total Billable Time (in minutes): 55    FOTO:  Intake Score:  %  Survey Score 2:  % FOTO next session if possible  Survey Score 3:  %    Precautions:     Standard      Subjective   1/10 B LBP today. No pain or soreness from last session. No longer gets leg pain. HEP daily..  Pain reported as 1/10.      Objective            Treatment:  Therapeutic Exercise  TE 1: Nustep UE/LE L8 x 10 minutes-Instruction to maintain erect posture and only perform resistance that does not reproduce symptoms.  Attempted elliptical but stopped after 1.5 minutes 2* pt stated he felt like it may affect his knees. .  TE 2: 2 x 10 bridging with kicks with purple theraband  TE 3: Resisted lateral walks followed by forward and backward monster walks purple TB just above knee 2 laps down hallway  Balance/Neuromuscular Re-Education  NMR 1: 2 x 10 EIS hips braced at edge of mat  NMR 4: 2 x 10 bird dogs 2# at ankles  NMR 5: SLR 2# 2 x 10,   NP- Prone extension,   NP-bent  over glute maximal 2# x 10,   Standing hip abduction 2 x 10 2#,   NP- SL RDL to touch chair 2# x 15,    15 ea side lifts/chops 20#,   Rows 50# 2 x 10  NMR 6: 1/2 bridging, left and right, 2 x 10 reps  f/b bridging with purple band  3 x 10, 3 sec hold  NMR 7: seated thoracic extension with hands behind head x 15  CHARGES BASED ON 1-1 TX:  Time Entry(in minutes):  Neuromuscular Re-Education Time Entry: 30  Therapeutic Exercise Time Entry: 25    Assessment & Plan   Assessment: Good response to previous session and eager to progress. Attempted elliptical but pt started to feel like it may not be good for his knees, so stopped. Progressed postural training with resistance with good response.        The patient will continue to benefit from skilled outpatient physical therapy in order to address the deficits listed in the problem list on the initial evaluation, provide patient and family education, and maximize the patients level of independence in the home and community environments.     The patient's spiritual, cultural, and educational needs were considered, and the patient is agreeable to the plan of care and goals.           Plan: continue to progress extension based therex, postural education and correction, hip and dorsal strengthening, progress HEP    Goals:   Active       A. STG       The patient will begin a written home exercise program  (Met)       Start:  06/24/25    Expected End:  07/15/25    Resolved:  07/08/25         Decrease pain at worst to 4/10  (Progressing)       Start:  06/24/25    Expected End:  07/15/25            Increase hamstring flexibility to 60 degrees  (Progressing)       Start:  06/24/25    Expected End:  07/15/25            Increase right ankle dorsiflexion to 4/5 (Met)       Start:  06/24/25    Expected End:  07/15/25    Resolved:  07/08/25         Increase the patients FOTO score to 57  (Met)       Start:  06/24/25    Expected End:  07/15/25    Resolved:  07/25/25            BBabita REYNOSO         The patient will be independent with a home exercise program  (Progressing)       Start:  06/24/25    Expected End:  08/22/25            Decrease pain at worst to 2/10  (Progressing)       Start:  06/24/25    Expected End:  08/22/25            Increase hamstring flexibility to 70 degrees  (Progressing)       Start:  06/24/25    Expected End:  08/22/25            Increase right ankle dorsiflexion to 4+/5 (Progressing)       Start:  06/24/25    Expected End:  08/22/25            The patient will be able to ascend/descend 20 step/stairs without onset of symptoms.  (Progressing)       Start:  06/24/25    Expected End:  08/22/25                      Jaqueline Maywood, PTA       FOTO QR code:

## 2025-07-31 ENCOUNTER — CLINICAL SUPPORT (OUTPATIENT)
Dept: REHABILITATION | Facility: HOSPITAL | Age: 85
End: 2025-07-31
Payer: MEDICARE

## 2025-07-31 DIAGNOSIS — R29.898 IMPAIRED FLEXIBILITY OF LOWER EXTREMITY: ICD-10-CM

## 2025-07-31 DIAGNOSIS — G89.29 CHRONIC RIGHT-SIDED LOW BACK PAIN WITH RIGHT-SIDED SCIATICA: Primary | ICD-10-CM

## 2025-07-31 DIAGNOSIS — R29.898 DECREASED STRENGTH OF LOWER EXTREMITY: ICD-10-CM

## 2025-07-31 DIAGNOSIS — M54.41 CHRONIC RIGHT-SIDED LOW BACK PAIN WITH RIGHT-SIDED SCIATICA: Primary | ICD-10-CM

## 2025-07-31 PROCEDURE — 97112 NEUROMUSCULAR REEDUCATION: CPT | Mod: PO,CQ

## 2025-07-31 PROCEDURE — 97110 THERAPEUTIC EXERCISES: CPT | Mod: PO,CQ

## 2025-08-05 ENCOUNTER — CLINICAL SUPPORT (OUTPATIENT)
Dept: REHABILITATION | Facility: HOSPITAL | Age: 85
End: 2025-08-05
Attending: PHYSICAL MEDICINE & REHABILITATION
Payer: MEDICARE

## 2025-08-05 DIAGNOSIS — G89.29 CHRONIC RIGHT-SIDED LOW BACK PAIN WITH RIGHT-SIDED SCIATICA: Primary | ICD-10-CM

## 2025-08-05 DIAGNOSIS — R29.898 DECREASED STRENGTH OF LOWER EXTREMITY: ICD-10-CM

## 2025-08-05 DIAGNOSIS — R29.898 IMPAIRED FLEXIBILITY OF LOWER EXTREMITY: ICD-10-CM

## 2025-08-05 DIAGNOSIS — M54.41 CHRONIC RIGHT-SIDED LOW BACK PAIN WITH RIGHT-SIDED SCIATICA: Primary | ICD-10-CM

## 2025-08-05 PROCEDURE — 97530 THERAPEUTIC ACTIVITIES: CPT | Mod: PO

## 2025-08-05 PROCEDURE — 97112 NEUROMUSCULAR REEDUCATION: CPT | Mod: PO

## 2025-08-05 PROCEDURE — 97110 THERAPEUTIC EXERCISES: CPT | Mod: PO

## 2025-08-05 NOTE — PROGRESS NOTES
"      Outpatient Rehab    Physical Therapy Visit    Patient Name: Iftikhar Lynn  MRN: [id number]  YOB: 1940  Encounter Date: 8/5/2025    Therapy Diagnosis:   Encounter Diagnoses   Name Primary?    Chronic right-sided low back pain with right-sided sciatica Yes    Decreased strength of lower extremity     Impaired flexibility of lower extremity            Physician: Zacarias Siegel MD    Physician Orders: Eval and Treat  Medical Diagnosis: Chronic right-sided low back pain with right-sided sciatica  Surgical Diagnosis: Not applicable for this Episode   Surgical Date: Not applicable for this Episode  Days Since Last Surgery: Not applicable for this Episode    Visit # / Visits Authorized:  9 / 15  Insurance Authorization Period: 6/20/2025 to 12/31/2025  Date of Evaluation: 6/24/2025  Plan of Care Certification: 6/24/2025 to 8/22/2025      PT/PTA:     Number of PTA visits since last PT visit:     Time In: 1005   Time Out: 1051  Total Time (in minutes): 46   Total Billable Time (in minutes): 46    FOTO:  Intake Score:  %  Survey Score 2:  % FOTO next session if possible  Survey Score 3:  %    Precautions:       Subjective   Doing well today. Therapy is helping. He went to the gym yesterday and is sore today..         Objective            Treatment:  Therapeutic Exercise  TE 1: Nustep UE/LE L8 x 8 mins  TE 2: Qped thread the needle x 5 ea  TE 4: 2 x 10 ea leg cable hip extension 3rd rung  TE 7: x 15 ea side lifts/chops 20#  Balance/Neuromuscular Re-Education  NMR 1: 2 x 10 EIS hips braced at edge of mat  NMR 2: Paloff press 40#, 3"Hold x 20  NMR 4: 2 x 10 bird dogs 2# at ankles  NMR 5: Rows 50#, cuing for technique  NMR 6: 1/2 bridging, left and right, 2 x 10 reps  f/b bridging with purple band  3 x 10, 3 sec hold- Reviewed  NMR 7: seated thoracic extension with hands behind head x 15  NMR 9: Knee fall out with TA contraction 2x5  Therapeutic Activity  TA 1: Fwd step up with knee  3"Hx 10  TA " 2: Ball squats x 15 / squat to chair x 8  CHARGES BASED ON 1-1 TX:  Time Entry(in minutes):  Neuromuscular Re-Education Time Entry: 13  Therapeutic Activity Time Entry: 10  Therapeutic Exercise Time Entry: 23    Assessment & Plan   Assessment: Patient performed treatment session today with physical and/or verbal input from the clinician. Patient is progressing with rehab. Patient will benefit from continued skilled therapy to maximize functional improvement.  Good tolerance to progressions given with no provocation of symptoms or pain.  Able to progress without complaint or increased symptoms.  Increased challenge to core and gluteal muscles with good response by patient. Patient required supervision and assistance with setup to maximize activation of target muscles and avoid compensation    Evaluation/Treatment Tolerance: Patient tolerated treatment well    The patient will continue to benefit from skilled outpatient physical therapy in order to address the deficits listed in the problem list on the initial evaluation, provide patient and family education, and maximize the patients level of independence in the home and community environments.     The patient's spiritual, cultural, and educational needs were considered, and the patient is agreeable to the plan of care and goals.     Education  Education was done with Patient. The patient's learning style includes Demonstration and Listening. The patient Demonstrates understanding and Verbalizes understanding.         Reviewed HEP with demonstration by patient of 3-5 reps to dem understanding        Plan: Continue to advance patient as tolerated per POC for progress toward LTGs. Nearing DC    Goals:   Active       A. STG       The patient will begin a written home exercise program  (Met)       Start:  06/24/25    Expected End:  07/15/25    Resolved:  07/08/25         Decrease pain at worst to 4/10  (Met)       Start:  06/24/25    Expected End:  07/15/25    Resolved:   08/05/25         Increase hamstring flexibility to 60 degrees  (Progressing)       Start:  06/24/25    Expected End:  07/15/25            Increase right ankle dorsiflexion to 4/5 (Met)       Start:  06/24/25    Expected End:  07/15/25    Resolved:  07/08/25         Increase the patients FOTO score to 57  (Met)       Start:  06/24/25    Expected End:  07/15/25    Resolved:  07/25/25            B. LTG        The patient will be independent with a home exercise program  (Met)       Start:  06/24/25    Expected End:  08/22/25    Resolved:  08/05/25         Decrease pain at worst to 2/10  (Progressing)       Start:  06/24/25    Expected End:  08/22/25            Increase hamstring flexibility to 70 degrees  (Progressing)       Start:  06/24/25    Expected End:  08/22/25            Increase right ankle dorsiflexion to 4+/5 (Progressing)       Start:  06/24/25    Expected End:  08/22/25            The patient will be able to ascend/descend 20 step/stairs without onset of symptoms.  (Progressing)       Start:  06/24/25    Expected End:  08/22/25                      Kyleigh Murillo, PT

## 2025-08-07 ENCOUNTER — CLINICAL SUPPORT (OUTPATIENT)
Dept: REHABILITATION | Facility: HOSPITAL | Age: 85
End: 2025-08-07
Payer: MEDICARE

## 2025-08-07 DIAGNOSIS — R29.898 IMPAIRED FLEXIBILITY OF LOWER EXTREMITY: ICD-10-CM

## 2025-08-07 DIAGNOSIS — G89.29 CHRONIC RIGHT-SIDED LOW BACK PAIN WITH RIGHT-SIDED SCIATICA: Primary | ICD-10-CM

## 2025-08-07 DIAGNOSIS — R29.898 DECREASED STRENGTH OF LOWER EXTREMITY: ICD-10-CM

## 2025-08-07 DIAGNOSIS — M54.41 CHRONIC RIGHT-SIDED LOW BACK PAIN WITH RIGHT-SIDED SCIATICA: Primary | ICD-10-CM

## 2025-08-07 PROCEDURE — 97110 THERAPEUTIC EXERCISES: CPT | Mod: PO,CQ

## 2025-08-07 PROCEDURE — 97112 NEUROMUSCULAR REEDUCATION: CPT | Mod: PO,CQ

## 2025-08-07 PROCEDURE — 97530 THERAPEUTIC ACTIVITIES: CPT | Mod: PO,CQ

## 2025-08-07 NOTE — PROGRESS NOTES
"    Outpatient Rehab    Physical Therapy Visit    Patient Name: Iftikhar Lynn  MRN: [id number]  YOB: 1940  Encounter Date: 8/7/2025    Therapy Diagnosis:   Encounter Diagnoses   Name Primary?    Chronic right-sided low back pain with right-sided sciatica Yes    Decreased strength of lower extremity     Impaired flexibility of lower extremity              Physician: Zacarias Siegel MD    Physician Orders: Eval and Treat  Medical Diagnosis: Chronic right-sided low back pain with right-sided sciatica  Surgical Diagnosis: Not applicable for this Episode   Surgical Date: Not applicable for this Episode  Days Since Last Surgery: Not applicable for this Episode    Visit # / Visits Authorized:  10 / 15  Insurance Authorization Period: 6/20/2025 to 12/31/2025  Date of Evaluation: 6/24/2025  Plan of Care Certification: 6/24/2025 to 8/22/2025      PT/PTA: PTA   Number of PTA visits since last PT visit:1    Time In: 1002   Time Out: 1118  Total Time (in minutes): 76   Total Billable Time (in minutes): 76    FOTO:  Intake Score:  %  Survey Score 2:  %   Survey Score 3:  %    Precautions:     Standard      Subjective   Feeling great and motivated. Did strengthening at the gym yesterday. Wants to review exercises that he can do at the gym to include a floor routine..  Pain reported as 0/10.      Objective            Treatment:  Therapeutic Exercise  TE 1: Elliptical x 8 minutes at level 1  TE 2: Qped thread the needle with opener x 5 ea  TE 7: x 15 ea side lifts/chops 20#-mc for proper excursions and back protection  Balance/Neuromuscular Re-Education  NMR 1: 2 x 10 EIS hips braced at edge of mat-reviewed  NMR 2: Paloff press 40#, 3"Hold x 20-reveiwed-power stance  NMR 4: 2 x 10 bird dogs 2# at [id number] for increased TA activation for control  NMR 5: Rows 50#, cuing for technique-reviewed-use power stance  NMR 6: Resisted bridge with kicks-reviewed.    Modified plank on forearms and knees 3 x 30s, " " SL modified plank on forearm and knees 3 x 10s each.  NMR 9: Knee fall out with TA contraction 2x5  NMR 10: Extensive review of current updated HEP with plan for safe progression. Pt demo a few reps of all exercises to ensure proper form. Manual cues for TA activation and control, glute engagement, and proper weight shifting. UE support for some standing exercises for safety.  Discussed safety in gym and at home and stopping any exercise or activity that increases symptoms.  Issued consolidated handout of exercises he plans to perform at the gym. He does not use pulleys at the gym for rows since there is a row machine, so did not add to HEP. OLIVER to be done several times per day and prn for relief of LB symptoms. To review standing cable LE strengthening next session if time permits.  Therapeutic Activity  TA 1: Fwd step up with knee  3"Hx 10-unilateral UE support for safety  TA 3: Edu re: proper body mechanics with standing chores at home and golfer's lift with manual cues for correct form and weight shifting. Issed lifting handout.  TA 4: Wall squats with cues for depth to comfort for knees only, and discussion re: progressions to prevent exacerbation of knee symptoms to include 1/4 wall sits with progression of holds, and then addition of UE stabilization during sit.  CHARGES BASED ON 1-1 TX:  Time Entry(in minutes):  Neuromuscular Re-Education Time Entry: 31  Therapeutic Activity Time Entry: 30  Therapeutic Exercise Time Entry: 15    Assessment & Plan   Assessment: Remains highly motivated and managing symptoms well, but pt continues to walk with forward trunk flexion at hips with forward head. Able to correct with occasional cues. Review of existing home exercise program exercises with addition of new exercises today with good response and manual cues for TA activation prior to starting exercise and sustaining through excursions. Good response to manul and verbal cues with good return demos. Reminders to " use UE support with forward step up with knee  and lateral step ups 2* very unsteady. Initiated golfer's lift and instruction of body mechanics with household chores. May benefit from body mechanics training with lifting next session if possible. Denied pain after session.  Will benefit from continued physical therapy intervention to progress toward goals set forth in plan of care to improve functional mobility and quality of life.   Evaluation/Treatment Tolerance: Patient tolerated treatment well    The patient will continue to benefit from skilled outpatient physical therapy in order to address the deficits listed in the problem list on the initial evaluation, provide patient and family education, and maximize the patients level of independence in the home and community environments.     The patient's spiritual, cultural, and educational needs were considered, and the patient is agreeable to the plan of care and goals.           Plan: Continue to advance patient as tolerated per POC for progress toward LTGs. Nearing DC    Goals:   Active       A. STG       The patient will begin a written home exercise program  (Met)       Start:  06/24/25    Expected End:  07/15/25    Resolved:  07/08/25         Decrease pain at worst to 4/10  (Met)       Start:  06/24/25    Expected End:  07/15/25    Resolved:  08/05/25         Increase hamstring flexibility to 60 degrees  (Progressing)       Start:  06/24/25    Expected End:  07/15/25            Increase right ankle dorsiflexion to 4/5 (Met)       Start:  06/24/25    Expected End:  07/15/25    Resolved:  07/08/25         Increase the patients FOTO score to 57  (Met)       Start:  06/24/25    Expected End:  07/15/25    Resolved:  07/25/25            B. LTG        The patient will be independent with a home exercise program  (Met)       Start:  06/24/25    Expected End:  08/22/25    Resolved:  08/05/25         Decrease pain at worst to 2/10  (Progressing)       Start:   06/24/25    Expected End:  08/22/25            Increase hamstring flexibility to 70 degrees  (Progressing)       Start:  06/24/25    Expected End:  08/22/25            Increase right ankle dorsiflexion to 4+/5 (Progressing)       Start:  06/24/25    Expected End:  08/22/25            The patient will be able to ascend/descend 20 step/stairs without onset of symptoms.  (Progressing)       Start:  06/24/25    Expected End:  08/22/25                        Jaqueline Auburn, PTA       FOTO QR code:

## 2025-08-11 PROBLEM — M54.41 CHRONIC RIGHT-SIDED LOW BACK PAIN WITH RIGHT-SIDED SCIATICA: Status: RESOLVED | Noted: 2024-04-01 | Resolved: 2025-08-11

## 2025-08-11 PROBLEM — G89.29 CHRONIC RIGHT-SIDED LOW BACK PAIN WITH RIGHT-SIDED SCIATICA: Status: RESOLVED | Noted: 2024-04-01 | Resolved: 2025-08-11

## 2025-08-19 ENCOUNTER — CLINICAL SUPPORT (OUTPATIENT)
Dept: REHABILITATION | Facility: HOSPITAL | Age: 85
End: 2025-08-19
Attending: PHYSICAL MEDICINE & REHABILITATION
Payer: MEDICARE

## 2025-08-19 DIAGNOSIS — R29.898 IMPAIRED FLEXIBILITY OF LOWER EXTREMITY: ICD-10-CM

## 2025-08-19 DIAGNOSIS — R29.898 DECREASED STRENGTH OF LOWER EXTREMITY: Primary | ICD-10-CM

## 2025-08-19 PROCEDURE — 97530 THERAPEUTIC ACTIVITIES: CPT | Mod: PO

## 2025-08-19 PROCEDURE — 97110 THERAPEUTIC EXERCISES: CPT | Mod: PO

## 2025-08-19 PROCEDURE — 97112 NEUROMUSCULAR REEDUCATION: CPT | Mod: PO

## 2025-08-29 ENCOUNTER — TELEPHONE (OUTPATIENT)
Dept: FAMILY MEDICINE | Facility: CLINIC | Age: 85
End: 2025-08-29
Payer: MEDICARE

## 2025-08-29 DIAGNOSIS — F10.10 EXCESSIVE DRINKING OF ALCOHOL: ICD-10-CM

## 2025-08-29 DIAGNOSIS — I10 HYPERTENSION, UNSPECIFIED TYPE: ICD-10-CM

## 2025-08-29 DIAGNOSIS — E78.5 HYPERLIPIDEMIA, UNSPECIFIED HYPERLIPIDEMIA TYPE: ICD-10-CM

## 2025-08-29 DIAGNOSIS — Z00.00 ENCOUNTER FOR PREVENTIVE HEALTH EXAMINATION: Primary | ICD-10-CM

## 2025-08-30 ENCOUNTER — LAB VISIT (OUTPATIENT)
Dept: LAB | Facility: HOSPITAL | Age: 85
End: 2025-08-30
Attending: FAMILY MEDICINE
Payer: MEDICARE

## 2025-08-30 DIAGNOSIS — E78.5 HYPERLIPIDEMIA, UNSPECIFIED HYPERLIPIDEMIA TYPE: ICD-10-CM

## 2025-08-30 DIAGNOSIS — I10 HYPERTENSION, UNSPECIFIED TYPE: ICD-10-CM

## 2025-08-30 DIAGNOSIS — F10.10 EXCESSIVE DRINKING OF ALCOHOL: ICD-10-CM

## 2025-08-30 DIAGNOSIS — Z00.00 ENCOUNTER FOR PREVENTIVE HEALTH EXAMINATION: ICD-10-CM

## 2025-08-30 LAB
ABSOLUTE EOSINOPHIL (OHS): 0.55 K/UL
ABSOLUTE MONOCYTE (OHS): 0.45 K/UL (ref 0.3–1)
ABSOLUTE NEUTROPHIL COUNT (OHS): 2.75 K/UL (ref 1.8–7.7)
ALBUMIN SERPL BCP-MCNC: 4.2 G/DL (ref 3.5–5.2)
ALP SERPL-CCNC: 70 UNIT/L (ref 40–150)
ALT SERPL W/O P-5'-P-CCNC: 24 UNIT/L (ref 0–55)
ANION GAP (OHS): 8 MMOL/L (ref 8–16)
AST SERPL-CCNC: 28 UNIT/L (ref 0–50)
BASOPHILS # BLD AUTO: 0.13 K/UL
BASOPHILS NFR BLD AUTO: 2.2 %
BILIRUB SERPL-MCNC: 0.8 MG/DL (ref 0.1–1)
BUN SERPL-MCNC: 12 MG/DL (ref 8–23)
CALCIUM SERPL-MCNC: 9.3 MG/DL (ref 8.7–10.5)
CHLORIDE SERPL-SCNC: 103 MMOL/L (ref 95–110)
CHOLEST SERPL-MCNC: 162 MG/DL (ref 120–199)
CHOLEST/HDLC SERPL: 2.8 {RATIO} (ref 2–5)
CO2 SERPL-SCNC: 26 MMOL/L (ref 23–29)
CREAT SERPL-MCNC: 0.7 MG/DL (ref 0.5–1.4)
EAG (OHS): 100 MG/DL (ref 68–131)
ERYTHROCYTE [DISTWIDTH] IN BLOOD BY AUTOMATED COUNT: 13.2 % (ref 11.5–14.5)
GFR SERPLBLD CREATININE-BSD FMLA CKD-EPI: >60 ML/MIN/1.73/M2
GLUCOSE SERPL-MCNC: 101 MG/DL (ref 70–110)
HBA1C MFR BLD: 5.1 % (ref 4–5.6)
HCT VFR BLD AUTO: 42.1 % (ref 40–54)
HDLC SERPL-MCNC: 58 MG/DL (ref 40–75)
HDLC SERPL: 35.8 % (ref 20–50)
HGB BLD-MCNC: 13.5 GM/DL (ref 14–18)
IMM GRANULOCYTES # BLD AUTO: 0.02 K/UL (ref 0–0.04)
IMM GRANULOCYTES NFR BLD AUTO: 0.3 % (ref 0–0.5)
LDLC SERPL CALC-MCNC: 90.8 MG/DL (ref 63–159)
LYMPHOCYTES # BLD AUTO: 2.09 K/UL (ref 1–4.8)
MCH RBC QN AUTO: 32.4 PG (ref 27–31)
MCHC RBC AUTO-ENTMCNC: 32.1 G/DL (ref 32–36)
MCV RBC AUTO: 101 FL (ref 82–98)
NONHDLC SERPL-MCNC: 104 MG/DL
NUCLEATED RBC (/100WBC) (OHS): 0 /100 WBC
PLATELET # BLD AUTO: 274 K/UL (ref 150–450)
PMV BLD AUTO: 10.5 FL (ref 9.2–12.9)
POTASSIUM SERPL-SCNC: 4.5 MMOL/L (ref 3.5–5.1)
PROT SERPL-MCNC: 6.6 GM/DL (ref 6–8.4)
RBC # BLD AUTO: 4.17 M/UL (ref 4.6–6.2)
RELATIVE EOSINOPHIL (OHS): 9.2 %
RELATIVE LYMPHOCYTE (OHS): 34.9 % (ref 18–48)
RELATIVE MONOCYTE (OHS): 7.5 % (ref 4–15)
RELATIVE NEUTROPHIL (OHS): 45.9 % (ref 38–73)
SODIUM SERPL-SCNC: 137 MMOL/L (ref 136–145)
TRIGL SERPL-MCNC: 66 MG/DL (ref 30–150)
WBC # BLD AUTO: 5.99 K/UL (ref 3.9–12.7)

## 2025-08-30 PROCEDURE — 36415 COLL VENOUS BLD VENIPUNCTURE: CPT | Mod: PO

## 2025-08-30 PROCEDURE — 80061 LIPID PANEL: CPT

## 2025-08-30 PROCEDURE — 83036 HEMOGLOBIN GLYCOSYLATED A1C: CPT

## 2025-08-30 PROCEDURE — 85025 COMPLETE CBC W/AUTO DIFF WBC: CPT

## 2025-08-30 PROCEDURE — 80053 COMPREHEN METABOLIC PANEL: CPT

## 2025-09-03 ENCOUNTER — OFFICE VISIT (OUTPATIENT)
Dept: FAMILY MEDICINE | Facility: CLINIC | Age: 85
End: 2025-09-03
Payer: MEDICARE

## 2025-09-03 VITALS
SYSTOLIC BLOOD PRESSURE: 128 MMHG | WEIGHT: 170.63 LBS | OXYGEN SATURATION: 96 % | DIASTOLIC BLOOD PRESSURE: 64 MMHG | RESPIRATION RATE: 17 BRPM | BODY MASS INDEX: 23.11 KG/M2 | TEMPERATURE: 98 F | HEART RATE: 79 BPM | HEIGHT: 72 IN

## 2025-09-03 DIAGNOSIS — I49.8 OTHER CARDIAC ARRHYTHMIA: ICD-10-CM

## 2025-09-03 DIAGNOSIS — R79.9 ABNORMAL FINDING OF BLOOD CHEMISTRY, UNSPECIFIED: ICD-10-CM

## 2025-09-03 DIAGNOSIS — R93.5 ABNORMAL US (ULTRASOUND) OF ABDOMEN: Primary | ICD-10-CM

## 2025-09-03 DIAGNOSIS — K63.5 POLYP OF COLON, UNSPECIFIED PART OF COLON, UNSPECIFIED TYPE: ICD-10-CM

## 2025-09-03 DIAGNOSIS — K21.00 GASTROESOPHAGEAL REFLUX DISEASE WITH ESOPHAGITIS WITHOUT HEMORRHAGE: ICD-10-CM

## 2025-09-03 DIAGNOSIS — E78.2 MIXED HYPERLIPIDEMIA: ICD-10-CM

## 2025-09-03 DIAGNOSIS — I10 PRIMARY HYPERTENSION: ICD-10-CM

## 2025-09-03 PROCEDURE — 99214 OFFICE O/P EST MOD 30 MIN: CPT | Mod: PBBFAC,PO | Performed by: FAMILY MEDICINE

## 2025-09-03 PROCEDURE — 99999 PR PBB SHADOW E&M-EST. PATIENT-LVL IV: CPT | Mod: PBBFAC,,, | Performed by: FAMILY MEDICINE

## 2025-09-03 PROCEDURE — 99214 OFFICE O/P EST MOD 30 MIN: CPT | Mod: S$PBB,,, | Performed by: FAMILY MEDICINE

## 2025-09-03 PROCEDURE — G2211 COMPLEX E/M VISIT ADD ON: HCPCS | Mod: ,,, | Performed by: FAMILY MEDICINE

## 2025-09-03 RX ORDER — AMLODIPINE AND VALSARTAN 5; 320 MG/1; MG/1
1 TABLET ORAL DAILY
Qty: 90 TABLET | Refills: 3 | Status: SHIPPED | OUTPATIENT
Start: 2025-09-03

## 2025-09-03 RX ORDER — SIMVASTATIN 40 MG/1
40 TABLET, FILM COATED ORAL NIGHTLY
Qty: 90 TABLET | Refills: 3 | Status: SHIPPED | OUTPATIENT
Start: 2025-09-03 | End: 2025-12-02

## 2025-09-03 RX ORDER — METOPROLOL SUCCINATE 25 MG/1
25 TABLET, EXTENDED RELEASE ORAL 2 TIMES DAILY
Qty: 180 TABLET | Refills: 3 | Status: SHIPPED | OUTPATIENT
Start: 2025-09-03

## (undated) DEVICE — SUT 2-0 VICRYL / SH (J417)

## (undated) DEVICE — GLOVE 8.5 PROTEXIS PI ORTHO

## (undated) DEVICE — SEE MEDLINE ITEM 152622

## (undated) DEVICE — SUT V-LOC 180 2-0 GS-22 9IN

## (undated) DEVICE — STAPLER SKIN ROTATING HEAD

## (undated) DEVICE — SPONGE SUPER KERLIX 6X6.75IN

## (undated) DEVICE — TOWEL OR DISP STRL BLUE 4/PK

## (undated) DEVICE — MANIFOLD 4 PORT

## (undated) DEVICE — EVACUATOR WOUND BULB 100CC

## (undated) DEVICE — DRAPE THYROID WITH ARMBOARD

## (undated) DEVICE — SOL IRR NACL .9% 3000ML

## (undated) DEVICE — ELECTRODE REM PLYHSV RETURN 9

## (undated) DEVICE — SLEEVE SCD EXPRESS KNEE MEDIUM

## (undated) DEVICE — SEE MEDLINE ITEM 146313

## (undated) DEVICE — SOL ELECTROLUBE ANTI-STIC

## (undated) DEVICE — TUBING FLOSTEADY ARTHROSCOPY

## (undated) DEVICE — TRAY CATH 1-LYR URIMTR 16FR

## (undated) DEVICE — SLEEVE SCD EXPRESS CALF MEDIUM

## (undated) DEVICE — TROCAR ENDOPATH XCEL 12X100MM

## (undated) DEVICE — SUT MONOCRYL 4-0 PS-2

## (undated) DEVICE — PACK CUSTOM UNIV BASIN SLI

## (undated) DEVICE — APPLICATOR CHLORAPREP ORN 26ML

## (undated) DEVICE — DRAPE MAJOR ABD 102X122X78IN

## (undated) DEVICE — GOWN POLY REINF BRTH SLV XL

## (undated) DEVICE — SYS LABEL CORRECT MED

## (undated) DEVICE — CONTAINER SPECIMEN STRL 4OZ

## (undated) DEVICE — COVER TIP CURVED SCISSORS XI

## (undated) DEVICE — SEE MEDLINE ITEM 146292

## (undated) DEVICE — PAD PINK TRENDELENBURG POS XL

## (undated) DEVICE — DRESSING N ADH OIL EMUL 3X3

## (undated) DEVICE — NDL SAFETY 21G X 1 1/2 ECLPSE

## (undated) DEVICE — SUT ABS CLIP LAPRA-TY CTD

## (undated) DEVICE — GAUZE SPONGE BULKEE 6X6.75IN

## (undated) DEVICE — Device

## (undated) DEVICE — SYR 10CC LUER LOCK

## (undated) DEVICE — DRAPE STERI INSTRUMENT 1018

## (undated) DEVICE — APPLICATOR SURGICAL ARISTA XL

## (undated) DEVICE — GLOVE 8.5 PROTEXIS PI BLUE

## (undated) DEVICE — NDL PNEUMOPERITONEUM 150MM

## (undated) DEVICE — APPLIER CLIP LIAGCLIP 9.375IN

## (undated) DEVICE — BLADE INCISOR 3.5MM ELITE PLUS

## (undated) DEVICE — SEE MEDLINE ITEM 157117

## (undated) DEVICE — NDL SPINAL 18GX3.5 SPINOCAN

## (undated) DEVICE — SOL CLEARIFY VISUALIZATION LAP

## (undated) DEVICE — BAG TISS RETRV MONARCH 10MM

## (undated) DEVICE — SEE MEDLINE ITEM 157131

## (undated) DEVICE — DRAPE STERI U-SHAPED 47X51IN

## (undated) DEVICE — SKINMARKER W/RULER DEVON

## (undated) DEVICE — DRAIN SIL FLT 7MM FULL PERF ST

## (undated) DEVICE — PAD DERMAPROX XL 22X14X.5IN

## (undated) DEVICE — DRAPE ARM DAVINCI XI

## (undated) DEVICE — DRESSING TRANS 4X4 TEGADERM

## (undated) DEVICE — IRRIGATOR SUCTION W/TIP

## (undated) DEVICE — SET TUBE PNEUMOCLEAR SE HI FLO

## (undated) DEVICE — GLOVE SURG ULTRA TOUCH 7.5

## (undated) DEVICE — SUT CTD VICRYL VIL BR SH 27

## (undated) DEVICE — DRAPE COLUMN DAVINCI XI

## (undated) DEVICE — COVER SURG LIGHT HANDLE

## (undated) DEVICE — GOWN POLY REINF X-LONG XL

## (undated) DEVICE — SCISSOR 5MMX35CM DIRECT DRIVE

## (undated) DEVICE — SEE MEDLINE ITEM 157118

## (undated) DEVICE — GAUZE DRAIN N WVN 6PLY 4X4IN

## (undated) DEVICE — SUT PDS II 0 CT-1 VIL MONO

## (undated) DEVICE — SUT ETHILON 3-0 PS2 18 BLK

## (undated) DEVICE — GLOVE SENSICARE PI GRN 6.5

## (undated) DEVICE — SUT SILK 3-0 BLK BR SH 30IN

## (undated) DEVICE — LINER SUCTION 3000CC

## (undated) DEVICE — PACK ARTHROSCOPY W/ISO BAC

## (undated) DEVICE — PADDING CAST SPECIALIST 6X4YD

## (undated) DEVICE — BLADE SURG CARBON STEEL SZ11

## (undated) DEVICE — PAD CAST SPECIALIST STRL 6

## (undated) DEVICE — PACK SIRUS BASIC V SURG STRL

## (undated) DEVICE — SYR SLIP TIP 10ML SHIELD

## (undated) DEVICE — MAT QUICK 40X30 FLOOR FLUID LF

## (undated) DEVICE — STRAP OR TABLE 5IN X 72IN

## (undated) DEVICE — SEE MEDLINE ITEM 146231

## (undated) DEVICE — SPONGE GAUZE 16PLY 4X4

## (undated) DEVICE — BANDAGE ACE ELASTIC 6"

## (undated) DEVICE — SUT 3-0 VICRYL / SH (J416)

## (undated) DEVICE — SEE MEDLINE ITEM 152487

## (undated) DEVICE — TOURNIQUET SB QC DP 34X4IN

## (undated) DEVICE — TAPE SILK 3IN

## (undated) DEVICE — CLIP HEMO-LOK MLX LARGE LF

## (undated) DEVICE — SEE MEDLINE ITEM 152530

## (undated) DEVICE — DRESSING GAUZE 6PLY 4X4

## (undated) DEVICE — POWDER ARISTA AH 3G

## (undated) DEVICE — NDL BOX COUNTER

## (undated) DEVICE — PAD ABD 8X10 STERILE

## (undated) DEVICE — CUBE COLD THERAPY POLAR CARE

## (undated) DEVICE — SUT 0 VICRYL / CT-1

## (undated) DEVICE — SUT SILK 0 STRANDS 30IN BLK

## (undated) DEVICE — ADHESIVE DERMABOND ADVANCED

## (undated) DEVICE — SEAL UNIVERSAL 5MM-8MM XI

## (undated) DEVICE — GLOVE SENSICARE PI ALOE 6.5

## (undated) DEVICE — CLOSURE SKIN STERI STRIP 1/2X4